# Patient Record
Sex: FEMALE | Race: WHITE | Employment: OTHER | URBAN - METROPOLITAN AREA
[De-identification: names, ages, dates, MRNs, and addresses within clinical notes are randomized per-mention and may not be internally consistent; named-entity substitution may affect disease eponyms.]

---

## 2017-02-06 ENCOUNTER — TELEPHONE (OUTPATIENT)
Dept: CARDIOLOGY CLINIC | Age: 77
End: 2017-02-06

## 2017-02-06 NOTE — TELEPHONE ENCOUNTER
Keiry Rome NP   You Less than a minute ago (3:35 PM)                 Hold 3 days prior (Routing comment)         Will fax copy of this to [address] [place] [address]  and the form they requested to be filled out by EP nurses.

## 2017-02-06 NOTE — TELEPHONE ENCOUNTER
We received faxed from [facility] [phone] stating patient will be having endoscopic procedure 05/11/2017 and wanted to know when to hold aspirin. Please advise.

## 2017-02-20 RX ORDER — ATORVASTATIN CALCIUM 20 MG/1
TABLET, FILM COATED ORAL
Qty: 90 TAB | Refills: 1 | Status: SHIPPED | OUTPATIENT
Start: 2017-02-20

## 2017-02-23 ENCOUNTER — CLINICAL SUPPORT (OUTPATIENT)
Dept: CARDIOLOGY CLINIC | Age: 77
End: 2017-02-23

## 2017-02-23 DIAGNOSIS — Z95.0 PACEMAKER: Primary | ICD-10-CM

## 2017-02-23 DIAGNOSIS — I48.92 ATRIAL FLUTTER, UNSPECIFIED TYPE (HCC): Primary | ICD-10-CM

## 2017-02-23 DIAGNOSIS — I44.2 COMPLETE HEART BLOCK (HCC): ICD-10-CM

## 2017-02-23 DIAGNOSIS — Z95.0 PACEMAKER: ICD-10-CM

## 2017-02-23 NOTE — PROGRESS NOTES
Subjective/HPI:     Kristin Pike is a 78 y.o. female is here for pacemaker f/u appt. The patient denies chest pain/ shortness of breath, orthopnea, PND, LE edema, palpitations, syncope, presyncope or fatigue. Doing well. PCP Provider  Belem Hall MD  Past Medical History:   Diagnosis Date    COPD     BY CHEST XRAY    Early satiety 1/28/2016    Hypertension     Myocardial infarction ([address] 2014    Nausea & vomiting     Occult blood in stools 1/28/2016      Past Surgical History:   Procedure Laterality Date    ABDOMEN SURGERY PROC UNLISTED      inguinal hernia repair right    HX HERNIA REPAIR  11/29/2015    Incarcerated left femoral hernia repair,.  HX PACEMAKER Left 2014     No Known Allergies   Family History   Problem Relation Age of Onset    Heart Disease Mother     Cancer Father      BLADDER    Other Sister      RA    Other Brother      RA    Alcohol abuse Brother       Current Outpatient Prescriptions   Medication Sig    apixaban (ELIQUIS) 5 mg tablet Take 1 Tab by mouth two (2) times a day.  atorvastatin (LIPITOR) 20 mg tablet TAKE 1 TABLET BY MOUTH EVERY DAY    carvedilol (COREG) 12.5 mg tablet Take 1 Tab by mouth two (2) times daily (with meals).  lisinopril (PRINIVIL, ZESTRIL) 5 mg tablet Take 1 Tab by mouth daily.  HYDROcodone-acetaminophen (NORCO) 5-325 mg per tablet Take 1 Tab by mouth as needed. NOT TAKING    Omeprazole delayed release (PRILOSEC D/R) 20 mg tablet Take 1 Tab by mouth daily. Please take 1/2 to 1 hour before a meal.    Please be aware that the use of medications in the proton pump inhibitor class  have been associated with hip fractures, osteoporosis, pneumonia and diarrhea.  aspirin delayed-release 81 mg tablet Take 81 mg by mouth daily.  multivitamin (ONE A DAY) tablet Take 1 Tab by mouth daily. No current facility-administered medications for this visit. There were no vitals filed for this visit.   Social History Social History    Marital status:      Spouse name: N/A    Number of children: N/A    Years of education: N/A     Occupational History    Not on file. Social History Main Topics    Smoking status: Former Smoker     Packs/day: 1.00     Years: 50.00     Types: Cigarettes     Quit date: 9/4/2014    Smokeless tobacco: Never Used    Alcohol use No    Drug use: No    Sexual activity: Not on file     Other Topics Concern    Not on file     Social History Narrative           Review of Symptoms:    General: Pt denies excessive weight gain or loss. Pt is able to conduct ADL's  HEENT: Denies blurred vision, headaches, epistaxis and difficulty swallowing. Respiratory: Denies shortness of breath, BURKETT, wheezing or stridor. Cardiovascular: Denies precordial pain, palpitations, edema or PND  Gastrointestinal: Denies poor appetite, indigestion, abdominal pain or blood in stool  Urinary: Denies dysuria, pyuria  Musculoskeletal: Denies pain or swelling from muscles or joints  Neurologic: Denies tremor, paresthesias, or sensory motor disturbance  Skin: Denies rash, itching or texture change.   Psych: Denies depression      Cardiographics    [employer] pacemaker- 2% A paced  Atrial flutter 9.2% time    Results for orders placed or performed during the hospital encounter of 11/27/15   EKG, 12 LEAD, INITIAL   Result Value Ref Range    Ventricular Rate 75 BPM    Atrial Rate 75 BPM    P-R Interval 172 ms    QRS Duration 76 ms    Q-T Interval 358 ms    QTC Calculation (Bezet) 399 ms    Calculated P Axis 70 degrees    Calculated R Axis 66 degrees    Calculated T Axis 71 degrees    Diagnosis       Poor data quality  Sinus rhythm with premature atrial complexes  Possible Left atrial enlargement  Nonspecific T wave abnormality    Anterolateral injury pattern has resolved since 9/2014  Confirmed by Shahab Gonsalez ([id number]) on 11/29/2015 3:31:04 PM           Cardiology Labs:  Lab Results   Component Value Date/Time Cholesterol, total 183 09/05/2014 04:30 AM    HDL Cholesterol 61 09/05/2014 04:30 AM    LDL, calculated 105.6 09/05/2014 04:30 AM    Triglyceride 82 09/05/2014 04:30 AM    CHOL/HDL Ratio 3.0 09/05/2014 04:30 AM       Lab Results   Component Value Date/Time    Sodium 135 12/13/2015 11:47 AM    Potassium 4.0 12/13/2015 11:47 AM    Chloride 98 12/13/2015 11:47 AM    CO2 32 12/13/2015 11:47 AM    Anion gap 5 12/13/2015 11:47 AM    Glucose 102 12/13/2015 11:47 AM    BUN 6 12/13/2015 11:47 AM    Creatinine 0.60 12/13/2015 11:47 AM    BUN/Creatinine ratio 10 12/13/2015 11:47 AM    GFR est AA >60 12/13/2015 11:47 AM    GFR est non-AA >60 12/13/2015 11:47 AM    Calcium 9.4 12/13/2015 11:47 AM    AST (SGOT) 28 12/13/2015 11:47 AM    Alk. phosphatase 109 12/13/2015 11:47 AM    Protein, total 7.4 12/13/2015 11:47 AM    Albumin 3.4 12/13/2015 11:47 AM    Globulin 4.0 12/13/2015 11:47 AM    A-G Ratio 0.9 12/13/2015 11:47 AM    ALT (SGPT) 38 12/13/2015 11:47 AM           Assessment:     Assessment:     Carolina Duane was seen today for pacemaker check. Diagnoses and all orders for this visit:    Atrial flutter, unspecified type ([address]  -     apixaban (ELIQUIS) 5 mg tablet; Take 1 Tab by mouth two (2) times a day. Complete heart block ([address]    Pacemaker        ICD-10-CM ICD-9-CM    1. Atrial flutter, unspecified type (HCC) I48.92 427.32 apixaban (ELIQUIS) 5 mg tablet   2. Complete heart block (HCC) I44.2 426.0    3. Pacemaker Z95.0 V45.01      Orders Placed This Encounter    apixaban (ELIQUIS) 5 mg tablet     Sig: Take 1 Tab by mouth two (2) times a day. Dispense:  60 Tab     Refill:  6        Plan:     Patient presents today for her pacemaker f/u appt, she denies cardiac complaints. She was found today to have new onset of atrial flutter. She is currently taking Coreg 12.5mg po bid and trends lower normotensive. She is a chads vasc score greater then 2, I will start Eliquis 5mg po bid and continue ASA.  I will have her return to discuss tx options with Dr Fercho Davis, information on atrial flutter ablation given to the patient to review before her appt.     Blanca Parra, NP

## 2017-03-07 ENCOUNTER — OFFICE VISIT (OUTPATIENT)
Dept: CARDIOLOGY CLINIC | Age: 77
End: 2017-03-07

## 2017-03-07 VITALS
OXYGEN SATURATION: 96 % | SYSTOLIC BLOOD PRESSURE: 140 MMHG | BODY MASS INDEX: 18.64 KG/M2 | HEIGHT: 72 IN | RESPIRATION RATE: 14 BRPM | DIASTOLIC BLOOD PRESSURE: 76 MMHG | WEIGHT: 137.6 LBS | HEART RATE: 62 BPM

## 2017-03-07 DIAGNOSIS — I48.0 PAROXYSMAL ATRIAL FIBRILLATION (HCC): Primary | ICD-10-CM

## 2017-03-07 DIAGNOSIS — I48.3 TYPICAL ATRIAL FLUTTER (HCC): ICD-10-CM

## 2017-03-07 DIAGNOSIS — I48.0 PAROXYSMAL ATRIAL FIBRILLATION (HCC): ICD-10-CM

## 2017-03-07 DIAGNOSIS — I10 ESSENTIAL HYPERTENSION: Primary | ICD-10-CM

## 2017-03-07 RX ORDER — ALBUTEROL SULFATE 90 UG/1
AEROSOL, METERED RESPIRATORY (INHALATION)
Refills: 0 | COMMUNITY
Start: 2017-02-09 | End: 2018-02-13

## 2017-03-07 RX ORDER — POLYETHYLENE GLYCOL 3350, SODIUM CHLORIDE, SODIUM BICARBONATE, POTASSIUM CHLORIDE 420; 11.2; 5.72; 1.48 G/4L; G/4L; G/4L; G/4L
POWDER, FOR SOLUTION ORAL
Refills: 0 | COMMUNITY
Start: 2017-02-19 | End: 2017-05-10

## 2017-03-07 NOTE — MR AVS SNAPSHOT
Visit Information Date & Time Provider Department Dept. Phone Encounter #  
 3/7/2017 10:45 AM Ap العلي, [address] [facility] [phone] [id number] Upcoming Health Maintenance Date Due DTaP/Tdap/Td series (1 - Tdap) 12/16/1963 FOBT Q 1 YEAR AGE 50-75 12/16/1992 ZOSTER VACCINE AGE 60> 12/16/2002 GLAUCOMA SCREENING Q2Y 12/16/2007 OSTEOPOROSIS SCREENING (DEXA) 12/16/2007 Pneumococcal 65+ Low/Medium Risk (1 of 2 - PCV13) 12/16/2007 MEDICARE YEARLY EXAM 12/16/2007 INFLUENZA AGE 9 TO ADULT 8/1/2016 Allergies as of 3/7/2017  Review Complete On: 3/7/2017 By: Marty Galvin No Known Allergies Current Immunizations  Reviewed on 11/30/2015 No immunizations on file. Not reviewed this visit You Were Diagnosed With   
  
 Codes Comments Essential hypertension    -  Primary ICD-10-CM: I10 
ICD-9-CM: 401.9 Vitals BP Pulse Resp Height(growth percentile) Weight(growth percentile) SpO2  
 140/76 (BP 1 Location: Right arm, BP Patient Position: Sitting) 62 14 6' (1.829 m) 137 lb 9.6 oz (62.4 kg) 96% BMI OB Status Smoking Status 18.66 kg/m2 Postmenopausal Former Smoker Vitals History BMI and BSA Data Body Mass Index Body Surface Area  
 18.66 kg/m 2 1.78 m 2 Preferred Pharmacy Pharmacy Name Phone [id number] [phone] Your Updated Medication List  
  
   
This list is accurate as of: 3/7/17 11:18 AM.  Always use your most recent med list.  
  
  
  
  
 apixaban 5 mg tablet Commonly known as:  Timmothy [place] Take 1 Tab by mouth two (2) times a day. aspirin delayed-release 81 mg tablet Take 81 mg by mouth daily. atorvastatin 20 mg tablet Commonly known as:  LIPITOR  
TAKE 1 TABLET BY MOUTH EVERY DAY  
  
 carvedilol 12.5 mg tablet Commonly known as:  King Liter  
 Take 1 Tab by mouth two (2) times daily (with meals). HYDROcodone-acetaminophen 5-325 mg per tablet Commonly known as:  [place]aqas Bravo Take 1 Tab by mouth as needed. NOT TAKING  
  
 lisinopril 5 mg tablet Commonly known as:   Take 1 Tab by mouth daily. multivitamin tablet Commonly known as:  ONE A DAY Take 1 Tab by mouth daily. Omeprazole delayed release 20 mg tablet Commonly known as:  PRILOSEC D/R Take 1 Tab by mouth daily. Please take 1/2 to 1 hour before a meal.   Please be aware that the use of medications in the proton pump inhibitor class  have been associated with hip fractures, osteoporosis, pneumonia and diarrhea.  
  
 peg-electrolyte soln 420 gram solution Commonly known as:  NULYTELY  
USE AS DIRECTED VENTOLIN HFA 90 mcg/actuation inhaler Generic drug:  albuterol INHALE 2 PUFFS EVERY 4 HOURS AS NEEDED We Performed the Following AMB POC EKG ROUTINE W/ 12 LEADS, INTER & REP [67148 CPT(R)] Introducing [facility]! Lorena Hardwick introduces [employer] patient portal. Now you can access parts of your medical record, email your doctor's office, and request medication refills online. 1. In your internet browser, go to [email or web address] 2. Click on the First Time User? Click Here link in the Sign In box. You will see the New Member Sign Up page. 3. Enter your [employer] Access Code exactly as it appears below. You will not need to use this code after youve completed the sign-up process. If you do not sign up before the expiration date, you must request a new code. · [employer] Access Code: [email or web address] Expires: 2017 12:46 PM 
 
4. Enter the last four digits of your Social Security Number (xxxx) and Date of Birth (mm/dd/yyyy) as indicated and click Submit. You will be taken to the next sign-up page. 5. Create a [employer] ID.  This will be your [employer] login ID and cannot be changed, so think of one that is secure and easy to remember. 6. Create a [employer] password. You can change your password at any time. 7. Enter your Password Reset Question and Answer. This can be used at a later time if you forget your password. 8. Enter your e-mail address. You will receive e-mail notification when new information is available in [address] 9. Click Sign Up. You can now view and download portions of your medical record. 10. Click the Download Summary menu link to download a portable copy of your medical information. If you have questions, please visit the Frequently Asked Questions section of the [employer] website. Remember, [employer] is NOT to be used for urgent needs. For medical emergencies, dial 911. Now available from your iPhone and Android! Please provide this summary of care documentation to your next provider. Your primary care clinician is listed as Kamille Ricks. If you have any questions after today's visit, please call [phone].

## 2017-03-07 NOTE — PROGRESS NOTES
Subjective:      Odalis Gotti is a 78 y.o. female is here for PAF/PAFL noted on device check. She has palpitations. Patient Active Problem List    Diagnosis Date Noted    Occult blood in stools 01/28/2016    Early satiety 01/28/2016    Femoral hernia with obstruction 11/29/2015    SBO (small bowel obstruction) ([address] 11/28/2015    Pacemaker 09/08/2014    Complete heart block ([address] 09/06/2014    Acute MI, inferolateral wall ([address] 09/04/2014    Hypertension 09/04/2014    Tobacco abuse 09/04/2014    Takotsubo cardiomyopathy 09/04/2014    STEMI (ST elevation myocardial infarction) ([address] 09/04/2014      Gildardo Diaz MD  Past Medical History:   Diagnosis Date    COPD     BY CHEST XRAY    Early satiety 1/28/2016    Hypertension     Myocardial infarction ([address] 2014    Nausea & vomiting     Occult blood in stools 1/28/2016      Past Surgical History:   Procedure Laterality Date    ABDOMEN SURGERY PROC UNLISTED      inguinal hernia repair right    HX HERNIA REPAIR  11/29/2015    Incarcerated left femoral hernia repair,.  HX PACEMAKER Left 2014     No Known Allergies   Family History   Problem Relation Age of Onset    Heart Disease Mother     Cancer Father      BLADDER    Other Sister      RA    Other Brother      RA    Alcohol abuse Brother     negative for cardiac disease  Social History     Social History    Marital status:      Spouse name: N/A    Number of children: N/A    Years of education: N/A     Social History Main Topics    Smoking status: Former Smoker     Packs/day: 1.00     Years: 50.00     Types: Cigarettes     Quit date: 9/4/2014    Smokeless tobacco: Never Used    Alcohol use No    Drug use: No    Sexual activity: Not Asked     Other Topics Concern    None     Social History Narrative     Current Outpatient Prescriptions   Medication Sig    apixaban (ELIQUIS) 5 mg tablet Take 1 Tab by mouth two (2) times a day.     atorvastatin (LIPITOR) 20 mg tablet TAKE 1 TABLET BY MOUTH EVERY DAY    carvedilol (COREG) 12.5 mg tablet Take 1 Tab by mouth two (2) times daily (with meals).  lisinopril (PRINIVIL, ZESTRIL) 5 mg tablet Take 1 Tab by mouth daily.  aspirin delayed-release 81 mg tablet Take 81 mg by mouth daily.  multivitamin (ONE A DAY) tablet Take 1 Tab by mouth daily.  VENTOLIN HFA 90 mcg/actuation inhaler INHALE 2 PUFFS EVERY 4 HOURS AS NEEDED    peg-electrolyte soln (NULYTELY) 420 gram solution USE AS DIRECTED    HYDROcodone-acetaminophen (NORCO) 5-325 mg per tablet Take 1 Tab by mouth as needed. NOT TAKING    Omeprazole delayed release (PRILOSEC D/R) 20 mg tablet Take 1 Tab by mouth daily. Please take 1/2 to 1 hour before a meal.    Please be aware that the use of medications in the proton pump inhibitor class  have been associated with hip fractures, osteoporosis, pneumonia and diarrhea. No current facility-administered medications for this visit. Vitals:    03/07/17 1037   BP: 140/76   Pulse: 62   Resp: 14   SpO2: 96%   Weight: 137 lb 9.6 oz (62.4 kg)   Height: 6' (1.829 m)       I have reviewed the nurses notes, vitals, problem list, allergy list, medical history, family, social history and medications. Review of Symptoms:    General: Pt denies excessive weight gain or loss. Pt is able to conduct ADL's  HEENT: Denies blurred vision, headaches, epistaxis and difficulty swallowing. Respiratory: Denies shortness of breath, BURKETT, wheezing or stridor. Cardiovascular: Denies precordial pain, palpitations, edema or PND  Gastrointestinal: Denies poor appetite, indigestion, abdominal pain or blood in stool  Urinary: Denies dysuria, pyuria  Musculoskeletal: Denies pain or swelling from muscles or joints  Neurologic: Denies tremor, paresthesias, or sensory motor disturbance  Skin: Denies rash, itching or texture change. Psych: Denies depression      Physical Exam:      General: Well developed, in no acute distress.   HEENT: Eyes - PERRL, no jvd  Heart:  Normal S1/S2 negative S3 or S4. Regular, no murmur, gallop or rub.   Respiratory: Clear bilaterally x 4, no wheezing or rales  Abdomen:   Soft, non-tender, bowel sounds are active.   Extremities:  No edema, normal cap refill, no cyanosis. Musculoskeletal: No clubbing  Neuro: A&Ox3, speech clear, gait stable. Skin: Skin color is normal. No rashes or lesions.  Non diaphoretic  Vascular: 2+ pulses symmetric in all extremities    Cardiographics    Ekg: a paced    Pacer - pafib,pafl    Results for orders placed or performed during the hospital encounter of 11/27/15   EKG, 12 LEAD, INITIAL   Result Value Ref Range    Ventricular Rate 75 BPM    Atrial Rate 75 BPM    P-R Interval 172 ms    QRS Duration 76 ms    Q-T Interval 358 ms    QTC Calculation (Bezet) 399 ms    Calculated P Axis 70 degrees    Calculated R Axis 66 degrees    Calculated T Axis 71 degrees    Diagnosis       Poor data quality  Sinus rhythm with premature atrial complexes  Possible Left atrial enlargement  Nonspecific T wave abnormality    Anterolateral injury pattern has resolved since 9/2014  Confirmed by Jerry Lopez ([id number]) on 11/29/2015 3:31:04 PM           Lab Results   Component Value Date/Time    WBC 8.0 12/13/2015 11:47 AM    Hemoglobin (POC) 15.0 09/04/2014 06:16 PM    HGB 13.4 12/13/2015 11:47 AM    Hematocrit (POC) 44 09/04/2014 06:16 PM    HCT 40.9 12/13/2015 11:47 AM    PLATELET 376 96/64/0245 11:47 AM    MCV 90.9 12/13/2015 11:47 AM      Lab Results   Component Value Date/Time    Sodium 135 12/13/2015 11:47 AM    Potassium 4.0 12/13/2015 11:47 AM    Chloride 98 12/13/2015 11:47 AM    CO2 32 12/13/2015 11:47 AM    Anion gap 5 12/13/2015 11:47 AM    Glucose 102 12/13/2015 11:47 AM    BUN 6 12/13/2015 11:47 AM    Creatinine 0.60 12/13/2015 11:47 AM    BUN/Creatinine ratio 10 12/13/2015 11:47 AM    GFR est AA >60 12/13/2015 11:47 AM    GFR est non-AA >60 12/13/2015 11:47 AM    Calcium 9.4 12/13/2015 11:47 AM    Bilirubin, total 0.4 12/13/2015 11:47 AM    AST (SGOT) 28 12/13/2015 11:47 AM    Alk. phosphatase 109 12/13/2015 11:47 AM    Protein, total 7.4 12/13/2015 11:47 AM    Albumin 3.4 12/13/2015 11:47 AM    Globulin 4.0 12/13/2015 11:47 AM    A-G Ratio 0.9 12/13/2015 11:47 AM    ALT (SGPT) 38 12/13/2015 11:47 AM         Assessment:     Assessment:        ICD-10-CM ICD-9-CM    1. Essential hypertension I10 401.9 AMB POC EKG ROUTINE W/ 12 LEADS, INTER & REP   2. Typical atrial flutter (HCC) I48.3 427.32    3. Paroxysmal atrial fibrillation (HCC) I48.0 427.31      Orders Placed This Encounter    AMB POC EKG ROUTINE W/ 12 LEADS, INTER & REP     Order Specific Question:   Reason for Exam:     Answer:   Routine    VENTOLIN HFA 90 mcg/actuation inhaler     Sig: INHALE 2 PUFFS EVERY 4 HOURS AS NEEDED     Refill:  0    peg-electrolyte soln (NULYTELY) 420 gram solution     Sig: USE AS DIRECTED     Refill:  0        Plan:   Ms Linder is a pleasant lady with symptomatic paroxysmal AF and paroxysmal AFL (9.2% of the time). She is a candidate for an afib ablation. I discussed the risks/benefits/alternatives of the procedure with the patient. Risks include (but are not limited to) bleeding, heart block, infection, cva/mi/tamponade/esophageal perforation/pv stenosis/death. The patient understands that there is a 5-9% major complication rate and agrees to proceed. Thank you for this interesting consultation. Continue medical management for htn. Thank you for allowing me to participate in Geoff Lazar 's care.     Aliyah Doherty MD, Ninfa Sarmiento

## 2017-03-13 ENCOUNTER — HOSPITAL ENCOUNTER (OUTPATIENT)
Dept: LAB | Age: 77
Discharge: HOME | End: 2017-03-13
Payer: MEDICARE

## 2017-03-13 PROCEDURE — 36415 COLL VENOUS BLD VENIPUNCTURE: CPT

## 2017-03-13 PROCEDURE — 83735 ASSAY OF MAGNESIUM: CPT

## 2017-03-13 PROCEDURE — 85025 COMPLETE CBC W/AUTO DIFF WBC: CPT

## 2017-03-13 PROCEDURE — 85610 PROTHROMBIN TIME: CPT

## 2017-03-13 PROCEDURE — 80053 COMPREHEN METABOLIC PANEL: CPT

## 2017-03-14 LAB
ALBUMIN SERPL-MCNC: 3.9 G/DL (ref 3.5–4.8)
ALBUMIN/GLOB SERPL: 1.4 {RATIO} (ref 1.2–2.2)
ALP SERPL-CCNC: 92 IU/L (ref 39–117)
ALT SERPL-CCNC: 29 IU/L (ref 0–32)
AST SERPL-CCNC: 33 IU/L (ref 0–40)
BASOPHILS # BLD AUTO: 0 X10E3/UL (ref 0–0.2)
BASOPHILS NFR BLD AUTO: 0 %
BILIRUB SERPL-MCNC: 0.4 MG/DL (ref 0–1.2)
BUN SERPL-MCNC: 11 MG/DL (ref 8–27)
BUN/CREAT SERPL: 17 (ref 11–26)
CALCIUM SERPL-MCNC: 9.7 MG/DL (ref 8.7–10.3)
CHLORIDE SERPL-SCNC: 91 MMOL/L (ref 96–106)
CO2 SERPL-SCNC: 29 MMOL/L (ref 18–29)
CREAT SERPL-MCNC: 0.63 MG/DL (ref 0.57–1)
EOSINOPHIL # BLD AUTO: 0.1 X10E3/UL (ref 0–0.4)
EOSINOPHIL NFR BLD AUTO: 2 %
ERYTHROCYTE [DISTWIDTH] IN BLOOD BY AUTOMATED COUNT: 14.1 % (ref 12.3–15.4)
GLOBULIN SER CALC-MCNC: 2.8 G/DL (ref 1.5–4.5)
GLUCOSE SERPL-MCNC: 104 MG/DL (ref 65–99)
HCT VFR BLD AUTO: 42.5 % (ref 34–46.6)
HGB BLD-MCNC: 13.9 G/DL (ref 11.1–15.9)
IMM GRANULOCYTES # BLD: 0 X10E3/UL (ref 0–0.1)
IMM GRANULOCYTES NFR BLD: 0 %
INR PPP: 1 (ref 0.8–1.2)
LYMPHOCYTES # BLD AUTO: 1.2 X10E3/UL (ref 0.7–3.1)
LYMPHOCYTES NFR BLD AUTO: 23 %
MAGNESIUM SERPL-MCNC: 1.9 MG/DL (ref 1.6–2.3)
MCH RBC QN AUTO: 30.5 PG (ref 26.6–33)
MCHC RBC AUTO-ENTMCNC: 32.7 G/DL (ref 31.5–35.7)
MCV RBC AUTO: 93 FL (ref 79–97)
MONOCYTES # BLD AUTO: 0.6 X10E3/UL (ref 0.1–0.9)
MONOCYTES NFR BLD AUTO: 12 %
NEUTROPHILS # BLD AUTO: 3.4 X10E3/UL (ref 1.4–7)
NEUTROPHILS NFR BLD AUTO: 63 %
PLATELET # BLD AUTO: 178 X10E3/UL (ref 150–379)
POTASSIUM SERPL-SCNC: 4.6 MMOL/L (ref 3.5–5.2)
PROT SERPL-MCNC: 6.7 G/DL (ref 6–8.5)
PROTHROMBIN TIME: 10 SEC (ref 9.1–12)
RBC # BLD AUTO: 4.56 X10E6/UL (ref 3.77–5.28)
SODIUM SERPL-SCNC: 133 MMOL/L (ref 134–144)
WBC # BLD AUTO: 5.3 X10E3/UL (ref 3.4–10.8)

## 2017-03-22 ENCOUNTER — ANESTHESIA (OUTPATIENT)
Dept: NON INVASIVE DIAGNOSTICS | Age: 77
End: 2017-03-22
Payer: MEDICARE

## 2017-03-22 ENCOUNTER — SURGERY (OUTPATIENT)
Age: 77
End: 2017-03-22

## 2017-03-22 ENCOUNTER — HOSPITAL ENCOUNTER (OUTPATIENT)
Dept: NON INVASIVE DIAGNOSTICS | Age: 77
Setting detail: OBSERVATION
Discharge: HOME | End: 2017-03-23
Attending: INTERNAL MEDICINE | Admitting: INTERNAL MEDICINE
Payer: MEDICARE

## 2017-03-22 ENCOUNTER — ANESTHESIA EVENT (OUTPATIENT)
Dept: NON INVASIVE DIAGNOSTICS | Age: 77
End: 2017-03-22
Payer: MEDICARE

## 2017-03-22 LAB
ACT BLD: 142 SECS (ref 79–138)
ACT BLD: 343 SECS (ref 79–138)

## 2017-03-22 PROCEDURE — 77030034850

## 2017-03-22 PROCEDURE — 77030030806 HC PTCH ENSIT NAVX STJU -G

## 2017-03-22 PROCEDURE — 74011000250 HC RX REV CODE- 250

## 2017-03-22 PROCEDURE — 77030013797 HC KT TRNSDUC PRSSR EDWD -A

## 2017-03-22 PROCEDURE — 77030010880 HC CBL EP SUPRME STJU -C

## 2017-03-22 PROCEDURE — C1733 CATH, EP, OTHR THAN COOL-TIP: HCPCS

## 2017-03-22 PROCEDURE — 76060000035 HC ANESTHESIA 2 TO 2.5 HR

## 2017-03-22 PROCEDURE — C1731 CATH, EP, 20 OR MORE ELEC: HCPCS

## 2017-03-22 PROCEDURE — 77030029065 HC DRSG HEMO QCLOT ZMED -B

## 2017-03-22 PROCEDURE — C1730 CATH, EP, 19 OR FEW ELECT: HCPCS

## 2017-03-22 PROCEDURE — 77030013079 HC BLNKT BAIR HGGR 3M -A: Performed by: ANESTHESIOLOGY

## 2017-03-22 PROCEDURE — 77030030278 HC COAX UMB DISP MEDT -C

## 2017-03-22 PROCEDURE — 77030016894 HC CBL EP DX CATH3 STJU -B

## 2017-03-22 PROCEDURE — 77030011640 HC PAD GRND REM COVD -A

## 2017-03-22 PROCEDURE — C1894 INTRO/SHEATH, NON-LASER: HCPCS

## 2017-03-22 PROCEDURE — 99218 HC RM OBSERVATION: CPT

## 2017-03-22 PROCEDURE — 77030020506 HC NDL TRNSPTL NRG BAYL -F

## 2017-03-22 PROCEDURE — 77030020061 HC IV BLD WRMR ADMIN SET 3M -B: Performed by: ANESTHESIOLOGY

## 2017-03-22 PROCEDURE — 93325 DOPPLER ECHO COLOR FLOW MAPG: CPT

## 2017-03-22 PROCEDURE — 77030027138 HC INCENT SPIROMETER -A

## 2017-03-22 PROCEDURE — 77030018729 HC ELECTRD DEFIB PAD CARD -B

## 2017-03-22 PROCEDURE — 74011250636 HC RX REV CODE- 250/636

## 2017-03-22 PROCEDURE — C1769 GUIDE WIRE: HCPCS

## 2017-03-22 PROCEDURE — 74011250636 HC RX REV CODE- 250/636: Performed by: INTERNAL MEDICINE

## 2017-03-22 PROCEDURE — 93613 INTRACARDIAC EPHYS 3D MAPG: CPT

## 2017-03-22 PROCEDURE — 77030026438 HC STYL ET INTUB CARD -A: Performed by: ANESTHESIOLOGY

## 2017-03-22 PROCEDURE — 77030029163 HC CBL EP DX ACHV DISP MEDT -C

## 2017-03-22 PROCEDURE — 77030008543 HC TBNG MON PRSS MRTM -A

## 2017-03-22 PROCEDURE — 77030019908 HC STETH ESOPH SIMS -A: Performed by: ANESTHESIOLOGY

## 2017-03-22 PROCEDURE — C1893 INTRO/SHEATH, FIXED,NON-PEEL: HCPCS

## 2017-03-22 PROCEDURE — 77030021678 HC GLIDESCP STAT DISP VERT -B: Performed by: ANESTHESIOLOGY

## 2017-03-22 PROCEDURE — C1759 CATH, INTRA ECHOCARDIOGRAPHY: HCPCS

## 2017-03-22 PROCEDURE — 77030030261 HC CBL UMB ELEC DISP MEDT -C

## 2017-03-22 PROCEDURE — 76210000006 HC OR PH I REC 0.5 TO 1 HR

## 2017-03-22 PROCEDURE — 74011636320 HC RX REV CODE- 636/320: Performed by: INTERNAL MEDICINE

## 2017-03-22 PROCEDURE — 77030008684 HC TU ET CUF COVD -B: Performed by: ANESTHESIOLOGY

## 2017-03-22 PROCEDURE — 74011250637 HC RX REV CODE- 250/637: Performed by: INTERNAL MEDICINE

## 2017-03-22 PROCEDURE — 85347 COAGULATION TIME ACTIVATED: CPT

## 2017-03-22 RX ORDER — ATORVASTATIN CALCIUM 20 MG/1
20 TABLET, FILM COATED ORAL
Status: DISCONTINUED | OUTPATIENT
Start: 2017-03-22 | End: 2017-03-23 | Stop reason: HOSPADM

## 2017-03-22 RX ORDER — FENTANYL CITRATE 50 UG/ML
12.5-5 INJECTION, SOLUTION INTRAMUSCULAR; INTRAVENOUS
Status: DISCONTINUED | OUTPATIENT
Start: 2017-03-22 | End: 2017-03-23 | Stop reason: HOSPADM

## 2017-03-22 RX ORDER — HEPARIN SODIUM 1000 [USP'U]/ML
INJECTION, SOLUTION INTRAVENOUS; SUBCUTANEOUS
Status: DISCONTINUED | OUTPATIENT
Start: 2017-03-22 | End: 2017-03-22 | Stop reason: HOSPADM

## 2017-03-22 RX ORDER — SODIUM CHLORIDE, SODIUM LACTATE, POTASSIUM CHLORIDE, CALCIUM CHLORIDE 600; 310; 30; 20 MG/100ML; MG/100ML; MG/100ML; MG/100ML
100 INJECTION, SOLUTION INTRAVENOUS CONTINUOUS
Status: CANCELLED | OUTPATIENT
Start: 2017-03-22 | End: 2017-03-23

## 2017-03-22 RX ORDER — SODIUM CHLORIDE 9 MG/ML
INJECTION, SOLUTION INTRAVENOUS
Status: DISCONTINUED | OUTPATIENT
Start: 2017-03-22 | End: 2017-03-22 | Stop reason: HOSPADM

## 2017-03-22 RX ORDER — LISINOPRIL 5 MG/1
5 TABLET ORAL DAILY
Status: DISCONTINUED | OUTPATIENT
Start: 2017-03-23 | End: 2017-03-23 | Stop reason: HOSPADM

## 2017-03-22 RX ORDER — HYDROCODONE BITARTRATE AND ACETAMINOPHEN 5; 325 MG/1; MG/1
1 TABLET ORAL AS NEEDED
Status: CANCELLED | OUTPATIENT
Start: 2017-03-22

## 2017-03-22 RX ORDER — MIDAZOLAM HYDROCHLORIDE 1 MG/ML
1-5 INJECTION, SOLUTION INTRAMUSCULAR; INTRAVENOUS
Status: DISCONTINUED | OUTPATIENT
Start: 2017-03-22 | End: 2017-03-23 | Stop reason: HOSPADM

## 2017-03-22 RX ORDER — PROPOFOL 10 MG/ML
INJECTION, EMULSION INTRAVENOUS
Status: DISCONTINUED | OUTPATIENT
Start: 2017-03-22 | End: 2017-03-22 | Stop reason: HOSPADM

## 2017-03-22 RX ORDER — FENTANYL CITRATE 50 UG/ML
INJECTION, SOLUTION INTRAMUSCULAR; INTRAVENOUS AS NEEDED
Status: DISCONTINUED | OUTPATIENT
Start: 2017-03-22 | End: 2017-03-22 | Stop reason: HOSPADM

## 2017-03-22 RX ORDER — ACETAMINOPHEN 325 MG/1
650 TABLET ORAL
Status: DISCONTINUED | OUTPATIENT
Start: 2017-03-22 | End: 2017-03-23 | Stop reason: HOSPADM

## 2017-03-22 RX ORDER — PROTAMINE SULFATE 10 MG/ML
25-100 INJECTION, SOLUTION INTRAVENOUS
Status: DISCONTINUED | OUTPATIENT
Start: 2017-03-22 | End: 2017-03-23 | Stop reason: HOSPADM

## 2017-03-22 RX ORDER — DOBUTAMINE HYDROCHLORIDE 200 MG/100ML
INJECTION INTRAVENOUS
Status: DISCONTINUED | OUTPATIENT
Start: 2017-03-22 | End: 2017-03-22 | Stop reason: HOSPADM

## 2017-03-22 RX ORDER — HEPARIN SODIUM 1000 [USP'U]/ML
INJECTION, SOLUTION INTRAVENOUS; SUBCUTANEOUS
Status: DISCONTINUED
Start: 2017-03-22 | End: 2017-03-23 | Stop reason: HOSPADM

## 2017-03-22 RX ORDER — MIDAZOLAM HYDROCHLORIDE 1 MG/ML
INJECTION, SOLUTION INTRAMUSCULAR; INTRAVENOUS AS NEEDED
Status: DISCONTINUED | OUTPATIENT
Start: 2017-03-22 | End: 2017-03-22 | Stop reason: HOSPADM

## 2017-03-22 RX ORDER — FENTANYL CITRATE 50 UG/ML
INJECTION, SOLUTION INTRAMUSCULAR; INTRAVENOUS
Status: DISPENSED
Start: 2017-03-22 | End: 2017-03-22

## 2017-03-22 RX ORDER — DIPHENHYDRAMINE HYDROCHLORIDE 50 MG/ML
12.5 INJECTION, SOLUTION INTRAMUSCULAR; INTRAVENOUS AS NEEDED
Status: CANCELLED | OUTPATIENT
Start: 2017-03-22 | End: 2017-03-22

## 2017-03-22 RX ORDER — DOBUTAMINE HYDROCHLORIDE 200 MG/100ML
INJECTION INTRAVENOUS
Status: DISCONTINUED
Start: 2017-03-22 | End: 2017-03-23 | Stop reason: HOSPADM

## 2017-03-22 RX ORDER — LIDOCAINE HYDROCHLORIDE 20 MG/ML
INJECTION, SOLUTION EPIDURAL; INFILTRATION; INTRACAUDAL; PERINEURAL AS NEEDED
Status: DISCONTINUED | OUTPATIENT
Start: 2017-03-22 | End: 2017-03-22 | Stop reason: HOSPADM

## 2017-03-22 RX ORDER — SUCCINYLCHOLINE CHLORIDE 20 MG/ML
INJECTION INTRAMUSCULAR; INTRAVENOUS AS NEEDED
Status: DISCONTINUED | OUTPATIENT
Start: 2017-03-22 | End: 2017-03-22 | Stop reason: HOSPADM

## 2017-03-22 RX ORDER — MIDAZOLAM HYDROCHLORIDE 1 MG/ML
0.5 INJECTION, SOLUTION INTRAMUSCULAR; INTRAVENOUS
Status: CANCELLED | OUTPATIENT
Start: 2017-03-22

## 2017-03-22 RX ORDER — SODIUM CHLORIDE 0.9 % (FLUSH) 0.9 %
5-10 SYRINGE (ML) INJECTION EVERY 8 HOURS
Status: DISCONTINUED | OUTPATIENT
Start: 2017-03-22 | End: 2017-03-23 | Stop reason: HOSPADM

## 2017-03-22 RX ORDER — SODIUM CHLORIDE, SODIUM LACTATE, POTASSIUM CHLORIDE, CALCIUM CHLORIDE 600; 310; 30; 20 MG/100ML; MG/100ML; MG/100ML; MG/100ML
INJECTION, SOLUTION INTRAVENOUS
Status: DISCONTINUED | OUTPATIENT
Start: 2017-03-22 | End: 2017-03-22 | Stop reason: HOSPADM

## 2017-03-22 RX ORDER — MIDAZOLAM HYDROCHLORIDE 1 MG/ML
1 INJECTION, SOLUTION INTRAMUSCULAR; INTRAVENOUS AS NEEDED
Status: CANCELLED | OUTPATIENT
Start: 2017-03-22

## 2017-03-22 RX ORDER — PROPOFOL 10 MG/ML
INJECTION, EMULSION INTRAVENOUS AS NEEDED
Status: DISCONTINUED | OUTPATIENT
Start: 2017-03-22 | End: 2017-03-22 | Stop reason: HOSPADM

## 2017-03-22 RX ORDER — FENTANYL CITRATE 50 UG/ML
25 INJECTION, SOLUTION INTRAMUSCULAR; INTRAVENOUS
Status: CANCELLED | OUTPATIENT
Start: 2017-03-22

## 2017-03-22 RX ORDER — HEPARIN SODIUM 200 [USP'U]/100ML
2000 INJECTION, SOLUTION INTRAVENOUS ONCE
Status: COMPLETED | OUTPATIENT
Start: 2017-03-22 | End: 2017-03-22

## 2017-03-22 RX ORDER — DEXAMETHASONE SODIUM PHOSPHATE 100 MG/10ML
INJECTION INTRAMUSCULAR; INTRAVENOUS AS NEEDED
Status: DISCONTINUED | OUTPATIENT
Start: 2017-03-22 | End: 2017-03-22 | Stop reason: HOSPADM

## 2017-03-22 RX ORDER — FENTANYL CITRATE 50 UG/ML
50 INJECTION, SOLUTION INTRAMUSCULAR; INTRAVENOUS AS NEEDED
Status: CANCELLED | OUTPATIENT
Start: 2017-03-22

## 2017-03-22 RX ORDER — SODIUM CHLORIDE 0.9 % (FLUSH) 0.9 %
5-10 SYRINGE (ML) INJECTION AS NEEDED
Status: DISCONTINUED | OUTPATIENT
Start: 2017-03-22 | End: 2017-03-23 | Stop reason: HOSPADM

## 2017-03-22 RX ORDER — ASPIRIN 81 MG/1
81 TABLET ORAL DAILY
Status: DISCONTINUED | OUTPATIENT
Start: 2017-03-23 | End: 2017-03-23 | Stop reason: HOSPADM

## 2017-03-22 RX ORDER — ROCURONIUM BROMIDE 10 MG/ML
INJECTION, SOLUTION INTRAVENOUS AS NEEDED
Status: DISCONTINUED | OUTPATIENT
Start: 2017-03-22 | End: 2017-03-22 | Stop reason: HOSPADM

## 2017-03-22 RX ORDER — HEPARIN SODIUM 1000 [USP'U]/ML
30000 INJECTION, SOLUTION INTRAVENOUS; SUBCUTANEOUS ONCE
Status: DISCONTINUED | OUTPATIENT
Start: 2017-03-22 | End: 2017-03-22 | Stop reason: HOSPADM

## 2017-03-22 RX ORDER — HYDROCODONE BITARTRATE AND ACETAMINOPHEN 5; 325 MG/1; MG/1
1 TABLET ORAL
Status: DISCONTINUED | OUTPATIENT
Start: 2017-03-22 | End: 2017-03-23 | Stop reason: HOSPADM

## 2017-03-22 RX ORDER — HEPARIN SODIUM 1000 [USP'U]/ML
INJECTION, SOLUTION INTRAVENOUS; SUBCUTANEOUS AS NEEDED
Status: DISCONTINUED | OUTPATIENT
Start: 2017-03-22 | End: 2017-03-22 | Stop reason: HOSPADM

## 2017-03-22 RX ORDER — HEPARIN SODIUM 10000 [USP'U]/100ML
INJECTION, SOLUTION INTRAVENOUS
Status: DISCONTINUED
Start: 2017-03-22 | End: 2017-03-23 | Stop reason: HOSPADM

## 2017-03-22 RX ORDER — MIDAZOLAM HYDROCHLORIDE 1 MG/ML
INJECTION, SOLUTION INTRAMUSCULAR; INTRAVENOUS
Status: DISPENSED
Start: 2017-03-22 | End: 2017-03-22

## 2017-03-22 RX ORDER — LIDOCAINE HYDROCHLORIDE 10 MG/ML
0.1 INJECTION, SOLUTION EPIDURAL; INFILTRATION; INTRACAUDAL; PERINEURAL AS NEEDED
Status: CANCELLED | OUTPATIENT
Start: 2017-03-22

## 2017-03-22 RX ORDER — PANTOPRAZOLE SODIUM 40 MG/1
40 TABLET, DELAYED RELEASE ORAL
Status: DISCONTINUED | OUTPATIENT
Start: 2017-03-22 | End: 2017-03-23 | Stop reason: HOSPADM

## 2017-03-22 RX ORDER — HYDROMORPHONE HYDROCHLORIDE 1 MG/ML
0.5 INJECTION, SOLUTION INTRAMUSCULAR; INTRAVENOUS; SUBCUTANEOUS
Status: CANCELLED | OUTPATIENT
Start: 2017-03-22

## 2017-03-22 RX ORDER — PROTAMINE SULFATE 10 MG/ML
INJECTION, SOLUTION INTRAVENOUS
Status: DISCONTINUED
Start: 2017-03-22 | End: 2017-03-23 | Stop reason: HOSPADM

## 2017-03-22 RX ORDER — PROTAMINE SULFATE 10 MG/ML
INJECTION, SOLUTION INTRAVENOUS AS NEEDED
Status: DISCONTINUED | OUTPATIENT
Start: 2017-03-22 | End: 2017-03-22 | Stop reason: HOSPADM

## 2017-03-22 RX ORDER — CARVEDILOL 12.5 MG/1
12.5 TABLET ORAL 2 TIMES DAILY WITH MEALS
Status: DISCONTINUED | OUTPATIENT
Start: 2017-03-22 | End: 2017-03-23 | Stop reason: HOSPADM

## 2017-03-22 RX ORDER — ONDANSETRON 2 MG/ML
4 INJECTION INTRAMUSCULAR; INTRAVENOUS AS NEEDED
Status: CANCELLED | OUTPATIENT
Start: 2017-03-22

## 2017-03-22 RX ADMIN — PROTAMINE SULFATE 50 MG: 10 INJECTION, SOLUTION INTRAVENOUS at 11:51

## 2017-03-22 RX ADMIN — DOBUTAMINE HYDROCHLORIDE 20 MCG/KG/MIN: 200 INJECTION INTRAVENOUS at 11:40

## 2017-03-22 RX ADMIN — HEPARIN SODIUM 4000 UNITS/HR: 1000 INJECTION, SOLUTION INTRAVENOUS; SUBCUTANEOUS at 10:39

## 2017-03-22 RX ADMIN — MIDAZOLAM HYDROCHLORIDE 2 MG: 1 INJECTION, SOLUTION INTRAMUSCULAR; INTRAVENOUS at 10:17

## 2017-03-22 RX ADMIN — PROPOFOL 75 MCG/KG/MIN: 10 INJECTION, EMULSION INTRAVENOUS at 10:20

## 2017-03-22 RX ADMIN — PROTAMINE SULFATE 50 MG: 10 INJECTION, SOLUTION INTRAVENOUS at 11:50

## 2017-03-22 RX ADMIN — ROCURONIUM BROMIDE 10 MG: 10 INJECTION, SOLUTION INTRAVENOUS at 10:19

## 2017-03-22 RX ADMIN — SODIUM CHLORIDE: 9 INJECTION, SOLUTION INTRAVENOUS at 12:13

## 2017-03-22 RX ADMIN — HYDROCODONE BITARTRATE AND ACETAMINOPHEN 1 TABLET: 5; 325 TABLET ORAL at 13:58

## 2017-03-22 RX ADMIN — DEXAMETHASONE SODIUM PHOSPHATE 5 MG: 100 INJECTION INTRAMUSCULAR; INTRAVENOUS at 10:24

## 2017-03-22 RX ADMIN — SUCCINYLCHOLINE CHLORIDE 140 MG: 20 INJECTION INTRAMUSCULAR; INTRAVENOUS at 10:19

## 2017-03-22 RX ADMIN — ATORVASTATIN CALCIUM 20 MG: 20 TABLET, FILM COATED ORAL at 20:50

## 2017-03-22 RX ADMIN — HEPARIN SODIUM 4000 UNITS: 200 INJECTION, SOLUTION INTRAVENOUS at 11:02

## 2017-03-22 RX ADMIN — FENTANYL CITRATE 100 MCG: 50 INJECTION, SOLUTION INTRAMUSCULAR; INTRAVENOUS at 10:19

## 2017-03-22 RX ADMIN — LIDOCAINE HYDROCHLORIDE 20 MG: 20 INJECTION, SOLUTION EPIDURAL; INFILTRATION; INTRACAUDAL; PERINEURAL at 10:19

## 2017-03-22 RX ADMIN — IOPAMIDOL 50 ML: 755 INJECTION, SOLUTION INTRAVENOUS at 11:05

## 2017-03-22 RX ADMIN — SODIUM CHLORIDE, SODIUM LACTATE, POTASSIUM CHLORIDE, CALCIUM CHLORIDE: 600; 310; 30; 20 INJECTION, SOLUTION INTRAVENOUS at 10:16

## 2017-03-22 RX ADMIN — PANTOPRAZOLE SODIUM 40 MG: 40 TABLET, DELAYED RELEASE ORAL at 18:14

## 2017-03-22 RX ADMIN — PROPOFOL 150 MG: 10 INJECTION, EMULSION INTRAVENOUS at 10:19

## 2017-03-22 RX ADMIN — Medication 10 ML: at 20:50

## 2017-03-22 RX ADMIN — CARVEDILOL 12.5 MG: 12.5 TABLET, FILM COATED ORAL at 18:14

## 2017-03-22 RX ADMIN — HEPARIN SODIUM 12000 UNITS: 1000 INJECTION, SOLUTION INTRAVENOUS; SUBCUTANEOUS at 10:31

## 2017-03-22 RX ADMIN — APIXABAN 5 MG: 5 TABLET, FILM COATED ORAL at 18:14

## 2017-03-22 NOTE — H&P (VIEW-ONLY)
Subjective:      Geoff Lazar is a 78 y.o. female is here for PAF/PAFL noted on device check. She has palpitations. Patient Active Problem List    Diagnosis Date Noted    Occult blood in stools 01/28/2016    Early satiety 01/28/2016    Femoral hernia with obstruction 11/29/2015    SBO (small bowel obstruction) ([address] 11/28/2015    Pacemaker 09/08/2014    Complete heart block ([address] 09/06/2014    Acute MI, inferolateral wall ([address] 09/04/2014    Hypertension 09/04/2014    Tobacco abuse 09/04/2014    Takotsubo cardiomyopathy 09/04/2014    STEMI (ST elevation myocardial infarction) ([address] 09/04/2014      Samantha Yanez MD  Past Medical History:   Diagnosis Date    COPD     BY CHEST XRAY    Early satiety 1/28/2016    Hypertension     Myocardial infarction ([address] 2014    Nausea & vomiting     Occult blood in stools 1/28/2016      Past Surgical History:   Procedure Laterality Date    ABDOMEN SURGERY PROC UNLISTED      inguinal hernia repair right    HX HERNIA REPAIR  11/29/2015    Incarcerated left femoral hernia repair,.  HX PACEMAKER Left 2014     No Known Allergies   Family History   Problem Relation Age of Onset    Heart Disease Mother     Cancer Father      BLADDER    Other Sister      RA    Other Brother      RA    Alcohol abuse Brother     negative for cardiac disease  Social History     Social History    Marital status:      Spouse name: N/A    Number of children: N/A    Years of education: N/A     Social History Main Topics    Smoking status: Former Smoker     Packs/day: 1.00     Years: 50.00     Types: Cigarettes     Quit date: 9/4/2014    Smokeless tobacco: Never Used    Alcohol use No    Drug use: No    Sexual activity: Not Asked     Other Topics Concern    None     Social History Narrative     Current Outpatient Prescriptions   Medication Sig    apixaban (ELIQUIS) 5 mg tablet Take 1 Tab by mouth two (2) times a day.     atorvastatin (LIPITOR) 20 mg tablet TAKE 1 TABLET BY MOUTH EVERY DAY    carvedilol (COREG) 12.5 mg tablet Take 1 Tab by mouth two (2) times daily (with meals).  lisinopril (PRINIVIL, ZESTRIL) 5 mg tablet Take 1 Tab by mouth daily.  aspirin delayed-release 81 mg tablet Take 81 mg by mouth daily.  multivitamin (ONE A DAY) tablet Take 1 Tab by mouth daily.  VENTOLIN HFA 90 mcg/actuation inhaler INHALE 2 PUFFS EVERY 4 HOURS AS NEEDED    peg-electrolyte soln (NULYTELY) 420 gram solution USE AS DIRECTED    HYDROcodone-acetaminophen (NORCO) 5-325 mg per tablet Take 1 Tab by mouth as needed. NOT TAKING    Omeprazole delayed release (PRILOSEC D/R) 20 mg tablet Take 1 Tab by mouth daily. Please take 1/2 to 1 hour before a meal.    Please be aware that the use of medications in the proton pump inhibitor class  have been associated with hip fractures, osteoporosis, pneumonia and diarrhea. No current facility-administered medications for this visit. Vitals:    03/07/17 1037   BP: 140/76   Pulse: 62   Resp: 14   SpO2: 96%   Weight: 137 lb 9.6 oz (62.4 kg)   Height: 6' (1.829 m)       I have reviewed the nurses notes, vitals, problem list, allergy list, medical history, family, social history and medications. Review of Symptoms:    General: Pt denies excessive weight gain or loss. Pt is able to conduct ADL's  HEENT: Denies blurred vision, headaches, epistaxis and difficulty swallowing. Respiratory: Denies shortness of breath, BURKETT, wheezing or stridor. Cardiovascular: Denies precordial pain, palpitations, edema or PND  Gastrointestinal: Denies poor appetite, indigestion, abdominal pain or blood in stool  Urinary: Denies dysuria, pyuria  Musculoskeletal: Denies pain or swelling from muscles or joints  Neurologic: Denies tremor, paresthesias, or sensory motor disturbance  Skin: Denies rash, itching or texture change. Psych: Denies depression      Physical Exam:      General: Well developed, in no acute distress.   HEENT: Eyes - PERRL, no jvd  Heart:  Normal S1/S2 negative S3 or S4. Regular, no murmur, gallop or rub.   Respiratory: Clear bilaterally x 4, no wheezing or rales  Abdomen:   Soft, non-tender, bowel sounds are active.   Extremities:  No edema, normal cap refill, no cyanosis. Musculoskeletal: No clubbing  Neuro: A&Ox3, speech clear, gait stable. Skin: Skin color is normal. No rashes or lesions.  Non diaphoretic  Vascular: 2+ pulses symmetric in all extremities    Cardiographics    Ekg: a paced    Pacer - pafib,pafl    Results for orders placed or performed during the hospital encounter of 11/27/15   EKG, 12 LEAD, INITIAL   Result Value Ref Range    Ventricular Rate 75 BPM    Atrial Rate 75 BPM    P-R Interval 172 ms    QRS Duration 76 ms    Q-T Interval 358 ms    QTC Calculation (Bezet) 399 ms    Calculated P Axis 70 degrees    Calculated R Axis 66 degrees    Calculated T Axis 71 degrees    Diagnosis       Poor data quality  Sinus rhythm with premature atrial complexes  Possible Left atrial enlargement  Nonspecific T wave abnormality    Anterolateral injury pattern has resolved since 9/2014  Confirmed by Beronica Moore ([id number]) on 11/29/2015 3:31:04 PM           Lab Results   Component Value Date/Time    WBC 8.0 12/13/2015 11:47 AM    Hemoglobin (POC) 15.0 09/04/2014 06:16 PM    HGB 13.4 12/13/2015 11:47 AM    Hematocrit (POC) 44 09/04/2014 06:16 PM    HCT 40.9 12/13/2015 11:47 AM    PLATELET 634 81/65/1245 11:47 AM    MCV 90.9 12/13/2015 11:47 AM      Lab Results   Component Value Date/Time    Sodium 135 12/13/2015 11:47 AM    Potassium 4.0 12/13/2015 11:47 AM    Chloride 98 12/13/2015 11:47 AM    CO2 32 12/13/2015 11:47 AM    Anion gap 5 12/13/2015 11:47 AM    Glucose 102 12/13/2015 11:47 AM    BUN 6 12/13/2015 11:47 AM    Creatinine 0.60 12/13/2015 11:47 AM    BUN/Creatinine ratio 10 12/13/2015 11:47 AM    GFR est AA >60 12/13/2015 11:47 AM    GFR est non-AA >60 12/13/2015 11:47 AM    Calcium 9.4 12/13/2015 11:47 AM    Bilirubin, total 0.4 12/13/2015 11:47 AM    AST (SGOT) 28 12/13/2015 11:47 AM    Alk. phosphatase 109 12/13/2015 11:47 AM    Protein, total 7.4 12/13/2015 11:47 AM    Albumin 3.4 12/13/2015 11:47 AM    Globulin 4.0 12/13/2015 11:47 AM    A-G Ratio 0.9 12/13/2015 11:47 AM    ALT (SGPT) 38 12/13/2015 11:47 AM         Assessment:     Assessment:        ICD-10-CM ICD-9-CM    1. Essential hypertension I10 401.9 AMB POC EKG ROUTINE W/ 12 LEADS, INTER & REP   2. Typical atrial flutter (HCC) I48.3 427.32    3. Paroxysmal atrial fibrillation (HCC) I48.0 427.31      Orders Placed This Encounter    AMB POC EKG ROUTINE W/ 12 LEADS, INTER & REP     Order Specific Question:   Reason for Exam:     Answer:   Routine    VENTOLIN HFA 90 mcg/actuation inhaler     Sig: INHALE 2 PUFFS EVERY 4 HOURS AS NEEDED     Refill:  0    peg-electrolyte soln (NULYTELY) 420 gram solution     Sig: USE AS DIRECTED     Refill:  0        Plan:   Ms Norma Cuello is a pleasant lady with symptomatic paroxysmal AF and paroxysmal AFL (9.2% of the time). She is a candidate for an afib ablation. I discussed the risks/benefits/alternatives of the procedure with the patient. Risks include (but are not limited to) bleeding, heart block, infection, cva/mi/tamponade/esophageal perforation/pv stenosis/death. The patient understands that there is a 7-6% major complication rate and agrees to proceed. Thank you for this interesting consultation. Continue medical management for htn. Thank you for allowing me to participate in Leslie Woods 's care.     Gabbie Summers MD, [facility]

## 2017-03-22 NOTE — PROGRESS NOTES
Cardiac Cath Lab Recovery Arrival Note:      Rosendo Keys arrived to Cardiac Cath Lab, Recovery Area. Staff introduced to patient. Patient identifiers verified with NAME and DATE OF BIRTH. Procedure verified with patient. Consent forms reviewed and signed by patient or authorized representative and verified. Allergies verified. Patient and family oriented to department. Patient and family informed of procedure and plan of care. Questions answered with review. Patient prepped for procedure, per orders from physician, prior to arrival.    Patient on cardiac monitor, non-invasive blood pressure, SPO2 monitor. On room air. Patient is A&Ox 3. Patient reports no complaints. Patient in stretcher, in low position, with side rails up, call bell within reach, patient instructed to call if assistance as needed. Patient prep in: [address], [place]. Family in: Waiting room.    Prep by: Dirk Miranda RN

## 2017-03-22 NOTE — IP AVS SNAPSHOT
Current Discharge Medication List  
  
CONTINUE these medications which have NOT CHANGED Dose & Instructions Dispensing Information Comments Morning Noon Evening Bedtime  
 apixaban 5 mg tablet Commonly known as:  James Aguero Your last dose was: Your next dose is:    
   
   
 Dose:  5 mg Take 1 Tab by mouth two (2) times a day. Quantity:  60 Tab Refills:  6  
     
   
   
   
  
 aspirin delayed-release 81 mg tablet Your last dose was: Your next dose is:    
   
   
 Dose:  81 mg Take 81 mg by mouth daily. Refills:  0  
     
   
   
   
  
 atorvastatin 20 mg tablet Commonly known as:  LIPITOR Your last dose was: Your next dose is: TAKE 1 TABLET BY MOUTH EVERY DAY Quantity:  90 Tab Refills:  1  
     
   
   
   
  
 carvedilol 12.5 mg tablet Commonly known as:  Doris Lopez Your last dose was: Your next dose is:    
   
   
 Dose:  12.5 mg Take 1 Tab by mouth two (2) times daily (with meals). Quantity:  180 Tab Refills:  3  
     
   
   
   
  
 lisinopril 5 mg tablet Commonly known as:  Laney Jiang Your last dose was: Your next dose is:    
   
   
 Dose:  5 mg Take 1 Tab by mouth daily. Quantity:  90 Tab Refills:  3  
     
   
   
   
  
 multivitamin tablet Commonly known as:  ONE A DAY Your last dose was: Your next dose is:    
   
   
 Dose:  1 Tab Take 1 Tab by mouth daily. Refills:  0 Omeprazole delayed release 20 mg tablet Commonly known as:  PRILOSEC D/R Your last dose was: Your next dose is:    
   
   
 Dose:  20 mg Take 1 Tab by mouth daily. Please take 1/2 to 1 hour before a meal.   Please be aware that the use of medications in the proton pump inhibitor class  have been associated with hip fractures, osteoporosis, pneumonia and diarrhea. Quantity:  30 Tab Refills:  3 peg-electrolyte soln 420 gram solution Commonly known as:  Stephanie Reining Your last dose was: Your next dose is: USE AS DIRECTED Refills:  0 VENTOLIN HFA 90 mcg/actuation inhaler Generic drug:  albuterol Your last dose was: Your next dose is:    
   
   
 INHALE 2 PUFFS EVERY 4 HOURS AS NEEDED Refills:  0

## 2017-03-22 NOTE — ANESTHESIA POSTPROCEDURE EVALUATION
Post-Anesthesia Evaluation and Assessment    Patient: Reanna Wolfe MRN: [id number]  SSN: [id number]    YOB: 1940  Age: 78 y.o. Sex: female       Cardiovascular Function/Vital Signs  Visit Vitals    /77 (BP 1 Location: Right arm, BP Patient Position: At rest)    Pulse 78    Temp 36.4 °C (97.6 °F)    Resp 24    Ht 5' 11\" (1.803 m)    Wt 62.1 kg (137 lb)    SpO2 94%    BMI 19.11 kg/m2       Patient is status post general, total IV anesthesia anesthesia for * No procedures listed *. Nausea/Vomiting: None    Postoperative hydration reviewed and adequate. Pain:  Pain Scale 1: Numeric (0 - 10) (03/22/17 1250)  Pain Intensity 1: 0 (03/22/17 1250)   Managed    Neurological Status:   Neuro (WDL): Exceptions to WDL (03/22/17 1241)  Neuro  Neurologic State: Drowsy (03/22/17 1241)  Orientation Level: Oriented to person;Oriented to situation (03/22/17 1241)  Cognition: Follows commands (03/22/17 1241)  Speech: Clear (03/22/17 1241)  LUE Motor Response: Purposeful (03/22/17 1241)  LLE Motor Response: Purposeful (03/22/17 1241)  RUE Motor Response: Purposeful (03/22/17 1241)  RLE Motor Response: Purposeful (03/22/17 1241)   At baseline    Mental Status and Level of Consciousness: Arousable    Pulmonary Status:   O2 Device: Nasal cannula (03/22/17 1250)   Adequate oxygenation and airway patent    Complications related to anesthesia: None    Post-anesthesia assessment completed.  No concerns    Signed By: Daniel Sales MD     March 22, 2017

## 2017-03-22 NOTE — ANESTHESIA PREPROCEDURE EVALUATION
Anesthetic History     PONV          Review of Systems / Medical History  Patient summary reviewed, nursing notes reviewed and pertinent labs reviewed    Pulmonary    COPD      Smoker         Neuro/Psych   Within defined limits           Cardiovascular    Hypertension        Dysrhythmias   Pacemaker (PM), past MI and CAD    Exercise tolerance: >4 METS     GI/Hepatic/Renal  Within defined limits              Endo/Other  Within defined limits           Other Findings              Physical Exam    Airway  Mallampati: III  TM Distance: 4 - 6 cm  Neck ROM: normal range of motion   Mouth opening: Normal     Cardiovascular  Regular rate and rhythm,  S1 and S2 normal,  no murmur, click, rub, or gallop             Dental  No notable dental hx       Pulmonary  Breath sounds clear to auscultation               Abdominal  GI exam deferred       Other Findings            Anesthetic Plan    ASA: 3, emergent  Anesthesia type: general    Monitoring Plan: BIS      Induction: Intravenous  Anesthetic plan and risks discussed with: Patient      TIVA

## 2017-03-22 NOTE — IP AVS SNAPSHOT
[address] [facility] 
[phone] Patient: Alysa Tao MRN: [id number] PYI:[id number] You are allergic to the following No active allergies Recent Documentation Height Weight BMI OB Status Smoking Status 1.803 m 62.1 kg 19.11 kg/m2 Postmenopausal Former Smoker Emergency Contacts Name Discharge Info Relation Home Work Mobile Talia Pierre  Child [2]   [phone] AnikatonMary  Child [2] [phone] Lesia Kimble  Child [2] [phone] About your hospitalization You were admitted on:  March 22, 2017 You last received care in the:  \[facility] [place] [facility]\"" 2 INTRVNTNL CARDIO You were discharged on:  March 23, 2017 Unit phone number:  [phone] Why you were hospitalized Your primary diagnosis was:  Not on File Your diagnoses also included:  Paf (Paroxysmal Atrial Fibrillation) ([facility]), Atrial Flutter (Hcc), S/P Ablation Of Atrial Fibrillation, S/P Ablation Of Atrial Flutter Providers Seen During Your Hospitalizations Provider Role Specialty Primary office phone Moreno Rider MD Attending Provider Cardiology [phone] Your Primary Care Physician (PCP) Primary Care Physician Office Phone Office Fax Bernardinokarimefaith Bobby [phone] Follow-up Information Follow up With Details Comments Contact Info Kj Mehta MD   [address] [facility] 
[phone] Moreno Rider MD On 3/30/2017 as scheduled [address] [facility] 
[phone] Your Appointments Thursday March 30, 2017 10:15 AM EDT HOSPITAL FOLLOW-UP with Moreno Rider MD  
[facility]) [address] [facility]  
[phone]  Thursday March 30, 2017 10:15 AM EDT  
 PACEMAKER with PACEMAKER, [facility] [address]) [address] [facility] [place]  
[phone] Current Discharge Medication List  
  
CONTINUE these medications which have NOT CHANGED Dose & Instructions Dispensing Information Comments Morning Noon Evening Bedtime  
 apixaban 5 mg tablet Commonly known as:  Timmothy Pellston Your last dose was: Your next dose is:    
   
   
 Dose:  5 mg Take 1 Tab by mouth two (2) times a day. Quantity:  60 Tab Refills:  6  
     
   
   
   
  
 aspirin delayed-release 81 mg tablet Your last dose was: Your next dose is:    
   
   
 Dose:  81 mg Take 81 mg by mouth daily. Refills:  0  
     
   
   
   
  
 atorvastatin 20 mg tablet Commonly known as:  LIPITOR Your last dose was: Your next dose is: TAKE 1 TABLET BY MOUTH EVERY DAY Quantity:  90 Tab Refills:  1  
     
   
   
   
  
 carvedilol 12.5 mg tablet Commonly known as:  King Liter Your last dose was: Your next dose is:    
   
   
 Dose:  12.5 mg Take 1 Tab by mouth two (2) times daily (with meals). Quantity:  180 Tab Refills:  3  
     
   
   
   
  
 lisinopril 5 mg tablet Commonly known as:  Merilynn Putney Your last dose was: Your next dose is:    
   
   
 Dose:  5 mg Take 1 Tab by mouth daily. Quantity:  90 Tab Refills:  3  
     
   
   
   
  
 multivitamin tablet Commonly known as:  ONE A DAY Your last dose was: Your next dose is:    
   
   
 Dose:  1 Tab Take 1 Tab by mouth daily. Refills:  0 Omeprazole delayed release 20 mg tablet Commonly known as:  PRILOSEC D/R Your last dose was: Your next dose is:    
   
   
 Dose:  20 mg Take 1 Tab by mouth daily.  Please take 1/2 to 1 hour before a meal. Please be aware that the use of medications in the proton pump inhibitor class  have been associated with hip fractures, osteoporosis, pneumonia and diarrhea. Quantity:  30 Tab Refills:  3 peg-electrolyte soln 420 gram solution Commonly known as:  Tatiana Alberta Your last dose was: Your next dose is: USE AS DIRECTED Refills:  0 VENTOLIN HFA 90 mcg/actuation inhaler Generic drug:  albuterol Your last dose was: Your next dose is:    
   
   
 INHALE 2 PUFFS EVERY 4 HOURS AS NEEDED Refills:  0 Discharge Instructions [facility] [place], [place], [address] [facility]  [phone] ABLATION DISCHARGE INSTRUCTIONS Patient ID: 
Myla Colon [id number] 
11 y.o. 
1940 Admit Date: 3/22/2017 Discharge Date: 3/23/2017 Admitting Physician: Ana Louise MD  
 
Discharge Physician: Ender Cade NP Admission Diagnoses:  
a fib Recovery Needed in PACU 
a fib Discharge Diagnoses: Active Problems: 
  PAF (paroxysmal atrial fibrillation) ([facility] (3/23/2017) Atrial flutter ([facility] (3/23/2017) S/P ablation of atrial fibrillation (3/23/2017) S/P ablation of atrial flutter (3/23/2017) Discharge Condition: Good Cardiology Procedures this Admission:  PVI atrial fib/flutter ablation Disposition: home Reference discharge instructions provided by nursing for diet and activity. Follow-up with Dr Howard Burns in one week. Call [phone] to make an appointment. Signed: 
Ender Cade NP 
3/23/2017 
8:20 AM 
 
S/P ABLATION DISCHARGE INSTRUCTIONS It is normal to feel tired the first couple days. Take it easy and follow the physicians instructions. CHECK THE CATHETER INSERTION SITE DAILY: 
You may shower 24 hours after the procedure, remove the bandage during showering. Wash with soap and water and pat dry. Gentle cleaning of the site with soap and water is sufficient, cover with a dry clean dressing or bandage. Do not apply creams or powders to the area. Do not sit in a bathtub or pool of water for 7 days or until wound has completely healed. Temporary bruising and discomfort is normal and may last a few weeks. You may have a  formation of a small lump at the site which may last up to 6 weeks. CALL THE PHYSICIAN: If the site becomes red, swollen or feels warm to the touch If there is bleeding or drainage or if there is unusual pain at the groin or down the leg. If there is any bleeding, lie down, apply pressure or have someone apply pressure with a clean cloth until the bleeding stops. If the bleeding continues, call 911 to be transported to the hospital. 
DO  [facility] [address]. Activity: For the first 24-48 hours or as instructed by the physician: No lifting, pushing or pulling over 5 pounds and no straining the insertion site. Do not life grocery bags or the garbage can, do not run the vacuum  or  for 7 days. Start with short walks as in the hospital and gradually increase as tolerated each day. It is recommended to walk 30 minutes 5-7 days per week. Follow your physicians instructions on activity. Avoid walking outside in extremes of heat or cold. Walk inside when it is cold and windy or hot and humid. Things to keep in mind: 
No driving for at least 5 days, or as designated by your physician. Limit the number of times you go up and down the stairs Take rests and pace yourself with activity. Be careful and do not strain with bowel movements. Medications: Take all medications as prescribed Call your physician if you have any questions Keep an updated list of your medications with you at all times and give a list to your physician and pharmacist 
 
Signs and Symptoms: Be cautious of symptoms of angina or recurrent symptoms such as chest discomfort, unusual shortness of breath or fatigue, palpitations. After Care: Follow up with your physician as instructed. Follow a heart healthy diet with proper portion control, daily stress management, daily exercise, blood pressure and cholesterol control , and smoking cessation. AFTER YOU TRANSESOPHAGEAL ECHOCARDIOGRAM 
 
Do not eat or drink for at least two hours after your procedure. Your throat will be numb and there is a risk you might have difficulty swallowing for a while. Be careful when you do eat or drink for the first time especially with hot fluids since you could easily burn your throat. Call your doctor if: 
 
· You are bleeding from your throat or mouth. · You have trouble breathing all of a sudden. · You have chest pain or any pain that spreads to your neck, jaw, or arms. · You have questions or concerns. · You have a fever greater than 101°F. Discharge Orders None Introducing [facility]! Franklin Andrews introduces [employer] patient portal. Now you can access parts of your medical record, email your doctor's office, and request medication refills online. 1. In your internet browser, go to [email or web address] 2. Click on the First Time User? Click Here link in the Sign In box. You will see the New Member Sign Up page. 3. Enter your [employer] Access Code exactly as it appears below. You will not need to use this code after youve completed the sign-up process. If you do not sign up before the expiration date, you must request a new code. · [employer] Access Code: [email or web address] Expires: 2017  1:46 PM 
 
4. Enter the last four digits of your Social Security Number (xxxx) and Date of Birth (mm/dd/yyyy) as indicated and click Submit. You will be taken to the next sign-up page. 5. Create a Y[employer]YangMap ID.  This will be your [employer] login ID and cannot be changed, so think of one that is secure and easy to remember. 6. Create a [employer] password. You can change your password at any time. 7. Enter your Password Reset Question and Answer. This can be used at a later time if you forget your password. 8. Enter your e-mail address. You will receive e-mail notification when new information is available in [address] 9. Click Sign Up. You can now view and download portions of your medical record. 10. Click the Download Summary menu link to download a portable copy of your medical information. If you have questions, please visit the Frequently Asked Questions section of the [employer] website. Remember, [employer] is NOT to be used for urgent needs. For medical emergencies, dial 911. Now available from your iPhone and Android! General Information Please provide this summary of care documentation to your next provider. Patient Signature:  ____________________________________________________________ Date:  ____________________________________________________________  
  
[employer] Provider Signature:  ____________________________________________________________ Date:  ____________________________________________________________

## 2017-03-22 NOTE — PERIOP NOTES
TRANSFER - OUT REPORT:    Verbal report given to [facility] RN(name) on Day Laguna  being transferred to IVCU/2164(unit) for routine post - op       Report consisted of patients Situation, Background, Assessment and   Recommendations(SBAR). Information from the following report(s) SBAR, Procedure Summary, Intake/Output, MAR and Recent Results was reviewed with the receiving nurse. Opportunity for questions and clarification was provided. Patient transported with:   Monitor  O2 @ 2 liters  Registered Nurse  Tech     No family in [place] to notify of transfer.

## 2017-03-22 NOTE — PROCEDURES
[address] [place] [address], [address] [facility]  [phone]    Indications and Pre-Procedure Diagnosis:  Macey Damian is a 78 y.o. female with atrial fibrillation and atrial flutter is referred for electr-physiologic evaluation and intervention. Post Procedure Diagnosis    Atrial fibrillation  Atrial flutter    Atrial Fibrillation Ablation Summary  Informed consent was obtained. The patient was brought to the EP lab where VON demonstrated no thrombus in the left atrial appendage. All vascular access sites were prepped and draped in the usual sterile fashion and the Seldinger technique was used to catherize the RFV and LFV with multi-polar electrode catheters, which were placed in the appropriate intra-cardiac sites under fluoroscopic guidance (see catheter list). Right and left atrial pacing and recording, His bundle recording, and right ventricular pacing and recording were performed. Continuous pulse oximetry and cuff BP monitoring were performed. During the procedure, the patient received Versed, Fentanyl and Propofol for sedation per anesthesia personnel. Transeptal Puncture  A transeptal atrial puncture was performed using fluoroscopic and intracardiac ultrasound guidance. An SL1 sheath was dragged from the SVC along the septum until a sudden leftward displacement was observed. Engagement of the Fossa Ovalis was confirmed by the interatrial tenting seen under intracardiac ultrasound guidance. The Julieta Lat NRG needle was advanced into the left atrium and its positioned confirmed with contrast injection. The dilator and sheath were advanced carefully over the needle into the body of the left atrium and the dilator/needle removed. A Flexcath sheath was exchanged over the wire for the SL1 sheath.  No pericardial effusion was appreciated on intracardiac ultrasound so a bolus injection of heparin 100 units/kg was administered, with a continuous heparin gtt of 4000 units/hr so that the activated clotting time maintained was between 300 and 400 seconds with additional boluses q 30 minutes as necessary. A 3D shell representing the left atrium and pulmonary veins was constructed with the electroanatomic mapping system. An Achieve circular mapping catheter was advanced into the left atrium through the Flexcath sheath and a map of the body of the LA and the pulmonary veins was created. Pulmonary vein venography was also performed at that time. A 28 mm Medtronic Cryo balloon was advanced into the left atrium. Cryo ablation of the left atrium was performed at the PV ostia to encircle the right and left PVs. Cryo energy was applied for a total of 16 minutes with confirmed entrance/exit block of four pulmonary veins. Autonomic manipulation with Dobutamine @ 20 mcg/min was performed during this procedure. Post ablation, on dobutamine at 20 mg/min, there was spontaneous initiation of counter clockwise atrial flutter at a cycle length of 217 msec. Pacing from the posterior septum showed entrainment with concealed fusion and post pacing intervals within 30 msec of the flutter cycle length. Atrial Flutter ablation  The flexcath sheath was pulled back into the right atrium and the cryoballoon was removed. A large curve 10 mm tip Blazer catheter was used to deliver 4 RF lesions for a total of 5 minutes in the cavo-tricuspid isthmus with creation of bi-directional isthmus block. The patient converted to an atypical atrial flutter with a cycle length of 227 msec that was entrained from the left atrium. The patient was cardioverted to sinus rhythm with one 360J biphasic synchronized shock. At the end of the procedure, all catheters were removed, heparin reversed, groin sheaths pulled and hemostasis achieved. The patient left the laboratory in a stable condition. Fluoroscopy and procedure times were 16 and 70 minutes respectively. Estimated blood loss: <10 ml. Sharp counts: correct. Specimen (s) collected: none. The procedure related complication occurred: none. The following problems were encountered: none. Conduction intervals (ms)    A-A A-H H-V P-R QRS Q-T R-R V-V  [phone] 80 [phone]    AV geovany conduction    VA Block when pacing at 280 ms    Findings and Summary    This study demonstrates:  1. Successful PVI of all 4 PVs  2. Atrial flutter ablation with RFA of the CTI and confirmed bi-directional isthmus block  3. Atypical atrial flutter on dobutamine with rapid atrial pacing    Recommendation:  1. [address]  2. DDDR 75 bpm    Thank you for allowing me to participate in this patients care.     Rosendo Tirado MD, Mikaela Travis

## 2017-03-22 NOTE — PROGRESS NOTES
Pt assisted up to chair with one assist. Bilateral groin sites stable. Pt's Sats 90 on room air. 2 L NC pllaced on pt. Sats 93. Will give incentive spirometer.

## 2017-03-22 NOTE — INTERVAL H&P NOTE
H&P Update:  Claudette Lucky was seen and examined. History and physical has been reviewed. The patient has been examined.  There have been no significant clinical changes since the completion of the originally dated History and Physical.    Signed By: Telly Jones MD     March 22, 2017 8:46 AM

## 2017-03-22 NOTE — PERIOP NOTES
Handoff Report from Operating Room to PACU    Report received from HERNANDO Aguero and BRIAN Garcia regarding Tilmon Corporal. Surgeon(s):  Case Anesthesia  And Procedure(s) (LRB):  PACU/RECOVERY FROM VON PVI (N/A)  confirmed   with allergies, drains and dressings discussed. Anesthesia type, drugs, patient history, complications, estimated blood loss, vital signs, intake and output, and last pain medication, lines and temperature were reviewed.

## 2017-03-23 VITALS
WEIGHT: 137 LBS | TEMPERATURE: 97.7 F | SYSTOLIC BLOOD PRESSURE: 118 MMHG | BODY MASS INDEX: 19.18 KG/M2 | RESPIRATION RATE: 18 BRPM | DIASTOLIC BLOOD PRESSURE: 57 MMHG | HEART RATE: 86 BPM | HEIGHT: 71 IN | OXYGEN SATURATION: 94 %

## 2017-03-23 PROBLEM — Z86.79 S/P ABLATION OF ATRIAL FLUTTER: Status: ACTIVE | Noted: 2017-03-23

## 2017-03-23 PROBLEM — Z86.79 S/P ABLATION OF ATRIAL FIBRILLATION: Status: ACTIVE | Noted: 2017-03-23

## 2017-03-23 PROBLEM — Z98.890 S/P ABLATION OF ATRIAL FIBRILLATION: Status: ACTIVE | Noted: 2017-03-23

## 2017-03-23 PROBLEM — I48.0 PAF (PAROXYSMAL ATRIAL FIBRILLATION) (HCC): Status: ACTIVE | Noted: 2017-03-23

## 2017-03-23 PROBLEM — I48.92 ATRIAL FLUTTER (HCC): Status: ACTIVE | Noted: 2017-03-23

## 2017-03-23 PROBLEM — Z98.890 S/P ABLATION OF ATRIAL FLUTTER: Status: ACTIVE | Noted: 2017-03-23

## 2017-03-23 LAB
ANION GAP BLD CALC-SCNC: 8 MMOL/L (ref 5–15)
ATRIAL RATE: 75 BPM
BUN SERPL-MCNC: 14 MG/DL (ref 6–20)
BUN/CREAT SERPL: 20 (ref 12–20)
CALCIUM SERPL-MCNC: 8.9 MG/DL (ref 8.5–10.1)
CALCULATED P AXIS, ECG09: 81 DEGREES
CALCULATED R AXIS, ECG10: 78 DEGREES
CALCULATED T AXIS, ECG11: 82 DEGREES
CHLORIDE SERPL-SCNC: 98 MMOL/L (ref 97–108)
CO2 SERPL-SCNC: 28 MMOL/L (ref 21–32)
CREAT SERPL-MCNC: 0.71 MG/DL (ref 0.55–1.02)
DIAGNOSIS, 93000: NORMAL
ERYTHROCYTE [DISTWIDTH] IN BLOOD BY AUTOMATED COUNT: 14.6 % (ref 11.5–14.5)
GLUCOSE SERPL-MCNC: 118 MG/DL (ref 65–100)
HCT VFR BLD AUTO: 38.6 % (ref 35–47)
HGB BLD-MCNC: 12.8 G/DL (ref 11.5–16)
MCH RBC QN AUTO: 30.1 PG (ref 26–34)
MCHC RBC AUTO-ENTMCNC: 33.2 G/DL (ref 30–36.5)
MCV RBC AUTO: 90.8 FL (ref 80–99)
P-R INTERVAL, ECG05: 142 MS
PLATELET # BLD AUTO: 171 K/UL (ref 150–400)
POTASSIUM SERPL-SCNC: 4.5 MMOL/L (ref 3.5–5.1)
Q-T INTERVAL, ECG07: 374 MS
QRS DURATION, ECG06: 80 MS
QTC CALCULATION (BEZET), ECG08: 417 MS
RBC # BLD AUTO: 4.25 M/UL (ref 3.8–5.2)
SODIUM SERPL-SCNC: 134 MMOL/L (ref 136–145)
VENTRICULAR RATE, ECG03: 75 BPM
WBC # BLD AUTO: 10.8 K/UL (ref 3.6–11)

## 2017-03-23 PROCEDURE — 36415 COLL VENOUS BLD VENIPUNCTURE: CPT | Performed by: INTERNAL MEDICINE

## 2017-03-23 PROCEDURE — 74011250637 HC RX REV CODE- 250/637: Performed by: INTERNAL MEDICINE

## 2017-03-23 PROCEDURE — 77010033678 HC OXYGEN DAILY

## 2017-03-23 PROCEDURE — 80048 BASIC METABOLIC PNL TOTAL CA: CPT | Performed by: INTERNAL MEDICINE

## 2017-03-23 PROCEDURE — 85027 COMPLETE CBC AUTOMATED: CPT | Performed by: INTERNAL MEDICINE

## 2017-03-23 PROCEDURE — 93005 ELECTROCARDIOGRAM TRACING: CPT

## 2017-03-23 PROCEDURE — 99218 HC RM OBSERVATION: CPT

## 2017-03-23 RX ADMIN — Medication 10 ML: at 03:52

## 2017-03-23 RX ADMIN — CARVEDILOL 12.5 MG: 12.5 TABLET, FILM COATED ORAL at 08:28

## 2017-03-23 RX ADMIN — APIXABAN 5 MG: 5 TABLET, FILM COATED ORAL at 08:29

## 2017-03-23 RX ADMIN — ASPIRIN 81 MG: 81 TABLET, COATED ORAL at 08:28

## 2017-03-23 RX ADMIN — LISINOPRIL 5 MG: 5 TABLET ORAL at 08:28

## 2017-03-23 RX ADMIN — PANTOPRAZOLE SODIUM 40 MG: 40 TABLET, DELAYED RELEASE ORAL at 08:28

## 2017-03-23 NOTE — PROGRESS NOTES
1900 - Bedside report from [facility]. NSR. Scant spotty nose bleeding. Pt holding pressure. Bilat groins benign. Up in chair. Voided post oneal removal.      Sat in chair for 2+ hours then ambulation in hallway 300 feet without difficulty or complaints. Bilat groins benign. 0700 - Bedside report to RN. NSR, occaisional paced. No complaints. Bilat groins benign.

## 2017-03-23 NOTE — DISCHARGE INSTRUCTIONS
[address] [id number]        [facility] INSTRUCTIONS    Patient ID:  Ulysses Durie  [id number]  19 y.o.  1940    Admit Date: 3/22/2017    Discharge Date: 3/23/2017     Admitting Physician: Franko Godinez MD     Discharge Physician: Bobby Chambers NP    Admission Diagnoses:   a fib  Recovery Needed in PACU  a fib    Discharge Diagnoses: Active Problems:    PAF (paroxysmal atrial fibrillation) ([facility]) (3/23/2017)      Atrial flutter ([address] (3/23/2017)      S/P ablation of atrial fibrillation (3/23/2017)      S/P ablation of atrial flutter (3/23/2017)        Discharge Condition: Good    Cardiology Procedures this Admission:  PVI atrial fib/flutter ablation    Disposition: home    Reference discharge instructions provided by nursing for diet and activity. Follow-up with Dr Merced Lawrence in one week. Call [phone] to make an appointment. Signed:  Bobby Chambers NP  3/23/2017  8:20 AM    S/P ABLATION DISCHARGE INSTRUCTIONS    It is normal to feel tired the first couple days. Take it easy and follow the physicians instructions. CHECK THE CATHETER INSERTION SITE DAILY:  You may shower 24 hours after the procedure, remove the bandage during showering. Wash with soap and water and pat dry. Gentle cleaning of the site with soap and water is sufficient, cover with a dry clean dressing or bandage. Do not apply creams or powders to the area. Do not sit in a bathtub or pool of water for 7 days or until wound has completely healed. Temporary bruising and discomfort is normal and may last a few weeks. You may have a  formation of a small lump at the site which may last up to 6 weeks. CALL THE PHYSICIAN:  If the site becomes red, swollen or feels warm to the touch  If there is bleeding or drainage or if there is unusual pain at the groin or down the leg.   If there is any bleeding, lie down, apply pressure or have someone apply pressure with a clean cloth until the bleeding stops. If the bleeding continues, call 911 to be transported to the hospital.  DO NOT DRIVE YOURSELF, [address]. Activity:      For the first 24-48 hours or as instructed by the physician:  No lifting, pushing or pulling over 5 pounds and no straining the insertion site. Do not life grocery bags or the garbage can, do not run the vacuum  or  for 7 days. Start with short walks as in the hospital and gradually increase as tolerated each day. It is recommended to walk 30 minutes 5-7 days per week. Follow your physicians instructions on activity. Avoid walking outside in extremes of heat or cold. Walk inside when it is cold and windy or hot and humid. Things to keep in mind:  No driving for at least 5 days, or as designated by your physician. Limit the number of times you go up and down the stairs  Take rests and pace yourself with activity. Be careful and do not strain with bowel movements. Medications: Take all medications as prescribed  Call your physician if you have any questions  Keep an updated list of your medications with you at all times and give a list to your physician and pharmacist    Signs and Symptoms:  Be cautious of symptoms of angina or recurrent symptoms such as chest discomfort, unusual shortness of breath or fatigue, palpitations. After Care: Follow up with your physician as instructed. Follow a heart healthy diet with proper portion control, daily stress management, daily exercise, blood pressure and cholesterol control , and smoking cessation. AFTER YOU TRANSESOPHAGEAL ECHOCARDIOGRAM    Do not eat or drink for at least two hours after your procedure. Your throat will be numb and there is a risk you might have difficulty swallowing for a while. Be careful when you do eat or drink for the first time especially with hot fluids since you could easily burn your throat.     Call your doctor if:    · You are bleeding from your throat or mouth. · You have trouble breathing all of a sudden. · You have chest pain or any pain that spreads to your neck, jaw, or arms. · You have questions or concerns.   · You have a fever greater than 101°F.

## 2017-03-23 NOTE — PROGRESS NOTES
Pt received discharge instructions and prescriptions. Pt states understanding of follow-up care and instructions. Pt has all belongings.  Flaco Ventura RN

## 2017-03-23 NOTE — CARDIO/PULMONARY
C/P Rehab Note:    Chart Reviewed. Pt with atrial fibrillation and atrial flutter S/pSuccessful PVI of all 4 PVs, Atrial flutter ablation. Echo from 11/16 LVEF 55-60%. VON results pending. PMH significant for:  -PPM s/p CHB  -CAD  -HTN  -Takotsubo cardiomyopathy  -COPD  Pt is a former smoker. Met with pt who was sitting up in bed. Provided pt a handout on \" EP STUDY, AFTER YOUR PROCEDURE\". Reviewed activity restrictions: No tub baths or swimming for the first seven days. No driving for the first 24 hours or strenuous activities. Discussed signs and symptoms of infection and when to call the MD. The importance of taking medications as prescribed and keeping follow up appointments. Pt without questions and demonstrated understanding.

## 2017-03-23 NOTE — PROGRESS NOTES
[address]  [phone]      Cardiology Progress Note      3/23/2017 9:03 AM    Admit Date: 3/22/2017    Admit Diagnosis:   a fib  Recovery Needed in PACU  a fib    Subjective:     Odalis Gotti has no c/o C/P, SOB, but is c/o slight HA. Overnight did experience left sided nose bleed that has since subsided.      Visit Vitals    /57 (BP 1 Location: Right arm, BP Patient Position: At rest)    Pulse 86    Temp 97.7 °F (36.5 °C)    Resp 18    Ht 5' 11\" (1.803 m)    Wt 62.1 kg (137 lb)    SpO2 94%    BMI 19.11 kg/m2       Current Facility-Administered Medications   Medication Dose Route Frequency    fentaNYL citrate (PF) injection 12.5-50 mcg  12.5-50 mcg IntraVENous Multiple    midazolam (VERSED) injection 1-5 mg  1-5 mg IntraVENous Multiple    protamine injection  mg   mg IntraVENous Multiple    protamine 10 mg/mL injection        heparin (porcine) 1,000 unit/mL injection        DOBUTamine (DOBUTREX) 500 mg/250 mL (2,000 mcg/mL) infusion        heparin 25,000 units in D5W 250 ml 25,000 unit/250 mL(100 unit/mL) infusion        sodium chloride (NS) flush 5-10 mL  5-10 mL IntraVENous Q8H    sodium chloride (NS) flush 5-10 mL  5-10 mL IntraVENous PRN    acetaminophen (TYLENOL) tablet 650 mg  650 mg Oral Q4H PRN    HYDROcodone-acetaminophen (NORCO) 5-325 mg per tablet 1 Tab  1 Tab Oral Q4H PRN    aspirin delayed-release tablet 81 mg  81 mg Oral DAILY    pantoprazole (PROTONIX) tablet 40 mg  40 mg Oral ACB&D    carvedilol (COREG) tablet 12.5 mg  12.5 mg Oral BID WITH MEALS    lisinopril (PRINIVIL, ZESTRIL) tablet 5 mg  5 mg Oral DAILY    atorvastatin (LIPITOR) tablet 20 mg  20 mg Oral QHS    apixaban (ELIQUIS) tablet 5 mg  5 mg Oral BID       Objective:      Physical Exam:  General Appearance:  WNWD thin  female in no acute distress  Chest:   Clear  Cardiovascular:  Regular rate and rhythm, no murmur.   Abdomen:   Soft, non-tender, bowel sounds are active.   Extremities: magnus groin sites D/I, no hematoma, soft; magnus +DP/PT  Skin:  Warm and dry.     Data Review:   Recent Labs      03/23/17 0347   WBC  10.8   HGB  12.8   HCT  38.6   PLT  171     Recent Labs      03/23/17 0347   NA  134*   K  4.5   CL  98   CO2  28   GLU  118*   BUN  14   CREA  0.71   CA  8.9       No results for input(s): TROIQ, CPK, CKMB in the last 72 hours. Intake/Output Summary (Last 24 hours) at 03/23/17 0903  Last data filed at 03/22/17 2004   Gross per 24 hour   Intake             1490 ml   Output              350 ml   Net             1140 ml        Telemetry: SR    Assessment:     Active Problems:    PAF (paroxysmal atrial fibrillation) ([facility]) (3/23/2017)      Atrial flutter ([facility] (3/23/2017)      S/P ablation of atrial fibrillation (3/23/2017)      S/P ablation of atrial flutter (3/23/2017)        Plan:     PAF/flutter:  S/p afib/flutter ablations. Continue on BB, Eliquis. Discharge to home with f/u to Dr. Yue Moody on 3/30/17. Pt seen and examined in details. Agree with NP A&P. D/w pt.      Charanjit Horn MD

## 2017-03-23 NOTE — PROGRESS NOTES
Care Management:    Admitted with Afib and is post Ablation. OBS letter given to patient and copy placed on chart.      Patient has follow up appointment scheduled with Dr Vy Zamudio 1974

## 2017-03-28 ENCOUNTER — PATIENT OUTREACH (OUTPATIENT)
Dept: CARDIOLOGY CLINIC | Age: 77
End: 2017-03-28

## 2017-03-28 NOTE — PROGRESS NOTES
Called pt to follow up on hospital visit to [facility], discharged on 3/23/17. LVM stating my name, NN at [facility], date and time of call, and return telephone number. This note will not be viewable in [address]

## 2017-03-30 ENCOUNTER — OFFICE VISIT (OUTPATIENT)
Dept: CARDIOLOGY CLINIC | Age: 77
End: 2017-03-30

## 2017-03-30 ENCOUNTER — CLINICAL SUPPORT (OUTPATIENT)
Dept: CARDIOLOGY CLINIC | Age: 77
End: 2017-03-30

## 2017-03-30 VITALS
HEIGHT: 71 IN | SYSTOLIC BLOOD PRESSURE: 140 MMHG | RESPIRATION RATE: 18 BRPM | DIASTOLIC BLOOD PRESSURE: 80 MMHG | OXYGEN SATURATION: 96 % | HEART RATE: 76 BPM | WEIGHT: 138.8 LBS | BODY MASS INDEX: 19.43 KG/M2

## 2017-03-30 DIAGNOSIS — I10 ESSENTIAL HYPERTENSION: Primary | ICD-10-CM

## 2017-03-30 DIAGNOSIS — I48.0 PAROXYSMAL ATRIAL FIBRILLATION (HCC): ICD-10-CM

## 2017-03-30 DIAGNOSIS — Z95.0 PACEMAKER: Primary | ICD-10-CM

## 2017-03-30 DIAGNOSIS — I48.0 PAF (PAROXYSMAL ATRIAL FIBRILLATION) (HCC): ICD-10-CM

## 2017-03-30 NOTE — PROGRESS NOTES
Subjective:      Lake Villatoro is a 78 y.o. female is here for follow up s/p AF/AFL ablation 3/2017. She is doing well, has some ongoing fatigue. The patient denies chest pain/ shortness of breath, orthopnea, PND, LE edema, palpitations, syncope, presyncope. Patient Active Problem List    Diagnosis Date Noted    PAF (paroxysmal atrial fibrillation) ([address] 03/23/2017    Atrial flutter ([address] 03/23/2017    S/P ablation of atrial fibrillation 03/23/2017    S/P ablation of atrial flutter 03/23/2017    Occult blood in stools 01/28/2016    Early satiety 01/28/2016    Femoral hernia with obstruction 11/29/2015    SBO (small bowel obstruction) ([address] 11/28/2015    Pacemaker 09/08/2014    Complete heart block ([address] 09/06/2014    Acute MI, inferolateral wall ([address] 09/04/2014    Hypertension 09/04/2014    Tobacco abuse 09/04/2014    Takotsubo cardiomyopathy 09/04/2014    STEMI (ST elevation myocardial infarction) ([address] 09/04/2014      Sesar Tello MD  Past Medical History:   Diagnosis Date    Atrial flutter ([address] 3/23/2017    COPD     BY CHEST XRAY    Early satiety 1/28/2016    Hypertension     Myocardial infarction ([address] 2014    Nausea & vomiting     Occult blood in stools 1/28/2016    PAF (paroxysmal atrial fibrillation) ([address] 3/23/2017    S/P ablation of atrial fibrillation 3/23/2017    S/P ablation of atrial flutter 3/23/2017      Past Surgical History:   Procedure Laterality Date    ABDOMEN SURGERY PROC UNLISTED      inguinal hernia repair right    HX HERNIA REPAIR  11/29/2015    Incarcerated left femoral hernia repair,.     HX PACEMAKER Left 2014     No Known Allergies   Family History   Problem Relation Age of Onset    Heart Disease Mother     Cancer Father      BLADDER    Other Sister      RA    Other Brother      RA    Alcohol abuse Brother     negative for cardiac disease  Social History     Social History    Marital status:      Spouse name: N/A    Number of children: N/A    Years of education: N/A     Social History Main Topics    Smoking status: Former Smoker     Packs/day: 1.00     Years: 50.00     Types: Cigarettes     Quit date: 9/4/2014    Smokeless tobacco: Never Used    Alcohol use No    Drug use: No    Sexual activity: Not Asked     Other Topics Concern    None     Social History Narrative     Current Outpatient Prescriptions   Medication Sig    VENTOLIN HFA 90 mcg/actuation inhaler INHALE 2 PUFFS EVERY 4 HOURS AS NEEDED    apixaban (ELIQUIS) 5 mg tablet Take 1 Tab by mouth two (2) times a day.  atorvastatin (LIPITOR) 20 mg tablet TAKE 1 TABLET BY MOUTH EVERY DAY    carvedilol (COREG) 12.5 mg tablet Take 1 Tab by mouth two (2) times daily (with meals).  lisinopril (PRINIVIL, ZESTRIL) 5 mg tablet Take 1 Tab by mouth daily.  aspirin delayed-release 81 mg tablet Take 81 mg by mouth daily.  multivitamin (ONE A DAY) tablet Take 1 Tab by mouth daily.  peg-electrolyte soln (NULYTELY) 420 gram solution USE AS DIRECTED    Omeprazole delayed release (PRILOSEC D/R) 20 mg tablet Take 1 Tab by mouth daily. Please take 1/2 to 1 hour before a meal.    Please be aware that the use of medications in the proton pump inhibitor class  have been associated with hip fractures, osteoporosis, pneumonia and diarrhea. No current facility-administered medications for this visit. Vitals:    03/30/17 1035   BP: 140/80   Pulse: 76   Resp: 18   SpO2: 96%   Weight: 138 lb 12.8 oz (63 kg)   Height: 5' 11\" (1.803 m)       I have reviewed the nurses notes, vitals, problem list, allergy list, medical history, family, social history and medications. Review of Symptoms:    General: +fatigue, Pt denies excessive weight gain or loss. Pt is able to conduct ADL's  HEENT: Denies blurred vision, headaches, epistaxis and difficulty swallowing. Respiratory: Denies shortness of breath, BURKETT, wheezing or stridor.   Cardiovascular: Denies precordial pain, palpitations, edema or PND  Gastrointestinal: Denies poor appetite, indigestion, abdominal pain or blood in stool  Urinary: Denies dysuria, pyuria  Musculoskeletal: Denies pain or swelling from muscles or joints  Neurologic: Denies tremor, paresthesias, or sensory motor disturbance  Skin: Denies rash, itching or texture change. Psych: Denies depression      Physical Exam:      General: Well developed, in no acute distress. HEENT: Eyes - PERRL, no jvd  Heart:  Normal S1/S2 negative S3 or S4. Regular, no murmur, gallop or rub.   Respiratory: Clear bilaterally x 4, no wheezing or rales  Abdomen:   Soft, non-tender, bowel sounds are active.   Extremities:  No edema, normal cap refill, no cyanosis. Musculoskeletal: No clubbing  Neuro: A&Ox3, speech clear, gait stable. Skin: Skin color is normal. No rashes or lesions.  Non diaphoretic  Vascular: 2+ pulses symmetric in all extremities    Cardiographics    Ekg: NSR    Results for orders placed or performed during the hospital encounter of 03/22/17   EKG, 12 LEAD, INITIAL   Result Value Ref Range    Ventricular Rate 75 BPM    Atrial Rate 75 BPM    P-R Interval 142 ms    QRS Duration 80 ms    Q-T Interval 374 ms    QTC Calculation (Bezet) 417 ms    Calculated P Axis 81 degrees    Calculated R Axis 78 degrees    Calculated T Axis 82 degrees    Diagnosis       Electronic atrial pacemaker  Nonspecific ST and T wave abnormality  When compared with ECG of 28-NOV-2015 13:10,  Electronic atrial pacemaker has replaced Sinus rhythm    Confirmed by Bennett Jean ([id number]) on 3/23/2017 7:50:30 AM           Lab Results   Component Value Date/Time    WBC 10.8 03/23/2017 03:47 AM    Hemoglobin (POC) 15.0 09/04/2014 06:16 PM    HGB 12.8 03/23/2017 03:47 AM    Hematocrit (POC) 44 09/04/2014 06:16 PM    HCT 38.6 03/23/2017 03:47 AM    PLATELET 117 91/56/1246 03:47 AM    MCV 90.8 03/23/2017 03:47 AM      Lab Results   Component Value Date/Time    Sodium 134 03/23/2017 03:47 AM    Potassium 4.5 03/23/2017 03:47 AM    Chloride 98 03/23/2017 03:47 AM    CO2 28 03/23/2017 03:47 AM    Anion gap 8 03/23/2017 03:47 AM    Glucose 118 03/23/2017 03:47 AM    BUN 14 03/23/2017 03:47 AM    Creatinine 0.71 03/23/2017 03:47 AM    BUN/Creatinine ratio 20 03/23/2017 03:47 AM    GFR est AA >60 03/23/2017 03:47 AM    GFR est non-AA >60 03/23/2017 03:47 AM    Calcium 8.9 03/23/2017 03:47 AM    Bilirubin, total 0.4 03/13/2017 09:34 AM    AST (SGOT) 33 03/13/2017 09:34 AM    Alk. phosphatase 92 03/13/2017 09:34 AM    Protein, total 6.7 03/13/2017 09:34 AM    Albumin 3.9 03/13/2017 09:34 AM    Globulin 4.0 12/13/2015 11:47 AM    A-G Ratio 1.4 03/13/2017 09:34 AM    ALT (SGPT) 29 03/13/2017 09:34 AM         Assessment:     Assessment:        ICD-10-CM ICD-9-CM    1. Essential hypertension I10 401.9 AMB POC EKG ROUTINE W/ 12 LEADS, INTER & REP     Orders Placed This Encounter    AMB POC EKG ROUTINE W/ 12 LEADS, INTER & REP     Order Specific Question:   Reason for Exam:     Answer:   routine        Plan:   Ms. Norma Cuello is here for f/u s/p AF/AFL ablation. She reports some fatigue. Device interrogation failed to reveal recurrence of AF/AFL. EKG shows NSR. Continue current medical therapy and follow up in 3 months. Continue medical management for HTN. Thank you for allowing me to participate in Leslie Woods 's care.     Gabbie Summers MD, Angella Carrasquillo

## 2017-04-10 ENCOUNTER — HOSPITAL ENCOUNTER (OUTPATIENT)
Dept: GENERAL RADIOLOGY | Age: 77
Discharge: HOME | End: 2017-04-10
Payer: MEDICARE

## 2017-04-10 DIAGNOSIS — R06.02 SOB (SHORTNESS OF BREATH): ICD-10-CM

## 2017-04-10 PROCEDURE — 71020 XR CHEST PA LAT: CPT

## 2017-04-14 RX ORDER — CARVEDILOL 12.5 MG/1
TABLET ORAL
Qty: 180 TAB | Refills: 3 | Status: SHIPPED | OUTPATIENT
Start: 2017-04-14 | End: 2018-02-13 | Stop reason: SDUPTHER

## 2017-05-10 ENCOUNTER — ANESTHESIA EVENT (OUTPATIENT)
Dept: ENDOSCOPY | Age: 77
End: 2017-05-10
Payer: MEDICARE

## 2017-05-10 NOTE — ANESTHESIA PREPROCEDURE EVALUATION
Anesthetic History     PONV (no probs with previous colonoscopy using propofol)          Review of Systems / Medical History  Patient summary reviewed, nursing notes reviewed and pertinent labs reviewed    Pulmonary    COPD (emphysema on CXR)      Smoker (EX, 50 pk yrs, quit 9-4-14)      Comments: Chronic congested sounding cough, denies fever, chills colored secretions or recent URI   Neuro/Psych   Within defined limits           Cardiovascular    Hypertension        Dysrhythmias (s/p ablation) : atrial fibrillation and atrial flutter  Pacemaker (PM for complete hrt blk), past MI, CAD and hyperlipidemia    Exercise tolerance: >4 METS  Comments: Hx of Takotsubo cardiomyopathy    3-30-17 EKG: SR, short TN    3-22-17 ECHO: normal function, mod to severe rt and left atrium dilation, mod to severe TR, no AS noted   GI/Hepatic/Renal  Within defined limits             Comments: Screening    Hx of SBO    Hx of occult blood in stools  Hx of early satiety Endo/Other  Within defined limits           Other Findings            Physical Exam    Airway  Mallampati: III  TM Distance: 4 - 6 cm  Neck ROM: normal range of motion   Mouth opening: Normal     Cardiovascular  Regular rate and rhythm,  S1 and S2 normal,  no murmur, click, rub, or gallop  Rhythm: regular  Rate: normal         Dental  No notable dental hx       Pulmonary  Breath sounds clear to auscultation               Abdominal  GI exam deferred       Other Findings            Anesthetic Plan    ASA: 3  Anesthesia type: total IV anesthesia          Induction: Intravenous  Anesthetic plan and risks discussed with: Patient

## 2017-05-11 ENCOUNTER — HOSPITAL ENCOUNTER (OUTPATIENT)
Age: 77
Setting detail: OUTPATIENT SURGERY
Discharge: HOME | End: 2017-05-11
Attending: SPECIALIST | Admitting: SPECIALIST
Payer: MEDICARE

## 2017-05-11 ENCOUNTER — ANESTHESIA (OUTPATIENT)
Dept: ENDOSCOPY | Age: 77
End: 2017-05-11
Payer: MEDICARE

## 2017-05-11 VITALS
TEMPERATURE: 98 F | HEART RATE: 75 BPM | SYSTOLIC BLOOD PRESSURE: 145 MMHG | WEIGHT: 135.31 LBS | RESPIRATION RATE: 14 BRPM | DIASTOLIC BLOOD PRESSURE: 70 MMHG | BODY MASS INDEX: 18.33 KG/M2 | OXYGEN SATURATION: 98 % | HEIGHT: 72 IN

## 2017-05-11 DIAGNOSIS — I48.92 ATRIAL FLUTTER, UNSPECIFIED TYPE (HCC): ICD-10-CM

## 2017-05-11 PROBLEM — D37.4 NEOPLASM OF UNCERTAIN BEHAVIOR OF LARGE INTESTINE: Status: ACTIVE | Noted: 2017-05-11

## 2017-05-11 PROCEDURE — 77030013992 HC SNR POLYP ENDOSC BSC -B: Performed by: SPECIALIST

## 2017-05-11 PROCEDURE — 76040000007: Performed by: SPECIALIST

## 2017-05-11 PROCEDURE — 74011250636 HC RX REV CODE- 250/636

## 2017-05-11 PROCEDURE — 77030010936 HC CLP LIG BSC -C: Performed by: SPECIALIST

## 2017-05-11 PROCEDURE — 74011000250 HC RX REV CODE- 250

## 2017-05-11 PROCEDURE — 76060000032 HC ANESTHESIA 0.5 TO 1 HR: Performed by: SPECIALIST

## 2017-05-11 PROCEDURE — 88305 TISSUE EXAM BY PATHOLOGIST: CPT | Performed by: SPECIALIST

## 2017-05-11 RX ORDER — MIDAZOLAM HYDROCHLORIDE 1 MG/ML
1-2 INJECTION, SOLUTION INTRAMUSCULAR; INTRAVENOUS
Status: DISCONTINUED | OUTPATIENT
Start: 2017-05-11 | End: 2017-05-11 | Stop reason: HOSPADM

## 2017-05-11 RX ORDER — SODIUM CHLORIDE 0.9 % (FLUSH) 0.9 %
5-10 SYRINGE (ML) INJECTION AS NEEDED
Status: DISCONTINUED | OUTPATIENT
Start: 2017-05-11 | End: 2017-05-11 | Stop reason: HOSPADM

## 2017-05-11 RX ORDER — PHENYLEPHRINE HCL IN 0.9% NACL 0.4MG/10ML
SYRINGE (ML) INTRAVENOUS AS NEEDED
Status: DISCONTINUED | OUTPATIENT
Start: 2017-05-11 | End: 2017-05-11

## 2017-05-11 RX ORDER — SODIUM CHLORIDE 0.9 % (FLUSH) 0.9 %
5-10 SYRINGE (ML) INJECTION EVERY 8 HOURS
Status: DISCONTINUED | OUTPATIENT
Start: 2017-05-11 | End: 2017-05-11 | Stop reason: HOSPADM

## 2017-05-11 RX ORDER — PROPOFOL 10 MG/ML
INJECTION, EMULSION INTRAVENOUS AS NEEDED
Status: DISCONTINUED | OUTPATIENT
Start: 2017-05-11 | End: 2017-05-11 | Stop reason: HOSPADM

## 2017-05-11 RX ORDER — NALOXONE HYDROCHLORIDE 0.4 MG/ML
0.4 INJECTION, SOLUTION INTRAMUSCULAR; INTRAVENOUS; SUBCUTANEOUS
Status: DISCONTINUED | OUTPATIENT
Start: 2017-05-11 | End: 2017-05-11 | Stop reason: HOSPADM

## 2017-05-11 RX ORDER — FLUMAZENIL 0.1 MG/ML
0.2 INJECTION INTRAVENOUS
Status: DISCONTINUED | OUTPATIENT
Start: 2017-05-11 | End: 2017-05-11 | Stop reason: HOSPADM

## 2017-05-11 RX ORDER — DEXTROMETHORPHAN/PSEUDOEPHED 2.5-7.5/.8
1.2 DROPS ORAL
Status: DISCONTINUED | OUTPATIENT
Start: 2017-05-11 | End: 2017-05-11 | Stop reason: HOSPADM

## 2017-05-11 RX ORDER — LIDOCAINE HYDROCHLORIDE 20 MG/ML
INJECTION, SOLUTION EPIDURAL; INFILTRATION; INTRACAUDAL; PERINEURAL AS NEEDED
Status: DISCONTINUED | OUTPATIENT
Start: 2017-05-11 | End: 2017-05-11 | Stop reason: HOSPADM

## 2017-05-11 RX ORDER — SODIUM CHLORIDE 9 MG/ML
100 INJECTION, SOLUTION INTRAVENOUS CONTINUOUS
Status: DISCONTINUED | OUTPATIENT
Start: 2017-05-11 | End: 2017-05-11 | Stop reason: HOSPADM

## 2017-05-11 RX ORDER — ATROPINE SULFATE 0.1 MG/ML
0.5 INJECTION INTRAVENOUS
Status: DISCONTINUED | OUTPATIENT
Start: 2017-05-11 | End: 2017-05-11 | Stop reason: HOSPADM

## 2017-05-11 RX ORDER — SODIUM CHLORIDE 9 MG/ML
INJECTION, SOLUTION INTRAVENOUS
Status: DISCONTINUED | OUTPATIENT
Start: 2017-05-11 | End: 2017-05-11 | Stop reason: HOSPADM

## 2017-05-11 RX ORDER — MIDAZOLAM HYDROCHLORIDE 1 MG/ML
.25-5 INJECTION, SOLUTION INTRAMUSCULAR; INTRAVENOUS
Status: DISCONTINUED | OUTPATIENT
Start: 2017-05-11 | End: 2017-05-11 | Stop reason: HOSPADM

## 2017-05-11 RX ADMIN — PROPOFOL 180 MG: 10 INJECTION, EMULSION INTRAVENOUS at 08:32

## 2017-05-11 RX ADMIN — LIDOCAINE HYDROCHLORIDE 40 MG: 20 INJECTION, SOLUTION EPIDURAL; INFILTRATION; INTRACAUDAL; PERINEURAL at 07:58

## 2017-05-11 RX ADMIN — SODIUM CHLORIDE: 9 INJECTION, SOLUTION INTRAVENOUS at 07:50

## 2017-05-11 NOTE — PROGRESS NOTES
[facility]  1940  [id number]    Situation:  Verbal report received from: Vera Agent  Procedure: Procedure(s):  COLONOSCOPY  ENDOSCOPIC POLYPECTOMY  RESOLUTION CLIP    Background:    Preoperative diagnosis: SCREENING  Postoperative diagnosis: colon polpys, diverticulosis    :  Dr. Jo-Ann Kemp  Assistant(s): Endoscopy Technician-1: Naeem Gifford  Endoscopy RN-1: Katie Frye RN    Specimens:   ID Type Source Tests Collected by Time Destination   1 : TRANSVERSE COLON POLYP Preservative Colon, Bryan Coleman MD 5/11/2017 0802 Pathology   2 : RECTUM POLYPS Preservative Sigmoid  Governor Forrest MD 5/11/2017 6121 Pathology     H. Pylori  no    Assessment:  Intra-procedure medications   Anesthesia gave intra-procedure sedation and medications, see anesthesia flow sheet yes    Intravenous fluids: NS@ KVO     Vital signs stable     Abdominal assessment: round and soft     Recommendation:  Discharge patient per MD order.     Family or Friend   Permission to share finding with family or friend yes

## 2017-05-11 NOTE — H&P
Pre-endoscopy H and P    The patient was seen and examined in the endoscopy suite. The airway was assessed and docuemented. The problem list, past medical history, and medications were reviewed. 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 Taking? VENTOLIN HFA 90 mcg/actuation inhaler 5/11/2017 at Unknown time Self Yes Yes   Sig: INHALE 2 PUFFS EVERY 4 HOURS AS NEEDED   apixaban (ELIQUIS) 5 mg tablet 5/7/2017 Self No Yes   Sig: Take 1 Tab by mouth two (2) times a day. aspirin delayed-release 81 mg tablet 5/10/2017 at Unknown time Self Yes Yes   Sig: Take 81 mg by mouth daily. atorvastatin (LIPITOR) 20 mg tablet 5/11/2017 at Unknown time Self No Yes   Sig: TAKE 1 TABLET BY MOUTH EVERY DAY   carvedilol (COREG) 12.5 mg tablet 5/11/2017 at Unknown time  No Yes   Sig: TAKE 1 TABLET BY MOUTH TWICE A DAY WITH MEALS   lisinopril (PRINIVIL, ZESTRIL) 5 mg tablet 5/10/2017 at Unknown time Self No Yes   Sig: Take 1 Tab by mouth daily. multivitamin (ONE A DAY) tablet 5/10/2017 at Unknown time Self Yes Yes   Sig: Take 1 Tab by mouth daily. Facility-Administered Medications: None           The review of systems is:  + for shortness of breath and negative for chest pain      The heart, lungs, and mental status were satisfactory for the administration of anesthesia sedation and for the procedure. I discussed with the patient the objectives, risks, consequences and alternatives to the procedure.       Luisa Negron MD  5/11/2017  7:55 AM

## 2017-05-11 NOTE — DISCHARGE INSTRUCTIONS
Deny Avendano  [id number]  1940              Procedure  Discharge Instructions:      Discomfort:  Redness at IV site- apply warm compress to area; if redness or soreness persist- contact your physician  There may be a slight amount of blood passed from the rectum  Gaseous discomfort- walking, belching will help relieve any discomfort  You may not operate a vehicle for 12 hours  You may not engage in an occupation involving machinery or appliances for rest of today  You may not drink alcoholic beverages for at least 12 hours  Avoid making any critical decisions for at least 24 hour  DIET:   You may resume your normal diet today. You should not overeat or \"feast\" today as your abdomen may become distended or uncomfortable. MEDICATIONS:   I reconciled this list from the list you gave us when you came today for the procedure. Please clarify with me, your primary care physician and the nurse who is discharging you if we have any discrepancies. Aspirin and or non-steroidal medication (Ibuprofen, Motrin, naproxen, etc.) is ok in limited quantities. ACTIVITY:  You may resume your normal daily activities it is recommended that you spend the remainder of the day resting -  avoid any strenuous activity. CALL M.D. ANY SIGN OF:  Increasing pain, nausea, vomiting  Abdominal distension (swelling)  New increased bleeding (oral or rectal)  Fever (chills)          Follow-up Instructions:   Call Dr. Dorcas Oglesby for the results of  biopsy in approximately one week  Telephone #  [phone]  Follow up visit as needed. You have recurrent polyps and will need another colonoscopy in six months. Az Maza MD  8:35 AM  5/11/2017   [facility] Activation    Thank you for requesting access to [facility]. Please follow the instructions below to securely access and download your online medical record.  [facility] allows you to send messages to your doctor, view your test results, renew your prescriptions, schedule appointments, and more. How Do I Sign Up? 1. In your internet browser, go to [email or web address]  2. Click on the First Time User? Click Here link in the Sign In box. You will be redirect to the New Member Sign Up page. 3. Enter your [employer] Access Code exactly as it appears below. You will not need to use this code after youve completed the sign-up process. If you do not sign up before the expiration date, you must request a new code. [employer] Access Code: [email or web address]  Expires: 2017  1:46 PM (This is the date your [employer] access code will )    4. Enter the last four digits of your Social Security Number (xxxx) and Date of Birth (mm/dd/yyyy) as indicated and click Submit. You will be taken to the next sign-up page. 5. Create a [employer] ID. This will be your [employer] login ID and cannot be changed, so think of one that is secure and easy to remember. 6. Create a [employer] password. You can change your password at any time. 7. Enter your Password Reset Question and Answer. This can be used at a later time if you forget your password. 8. Enter your e-mail address. You will receive e-mail notification when new information is available in [address] 9. Click Sign Up. You can now view and download portions of your medical record. 10. Click the Download Summary menu link to download a portable copy of your medical information. Additional Information    If you have questions, please visit the Frequently Asked Questions section of the [employer] website at [email or web address] Remember, [employer] is NOT to be used for urgent needs. For medical emergencies, dial 911.

## 2017-05-11 NOTE — PROCEDURES
Colonoscopy    Indications: prior large [id number] adenoma, rectum     Pre-operative Diagnosis: see above    Medications:  See anesthesia form    Post-operative Diagnosis:  colon polpys, diverticulosis      Procedure Details   Prior to the procedure its objectives, risks, consequences and alternatives were discussed with the patient who then elected to proceed. All questions were answered. Digital Rectal Exam:  was normal     The Olympus videocolonoscope was inserted in the rectum and advanced to the cecum. The cecum was identified by typical landmarks. The colonoscope was slowly and carefully withdrawn as the mucosa was inspected. In the distal transverse colon a 12 mm sessile polyp was snared and recovered with good hemostasis. I clipped the site. There was extensive left sided diverticulosis. At 20 cm in the sigmoid colon an 8mm sessile polyp was snared. Tissue was not recovered. I clipped this site x 2. In the distal rectum at 2 cm there was a 15mm sessile polyp. I snared this, recovered tissue (that fragmented) and clipped the site x 1. There were a few other small distal rectal polyps that I did not remove pending histology of the larger polyp. No other abnormalities were noted. Retroflexion in the rectum was confirmatory. Photos to document the ileocecal valve, appendiceal orifice were obtained. The preparation was adequate      Estimated Blood Loss:  none    Specimens:  Transverse colon polyp  Rectal polyp    Findings: Three polyps removed, one at prior site in rectum  A few rectal polyps remain  diverticula    Complications:  none    Repeat colonoscopy is recommended in:  Six months.                Jairo Mistry MD  8:31 AM  5/11/2017

## 2017-05-11 NOTE — ANESTHESIA POSTPROCEDURE EVALUATION
Post-Anesthesia Evaluation and Assessment    Patient: Randy Dyer MRN: [id number]  SSN: [id number]    YOB: 1940  Age: 78 y.o. Sex: female       Cardiovascular Function/Vital Signs  Visit Vitals    /70    Pulse 75    Temp 36.7 °C (98 °F)    Resp 14    Ht 6' (1.829 m)    Wt 61.4 kg (135 lb 5 oz)    SpO2 98%    Breastfeeding No    BMI 18.35 kg/m2       Patient is status post total IV anesthesia anesthesia for Procedure(s):  COLONOSCOPY  ENDOSCOPIC POLYPECTOMY  RESOLUTION CLIP. Nausea/Vomiting: None    Postoperative hydration reviewed and adequate. Pain:  Pain Scale 1: Numeric (0 - 10) (05/11/17 0905)  Pain Intensity 1: 0 (05/11/17 0905)   Managed    Neurological Status: At baseline    Mental Status and Level of Consciousness: Arousable    Pulmonary Status:   O2 Device: Room air (05/11/17 0905)   Adequate oxygenation and airway patent    Complications related to anesthesia: None    Post-anesthesia assessment completed.  No concerns    Signed By: Dena Villegas DO     May 11, 2017

## 2017-05-11 NOTE — IP AVS SNAPSHOT
[address] [place] [address] 
[phone] Patient: Eriberto Boyce MRN: [id number] QSK:[id number]/[id number] You are allergic to the following No active allergies Recent Documentation Height Weight Breastfeeding? BMI OB Status Smoking Status 1.829 m 61.4 kg No 18.35 kg/m2 Postmenopausal Former Smoker Emergency Contacts Name Discharge Info Relation Home Work Mobile Agapito Madsen  Child [2]   [phone] AnikatonMary  Child [2] [phone] Lesia Kimble  Child [2] [phone] About your hospitalization You were admitted on:  May 11, 2017 You last received care in the:  [facility] You were discharged on:  May 11, 2017 Unit phone number:  [phone] Why you were hospitalized Your primary diagnosis was:  Not on File Your diagnoses also included:  Neoplasm Of Uncertain Behavior Of Large Intestine Providers Seen During Your Hospitalizations Provider Role Specialty Primary office phone Deb Fu MD Attending Provider Gastroenterology [phone] Your Primary Care Physician (PCP) Primary Care Physician Office Phone Office Fax Tee Margaret [phone] Follow-up Information Follow up With Details Comments [place]   [address] [address] 
[phone] Current Discharge Medication List  
  
CONTINUE these medications which have NOT CHANGED Dose & Instructions Dispensing Information Comments Morning Noon Evening Bedtime  
 apixaban 5 mg tablet Commonly known as:  Wilburt Laity Start taking on:  5/13/2017 Your last dose was: Your next dose is:    
   
   
 Dose:  5 mg Take 1 Tab by mouth two (2) times a day. Quantity:  60 Tab Refills:  6  
     
   
   
   
  
 aspirin delayed-release 81 mg tablet Your last dose was: Your next dose is:    
   
   
 Dose:  81 mg Take 81 mg by mouth daily. Refills:  0  
     
   
   
   
  
 atorvastatin 20 mg tablet Commonly known as:  LIPITOR Your last dose was: Your next dose is: TAKE 1 TABLET BY MOUTH EVERY DAY Quantity:  90 Tab Refills:  1  
     
   
   
   
  
 carvedilol 12.5 mg tablet Commonly known as:  [place] Your last dose was: Your next dose is: TAKE 1 TABLET BY MOUTH TWICE A DAY WITH MEALS Quantity:  180 Tab Refills:  3  
     
   
   
   
  
 lisinopril 5 mg tablet Commonly known as:  Guadalupe Nicholson Your last dose was: Your next dose is:    
   
   
 Dose:  5 mg Take 1 Tab by mouth daily. Quantity:  90 Tab Refills:  3  
     
   
   
   
  
 multivitamin tablet Commonly known as:  ONE A DAY Your last dose was: Your next dose is:    
   
   
 Dose:  1 Tab Take 1 Tab by mouth daily. Refills:  0 VENTOLIN HFA 90 mcg/actuation inhaler Generic drug:  albuterol Your last dose was: Your next dose is:    
   
   
 INHALE 2 PUFFS EVERY 4 HOURS AS NEEDED Refills:  0 Where to Get Your Medications Information on where to get these meds will be given to you by the nurse or doctor. ! Ask your nurse or doctor about these medications  
  apixaban 5 mg tablet Discharge Instructions Georgette Collins [id number] 
1940 Procedure  Discharge Instructions:   
 
Discomfort: 
Redness at IV site- apply warm compress to area; if redness or soreness persist- contact your physician There may be a slight amount of blood passed from the rectum Gaseous discomfort- walking, belching will help relieve any discomfort You may not operate a vehicle for 12 hours You may not engage in an occupation involving machinery or appliances for rest of today You may not drink alcoholic beverages for at least 12 hours Avoid making any critical decisions for at least 24 hour DIET: 
 You may resume your normal diet today. You should not overeat or \"feast\" today as your abdomen may become distended or uncomfortable. MEDICATIONS: 
 I reconciled this list from the list you gave us when you came today for the procedure. Please clarify with me, your primary care physician and the nurse who is discharging you if we have any discrepancies. Aspirin and or non-steroidal medication (Ibuprofen, Motrin, naproxen, etc.) is ok in limited quantities. ACTIVITY: 
You may resume your normal daily activities it is recommended that you spend the remainder of the day resting -  avoid any strenuous activity. CALL M.D. ANY SIGN OF: Increasing pain, nausea, vomiting Abdominal distension (swelling) New increased bleeding (oral or rectal) Fever (chills) Follow-up Instructions: 
 Call Dr. Niranjan Brown for the results of  biopsy in approximately one week Telephone #  [phone] Follow up visit as needed. You have recurrent polyps and will need another colonoscopy in six months. Az Raza MD 
8:35 AM 
5/11/2017 [employer] Activation Thank you for requesting access to [employer] [facility]. Please follow the instructions below to securely access and download your online medical record. [employer] allows you to send messages to your doctor, view your test results, renew your prescriptions, schedule appointments, and more. How Do I Sign Up? 1. In your internet browser, go to [email or web address]. 
2. Click on the First Time User? Click Here link in the Sign In box. You will be redirect to the New Member Sign Up page. 3. Enter your [employer] Access Code exactly as it appears below. You will not need to use this code after youve completed the sign-up process. If you do not sign up before the expiration date, you must request a new code. [employer] Access Code: [email or web address] Expires: 2017  1:46 PM (This is the date your [employer] access code will ) 4. Enter the last four digits of your Social Security Number (xxxx) and Date of Birth (mm/dd/yyyy) as indicated and click Submit. You will be taken to the next sign-up page. 5. Create a [employer] ID. This will be your [employer] login ID and cannot be changed, so think of one that is secure and easy to remember. 6. Create a [employer] password. You can change your password at any time. 7. Enter your Password Reset Question and Answer. This can be used at a later time if you forget your password. 8. Enter your e-mail address. You will receive e-mail notification when new information is available in [address] 9. Click Sign Up. You can now view and download portions of your medical record. 10. Click the Download Summary menu link to download a portable copy of your medical information. Additional Information If you have questions, please visit the Frequently Asked Questions section of the [employer] website at [email or web address] Remember, [employer] is NOT to be used for urgent needs. For medical emergencies, dial 911. Discharge Orders None Introducing [facility]! Grover Hicks introduces [employer] patient portal. Now you can access parts of your medical record, email your doctor's office, and request medication refills online. 1. In your internet browser, go to [email or web address] 2. Click on the First Time User? Click Here link in the Sign In box. You will see the New Member Sign Up page. 3. Enter your [employer] Access Code exactly as it appears below. You will not need to use this code after youve completed the sign-up process. If you do not sign up before the expiration date, you must request a new code. · [employer] Access Code: [email or web address] Expires: 2017  1:46 PM 
 
 4. Enter the last four digits of your Social Security Number (xxxx) and Date of Birth (mm/dd/yyyy) as indicated and click Submit. You will be taken to the next sign-up page. 5. Create a [employer] ID. This will be your [employer] login ID and cannot be changed, so think of one that is secure and easy to remember. 6. Create a [employer] password. You can change your password at any time. 7. Enter your Password Reset Question and Answer. This can be used at a later time if you forget your password. 8. Enter your e-mail address. You will receive e-mail notification when new information is available in [address] 9. Click Sign Up. You can now view and download portions of your medical record. 10. Click the Download Summary menu link to download a portable copy of your medical information. If you have questions, please visit the Frequently Asked Questions section of the [employer] website. Remember, [employer] is NOT to be used for urgent needs. For medical emergencies, dial 911. Now available from your iPhone and Android! General Information Please provide this summary of care documentation to your next provider. Patient Signature:  ____________________________________________________________ Date:  ____________________________________________________________  
  
Esau Kohlia Provider Signature:  ____________________________________________________________ Date:  ____________________________________________________________

## 2017-06-29 ENCOUNTER — CLINICAL SUPPORT (OUTPATIENT)
Dept: CARDIOLOGY CLINIC | Age: 77
End: 2017-06-29

## 2017-06-29 ENCOUNTER — OFFICE VISIT (OUTPATIENT)
Dept: CARDIOLOGY CLINIC | Age: 77
End: 2017-06-29

## 2017-06-29 VITALS
WEIGHT: 137.8 LBS | DIASTOLIC BLOOD PRESSURE: 68 MMHG | HEIGHT: 72 IN | OXYGEN SATURATION: 92 % | SYSTOLIC BLOOD PRESSURE: 110 MMHG | RESPIRATION RATE: 16 BRPM | BODY MASS INDEX: 18.66 KG/M2 | HEART RATE: 75 BPM

## 2017-06-29 DIAGNOSIS — I44.2 COMPLETE HEART BLOCK (HCC): ICD-10-CM

## 2017-06-29 DIAGNOSIS — I48.92 ATRIAL FLUTTER, UNSPECIFIED TYPE (HCC): ICD-10-CM

## 2017-06-29 DIAGNOSIS — Z72.0 TOBACCO ABUSE: ICD-10-CM

## 2017-06-29 DIAGNOSIS — Z95.0 PACEMAKER: ICD-10-CM

## 2017-06-29 DIAGNOSIS — I48.0 PAF (PAROXYSMAL ATRIAL FIBRILLATION) (HCC): ICD-10-CM

## 2017-06-29 DIAGNOSIS — Z86.79 S/P ABLATION OF ATRIAL FLUTTER: ICD-10-CM

## 2017-06-29 DIAGNOSIS — I51.81 TAKOTSUBO CARDIOMYOPATHY: ICD-10-CM

## 2017-06-29 DIAGNOSIS — Z98.890 S/P ABLATION OF ATRIAL FLUTTER: ICD-10-CM

## 2017-06-29 DIAGNOSIS — I48.0 PAF (PAROXYSMAL ATRIAL FIBRILLATION) (HCC): Primary | ICD-10-CM

## 2017-06-29 DIAGNOSIS — Z86.79 S/P ABLATION OF ATRIAL FIBRILLATION: ICD-10-CM

## 2017-06-29 DIAGNOSIS — Z98.890 S/P ABLATION OF ATRIAL FIBRILLATION: ICD-10-CM

## 2017-06-29 DIAGNOSIS — Z95.0 PACEMAKER: Primary | ICD-10-CM

## 2017-06-29 DIAGNOSIS — I10 ESSENTIAL HYPERTENSION: ICD-10-CM

## 2017-06-29 RX ORDER — TIOTROPIUM BROMIDE INHALATION SPRAY 3.12 UG/1
SPRAY, METERED RESPIRATORY (INHALATION)
Refills: 6 | COMMUNITY
Start: 2017-05-31 | End: 2019-10-22

## 2017-06-29 RX ORDER — TIOTROPIUM BROMIDE 18 UG/1
CAPSULE ORAL; RESPIRATORY (INHALATION)
Refills: 4 | COMMUNITY
Start: 2017-06-11 | End: 2018-02-13

## 2017-06-29 NOTE — PROGRESS NOTES
Subjective:      Chio Hernandez is a 78 y.o. female is here for f/u regarding SSS with pacemaker and hx of afib/flutter with ablation in March. The patient denies chest pain/worsening shortness of breath, orthopnea, PND, LE edema, palpitations, syncope, presyncope or fatigue. 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    PAF (paroxysmal atrial fibrillation) ([address] 03/23/2017    Atrial flutter ([address] 03/23/2017    S/P ablation of atrial fibrillation 03/23/2017    S/P ablation of atrial flutter 03/23/2017    Occult blood in stools 01/28/2016    Early satiety 01/28/2016    Femoral hernia with obstruction 11/29/2015    SBO (small bowel obstruction) ([address] 11/28/2015    Pacemaker 09/08/2014    Complete heart block ([address] 09/06/2014    Acute MI, inferolateral wall ([address] 09/04/2014    Hypertension 09/04/2014    Tobacco abuse 09/04/2014    Takotsubo cardiomyopathy 09/04/2014    STEMI (ST elevation myocardial infarction) ([address] 09/04/2014      Marco Antonio Tate MD  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     HX PACEMAKER Left 2014     No Known Allergies   Family History   Problem Relation Age of Onset    Heart Disease Mother     Cancer Father      BLADDER    Other Sister      RA    Other Brother      RA    Alcohol abuse Brother     negative for cardiac disease  Social History     Social History    Marital status:      Spouse name: N/A    Number of children: N/A    Years of education: N/A     Social History Main Topics    Smoking status: Former Smoker     Packs/day: 1.00     Years: 50.00     Types: Cigarettes     Quit date: 9/4/2014    Smokeless tobacco: Never Used    Alcohol use No    Drug use: No    Sexual activity: Not Asked     Other Topics Concern    None     Social History Narrative     Current Outpatient Prescriptions   Medication Sig    SPIRIVA WITH HANDIHALER 18 mcg inhalation capsule INHALE 1 CAPSULE EVERY DAY    SPIRIVA RESPIMAT 2.5 mcg/actuation inhaler INHALE 2 PUFFS BY MOUTH ONCE DAILY    apixaban (ELIQUIS) 5 mg tablet Take 1 Tab by mouth two (2) times a day.  carvedilol (COREG) 12.5 mg tablet TAKE 1 TABLET BY MOUTH TWICE A DAY WITH MEALS    atorvastatin (LIPITOR) 20 mg tablet TAKE 1 TABLET BY MOUTH EVERY DAY    lisinopril (PRINIVIL, ZESTRIL) 5 mg tablet Take 1 Tab by mouth daily.  aspirin delayed-release 81 mg tablet Take 81 mg by mouth daily.  multivitamin (ONE A DAY) tablet Take 1 Tab by mouth daily.  VENTOLIN HFA 90 mcg/actuation inhaler INHALE 2 PUFFS EVERY 4 HOURS AS NEEDED     No current facility-administered medications for this visit. Vitals:    06/29/17 0841   BP: 110/68   Pulse: 75   Resp: 16   SpO2: 92%   Weight: 137 lb 12.8 oz (62.5 kg)   Height: 6' (1.829 m)       I have reviewed the nurses notes, vitals, problem list, allergy list, medical history, family, social history and medications. Review of Symptoms:    General: Pt denies excessive weight gain or loss. Pt is able to conduct ADL's  HEENT: Denies blurred vision, headaches, epistaxis and difficulty swallowing.   Respiratory: Denies worsening shortness of breath, BURKETT, wheezing or stridor. Cardiovascular: Denies precordial pain, palpitations, edema or PND  Gastrointestinal: Denies poor appetite, indigestion, abdominal pain or blood in stool  Urinary: Denies dysuria, pyuria  Musculoskeletal: Denies pain or swelling from muscles or joints  Neurologic: Denies tremor, paresthesias, or sensory motor disturbance  Skin: Denies rash, itching or texture change. Psych: Denies depression      Physical Exam:      General: Well developed, in no acute distress. HEENT: Eyes - PERRL, no jvd  Heart:  Normal S1/S2 negative S3 or S4. Regular, no murmur, gallop or rub.   Respiratory: Clear bilaterally x 4, no wheezing or rales  Abdomen:   Soft, non-tender, bowel sounds are active.   Extremities:  No edema, normal cap refill, no cyanosis. Musculoskeletal: No clubbing  Neuro: A&Ox3, speech clear, gait stable. Skin: Skin color is normal. No rashes or lesions.  Non diaphoretic  Vascular: 2+ pulses symmetric in all extremities    Cardiographics    Ekg: A paced, HR 76    [employer] biv-pacemaker- 66% A paced  Aflutter less than 1%, longest 22 sec  6.5 year battery life      Results for orders placed or performed during the hospital encounter of 03/22/17   EKG, 12 LEAD, INITIAL   Result Value Ref Range    Ventricular Rate 75 BPM    Atrial Rate 75 BPM    P-R Interval 142 ms    QRS Duration 80 ms    Q-T Interval 374 ms    QTC Calculation (Bezet) 417 ms    Calculated P Axis 81 degrees    Calculated R Axis 78 degrees    Calculated T Axis 82 degrees    Diagnosis       Electronic atrial pacemaker  Nonspecific ST and T wave abnormality  When compared with ECG of 28-NOV-2015 13:10,  Electronic atrial pacemaker has replaced Sinus rhythm    Confirmed by Fay Thompson ([id number]) on 3/23/2017 7:50:30 AM           Lab Results   Component Value Date/Time    WBC 10.8 03/23/2017 03:47 AM    Hemoglobin (POC) 15.0 09/04/2014 06:16 PM    HGB 12.8 03/23/2017 03:47 AM Hematocrit (POC) 44 09/04/2014 06:16 PM    HCT 38.6 03/23/2017 03:47 AM    PLATELET 947 60/73/8616 03:47 AM    MCV 90.8 03/23/2017 03:47 AM      Lab Results   Component Value Date/Time    Sodium 134 03/23/2017 03:47 AM    Potassium 4.5 03/23/2017 03:47 AM    Chloride 98 03/23/2017 03:47 AM    CO2 28 03/23/2017 03:47 AM    Anion gap 8 03/23/2017 03:47 AM    Glucose 118 03/23/2017 03:47 AM    BUN 14 03/23/2017 03:47 AM    Creatinine 0.71 03/23/2017 03:47 AM    BUN/Creatinine ratio 20 03/23/2017 03:47 AM    GFR est AA >60 03/23/2017 03:47 AM    GFR est non-AA >60 03/23/2017 03:47 AM    Calcium 8.9 03/23/2017 03:47 AM    Bilirubin, total 0.4 03/13/2017 09:34 AM    AST (SGOT) 33 03/13/2017 09:34 AM    Alk. phosphatase 92 03/13/2017 09:34 AM    Protein, total 6.7 03/13/2017 09:34 AM    Albumin 3.9 03/13/2017 09:34 AM    Globulin 4.0 12/13/2015 11:47 AM    A-G Ratio 1.4 03/13/2017 09:34 AM    ALT (SGPT) 29 03/13/2017 09:34 AM         Assessment:     Assessment:        ICD-10-CM ICD-9-CM    1. PAF (paroxysmal atrial fibrillation) (HCC) I48.0 427.31 AMB POC EKG ROUTINE W/ 12 LEADS, INTER & REP   2. Atrial flutter, unspecified type ([facility] I48.92 427.32    3. S/P ablation of atrial fibrillation Z98.890 V45.89     Z86.79     4. S/P ablation of atrial flutter Z98.890 V45.89     Z86.79     5. Complete heart block (HCC) I44.2 426.0    6. Pacemaker Z95.0 V45.01    7. Takotsubo cardiomyopathy I51.81 429.83    8. Essential hypertension I10 401.9    9.  Tobacco abuse Z72.0 305.1      Orders Placed This Encounter    AMB POC EKG ROUTINE W/ 12 LEADS, INTER & REP     Order Specific Question:   Reason for Exam:     Answer:   Routine    SPIRIVA WITH HANDIHALER 18 mcg inhalation capsule     Sig: INHALE 1 CAPSULE EVERY DAY     Refill:  4    SPIRIVA RESPIMAT 2.5 mcg/actuation inhaler     Sig: INHALE 2 PUFFS BY MOUTH ONCE DAILY     Refill:  6        Plan:   Ms Fernandez Arorad is here today for f/u regarding SSS with pacemaker and hx of afib/flutter with ablation in March. She denies new/worsening cardiac complaints. She is A paced 66%. She is having short bursts of atrial flutter longest 22 sec. Continue medical management for afib/flutter and htn. She is normotensive on med rx. F/U in a year    Thank you for allowing me to participate in Carley Owen 's care.     Stephanie Zeng MD, Hipolito Arellano

## 2017-07-21 ENCOUNTER — APPOINTMENT (OUTPATIENT)
Dept: GENERAL RADIOLOGY | Age: 77
End: 2017-07-21
Attending: EMERGENCY MEDICINE
Payer: MEDICARE

## 2017-07-21 ENCOUNTER — HOSPITAL ENCOUNTER (EMERGENCY)
Age: 77
Discharge: HOME | End: 2017-07-21
Attending: EMERGENCY MEDICINE
Payer: MEDICARE

## 2017-07-21 VITALS
RESPIRATION RATE: 18 BRPM | HEIGHT: 71 IN | DIASTOLIC BLOOD PRESSURE: 65 MMHG | HEART RATE: 74 BPM | OXYGEN SATURATION: 95 % | BODY MASS INDEX: 19.23 KG/M2 | SYSTOLIC BLOOD PRESSURE: 150 MMHG | WEIGHT: 137.35 LBS | TEMPERATURE: 98.8 F

## 2017-07-21 DIAGNOSIS — K59.00 CONSTIPATION, UNSPECIFIED CONSTIPATION TYPE: Primary | ICD-10-CM

## 2017-07-21 PROCEDURE — 99282 EMERGENCY DEPT VISIT SF MDM: CPT

## 2017-07-21 PROCEDURE — 74022 RADEX COMPL AQT ABD SERIES: CPT

## 2017-07-22 NOTE — ED NOTES
Discharge instructions reviewed with patient by Dr Alice Hong. Pt denies questions or concerns at this time. Pt appears in no acute distress at this time. Pt ambulatory out of department with steady gait.

## 2017-07-22 NOTE — ED TRIAGE NOTES
Pt presents to ED c/o constipation x1 month. Hx of SBO. Pt states she has had some loose stools after taking some laxatives. Denies nausea/vomiting/abdominal pain.

## 2017-07-22 NOTE — DISCHARGE INSTRUCTIONS
Constipation: Care Instructions  Your Care Instructions  Constipation means that you have a hard time passing stools (bowel movements). People pass stools from 3 times a day to once every 3 days. What is normal for you may be different. Constipation may occur with pain in the rectum and cramping. The pain may get worse when you try to pass stools. Sometimes there are small amounts of bright red blood on toilet paper or the surface of stools. This is because of enlarged veins near the rectum (hemorrhoids). A few changes in your diet and lifestyle may help you avoid ongoing constipation. Your doctor may also prescribe medicine to help loosen your stool. Some medicines can cause constipation. These include pain medicines and antidepressants. Tell your doctor about all the medicines you take. Your doctor may want to make a medicine change to ease your symptoms. Follow-up care is a key part of your treatment and safety. Be sure to make and go to all appointments, and call your doctor if you are having problems. It's also a good idea to know your test results and keep a list of the medicines you take. How can you care for yourself at home? · Drink plenty of fluids, enough so that your urine is light yellow or clear like water. If you have kidney, heart, or liver disease and have to limit fluids, talk with your doctor before you increase the amount of fluids you drink. · Include high-fiber foods in your diet each day. These include fruits, vegetables, beans, and whole grains. · Get at least 30 minutes of exercise on most days of the week. Walking is a good choice. You also may want to do other activities, such as running, swimming, cycling, or playing tennis or team sports. · Take a fiber supplement, such as Citrucel or Metamucil, every day. Read and follow all instructions on the label. · Schedule time each day for a bowel movement. A daily routine may help.  Take your time having your bowel movement. · Support your feet with a small step stool when you sit on the toilet. This helps flex your hips and places your pelvis in a squatting position. · Your doctor may recommend an over-the-counter laxative to relieve your constipation. Examples are Milk of Magnesia and MiraLax. Read and follow all instructions on the label. Do not use laxatives on a long-term basis. When should you call for help? Call your doctor now or seek immediate medical care if:  · You have new or worse belly pain. · You have new or worse nausea or vomiting. · You have blood in your stools. Watch closely for changes in your health, and be sure to contact your doctor if:  · Your constipation is getting worse. · You do not get better as expected. Where can you learn more? Go to http://gisell-shelly.info/. Enter 21 [phone] in the search box to learn more about \"Constipation: Care Instructions. \"  Current as of: March 20, 2017  Content Version: 11.3  © 5297-8485 [employer]. Care instructions adapted under license by [employer] (which disclaims liability or warranty for this information). If you have questions about a medical condition or this instruction, always ask your healthcare professional. [place] [address] any warranty or liability for your use of this information.

## 2017-07-22 NOTE — ED PROVIDER NOTES
HPI Comments: Jamar León, 78 y.o. Female with PMHx of HTN, MI, COPD, and PAF presents ambulatory to the ED with cc of intermittent constipation x 1 month. Pt notes that she has had increased difficulty with producing a BM and has to strain or use a laxative to produce a BM. Pt notes that she had a bowel obstruction in the past and is concerned because her current sxs are similar. Pt has been eating 3 meals per day, even though she has not been hungry recently. Of note, the pt had a colonoscopy performed in 5/17 by Dr. Romario Palacios which showed polyps, she has a f/u colonoscopy in October. She denies any changes fevers, chills, N/V/D, abd pain, CP, SOB or urinary sxs. PCP: Leighton Hutchinson MD    Social history significant for: - Tobacco, - EtOH, - Illicit Drug Use    There are no other complaints, changes, or physical findings at this time. Written by Deana Umanzor ED Scribe, as dictated by Rohini Pa MD.      The history is provided by the patient. No  was used. 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     HX PACEMAKER Left 2014         Family History:   Problem Relation Age of Onset    Heart Disease Mother     Cancer Father      BLADDER  Other Sister      RA    Other Brother      RA    Alcohol abuse Brother        Social History     Social History    Marital status:      Spouse name: N/A    Number of children: N/A    Years of education: N/A     Occupational History    Not on file. Social History Main Topics    Smoking status: Former Smoker     Packs/day: 1.00     Years: 50.00     Types: Cigarettes     Quit date: 9/4/2014    Smokeless tobacco: Never Used    Alcohol use No    Drug use: No    Sexual activity: Not on file     Other Topics Concern    Not on file     Social History Narrative         ALLERGIES: Review of patient's allergies indicates no known allergies. Review of Systems   Constitutional: Negative for activity change, appetite change, chills, fever and unexpected weight change. HENT: Negative for congestion. Eyes: Negative for pain and visual disturbance. Respiratory: Negative for cough and shortness of breath. Cardiovascular: Negative for chest pain. Gastrointestinal: Positive for constipation. Negative for abdominal pain, diarrhea, nausea and vomiting. Genitourinary: Negative for dysuria. Musculoskeletal: Negative for back pain. Skin: Negative for rash. Neurological: Negative for headaches. Vitals:    07/21/17 2146   BP: 150/65   Pulse: 74   Resp: 18   Temp: 98.8 °F (37.1 °C)   SpO2: 95%   Weight: 62.3 kg (137 lb 5.6 oz)   Height: 5' 11\" (1.803 m)            Physical Exam   Constitutional: She is oriented to person, place, and time. She appears well-developed and well-nourished. Well appearing elderly female in NAD   HENT:   Head: Normocephalic and atraumatic. Mouth/Throat: Oropharynx is clear and moist.   Eyes: Conjunctivae and EOM are normal. Pupils are equal, round, and reactive to light. Right eye exhibits no discharge. Left eye exhibits no discharge. Neck: Normal range of motion. Neck supple. Cardiovascular: Normal rate and normal heart sounds.     No murmur heard.  Pulmonary/Chest: Effort normal and breath sounds normal. No respiratory distress. She has no wheezes. She has no rales. Abdominal: Soft. Bowel sounds are normal. She exhibits no distension. There is no tenderness. Musculoskeletal: Normal range of motion. Trace pedal edema   Neurological: She is alert and oriented to person, place, and time. No cranial nerve deficit. She exhibits normal muscle tone. Skin: Skin is warm and dry. No rash noted. She is not diaphoretic. Nursing note and vitals reviewed. MDM  Number of Diagnoses or Management Options  Constipation, unspecified constipation type:   Diagnosis management comments: Change in stool frequency with recent colonoscopy 2 months ago, other than polyps there were no colon masses       Amount and/or Complexity of Data Reviewed  Tests in the radiology section of CPT®: ordered and reviewed  Review and summarize past medical records: yes    Patient Progress  Patient progress: stable    ED Course       Procedures  23:10 Discussed xray results as well as home care recommendations. No further symptoms. IMAGING RESULTS:  XR ABD ACUTE W 1 V CHEST   Final Result   INDICATION:  r/o ileus      EXAM: Frontal view of the chest and 2 views of the abdomen. Comparison April 10,  2017      FINDINGS: The cardiomediastinal silhouette is normal. Pulmonary vasculature is  not engorged. Lungs are hyperinflated. Pacer unchanged. No pneumothorax, focal  parenchymal opacity or effusion. There is no free air. Bowel gas pattern is  normal. No abnormal calcifications. Surgical clips right lower quadrant     IMPRESSION  IMPRESSION:  1. Hyperinflation no acute cardiopulmonary disease  2. Normal bowel gas pattern       IMPRESSION:  1. Constipation, unspecified constipation type        PLAN:  1. Current Discharge Medication List        2.    Follow-up Information     Follow up With Details Comments Contact Info    [facility] EMERGENCY DEPT  If symptoms worsen [address] [facility]  [phone]        Return to ED if worse     Discharge Note:  11:00 PM  The pt is ready for discharge. The pt's signs, symptoms, diagnosis, and discharge instructions have been discussed and pt has conveyed their understanding. The pt is to follow up as recommended or return to ER should their symptoms worsen. Plan has been discussed and pt is in agreement. This note is prepared by Dora Reddy, acting as a Scribe for Jean Carlos Laguerre MD.    Jean Carlos Laguerre MD: The scribe's documentation has been prepared under my direction and personally reviewed by me in its entirety. I confirm that the notes above accurately reflects all work, treatment, procedures, and medical decision making performed by me.

## 2017-08-23 ENCOUNTER — TELEPHONE (OUTPATIENT)
Dept: CARDIOLOGY CLINIC | Age: 77
End: 2017-08-23

## 2017-08-23 NOTE — TELEPHONE ENCOUNTER
Left message for patient to return my call r/t needing to make an appt before clearance for procedure with [facility].

## 2017-08-24 NOTE — TELEPHONE ENCOUNTER
Verified patient with two identifiers. Spoke with pt letting her know we will need to see her before she can be cleared for procedure with [facility]. Pt has an appt on 11/22/17 with Dr. Sravan Ward.

## 2017-08-25 ENCOUNTER — TELEPHONE (OUTPATIENT)
Dept: CARDIOLOGY CLINIC | Age: 77
End: 2017-08-25

## 2017-08-28 ENCOUNTER — TELEPHONE (OUTPATIENT)
Dept: CARDIOLOGY CLINIC | Age: 77
End: 2017-08-28

## 2017-08-28 NOTE — TELEPHONE ENCOUNTER
Verified patient with two identifiers. Spoke with pt c/o she would need an appt with Dr. Norman Alexis for clearance as he was the one who put her on Eliquis. Assured pt that Dr. Flavia Gonzalez can address that. She verbalized understanding.

## 2017-09-07 ENCOUNTER — TELEPHONE (OUTPATIENT)
Dept: CARDIOLOGY CLINIC | Age: 77
End: 2017-09-07

## 2017-09-07 NOTE — TELEPHONE ENCOUNTER
Colonoscopy on 11/30/17, please call to advise if it is okay if patient can be off blood thinner 5 days prier. Thanks.

## 2017-09-28 DIAGNOSIS — I48.92 ATRIAL FLUTTER, UNSPECIFIED TYPE (HCC): ICD-10-CM

## 2017-09-28 RX ORDER — APIXABAN 5 MG/1
TABLET, FILM COATED ORAL
Qty: 60 TAB | Refills: 6 | Status: SHIPPED | OUTPATIENT
Start: 2017-09-28 | End: 2017-11-09 | Stop reason: SDUPTHER

## 2017-09-29 DIAGNOSIS — I48.92 ATRIAL FLUTTER, UNSPECIFIED TYPE (HCC): ICD-10-CM

## 2017-09-29 RX ORDER — APIXABAN 5 MG/1
TABLET, FILM COATED ORAL
Qty: 60 TAB | Refills: 6 | Status: SHIPPED | OUTPATIENT
Start: 2017-09-29 | End: 2017-11-09 | Stop reason: SDUPTHER

## 2017-11-09 ENCOUNTER — OFFICE VISIT (OUTPATIENT)
Dept: CARDIOLOGY CLINIC | Age: 77
End: 2017-11-09

## 2017-11-09 VITALS
OXYGEN SATURATION: 97 % | HEART RATE: 70 BPM | SYSTOLIC BLOOD PRESSURE: 112 MMHG | WEIGHT: 143.1 LBS | HEIGHT: 71 IN | DIASTOLIC BLOOD PRESSURE: 70 MMHG | BODY MASS INDEX: 20.03 KG/M2 | RESPIRATION RATE: 18 BRPM

## 2017-11-09 DIAGNOSIS — I10 ESSENTIAL HYPERTENSION: ICD-10-CM

## 2017-11-09 DIAGNOSIS — I51.81 TAKOTSUBO CARDIOMYOPATHY: ICD-10-CM

## 2017-11-09 DIAGNOSIS — I48.0 PAF (PAROXYSMAL ATRIAL FIBRILLATION) (HCC): Primary | ICD-10-CM

## 2017-11-09 NOTE — PROGRESS NOTES
NAME:  Yazmin Saha   :   1940   MRN:   [id number]   PCP:  Frances Farias MD           Subjective: The patient is a 78y.o. year old female  who returns for a routine follow-up. Since the last visit, patient reports no change in exercise tolerance, chest pain, edema, medication intolerance, palpitations, shortness of breath, PND/orthopnea wheezing, sputum, syncope, dizziness or light headedness. Doing well. 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 PAF (paroxysmal atrial fibrillation) ([facility]) I48.0 427.31 AMB POC EKG ROUTINE W/ 12 LEADS, INTER & REP   2. Takotsubo cardiomyopathy I51.81 429.83    3. Essential hypertension I10 401.9       Social History   Substance Use Topics    Smoking status: Former Smoker     Packs/day: 1.00     Years: 50.00     Types: Cigarettes     Quit date: 2014    Smokeless tobacco: Never Used    Alcohol use No      Family History   Problem Relation Age of Onset    Heart Disease Mother     Cancer Father      BLADDER    Other Sister      RA    Other Brother      RA    Alcohol abuse Brother         Review of Systems  General: Pt denies excessive weight gain or loss. Pt is able to conduct ADL's  HEENT: Denies blurred vision, headaches, epistaxis and difficulty swallowing. Respiratory: Denies shortness of breath, BURKETT, wheezing or stridor.   Cardiovascular: Denies precordial pain, palpitations, edema or PND  Gastrointestinal: Denies poor appetite, indigestion, abdominal pain or blood in stool  Musculoskeletal: Denies pain or swelling from muscles or joints  Neurologic: Denies tremor, paresthesias, or sensory motor disturbance  Skin: Denies rash, itching or texture change. Objective:       Vitals:    11/09/17 1403 11/09/17 1411   BP: 110/70 112/70   Pulse: 70    Resp: 18    SpO2: 97%    Weight: 143 lb 1.6 oz (64.9 kg)    Height: 5' 11\" (1.803 m)     Body mass index is 19.96 kg/(m^2). General PE  Mental Status - Alert. General Appearance - Not in acute distress. Chest and Lung Exam   Inspection: Accessory muscles - No use of accessory muscles in breathing. Auscultation:   Breath sounds: - Normal.    Cardiovascular   Inspection: Jugular vein - Bilateral - Inspection Normal.  Palpation/Percussion:   Apical Impulse: - Normal.  Auscultation: Rhythm - Regular. Heart Sounds - S1 WNL and S2 WNL. No S3 or S4. Murmurs & Other Heart Sounds: Auscultation of the heart reveals - No Murmurs. Peripheral Vascular   Upper Extremity: Inspection - Bilateral - No Cyanotic nailbeds or Digital clubbing. Lower Extremity:   Palpation: Edema - Bilateral - No edema. Data Review:     EKG -EKG: paced rythm    Medications reviewed  Current Outpatient Prescriptions   Medication Sig    SPIRIVA RESPIMAT 2.5 mcg/actuation inhaler INHALE 2 PUFFS BY MOUTH ONCE DAILY    apixaban (ELIQUIS) 5 mg tablet Take 1 Tab by mouth two (2) times a day.  carvedilol (COREG) 12.5 mg tablet TAKE 1 TABLET BY MOUTH TWICE A DAY WITH MEALS    atorvastatin (LIPITOR) 20 mg tablet TAKE 1 TABLET BY MOUTH EVERY DAY    lisinopril (PRINIVIL, ZESTRIL) 5 mg tablet Take 1 Tab by mouth daily.  aspirin delayed-release 81 mg tablet Take 81 mg by mouth daily.  multivitamin (ONE A DAY) tablet Take 1 Tab by mouth daily.     SPIRIVA WITH HANDIHALER 18 mcg inhalation capsule INHALE 1 CAPSULE EVERY DAY    VENTOLIN HFA 90 mcg/actuation inhaler INHALE 2 PUFFS EVERY 4 HOURS AS NEEDED     No current facility-administered medications for this visit. Assessment:       ICD-10-CM ICD-9-CM    1. PAF (paroxysmal atrial fibrillation) (HCC) I48.0 427.31 AMB POC EKG ROUTINE W/ 12 LEADS, INTER & REP   2. Takotsubo cardiomyopathy I51.81 429.83    3. Essential hypertension I10 401.9         Plan:     Patient presents doing well and stable from cardiac standpoint. Cleared  for colonoscopy. Can hold eliquis for 24 hrs before ( 1 day before ) the procedure and the day of the procedure . Can resume next day if no polypectomy. Dr. Yue Bush notified. Continue current care and follow up in 6 months.     Eliezer Rodrigez MD

## 2017-11-09 NOTE — MR AVS SNAPSHOT
Visit Information Date & Time Provider Department Dept. Phone Encounter #  
 11/9/2017  2:15 PM [address] [facility], [place] [address] [facility] [phone] [id number] Your Appointments 1/4/2018  9:00 AM  
6 MONTH with Lisset Melara MD  
[facility]) Appt Note: Per Dr. Rosa Isela Jerez Abdul Dexter  
[phone] [address] [facility]  
  
    
 1/4/2018  9:00 AM  
6 MONTH with PACEMAKER, [facility]) Appt Note: Per Dr. Rosa Isela Jerez Bradshaw Dexter  
[phone] [address] [facility] Upcoming Health Maintenance Date Due FOBT Q 1 YEAR AGE 50-75 12/16/1992 ZOSTER VACCINE AGE 60> 10/16/2002 DTaP/Tdap/Td series (1 - Tdap) 1/11/2005 GLAUCOMA SCREENING Q2Y 12/16/2007 OSTEOPOROSIS SCREENING (DEXA) 12/16/2007 MEDICARE YEARLY EXAM 12/16/2007 Pneumococcal 65+ Low/Medium Risk (2 of 2 - PCV13) 9/19/2009 Influenza Age 5 to Adult 8/1/2017 Allergies as of 11/9/2017  Review Complete On: 11/9/2017 By: Cherrie Quezada LPN No Known Allergies Current Immunizations  Reviewed on 11/30/2015 Name Date Influenza Vaccine 10/1/2016 Pneumococcal Polysaccharide (PPSV-23) 9/19/2008 Td 1/10/2005 Not reviewed this visit You Were Diagnosed With   
  
 Codes Comments PAF (paroxysmal atrial fibrillation) ([facility]    -  Primary ICD-10-CM: I48.0 ICD-9-CM: 427.31 Takotsubo cardiomyopathy     ICD-10-CM: I51.81 
ICD-9-CM: 429.83 Vitals BP Pulse Resp Height(growth percentile) Weight(growth percentile) SpO2  
 112/70 (BP 1 Location: Right arm, BP Patient Position: Sitting) 70 18 5' 11\" (1.803 m) 143 lb 1.6 oz (64.9 kg) 97% BMI OB Status Smoking Status 19.96 kg/m2 Postmenopausal Former Smoker Vitals History BMI and BSA Data Body Mass Index Body Surface Area  
 19.96 kg/m 2 1.8 m 2 Preferred Pharmacy Pharmacy Name Phone [facility]/PHARMACY #[id number] [facility] [phone] Your Updated Medication List  
  
   
This list is accurate as of: 11/9/17  3:05 PM.  Always use your most recent med list.  
  
  
  
  
 apixaban 5 mg tablet Commonly known as:  Princella Hernandez Take 1 Tab by mouth two (2) times a day. aspirin delayed-release 81 mg tablet Take 81 mg by mouth daily. atorvastatin 20 mg tablet Commonly known as:  LIPITOR  
TAKE 1 TABLET BY MOUTH EVERY DAY  
  
 carvedilol 12.5 mg tablet Commonly known as:  COREG  
TAKE 1 TABLET BY MOUTH TWICE A DAY WITH MEALS  
  
 lisinopril 5 mg tablet Commonly known as:  Samantha [place] Take 1 Tab by mouth daily. multivitamin tablet Commonly known as:  ONE A DAY Take 1 Tab by mouth daily. * SPIRIVA RESPIMAT 2.5 mcg/actuation inhaler Generic drug:  tiotropium bromide INHALE 2 PUFFS BY MOUTH ONCE DAILY * SPIRIVA WITH HANDIHALER 18 mcg inhalation capsule Generic drug:  tiotropium INHALE 1 CAPSULE EVERY DAY  
  
 VENTOLIN HFA 90 mcg/actuation inhaler Generic drug:  albuterol INHALE 2 PUFFS EVERY 4 HOURS AS NEEDED * Notice: This list has 2 medication(s) that are the same as other medications prescribed for you. Read the directions carefully, and ask your doctor or other care provider to review them with you. We Performed the Following AMB POC EKG ROUTINE W/ 12 LEADS, INTER & REP [48681 CPT(R)] Introducing [facility] & HEALTH SERVICES! Yanira Antony introduces Vertive (Offers.com) patient portal. Now you can access parts of your medical record, email your doctor's office, and request medication refills online. 1. In your internet browser, go to [email or web address] 2. Click on the First Time User? Click Here link in the Sign In box. You will see the New Member Sign Up page. 3. Enter your RhinoCyte Access Code exactly as it appears below. You will not need to use this code after youve completed the sign-up process. If you do not sign up before the expiration date, you must request a new code. · RhinoCyte Access Code: [email or web address] Expires: 2/7/2018  3:05 PM 
 
4. Enter the last four digits of your Social Security Number (xxxx) and Date of Birth (mm/dd/yyyy) as indicated and click Submit. You will be taken to the next sign-up page. 5. Create a RhinoCyte ID. This will be your RhinoCyte login ID and cannot be changed, so think of one that is secure and easy to remember. 6. Create a RhinoCyte password. You can change your password at any time. 7. Enter your Password Reset Question and Answer. This can be used at a later time if you forget your password. 8. Enter your e-mail address. You will receive e-mail notification when new information is available in [address] 9. Click Sign Up. You can now view and download portions of your medical record. 10. Click the Download Summary menu link to download a portable copy of your medical information. If you have questions, please visit the Frequently Asked Questions section of the RhinoCyte website. Remember, RhinoCyte is NOT to be used for urgent needs. For medical emergencies, dial 911. Now available from your iPhone and Android! Please provide this summary of care documentation to your next provider. Your primary care clinician is listed as Kamille Ricks. If you have any questions after today's visit, please call [phone].

## 2017-11-10 ENCOUNTER — TELEPHONE (OUTPATIENT)
Dept: CARDIOLOGY CLINIC | Age: 77
End: 2017-11-10

## 2017-11-10 NOTE — TELEPHONE ENCOUNTER
Faxed clearance note to Krista @ [address] [facility] @ [phone]. Pt is cleared for procedure, hold Eliquis 24 hours before procedure.

## 2017-11-10 NOTE — TELEPHONE ENCOUNTER
Please call Haylee Dover, she faxed a request back in 8/17 to see if the patient can come off eliquis 5 days prior to her colonoscopy on 11/30/17     thanks

## 2017-11-29 ENCOUNTER — ANESTHESIA EVENT (OUTPATIENT)
Dept: ENDOSCOPY | Age: 77
End: 2017-11-29
Payer: MEDICARE

## 2017-11-29 DIAGNOSIS — I48.92 ATRIAL FLUTTER, UNSPECIFIED TYPE (HCC): ICD-10-CM

## 2017-11-29 NOTE — ANESTHESIA PREPROCEDURE EVALUATION
Anesthetic History     PONV (no probs with previous colonoscopy using propofol)          Review of Systems / Medical History  Patient summary reviewed, nursing notes reviewed and pertinent labs reviewed    Pulmonary    COPD (emphysema on CXR): moderate      Smoker (EX, 50 pk yrs, quit 9-4-14)      Comments: Chronic congested sounding cough, denies fever, chills colored secretions or recent URI   Neuro/Psych   Within defined limits           Cardiovascular    Hypertension: well controlled        Dysrhythmias (s/p ablation) : atrial fibrillation and atrial flutter  Pacemaker (PM for complete hrt blk), past MI (STEMI), CAD and hyperlipidemia    Exercise tolerance: <4 METS  Comments: Hx of Takotsubo cardiomyopathy    11-9-17 EKG: Pacemaker    3-22-17 ECHO: normal function, mod to severe rt and left atrium dilation, mod to severe TR, no AS noted   GI/Hepatic/Renal  Within defined limits             Comments:  Follow up for colon polyps, tubulovillous adenoma of rectum 2016    Hx of SBO    Hx of occult blood in stools  Hx of early satiety Endo/Other  Within defined limits           Other Findings            Physical Exam    Airway  Mallampati: III  TM Distance: 4 - 6 cm  Neck ROM: normal range of motion   Mouth opening: Normal     Cardiovascular  Regular rate and rhythm,  S1 and S2 normal,  no murmur, click, rub, or gallop  Rhythm: regular  Rate: normal         Dental    Dentition: Caps/crowns     Pulmonary      Decreased breath sounds: bilateral           Abdominal  GI exam deferred       Other Findings            Anesthetic Plan    ASA: 3  Anesthesia type: total IV anesthesia          Induction: Intravenous  Anesthetic plan and risks discussed with: Patient

## 2017-11-30 ENCOUNTER — ANESTHESIA (OUTPATIENT)
Dept: ENDOSCOPY | Age: 77
End: 2017-11-30
Payer: MEDICARE

## 2017-11-30 ENCOUNTER — HOSPITAL ENCOUNTER (OUTPATIENT)
Age: 77
Setting detail: OUTPATIENT SURGERY
Discharge: HOME | End: 2017-11-30
Attending: SPECIALIST | Admitting: SPECIALIST
Payer: MEDICARE

## 2017-11-30 VITALS
WEIGHT: 144.5 LBS | HEIGHT: 71 IN | BODY MASS INDEX: 20.23 KG/M2 | HEART RATE: 75 BPM | DIASTOLIC BLOOD PRESSURE: 89 MMHG | OXYGEN SATURATION: 99 % | TEMPERATURE: 97.5 F | RESPIRATION RATE: 20 BRPM | SYSTOLIC BLOOD PRESSURE: 132 MMHG

## 2017-11-30 DIAGNOSIS — I48.92 ATRIAL FLUTTER, UNSPECIFIED TYPE (HCC): ICD-10-CM

## 2017-11-30 PROCEDURE — 88305 TISSUE EXAM BY PATHOLOGIST: CPT | Performed by: SPECIALIST

## 2017-11-30 PROCEDURE — 76060000032 HC ANESTHESIA 0.5 TO 1 HR: Performed by: SPECIALIST

## 2017-11-30 PROCEDURE — 74011250636 HC RX REV CODE- 250/636: Performed by: SPECIALIST

## 2017-11-30 PROCEDURE — 76040000007: Performed by: SPECIALIST

## 2017-11-30 PROCEDURE — 74011250636 HC RX REV CODE- 250/636

## 2017-11-30 PROCEDURE — 77030013992 HC SNR POLYP ENDOSC BSC -B: Performed by: SPECIALIST

## 2017-11-30 RX ORDER — SODIUM CHLORIDE 0.9 % (FLUSH) 0.9 %
5-10 SYRINGE (ML) INJECTION AS NEEDED
Status: DISCONTINUED | OUTPATIENT
Start: 2017-11-30 | End: 2017-11-30 | Stop reason: HOSPADM

## 2017-11-30 RX ORDER — SODIUM CHLORIDE 0.9 % (FLUSH) 0.9 %
5-10 SYRINGE (ML) INJECTION EVERY 8 HOURS
Status: DISCONTINUED | OUTPATIENT
Start: 2017-11-30 | End: 2017-11-30 | Stop reason: HOSPADM

## 2017-11-30 RX ORDER — PROPOFOL 10 MG/ML
INJECTION, EMULSION INTRAVENOUS AS NEEDED
Status: DISCONTINUED | OUTPATIENT
Start: 2017-11-30 | End: 2017-11-30 | Stop reason: HOSPADM

## 2017-11-30 RX ORDER — FLUMAZENIL 0.1 MG/ML
0.2 INJECTION INTRAVENOUS
Status: DISCONTINUED | OUTPATIENT
Start: 2017-11-30 | End: 2017-11-30 | Stop reason: HOSPADM

## 2017-11-30 RX ORDER — PHENYLEPHRINE HCL IN 0.9% NACL 0.4MG/10ML
SYRINGE (ML) INTRAVENOUS AS NEEDED
Status: DISCONTINUED | OUTPATIENT
Start: 2017-11-30 | End: 2017-11-30 | Stop reason: HOSPADM

## 2017-11-30 RX ORDER — MIDAZOLAM HYDROCHLORIDE 1 MG/ML
1-2 INJECTION, SOLUTION INTRAMUSCULAR; INTRAVENOUS
Status: DISCONTINUED | OUTPATIENT
Start: 2017-11-30 | End: 2017-11-30 | Stop reason: HOSPADM

## 2017-11-30 RX ORDER — SODIUM CHLORIDE 9 MG/ML
100 INJECTION, SOLUTION INTRAVENOUS CONTINUOUS
Status: DISCONTINUED | OUTPATIENT
Start: 2017-11-30 | End: 2017-11-30 | Stop reason: HOSPADM

## 2017-11-30 RX ORDER — DEXTROMETHORPHAN/PSEUDOEPHED 2.5-7.5/.8
1.2 DROPS ORAL
Status: DISCONTINUED | OUTPATIENT
Start: 2017-11-30 | End: 2017-11-30 | Stop reason: HOSPADM

## 2017-11-30 RX ORDER — ATROPINE SULFATE 0.1 MG/ML
0.5 INJECTION INTRAVENOUS
Status: DISCONTINUED | OUTPATIENT
Start: 2017-11-30 | End: 2017-11-30 | Stop reason: HOSPADM

## 2017-11-30 RX ADMIN — Medication 80 MCG: at 08:09

## 2017-11-30 RX ADMIN — SODIUM CHLORIDE 100 ML/HR: 900 INJECTION, SOLUTION INTRAVENOUS at 07:27

## 2017-11-30 RX ADMIN — PROPOFOL 270 MG: 10 INJECTION, EMULSION INTRAVENOUS at 08:27

## 2017-11-30 NOTE — IP AVS SNAPSHOT
[address] [facility] 
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  
  
Phone:  [phone] Where:  [address] [address] Thursday Jan 04, 2018  
6 MONTH with PACEMAKER, [facility] at  9:00 AM  
Where:  [address] [facility]) 6 MONTH with Rex Gamez MD at  9:00 AM  
Where:  [address] [facility]) Discharge Orders None A check edmundo indicates which time of day the medication should be taken. My Medications TAKE these medications as instructed Instructions Each Dose to Equal  
 Morning Noon Evening Bedtime  
 apixaban 5 mg tablet Commonly known as:  Erin Clayton Start taking on:  12/3/2017 Your last dose was: Your next dose is: Take 1 Tab by mouth two (2) times a day. 5 mg  
    
   
   
   
  
 aspirin delayed-release 81 mg tablet Your last dose was: Your next dose is: Take 81 mg by mouth daily. 81 mg  
    
   
   
   
  
 atorvastatin 20 mg tablet Commonly known as:  LIPITOR Your last dose was: Your next dose is: TAKE 1 TABLET BY MOUTH EVERY DAY  
     
   
   
   
  
 carvedilol 12.5 mg tablet Commonly known as:  Shahid Morillo Your last dose was: Your next dose is: TAKE 1 TABLET BY MOUTH TWICE A DAY WITH MEALS  
     
   
   
   
  
 lisinopril 5 mg tablet Commonly known as:  Yudith Pierce Your last dose was: Your next dose is: Take 1 Tab by mouth daily. 5 mg  
    
   
   
   
  
 multivitamin tablet Commonly known as:  ONE A DAY Your last dose was: Your next dose is: Take 1 Tab by mouth daily. 1 Tab * SPIRIVA RESPIMAT 2.5 mcg/actuation inhaler Generic drug:  tiotropium bromide Your last dose was: Your next dose is:    
   
   
 INHALE 2 PUFFS BY MOUTH ONCE DAILY * SPIRIVA WITH HANDIHALER 18 mcg inhalation capsule Generic drug:  tiotropium Your last dose was: Your next dose is:    
   
   
 INHALE 1 CAPSULE EVERY DAY  
     
   
   
   
  
 VENTOLIN HFA 90 mcg/actuation inhaler Generic drug:  albuterol Your last dose was: Your next dose is:    
   
   
 INHALE 2 PUFFS EVERY 4 HOURS AS NEEDED * Notice: This list has 2 medication(s) that are the same as other medications prescribed for you. Read the directions carefully, and ask your doctor or other care provider to review them with you. Where to Get Your Medications These medications were sent to [facility]/pharmacy #[id number], [facility]  [address] [place] [address] Hours:  24-hours Phone:  [phone]  
  apixaban 5 mg tablet Discharge Instructions Kristin Distance [id number] 
1940 Procedure  Discharge Instructions:   
 
Discomfort: 
Redness at IV site- apply warm compress to area; if redness or soreness persist- contact your physician There may be a slight amount of blood passed from the rectum Gaseous discomfort- walking, belching will help relieve any discomfort You may not operate a vehicle for 12 hours You may not engage in an occupation involving machinery or appliances for rest of today You may not drink alcoholic beverages for at least 12 hours Avoid making any critical decisions for at least 24 hour DIET: 
 You may resume your normal diet today. You should not overeat or \"feast\" today as your abdomen may become distended or uncomfortable. MEDICATIONS: 
 I reconciled this list from the list you gave us when you came today for the procedure. Please clarify with me, your primary care physician and the nurse who is discharging you if we have any discrepancies. Aspirin and or non-steroidal medication (Ibuprofen, Motrin, naproxen, etc.) is ok in limited quantities. ACTIVITY: 
You may resume your normal daily activities it is recommended that you spend the remainder of the day resting -  avoid any strenuous activity. CALL M.D. ANY SIGN OF: Increasing pain, nausea, vomiting Abdominal distension (swelling) New increased bleeding (oral or rectal) Fever (chills) Follow-up Instructions: 
 Call Dr. Leilani Dawson for the results of  biopsy in approximately one week Telephone #  [phone] Follow up visit colonoscopy one year The polyp in the rectum is gone. The sigmoid polyp removed today was not clipped. Bleeding risk is for 14 days total.   
 
Karon Francis MD 
8:31 AM 
11/30/2017 [employer] Activation Thank you for requesting access to [employer]. Please follow the instructions below to securely access and download your online medical record. [employer] allows you to send messages to your doctor, view your test results, renew your prescriptions, schedule appointments, and more. How Do I Sign Up? 1. In your internet browser, go to [email or web address] 
2. Click on the First Time User? Click Here link in the Sign In box. You will be redirect to the New Member Sign Up page. 3. Enter your [employer] Access Code exactly as it appears below. You will not need to use this code after youve completed the sign-up process.  If you do not sign up before the expiration date, you must request a new code. WalkMe Access Code: [email or web address] Expires: 2018  3:05 PM (This is the date your WalkMe access code will ) 4. Enter the last four digits of your Social Security Number (xxxx) and Date of Birth (mm/dd/yyyy) as indicated and click Submit. You will be taken to the next sign-up page. 5. Create a WalkMe ID. This will be your WalkMe login ID and cannot be changed, so think of one that is secure and easy to remember. 6. Create a WalkMe password. You can change your password at any time. 7. Enter your Password Reset Question and Answer. This can be used at a later time if you forget your password. 8. Enter your e-mail address. You will receive e-mail notification when new information is available in [address] 9. Click Sign Up. You can now view and download portions of your medical record. 10. Click the Download Summary menu link to download a portable copy of your medical information. Additional Information If you have questions, please visit the Frequently Asked Questions section of the WalkMe website at [email or web address] Remember, WalkMe is NOT to be used for urgent needs. For medical emergencies, dial 911. Introducing [facility] & HEALTH SERVICES! [employer] introduces WalkMe patient portal. Now you can access parts of your medical record, email your doctor's office, and request medication refills online. 1. In your internet browser, go to [email or web address] 2. Click on the First Time User? Click Here link in the Sign In box. You will see the New Member Sign Up page. 3. Enter your WalkMe Access Code exactly as it appears below. You will not need to use this code after youve completed the sign-up process. If you do not sign up before the expiration date, you must request a new code. · WalkMe Access Code: [email or web address] Expires: 2/7/2018  3:05 PM 
 
4. Enter the last four digits of your Social Security Number (xxxx) and Date of Birth (mm/dd/yyyy) as indicated and click Submit. You will be taken to the next sign-up page. 5. Create a GradeStackt ID. This will be your [employer] Lanes login ID and cannot be changed, so think of one that is secure and easy to remember. 6. Create a Buzz Lanes password. You can change your password at any time. 7. Enter your Password Reset Question and Answer. This can be used at a later time if you forget your password. 8. Enter your e-mail address. You will receive e-mail notification when new information is available in [address] 9. Click Sign Up. You can now view and download portions of your medical record. 10. Click the Download Summary menu link to download a portable copy of your medical information. If you have questions, please visit the Frequently Asked Questions section of the [employer] website. Remember, [employer] is NOT to be used for urgent needs. For medical emergencies, dial 911. Now available from your iPhone and Android! Providers Seen During Your Hospitalization Provider Specialty Primary office phone Roderick Ricks MD Gastroenterology [phone] Your Primary Care Physician (PCP) Primary Care Physician Office Phone Office Fax Irma Saenz [phone] You are allergic to the following No active allergies Recent Documentation Height Weight BMI OB Status Smoking Status 1.803 m 65.5 kg 20.15 kg/m2 Postmenopausal Former Smoker Emergency Contacts Name Discharge Info Relation Home Work Mobile Lesia Kimble DISCHARGE CAREGIVER [3] Child [2] [phone] Mary Couch  Child [2] [phone] Patient Belongings The following personal items are in your possession at time of discharge: 
  Dental Appliances: None  Visual Aid: None Please provide this summary of care documentation to your next provider. Signatures-by signing, you are acknowledging that this After Visit Summary has been reviewed with you and you have received a copy. Patient Signature:  ____________________________________________________________ Date:  ____________________________________________________________  
  
[facility] Perfect Provider Signature:  ____________________________________________________________ Date:  ____________________________________________________________

## 2017-11-30 NOTE — H&P
Pre-endoscopy H and P    The patient was seen and examined in the endoscopy suite. The airway was assessed and docuemented. The problem list, past medical history, and medications were reviewed. 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 Taking? SPIRIVA RESPIMAT 2.5 mcg/actuation inhaler 11/29/2017 at Unknown time  Yes Yes   Sig: INHALE 2 PUFFS BY MOUTH ONCE DAILY   SPIRIVA WITH HANDIHALER 18 mcg inhalation capsule   Yes No   Sig: INHALE 1 CAPSULE EVERY DAY   VENTOLIN HFA 90 mcg/actuation inhaler Not Taking at Unknown time Self Yes No   Sig: INHALE 2 PUFFS EVERY 4 HOURS AS NEEDED   apixaban (ELIQUIS) 5 mg tablet 11/27/2017  No No   Sig: Take 1 Tab by mouth two (2) times a day. aspirin delayed-release 81 mg tablet 11/27/2017 Self Yes No   Sig: Take 81 mg by mouth daily. atorvastatin (LIPITOR) 20 mg tablet 11/29/2017 at Unknown time Self No Yes   Sig: TAKE 1 TABLET BY MOUTH EVERY DAY   carvedilol (COREG) 12.5 mg tablet 11/29/2017 at Unknown time  No Yes   Sig: TAKE 1 TABLET BY MOUTH TWICE A DAY WITH MEALS   lisinopril (PRINIVIL, ZESTRIL) 5 mg tablet 11/29/2017 at Unknown time Self No Yes   Sig: Take 1 Tab by mouth daily. multivitamin (ONE A DAY) tablet 11/29/2017 at Unknown time Self Yes Yes   Sig: Take 1 Tab by mouth daily. Facility-Administered Medications: None           The review of systems is:  negative for shortness of breath or chest pain      The heart, lungs, and mental status were satisfactory for the administration of anesthesia sedation and for the procedure. I discussed with the patient the objectives, risks, consequences and alternatives to the procedure.       Adolfo Lynn MD  11/30/2017  7:51 AM

## 2017-11-30 NOTE — DISCHARGE INSTRUCTIONS
Monse Weston  [id number]  1940              Procedure  Discharge Instructions:      Discomfort:  Redness at IV site- apply warm compress to area; if redness or soreness persist- contact your physician  There may be a slight amount of blood passed from the rectum  Gaseous discomfort- walking, belching will help relieve any discomfort  You may not operate a vehicle for 12 hours  You may not engage in an occupation involving machinery or appliances for rest of today  You may not drink alcoholic beverages for at least 12 hours  Avoid making any critical decisions for at least 24 hour  DIET:   You may resume your normal diet today. You should not overeat or \"feast\" today as your abdomen may become distended or uncomfortable. MEDICATIONS:   I reconciled this list from the list you gave us when you came today for the procedure. Please clarify with me, your primary care physician and the nurse who is discharging you if we have any discrepancies. Aspirin and or non-steroidal medication (Ibuprofen, Motrin, naproxen, etc.) is ok in limited quantities. ACTIVITY:  You may resume your normal daily activities it is recommended that you spend the remainder of the day resting -  avoid any strenuous activity. CALL M.D. ANY SIGN OF:  Increasing pain, nausea, vomiting  Abdominal distension (swelling)  New increased bleeding (oral or rectal)  Fever (chills)        Follow-up Instructions:   Call Dr. Waylon Samuels for the results of  biopsy in approximately one week  Telephone #  [phone]  Follow up visit colonoscopy one year    The polyp in the rectum is gone. The sigmoid polyp removed today was not clipped. Bleeding risk is for 14 days total.      Yvrose Jansen MD  8:31 AM  11/30/2017       [employer] Activation    Thank you for requesting access to [employer]. Please follow the instructions below to securely access and download your online medical record.  [employer] allows you to send messages to your doctor, view your test results, renew your prescriptions, schedule appointments, and more. How Do I Sign Up? 1. In your internet browser, go to [email or web address]  2. Click on the First Time User? Click Here link in the Sign In box. You will be redirect to the New Member Sign Up page. 3. Enter your [employer] Access Code exactly as it appears below. You will not need to use this code after youve completed the sign-up process. If you do not sign up before the expiration date, you must request a new code. [employer] Access Code: [email or web address]  Expires: 2018  3:05 PM (This is the date your [employer] access code will )    4. Enter the last four digits of your Social Security Number (xxxx) and Date of Birth (mm/dd/yyyy) as indicated and click Submit. You will be taken to the next sign-up page. 5. Create a [employer] ID. This will be your [employer] login ID and cannot be changed, so think of one that is secure and easy to remember. 6. Create a [employer] password. You can change your password at any time. 7. Enter your Password Reset Question and Answer. This can be used at a later time if you forget your password. 8. Enter your e-mail address. You will receive e-mail notification when new information is available in [address] 9. Click Sign Up. You can now view and download portions of your medical record. 10. Click the Download Summary menu link to download a portable copy of your medical information. Additional Information    If you have questions, please visit the Frequently Asked Questions section of the [employer] website at [email or web address] Remember, [employer] is NOT to be used for urgent needs. For medical emergencies, dial 911.

## 2017-11-30 NOTE — PERIOP NOTES
Anesthesia reports 270mg Propofol 80 mcg Neosynephrine and 750mL NS given during procedure. Received report from anesthesia staff on vital signs and status of patient.

## 2017-11-30 NOTE — ANESTHESIA POSTPROCEDURE EVALUATION
Post-Anesthesia Evaluation and Assessment    Patient: Tee Martinez MRN: [id number]  SSN: [id number]    YOB: 1940  Age: 78 y.o. Sex: female       Cardiovascular Function/Vital Signs  Visit Vitals    /89    Pulse 75    Temp 36.4 °C (97.5 °F)    Resp 20    Ht 5' 11\" (1.803 m)    Wt 65.5 kg (144 lb 8 oz)    SpO2 99%    BMI 20.15 kg/m2       Patient is status post total IV anesthesia anesthesia for Procedure(s):  COLONOSCOPY  ENDOSCOPIC POLYPECTOMY. Nausea/Vomiting: None    Postoperative hydration reviewed and adequate. Pain:  Pain Scale 1: Numeric (0 - 10) (11/30/17 0854)  Pain Intensity 1: 0 (11/30/17 0854)   Managed    Neurological Status: At baseline    Mental Status and Level of Consciousness: Arousable    Pulmonary Status:   O2 Device: Room air (11/30/17 0854)   Adequate oxygenation and airway patent    Complications related to anesthesia: None    Post-anesthesia assessment completed.  No concerns    Signed By: Misty Rodriguez MD     November 30, 2017

## 2017-11-30 NOTE — PERIOP NOTES
Nicky Jorge  1940  [id number]    Situation:  Verbal report received from: Modesto Sams RN  Procedure: Procedure(s):  COLONOSCOPY  ENDOSCOPIC POLYPECTOMY    Background:    Preoperative diagnosis: follow up for polyps  Postoperative diagnosis: Diverticulosis, colon polyp    :  Dr. Amador Mello  Assistant(s): Endoscopy Technician-1: Lillian Spencer  Endoscopy RN-1: Gely Botello RN    Specimens:   ID Type Source Tests Collected by Time Destination   1 : Descending colon polyp Preservative Colon, Descending  Silvano Irwin MD 11/30/2017 9613 Pathology   2 : Sigmoid colon 20cm polyp Preservative   Silvano Irwin MD 11/30/2017 1318 Pathology     H. Pylori  no    Assessment:  Intra-procedure medications     Anesthesia gave intra-procedure sedation and medications, see anesthesia flow sheet yes    Intravenous fluids: NS@ KVO     Vital signs stable     Abdominal assessment: round and soft     Recommendation:  Discharge patient per MD order  Family or Friend   Permission to share finding with family or friend yes

## 2017-11-30 NOTE — PROCEDURES
Colonoscopy    Indications: personal history of colon polyps    Pre-operative Diagnosis: see above    Medications:  See anesthesia form    Post-operative Diagnosis:  Diverticulosis, colon polyps      Procedure Details   Prior to the procedure its objectives, risks, consequences and alternatives were discussed with the patient who then elected to proceed. All questions were answered. Digital Rectal Exam:  was normal     The Olympus videocolonoscope was inserted in the rectum and advanced to the cecum. The cecum was identified by typical landmarks. The colonoscope was slowly and carefully withdrawn as the mucosa was inspected. At 60 cm in the proximal descending colon a six mm sessile polyp was snared and recovered with good hemostasis. Extensive left sided diverticula were seen. At 20 cm in the distal sigmoid colon an 8mm pedunculated polyp was snared completely with good hemostasis. I could not relocated the site to clip. No other abnormalities were noted. Retroflexion in the rectum was negative. Photos to document the ileocecal valve, appendiceal orifice and retroflexion exam were obtained. The preparation was adequate      Estimated Blood Loss:  none    Specimens:    Descending polyp  Sigmoid polyp    Findings:  two polyps  diverticula    Complications:  none    Repeat colonoscopy is recommended in:  One year.                Crystal Gant MD  8:32 AM  11/30/2017

## 2017-12-24 ENCOUNTER — HOSPITAL ENCOUNTER (EMERGENCY)
Age: 77
Discharge: HOME | End: 2017-12-24
Attending: EMERGENCY MEDICINE
Payer: MEDICARE

## 2017-12-24 ENCOUNTER — APPOINTMENT (OUTPATIENT)
Dept: GENERAL RADIOLOGY | Age: 77
End: 2017-12-24
Attending: EMERGENCY MEDICINE
Payer: MEDICARE

## 2017-12-24 VITALS
HEART RATE: 79 BPM | TEMPERATURE: 97.4 F | SYSTOLIC BLOOD PRESSURE: 138 MMHG | HEIGHT: 72 IN | WEIGHT: 140.87 LBS | DIASTOLIC BLOOD PRESSURE: 57 MMHG | BODY MASS INDEX: 19.08 KG/M2 | OXYGEN SATURATION: 93 % | RESPIRATION RATE: 23 BRPM

## 2017-12-24 DIAGNOSIS — J20.9 ACUTE BRONCHITIS, UNSPECIFIED ORGANISM: ICD-10-CM

## 2017-12-24 DIAGNOSIS — J44.1 ACUTE EXACERBATION OF CHRONIC OBSTRUCTIVE PULMONARY DISEASE (COPD) (HCC): Primary | ICD-10-CM

## 2017-12-24 LAB
ALBUMIN SERPL-MCNC: 3.3 G/DL (ref 3.5–5)
ALBUMIN/GLOB SERPL: 0.8 {RATIO} (ref 1.1–2.2)
ALP SERPL-CCNC: 98 U/L (ref 45–117)
ALT SERPL-CCNC: 32 U/L (ref 12–78)
ANION GAP SERPL CALC-SCNC: 3 MMOL/L (ref 5–15)
AST SERPL-CCNC: 28 U/L (ref 15–37)
BASOPHILS # BLD: 0 K/UL (ref 0–0.1)
BASOPHILS NFR BLD: 0 % (ref 0–1)
BILIRUB SERPL-MCNC: 0.3 MG/DL (ref 0.2–1)
BNP SERPL-MCNC: 904 PG/ML (ref 0–450)
BUN SERPL-MCNC: 12 MG/DL (ref 6–20)
BUN/CREAT SERPL: 17 (ref 12–20)
CALCIUM SERPL-MCNC: 9.4 MG/DL (ref 8.5–10.1)
CHLORIDE SERPL-SCNC: 95 MMOL/L (ref 97–108)
CK SERPL-CCNC: 62 U/L (ref 26–192)
CO2 SERPL-SCNC: 35 MMOL/L (ref 21–32)
CREAT SERPL-MCNC: 0.69 MG/DL (ref 0.55–1.02)
EOSINOPHIL # BLD: 0 K/UL (ref 0–0.4)
EOSINOPHIL NFR BLD: 1 % (ref 0–7)
ERYTHROCYTE [DISTWIDTH] IN BLOOD BY AUTOMATED COUNT: 14.1 % (ref 11.5–14.5)
FLUAV AG NPH QL IA: NEGATIVE
FLUBV AG NOSE QL IA: NEGATIVE
GLOBULIN SER CALC-MCNC: 4.1 G/DL (ref 2–4)
GLUCOSE SERPL-MCNC: 69 MG/DL (ref 65–100)
HCT VFR BLD AUTO: 37.5 % (ref 35–47)
HGB BLD-MCNC: 12.7 G/DL (ref 11.5–16)
LYMPHOCYTES # BLD: 2.2 K/UL (ref 0.8–3.5)
LYMPHOCYTES NFR BLD: 32 % (ref 12–49)
MCH RBC QN AUTO: 30.5 PG (ref 26–34)
MCHC RBC AUTO-ENTMCNC: 33.9 G/DL (ref 30–36.5)
MCV RBC AUTO: 90.1 FL (ref 80–99)
MONOCYTES # BLD: 0.9 K/UL (ref 0–1)
MONOCYTES NFR BLD: 14 % (ref 5–13)
NEUTS SEG # BLD: 3.6 K/UL (ref 1.8–8)
NEUTS SEG NFR BLD: 53 % (ref 32–75)
PLATELET # BLD AUTO: 180 K/UL (ref 150–400)
POTASSIUM SERPL-SCNC: 4 MMOL/L (ref 3.5–5.1)
PROT SERPL-MCNC: 7.4 G/DL (ref 6.4–8.2)
RBC # BLD AUTO: 4.16 M/UL (ref 3.8–5.2)
SODIUM SERPL-SCNC: 133 MMOL/L (ref 136–145)
TROPONIN I SERPL-MCNC: <0.04 NG/ML
WBC # BLD AUTO: 6.7 K/UL (ref 3.6–11)

## 2017-12-24 PROCEDURE — 77030029684 HC NEB SM VOL KT MONA -A

## 2017-12-24 PROCEDURE — 74011000250 HC RX REV CODE- 250: Performed by: EMERGENCY MEDICINE

## 2017-12-24 PROCEDURE — 83880 ASSAY OF NATRIURETIC PEPTIDE: CPT | Performed by: EMERGENCY MEDICINE

## 2017-12-24 PROCEDURE — 94640 AIRWAY INHALATION TREATMENT: CPT

## 2017-12-24 PROCEDURE — 93005 ELECTROCARDIOGRAM TRACING: CPT

## 2017-12-24 PROCEDURE — 99284 EMERGENCY DEPT VISIT MOD MDM: CPT

## 2017-12-24 PROCEDURE — 80053 COMPREHEN METABOLIC PANEL: CPT | Performed by: EMERGENCY MEDICINE

## 2017-12-24 PROCEDURE — 85025 COMPLETE CBC W/AUTO DIFF WBC: CPT | Performed by: EMERGENCY MEDICINE

## 2017-12-24 PROCEDURE — 71020 XR CHEST PA LAT: CPT

## 2017-12-24 PROCEDURE — 82550 ASSAY OF CK (CPK): CPT | Performed by: EMERGENCY MEDICINE

## 2017-12-24 PROCEDURE — 96375 TX/PRO/DX INJ NEW DRUG ADDON: CPT

## 2017-12-24 PROCEDURE — 74011250637 HC RX REV CODE- 250/637: Performed by: EMERGENCY MEDICINE

## 2017-12-24 PROCEDURE — 96365 THER/PROPH/DIAG IV INF INIT: CPT

## 2017-12-24 PROCEDURE — 84484 ASSAY OF TROPONIN QUANT: CPT | Performed by: EMERGENCY MEDICINE

## 2017-12-24 PROCEDURE — 74011250636 HC RX REV CODE- 250/636: Performed by: EMERGENCY MEDICINE

## 2017-12-24 PROCEDURE — 87804 INFLUENZA ASSAY W/OPTIC: CPT | Performed by: EMERGENCY MEDICINE

## 2017-12-24 PROCEDURE — 36415 COLL VENOUS BLD VENIPUNCTURE: CPT | Performed by: EMERGENCY MEDICINE

## 2017-12-24 RX ORDER — IPRATROPIUM BROMIDE AND ALBUTEROL SULFATE 2.5; .5 MG/3ML; MG/3ML
3 SOLUTION RESPIRATORY (INHALATION)
Status: COMPLETED | OUTPATIENT
Start: 2017-12-24 | End: 2017-12-24

## 2017-12-24 RX ORDER — MAGNESIUM SULFATE HEPTAHYDRATE 40 MG/ML
2 INJECTION, SOLUTION INTRAVENOUS ONCE
Status: COMPLETED | OUTPATIENT
Start: 2017-12-24 | End: 2017-12-24

## 2017-12-24 RX ORDER — FLUTICASONE PROPIONATE 50 MCG
2 SPRAY, SUSPENSION (ML) NASAL DAILY
COMMUNITY
End: 2018-02-13

## 2017-12-24 RX ORDER — ALBUTEROL SULFATE 0.83 MG/ML
2.5 SOLUTION RESPIRATORY (INHALATION)
Qty: 24 EACH | Refills: 0 | Status: SHIPPED | OUTPATIENT
Start: 2017-12-24 | End: 2019-10-22

## 2017-12-24 RX ORDER — AZITHROMYCIN 250 MG/1
TABLET, FILM COATED ORAL
Qty: 6 TAB | Refills: 0 | Status: SHIPPED | OUTPATIENT
Start: 2017-12-24 | End: 2017-12-29

## 2017-12-24 RX ORDER — AZITHROMYCIN 250 MG/1
500 TABLET, FILM COATED ORAL
Status: COMPLETED | OUTPATIENT
Start: 2017-12-24 | End: 2017-12-24

## 2017-12-24 RX ORDER — GUAIFENESIN 600 MG/1
600 TABLET, EXTENDED RELEASE ORAL 2 TIMES DAILY
Qty: 20 TAB | Refills: 0 | Status: SHIPPED | OUTPATIENT
Start: 2017-12-24 | End: 2018-02-13

## 2017-12-24 RX ORDER — GUAIFENESIN 600 MG/1
600 TABLET, EXTENDED RELEASE ORAL ONCE
Status: COMPLETED | OUTPATIENT
Start: 2017-12-24 | End: 2017-12-24

## 2017-12-24 RX ORDER — NEBULIZER AND COMPRESSOR
1 EACH MISCELLANEOUS ONCE
Qty: 1 EACH | Refills: 0 | Status: SHIPPED | OUTPATIENT
Start: 2017-12-24 | End: 2017-12-24

## 2017-12-24 RX ORDER — BENZONATATE 100 MG/1
100 CAPSULE ORAL
Qty: 30 CAP | Refills: 0 | Status: SHIPPED | OUTPATIENT
Start: 2017-12-24 | End: 2017-12-31

## 2017-12-24 RX ORDER — PREDNISONE 50 MG/1
50 TABLET ORAL DAILY
Qty: 5 TAB | Refills: 0 | Status: SHIPPED | OUTPATIENT
Start: 2017-12-24 | End: 2018-02-13

## 2017-12-24 RX ORDER — PREDNISONE 5 MG/1
TABLET ORAL
COMMUNITY
End: 2018-02-13

## 2017-12-24 RX ADMIN — MAGNESIUM SULFATE HEPTAHYDRATE 2 G: 40 INJECTION, SOLUTION INTRAVENOUS at 11:38

## 2017-12-24 RX ADMIN — METHYLPREDNISOLONE SODIUM SUCCINATE 125 MG: 125 INJECTION, POWDER, FOR SOLUTION INTRAMUSCULAR; INTRAVENOUS at 11:37

## 2017-12-24 RX ADMIN — IPRATROPIUM BROMIDE AND ALBUTEROL SULFATE 3 ML: .5; 3 SOLUTION RESPIRATORY (INHALATION) at 12:42

## 2017-12-24 RX ADMIN — IPRATROPIUM BROMIDE AND ALBUTEROL SULFATE 3 ML: .5; 3 SOLUTION RESPIRATORY (INHALATION) at 11:29

## 2017-12-24 RX ADMIN — GUAIFENESIN 600 MG: 600 TABLET, EXTENDED RELEASE ORAL at 11:37

## 2017-12-24 RX ADMIN — AZITHROMYCIN 500 MG: 250 TABLET, FILM COATED ORAL at 11:37

## 2017-12-24 NOTE — ED PROVIDER NOTES
EMERGENCY DEPARTMENT HISTORY AND PHYSICAL EXAM      Date: 12/24/2017  Patient Name: Katie Marroquin    History of Presenting Illness     Chief Complaint   Patient presents with    Shortness of Breath     Ambualtory w/ c/o SOB & wheezing for 1 week, seen @ [address] ED & given steroids, albuterol & flonase with no relief    Wheezing       History Provided By: Patient    HPI: Katie Marroquin, 78 y.o. female with PMHx significant for Afib w/ablation, COPD, HTN, MI, presents ambulatory to the ED with cc of one week of a constant and unchanged, mild to moderate SOB, described as a COPD exacerbation. Pt states she was seen at [facility] ER on 12/22/2017 with CXR, and tx with Albuterol, Flonase, and steroids with no significant improvement. She was not started on abx. Pt specifically denies any CP, leg swelling, N/V, abd pain, sputum production. She has not been admitted to the ICU, nor has required intubation for her exacerbations. Patient sleeps with O2. PCP: Wyatt Thornton MD    There are no other complaints, changes, or physical findings at this time. Current Facility-Administered Medications   Medication Dose Route Frequency Provider Last Rate Last Dose    albuterol-ipratropium (DUO-NEB) 2.5 MG-0.5 MG/3 ML  3 mL Nebulization NOW Jenna Sandra MD         Current Outpatient Prescriptions   Medication Sig Dispense Refill    Nebulizer & Compressor machine 1 Each by Does Not Apply route once for 1 dose. 1 Each 0    Nebulizer Accessories kit See prescribed 1 Kit 0    fluticasone (FLONASE) 50 mcg/actuation nasal spray 2 Sprays by Both Nostrils route daily.  predniSONE (DELTASONE) 5 mg tablet Take  by mouth.  azithromycin (ZITHROMAX Z-JACKIE) 250 mg tablet Take 500mg first day, then 250mg daily x 4 days 6 Tab 0    guaiFENesin ER (MUCINEX) 600 mg ER tablet Take 1 Tab by mouth two (2) times a day.  20 Tab 0    albuterol (PROVENTIL VENTOLIN) 2.5 mg /3 mL (0.083 %) nebulizer solution 3 mL by Nebulization route every four (4) hours as needed for Wheezing. 24 Each 0    Nebulizer & Compressor machine 1 Each by Does Not Apply route once for 1 dose. 1 Each 0    Nebulizer Accessories kit For nebulizer machine 1 Kit 0    benzonatate (TESSALON PERLES) 100 mg capsule Take 1 Cap by mouth three (3) times daily as needed for Cough for up to 7 days. 30 Cap 0    apixaban (ELIQUIS) 5 mg tablet Take 1 Tab by mouth two (2) times a day. 180 Tab 2    SPIRIVA WITH HANDIHALER 18 mcg inhalation capsule INHALE 1 CAPSULE EVERY DAY  4    SPIRIVA RESPIMAT 2.5 mcg/actuation inhaler INHALE 2 PUFFS BY MOUTH ONCE DAILY  6    carvedilol (COREG) 12.5 mg tablet TAKE 1 TABLET BY MOUTH TWICE A DAY WITH MEALS 180 Tab 3    VENTOLIN HFA 90 mcg/actuation inhaler INHALE 2 PUFFS EVERY 4 HOURS AS NEEDED  0    atorvastatin (LIPITOR) 20 mg tablet TAKE 1 TABLET BY MOUTH EVERY DAY 90 Tab 1    lisinopril (PRINIVIL, ZESTRIL) 5 mg tablet Take 1 Tab by mouth daily. 90 Tab 3    aspirin delayed-release 81 mg tablet Take 81 mg by mouth daily.  multivitamin (ONE A DAY) tablet Take 1 Tab by mouth daily.          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    Occult blood in stools 1/28/2016    PAF (paroxysmal atrial fibrillation) ([facility] [address] 3/23/2017    S/P ablation of atrial fibrillation 3/23/2017    S/P ablation of atrial flutter 3/23/2017       Past Surgical History:  Past Surgical History:   Procedure Laterality Date    ABDOMEN SURGERY PROC UNLISTED      inguinal hernia repair right    COLONOSCOPY N/A 5/11/2017    COLONOSCOPY performed by Juanito Pagan MD at [facility] ENDOSCOPY    COLONOSCOPY N/A 11/30/2017    COLONOSCOPY performed by Juanito Pagan MD at [facility] ENDOSCOPY  COLONOSCOPY,REMV LESN,SNARE  11/30/2017         HX HERNIA REPAIR  11/29/2015    Incarcerated left femoral hernia repair,.  HX PACEMAKER Left 2014       Family History:  Family History   Problem Relation Age of Onset    Heart Disease Mother     Cancer Father      BLADDER    Other Sister      RA    Other Brother      RA    Alcohol abuse Brother        Social History:  Social History   Substance Use Topics    Smoking status: Former Smoker     Packs/day: 1.00     Years: 50.00     Types: Cigarettes     Quit date: 9/4/2014    Smokeless tobacco: Never Used      Comment: lives with a smoker    Alcohol use No       Allergies:  No Known Allergies      Review of Systems   Review of Systems   Constitutional: Negative. Negative for chills and fever. HENT: Negative. Negative for congestion, facial swelling, rhinorrhea, sore throat, trouble swallowing and voice change. Eyes: Negative. Respiratory: Positive for cough, shortness of breath and wheezing. Negative for apnea and chest tightness. Cardiovascular: Negative. Negative for chest pain, palpitations and leg swelling. Gastrointestinal: Negative. Negative for abdominal distention, abdominal pain, blood in stool, constipation, diarrhea, nausea and vomiting. Endocrine: Negative. Negative for cold intolerance, heat intolerance and polyuria. Genitourinary: Negative. Negative for difficulty urinating, dysuria, flank pain, frequency, hematuria and urgency. Musculoskeletal: Negative. Negative for arthralgias, back pain, myalgias, neck pain and neck stiffness. Skin: Negative. Negative for color change and rash. Neurological: Negative. Negative for dizziness, syncope, facial asymmetry, speech difficulty, weakness, light-headedness, numbness and headaches. Hematological: Negative. Does not bruise/bleed easily. Psychiatric/Behavioral: Negative. Negative for confusion and self-injury. The patient is not nervous/anxious.         Physical Exam   Physical Exam   Constitutional: She is oriented to person, place, and time. She appears well-developed and well-nourished. No distress. HENT:   Head: Normocephalic and atraumatic. Mouth/Throat: Oropharynx is clear and moist. No oropharyngeal exudate. Eyes: Conjunctivae and EOM are normal. Pupils are equal, round, and reactive to light. Neck: Normal range of motion. Cardiovascular: Normal rate, regular rhythm and normal heart sounds. Exam reveals no gallop and no friction rub. No murmur heard. No peripheral edema. Pulmonary/Chest: Effort normal. No respiratory distress. She has wheezes (posterior lower bases, diffuse). She has no rales. She exhibits no tenderness. No retractions. Speaks full sentences. Pacemaker in L chest, NT   Abdominal: Soft. Bowel sounds are normal. She exhibits no distension and no mass. There is no tenderness. There is no rebound and no guarding. Musculoskeletal: Normal range of motion. She exhibits no edema, tenderness or deformity. Neurological: She is alert and oriented to person, place, and time. She displays normal reflexes. No cranial nerve deficit. She exhibits normal muscle tone. Coordination normal.   Skin: Skin is warm. No rash noted. She is not diaphoretic. Psychiatric: She has a normal mood and affect. Nursing note and vitals reviewed.     Diagnostic Study Results     Labs -     Recent Results (from the past 12 hour(s))   EKG, 12 LEAD, INITIAL    Collection Time: 12/24/17 10:57 AM   Result Value Ref Range    Ventricular Rate 75 BPM    Atrial Rate 75 BPM    P-R Interval 148 ms    QRS Duration 78 ms    Q-T Interval 370 ms    QTC Calculation (Bezet) 413 ms    Calculated P Axis 72 degrees    Calculated R Axis 68 degrees    Calculated T Axis 64 degrees    Diagnosis       Atrial-paced rhythm  Abnormal ECG  When compared with ECG of 23-MAR-2017 04:08,  No significant change was found     CBC WITH AUTOMATED DIFF    Collection Time: 12/24/17 11:33 AM Result Value Ref Range    WBC 6.7 3.6 - 11.0 K/uL    RBC 4.16 3.80 - 5.20 M/uL    HGB 12.7 11.5 - 16.0 g/dL    HCT 37.5 35.0 - 47.0 %    MCV 90.1 80.0 - 99.0 FL    MCH 30.5 26.0 - 34.0 PG    MCHC 33.9 30.0 - 36.5 g/dL    RDW 14.1 11.5 - 14.5 %    PLATELET 608 925 - 938 K/uL    NEUTROPHILS 53 32 - 75 %    LYMPHOCYTES 32 12 - 49 %    MONOCYTES 14 (H) 5 - 13 %    EOSINOPHILS 1 0 - 7 %    BASOPHILS 0 0 - 1 %    ABS. NEUTROPHILS 3.6 1.8 - 8.0 K/UL    ABS. LYMPHOCYTES 2.2 0.8 - 3.5 K/UL    ABS. MONOCYTES 0.9 0.0 - 1.0 K/UL    ABS. EOSINOPHILS 0.0 0.0 - 0.4 K/UL    ABS. BASOPHILS 0.0 0.0 - 0.1 K/UL   METABOLIC PANEL, COMPREHENSIVE    Collection Time: 12/24/17 11:33 AM   Result Value Ref Range    Sodium 133 (L) 136 - 145 mmol/L    Potassium 4.0 3.5 - 5.1 mmol/L    Chloride 95 (L) 97 - 108 mmol/L    CO2 35 (H) 21 - 32 mmol/L    Anion gap 3 (L) 5 - 15 mmol/L    Glucose 69 65 - 100 mg/dL    BUN 12 6 - 20 MG/DL    Creatinine 0.69 0.55 - 1.02 MG/DL    BUN/Creatinine ratio 17 12 - 20      GFR est AA >60 >60 ml/min/1.73m2    GFR est non-AA >60 >60 ml/min/1.73m2    Calcium 9.4 8.5 - 10.1 MG/DL    Bilirubin, total 0.3 0.2 - 1.0 MG/DL    ALT (SGPT) 32 12 - 78 U/L    AST (SGOT) 28 15 - 37 U/L    Alk.  phosphatase 98 45 - 117 U/L    Protein, total 7.4 6.4 - 8.2 g/dL    Albumin 3.3 (L) 3.5 - 5.0 g/dL    Globulin 4.1 (H) 2.0 - 4.0 g/dL    A-G Ratio 0.8 (L) 1.1 - 2.2     CK W/ REFLX CKMB    Collection Time: 12/24/17 11:33 AM   Result Value Ref Range    CK 62 26 - 192 U/L   TROPONIN I    Collection Time: 12/24/17 11:33 AM   Result Value Ref Range    Troponin-I, Qt. <0.04 <0.05 ng/mL   NT-PRO BNP    Collection Time: 12/24/17 11:33 AM   Result Value Ref Range    NT pro- (H) 0 - 450 PG/ML   INFLUENZA A & B AG (RAPID TEST)    Collection Time: 12/24/17 11:34 AM   Result Value Ref Range    Influenza A Antigen NEGATIVE  NEG      Influenza B Antigen NEGATIVE  NEG         Radiologic Studies -   XR CHEST PA LAT   Final Result        [facility] ED Course:   Initial assessment performed. The patients presenting problems have been discussed, and they are in agreement with the care plan formulated and outlined with them. I have encouraged them to ask questions as they arise throughout their visit. 12:04 PM  I re-examined the patient and evaluated the effectiveness of breathing treatment they received. I feel that there has been some improvement, but also feel that the patient would benefit clinically from further breathing treatments. I will evaluate the patient again after the next round of treatments. Progress Note:  Pt states she feels much better; pain resolved; denies any nausea; no new symptoms, SOB and wheezing resolved, no hypoxia, VSS; pt able to tolerate PO and ambulate without issues; pt clinically safe for discharge home with close PCP f/u. At time of discharge, pt had stable vitals and had no questions or concerns, and was very satisfied with overall care. Critical Care Time: none    Disposition:    Discharge Note:  12:21 PM  The pt is ready for discharge. The pt's signs, symptoms, diagnosis, and discharge instructions have been discussed and pt has conveyed their understanding. The pt is to follow up as recommended or return to ER should their symptoms worsen. Plan has been discussed and pt is in agreement. PLAN:  1. Current Discharge Medication List      START taking these medications    Details   !! Nebulizer & Compressor machine 1 Each by Does Not Apply route once for 1 dose. Qty: 1 Each, Refills: 0      !! Nebulizer Accessories kit See prescribed  Qty: 1 Kit, Refills: 0      azithromycin (ZITHROMAX Z-JACKIE) 250 mg tablet Take 500mg first day, then 250mg daily x 4 days  Qty: 6 Tab, Refills: 0      guaiFENesin ER (MUCINEX) 600 mg ER tablet Take 1 Tab by mouth two (2) times a day.   Qty: 20 Tab, Refills: 0      albuterol (PROVENTIL VENTOLIN) 2.5 mg /3 mL (0.083 %) nebulizer solution 3 mL by Nebulization route every four (4) hours as needed for Wheezing. Qty: 24 Each, Refills: 0      !! Nebulizer & Compressor machine 1 Each by Does Not Apply route once for 1 dose. Qty: 1 Each, Refills: 0      !! Nebulizer Accessories kit For nebulizer machine  Qty: 1 Kit, Refills: 0      benzonatate (TESSALON PERLES) 100 mg capsule Take 1 Cap by mouth three (3) times daily as needed for Cough for up to 7 days. Qty: 30 Cap, Refills: 0       !! - Potential duplicate medications found. Please discuss with provider. CONTINUE these medications which have NOT CHANGED    Details   fluticasone (FLONASE) 50 mcg/actuation nasal spray 2 Sprays by Both Nostrils route daily. predniSONE (DELTASONE) 5 mg tablet Take  by mouth. apixaban (ELIQUIS) 5 mg tablet Take 1 Tab by mouth two (2) times a day. Qty: 180 Tab, Refills: 2    Associated Diagnoses: Atrial flutter, unspecified type (HCC)      SPIRIVA WITH HANDIHALER 18 mcg inhalation capsule INHALE 1 CAPSULE EVERY DAY  Refills: 4      SPIRIVA RESPIMAT 2.5 mcg/actuation inhaler INHALE 2 PUFFS BY MOUTH ONCE DAILY  Refills: 6      carvedilol (COREG) 12.5 mg tablet TAKE 1 TABLET BY MOUTH TWICE A DAY WITH MEALS  Qty: 180 Tab, Refills: 3      VENTOLIN HFA 90 mcg/actuation inhaler INHALE 2 PUFFS EVERY 4 HOURS AS NEEDED  Refills: 0      atorvastatin (LIPITOR) 20 mg tablet TAKE 1 TABLET BY MOUTH EVERY DAY  Qty: 90 Tab, Refills: 1      lisinopril (PRINIVIL, ZESTRIL) 5 mg tablet Take 1 Tab by mouth daily. Qty: 90 Tab, Refills: 3      aspirin delayed-release 81 mg tablet Take 81 mg by mouth daily. multivitamin (ONE A DAY) tablet Take 1 Tab by mouth daily.            2.   Follow-up Information     Follow up With Details [address], [place]   [address] [place] [address]  [phone]      [facility] EMERGENCY DEPT  As needed, If symptoms worsen [address]  [address] [facility]  [phone]        Return to ED if worse Diagnosis     Clinical Impression:   1. Acute exacerbation of chronic obstructive pulmonary disease (COPD) ([facility] [address]    2. Acute bronchitis, unspecified organism        Attestations: This note is prepared by Tara Chavez, acting as Scribe for Mackenzie Gaston MD.       The scribe's documentation has been prepared under my direction and personally reviewed by me in its entirety. I confirm that the note above accurately reflects all work, treatment, procedures, and medical decision making performed by me. This note will not be viewable in [address]

## 2017-12-24 NOTE — DISCHARGE INSTRUCTIONS
Thank you! Thank you for allowing us to provide you with excellent care today. We hope we addressed all of your concerns and needs. We strive to provide excellent quality care in the Emergency Department. You will receive a survey after your visit to evaluate the care you were provided. Please rate us a level 5 (excellent), as anything less than excellent does not meet our goals. If you feel that you have not received excellent quality care or timely care, please ask to speak to the nurse manager. Please choose us in the future for your continued health care needs. ------------------------------------------------------------------------------------------------------------  The exam and treatment you received in the Emergency Department were for an urgent problem and are not intended as complete care. It is important that you follow up with a doctor, nurse practitioner, or physician assistant for ongoing care. If your symptoms become worse or you do not improve as expected and you are unable to reach your usual health care provider, you should return to the Emergency Department. We are available 24 hours a day. Please take your discharge instructions with you when you go to your follow-up appointment. If you have any problem arranging a follow-up appointment, contact the Emergency Department immediately. If a prescription has been provided, please have it filled as soon as possible to prevent a delay in treatment. Read the entire medication instruction sheet provided to you by the pharmacy. If you have any questions or reservations about taking the medication due to side effects or interactions with other medications, please call your primary care physician or contact the ER to speak with the charge nurse. Make an appointment with your family doctor or the physician you were referred to for follow-up of this visit as instructed on your discharge paperwork, as this is mandatory follow-up. Return to the ER if you are unable to be seen or if you are unable to be seen in a timely manner. If you have any problem arranging the follow-up visit, contact the Emergency Department immediately. Chronic Obstructive Pulmonary Disease (COPD): Care Instructions  Your Care Instructions    Chronic obstructive pulmonary disease (COPD) is a general term for a group of lung diseases, including emphysema and chronic bronchitis. People with COPD have decreased airflow in and out of the lungs, which makes it hard to breathe. The airways also can get clogged with thick mucus. Cigarette smoking is a major cause of COPD. Although there is no cure for COPD, you can slow its progress. Following your treatment plan and taking care of yourself can help you feel better and live longer. Follow-up care is a key part of your treatment and safety. Be sure to make and go to all appointments, and call your doctor if you are having problems. It's also a good idea to know your test results and keep a list of the medicines you take. How can you care for yourself at home? ?Staying healthy  ? · Do not smoke. This is the most important step you can take to prevent more damage to your lungs. If you need help quitting, talk to your doctor about stop-smoking programs and medicines. These can increase your chances of quitting for good. ? · Avoid colds and flu. Get a pneumococcal vaccine shot. If you have had one before, ask your doctor whether you need a second dose. Get the flu vaccine every fall. If you must be around people with colds or the flu, wash your hands often. ? · Avoid secondhand smoke, air pollution, and high altitudes. Also avoid cold, dry air and hot, humid air. Stay at home with your windows closed when air pollution is bad. ?Medicines and oxygen therapy  ? · Take your medicines exactly as prescribed. Call your doctor if you think you are having a problem with your medicine.    ? · You may be taking medicines such as:  ¨ Bronchodilators. These help open your airways and make breathing easier. Bronchodilators are either short-acting (work for 6 to 9 hours) or long-acting (work for 24 hours). You inhale most bronchodilators, so they start to act quickly. Always carry your quick-relief inhaler with you in case you need it while you are away from home. ¨ Corticosteroids (prednisone, budesonide). These reduce airway inflammation. They come in pill or inhaled form. You must take these medicines every day for them to work well. ? · A spacer may help you get more inhaled medicine to your lungs. Ask your doctor or pharmacist if a spacer is right for you. If it is, ask how to use it properly. ? · Do not take any vitamins, over-the-counter medicine, or herbal products without talking to your doctor first.   ? · If your doctor prescribed antibiotics, take them as directed. Do not stop taking them just because you feel better. You need to take the full course of antibiotics. ? · Oxygen therapy boosts the amount of oxygen in your blood and helps you breathe easier. Use the flow rate your doctor has recommended, and do not change it without talking to your doctor first.   Activity  ? · Get regular exercise. Walking is an easy way to get exercise. Start out slowly, and walk a little more each day. ? · Pay attention to your breathing. You are exercising too hard if you cannot talk while you are exercising. ? · Take short rest breaks when doing household chores and other activities. ? · Learn breathing methods-such as breathing through pursed lips-to help you become less short of breath. ? · If your doctor has not set you up with a pulmonary rehabilitation program, talk to him or her about whether rehab is right for you. Rehab includes exercise programs, education about your disease and how to manage it, help with diet and other changes, and emotional support. Diet  ? · Eat regular, healthy meals.  Use bronchodilators about 1 hour before you eat to make it easier to eat. Eat several small meals instead of three large ones. Drink beverages at the end of the meal. Avoid foods that are hard to chew. ? · Eat foods that contain protein so that you do not lose muscle mass. ? · Talk with your doctor if you gain too much weight or if you lose weight without trying. ?Mental health  ? · Talk to your family, friends, or a therapist about your feelings. It is normal to feel frightened, angry, hopeless, helpless, and even guilty. Talking openly about bad feelings can help you cope. If these feelings last, talk to your doctor. When should you call for help? Call 911 anytime you think you may need emergency care. For example, call if:  ? · You have severe trouble breathing. ?Call your doctor now or seek immediate medical care if:  ? · You have new or worse trouble breathing. ? · You cough up blood. ? · You have a fever. ? Watch closely for changes in your health, and be sure to contact your doctor if:  ? · You cough more deeply or more often, especially if you notice more mucus or a change in the color of your mucus. ? · You have new or worse swelling in your legs or belly. ? · You are not getting better as expected. Where can you learn more? Go to http://gisell-shelly.info/. Jason Espinoza in the search box to learn more about \"Chronic Obstructive Pulmonary Disease (COPD): Care Instructions. \"  Current as of: May 12, 2017  Content Version: 11.4  © 2468-4611 [employer]. Care instructions adapted under license by [employer] (which disclaims liability or warranty for this information). If you have questions about a medical condition or this instruction, always ask your healthcare professional. [address] any warranty or liability for your use of this information.          Bronchitis: Care Instructions  Your Care Instructions    Bronchitis is inflammation of the bronchial tubes, which carry air to the lungs. The tubes swell and produce mucus, or phlegm. The mucus and inflamed bronchial tubes make you cough. You may have trouble breathing. Most cases of bronchitis are caused by viruses like those that cause colds. Antibiotics usually do not help and they may be harmful. Bronchitis usually develops rapidly and lasts about 2 to 3 weeks in otherwise healthy people. Follow-up care is a key part of your treatment and safety. Be sure to make and go to all appointments, and call your doctor if you are having problems. It's also a good idea to know your test results and keep a list of the medicines you take. How can you care for yourself at home? · Take all medicines exactly as prescribed. Call your doctor if you think you are having a problem with your medicine. · Get some extra rest.  · Take an over-the-counter pain medicine, such as acetaminophen (Tylenol), ibuprofen (Advil, Motrin), or naproxen (Aleve) to reduce fever and relieve body aches. Read and follow all instructions on the label. · Do not take two or more pain medicines at the same time unless the doctor told you to. Many pain medicines have acetaminophen, which is Tylenol. Too much acetaminophen (Tylenol) can be harmful. · Take an over-the-counter cough medicine that contains dextromethorphan to help quiet a dry, hacking cough so that you can sleep. Avoid cough medicines that have more than one active ingredient. Read and follow all instructions on the label. · Breathe moist air from a humidifier, hot shower, or sink filled with hot water. The heat and moisture will thin mucus so you can cough it out. · Do not smoke. Smoking can make bronchitis worse. If you need help quitting, talk to your doctor about stop-smoking programs and medicines. These can increase your chances of quitting for good. When should you call for help? Call 911 anytime you think you may need emergency care.  For example, call if:  ? · You have severe trouble breathing. ?Call your doctor now or seek immediate medical care if:  ? · You have new or worse trouble breathing. ? · You cough up dark brown or bloody mucus (sputum). ? · You have a new or higher fever. ? · You have a new rash. ? Watch closely for changes in your health, and be sure to contact your doctor if:  ? · You cough more deeply or more often, especially if you notice more mucus or a change in the color of your mucus. ? · You are not getting better as expected. Where can you learn more? Go to http://gisell-shelly.info/. Enter H333 in the search box to learn more about \"Bronchitis: Care Instructions. \"  Current as of: May 12, 2017  Content Version: 11.4  © 3250-9077 [employer]. Care instructions adapted under license by [employer] (which disclaims liability or warranty for this information). If you have questions about a medical condition or this instruction, always ask your healthcare professional. [address] any warranty or liability for your use of this information.

## 2017-12-25 LAB
ATRIAL RATE: 75 BPM
CALCULATED P AXIS, ECG09: 72 DEGREES
CALCULATED R AXIS, ECG10: 68 DEGREES
CALCULATED T AXIS, ECG11: 64 DEGREES
DIAGNOSIS, 93000: NORMAL
P-R INTERVAL, ECG05: 148 MS
Q-T INTERVAL, ECG07: 370 MS
QRS DURATION, ECG06: 78 MS
QTC CALCULATION (BEZET), ECG08: 413 MS
VENTRICULAR RATE, ECG03: 75 BPM

## 2017-12-26 NOTE — PROGRESS NOTES
Late Entry:    CM received consult re: [facility] follow up. CM called [facility] to give referral. Devan Francisco to contact pt re: appointment time. CM to continue to offer discharge planning as needed.      LAUREL Sousa  [facility]  [phone]

## 2017-12-28 ENCOUNTER — HOSPITAL ENCOUNTER (OUTPATIENT)
Dept: CT IMAGING | Age: 77
Discharge: HOME | End: 2017-12-28
Payer: MEDICARE

## 2017-12-28 DIAGNOSIS — R06.02 SHORTNESS OF BREATH: ICD-10-CM

## 2017-12-28 DIAGNOSIS — I26.99 PULMONARY EMBOLISM (HCC): ICD-10-CM

## 2017-12-28 PROCEDURE — 74011636320 HC RX REV CODE- 636/320: Performed by: RADIOLOGY

## 2017-12-28 PROCEDURE — 71275 CT ANGIOGRAPHY CHEST: CPT

## 2017-12-28 PROCEDURE — 74011250636 HC RX REV CODE- 250/636: Performed by: RADIOLOGY

## 2017-12-28 RX ORDER — SODIUM CHLORIDE 9 MG/ML
50 INJECTION, SOLUTION INTRAVENOUS
Status: COMPLETED | OUTPATIENT
Start: 2017-12-28 | End: 2017-12-28

## 2017-12-28 RX ORDER — SODIUM CHLORIDE 0.9 % (FLUSH) 0.9 %
10 SYRINGE (ML) INJECTION
Status: COMPLETED | OUTPATIENT
Start: 2017-12-28 | End: 2017-12-28

## 2017-12-28 RX ADMIN — IOPAMIDOL 100 ML: 755 INJECTION, SOLUTION INTRAVENOUS at 18:29

## 2017-12-28 RX ADMIN — Medication 10 ML: at 18:29

## 2017-12-28 RX ADMIN — SODIUM CHLORIDE 50 ML/HR: 900 INJECTION, SOLUTION INTRAVENOUS at 18:29

## 2018-01-11 ENCOUNTER — CLINICAL SUPPORT (OUTPATIENT)
Dept: CARDIOLOGY CLINIC | Age: 78
End: 2018-01-11

## 2018-01-11 DIAGNOSIS — I44.2 COMPLETE HEART BLOCK (HCC): ICD-10-CM

## 2018-01-11 DIAGNOSIS — Z72.0 TOBACCO ABUSE: ICD-10-CM

## 2018-01-11 DIAGNOSIS — Z95.0 PACEMAKER: ICD-10-CM

## 2018-01-11 DIAGNOSIS — I51.81 TAKOTSUBO CARDIOMYOPATHY: ICD-10-CM

## 2018-01-11 DIAGNOSIS — D37.4 NEOPLASM OF UNCERTAIN BEHAVIOR OF LARGE INTESTINE: ICD-10-CM

## 2018-01-11 DIAGNOSIS — Z86.79 S/P ABLATION OF ATRIAL FLUTTER: ICD-10-CM

## 2018-01-11 DIAGNOSIS — Z98.890 S/P ABLATION OF ATRIAL FLUTTER: ICD-10-CM

## 2018-01-11 DIAGNOSIS — I10 ESSENTIAL HYPERTENSION: ICD-10-CM

## 2018-01-11 DIAGNOSIS — I48.0 PAF (PAROXYSMAL ATRIAL FIBRILLATION) (HCC): Primary | ICD-10-CM

## 2018-01-11 DIAGNOSIS — I21.19 ACUTE MI, INFEROLATERAL WALL (HCC): ICD-10-CM

## 2018-01-19 RX ORDER — LISINOPRIL 5 MG/1
TABLET ORAL
Qty: 90 TAB | Refills: 2 | Status: SHIPPED | OUTPATIENT
Start: 2018-01-19 | End: 2018-04-05 | Stop reason: SDUPTHER

## 2018-02-13 ENCOUNTER — CLINICAL SUPPORT (OUTPATIENT)
Dept: CARDIOLOGY CLINIC | Age: 78
End: 2018-02-13

## 2018-02-13 ENCOUNTER — OFFICE VISIT (OUTPATIENT)
Dept: CARDIOLOGY CLINIC | Age: 78
End: 2018-02-13

## 2018-02-13 VITALS
DIASTOLIC BLOOD PRESSURE: 84 MMHG | OXYGEN SATURATION: 92 % | HEART RATE: 77 BPM | WEIGHT: 145.8 LBS | HEIGHT: 72 IN | BODY MASS INDEX: 19.75 KG/M2 | SYSTOLIC BLOOD PRESSURE: 132 MMHG | RESPIRATION RATE: 16 BRPM

## 2018-02-13 DIAGNOSIS — I44.2 COMPLETE HEART BLOCK (HCC): ICD-10-CM

## 2018-02-13 DIAGNOSIS — I51.81 TAKOTSUBO CARDIOMYOPATHY: ICD-10-CM

## 2018-02-13 DIAGNOSIS — I48.0 PAF (PAROXYSMAL ATRIAL FIBRILLATION) (HCC): ICD-10-CM

## 2018-02-13 DIAGNOSIS — I10 ESSENTIAL HYPERTENSION: ICD-10-CM

## 2018-02-13 DIAGNOSIS — I48.92 ATRIAL FLUTTER, UNSPECIFIED TYPE (HCC): Primary | ICD-10-CM

## 2018-02-13 DIAGNOSIS — Z98.890 S/P ABLATION OF ATRIAL FIBRILLATION: ICD-10-CM

## 2018-02-13 DIAGNOSIS — Z95.0 PACEMAKER: ICD-10-CM

## 2018-02-13 DIAGNOSIS — Z95.0 PACEMAKER: Primary | ICD-10-CM

## 2018-02-13 DIAGNOSIS — Z86.79 S/P ABLATION OF ATRIAL FIBRILLATION: ICD-10-CM

## 2018-02-13 RX ORDER — MOMETASONE FUROATE AND FORMOTEROL FUMARATE DIHYDRATE 200; 5 UG/1; UG/1
AEROSOL RESPIRATORY (INHALATION)
Refills: 3 | COMMUNITY
Start: 2018-02-05 | End: 2019-10-22

## 2018-02-13 RX ORDER — CARVEDILOL 12.5 MG/1
TABLET ORAL
Qty: 180 TAB | Refills: 3 | Status: SHIPPED | OUTPATIENT
Start: 2018-02-13 | End: 2018-04-05 | Stop reason: SDUPTHER

## 2018-02-13 NOTE — PROGRESS NOTES
Subjective:      Risa Bower is a 78 y.o. female is here for device follow up. She is doing well. The patient denies chest pain/ shortness of breath, orthopnea, PND, LE edema, palpitations, syncope, presyncope or fatigue. 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    PAF (paroxysmal atrial fibrillation) ([address] 03/23/2017    Atrial flutter ([address] 03/23/2017    S/P ablation of atrial fibrillation 03/23/2017    S/P ablation of atrial flutter 03/23/2017    Occult blood in stools 01/28/2016    Early satiety 01/28/2016    Femoral hernia with obstruction 11/29/2015    SBO (small bowel obstruction) 11/28/2015    Pacemaker 09/08/2014    Complete heart block ([address] 09/06/2014    Acute MI, inferolateral wall ([address] 09/04/2014    Hypertension 09/04/2014    Tobacco abuse 09/04/2014    Takotsubo cardiomyopathy 09/04/2014    STEMI (ST elevation myocardial infarction) ([address] 09/04/2014      Eliu Cao MD  Past Medical History:   Diagnosis Date    Atrial flutter ([address] 3/23/2017    COPD     BY CHEST XRAY    Early satiety 1/28/2016    Hypertension     Myocardial infarction 2014    Nausea & vomiting     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    Occult blood in stools 1/28/2016    PAF (paroxysmal atrial fibrillation) ([address] 3/23/2017    S/P ablation of atrial fibrillation 3/23/2017    S/P ablation of atrial flutter 3/23/2017      Past Surgical History:   Procedure Laterality Date    ABDOMEN SURGERY PROC UNLISTED      inguinal hernia repair right    COLONOSCOPY N/A 5/11/2017    COLON[facility] at [facility]COPY    COLONOSCOPY N/A 11/30/2017    COLONOSCOPY performed by Pa Álvarez MD at [address] [place] [address]  11/30/2017         HX HERNIA REPAIR  11/29/2015    Incarcerated left femoral hernia repair,.     HX PACEMAKER Left 2014     No Known Allergies   Family History   Problem Relation Age of Onset    Heart Disease Mother     Cancer Father      BLADDER    Other Sister      RA    Other Brother      RA    Alcohol abuse Brother     negative for cardiac disease  Social History     Social History    Marital status:      Spouse name: N/A    Number of children: N/A    Years of education: N/A     Social History Main Topics    Smoking status: Former Smoker     Packs/day: 1.00     Years: 50.00     Types: Cigarettes     Quit date: 9/4/2014    Smokeless tobacco: Never Used      Comment: lives with a smoker    Alcohol use No    Drug use: No    Sexual activity: Not Asked     Other Topics Concern    None     Social History Narrative     Current Outpatient Prescriptions   Medication Sig    DULERA 200-5 mcg/actuation HFA inhaler INHALE 2 PUFFS TWICE DAILY    apixaban (ELIQUIS) 5 mg tablet Take 1 Tab by mouth two (2) times a day.  carvedilol (COREG) 12.5 mg tablet TAKE 1 TABLET BY MOUTH TWICE A DAY WITH MEALS    lisinopril (PRINIVIL, ZESTRIL) 5 mg tablet TAKE 1 TAB BY MOUTH DAILY.  Nebulizer Accessories kit See prescribed    albuterol (PROVENTIL VENTOLIN) 2.5 mg /3 mL (0.083 %) nebulizer solution 3 mL by Nebulization route every four (4) hours as needed for Wheezing.  SPIRIVA RESPIMAT 2.5 mcg/actuation inhaler INHALE 2 PUFFS BY MOUTH ONCE DAILY    atorvastatin (LIPITOR) 20 mg tablet TAKE 1 TABLET BY MOUTH EVERY DAY    aspirin delayed-release 81 mg tablet Take 81 mg by mouth daily.  multivitamin (ONE A DAY) tablet Take 1 Tab by mouth daily. No current facility-administered medications for this visit. Vitals:    02/13/18 1544   BP: 132/84   Pulse: 77   Resp: 16   SpO2: 92%   Weight: 145 lb 12.8 oz (66.1 kg)   Height: 6' (1.829 m)       I have reviewed the nurses notes, vitals, problem list, allergy list, medical history, family, social history and medications.     Review of Symptoms:    General: Pt denies excessive weight gain or loss. Pt is able to conduct ADL's  HEENT: Denies blurred vision, headaches, epistaxis and difficulty swallowing. Respiratory: Denies shortness of breath, BURKETT, wheezing or stridor. Cardiovascular: Denies precordial pain, palpitations, edema or PND  Gastrointestinal: Denies poor appetite, indigestion, abdominal pain or blood in stool  Urinary: Denies dysuria, pyuria  Musculoskeletal: Denies pain or swelling from muscles or joints  Neurologic: Denies tremor, paresthesias, or sensory motor disturbance  Skin: Denies rash, itching or texture change. Psych: Denies depression      Physical Exam:      General: Well developed, in no acute distress. HEENT: Eyes - PERRL, no jvd  Heart:  Normal S1/S2 negative S3 or S4. Regular, no murmur, gallop or rub.   Respiratory: Clear bilaterally x 4, no wheezing or rales  Abdomen:   Soft, non-tender, bowel sounds are active.   Extremities:  No edema, normal cap refill, no cyanosis. Musculoskeletal: No clubbing  Neuro: A&Ox3, speech clear, gait stable. Skin: Skin color is normal. No rashes or lesions.  Non diaphoretic  Vascular: 2+ pulses symmetric in all extremities    Cardiographics    Ekg: atrial pacing  BS PM: 77% AP, 1% RVP  <1% AF    Results for orders placed or performed during the hospital encounter of 12/24/17   EKG, 12 LEAD, INITIAL   Result Value Ref Range    Ventricular Rate 75 BPM    Atrial Rate 75 BPM    P-R Interval 148 ms    QRS Duration 78 ms    Q-T Interval 370 ms    QTC Calculation (Bezet) 413 ms    Calculated P Axis 72 degrees    Calculated R Axis 68 degrees    Calculated T Axis 64 degrees    Diagnosis       Atrial-paced rhythm  Abnormal ECG  When compared with ECG of 23-MAR-2017 04:08,  No significant change was found  Confirmed by Pérez Ordonez ([id number]) on 12/25/2017 12:25:34 PM           Lab Results   Component Value Date/Time    WBC 6.7 12/24/2017 11:33 AM    Hemoglobin (POC) 15.0 09/04/2014 06:16 PM HGB 12.7 12/24/2017 11:33 AM    Hematocrit (POC) 44 09/04/2014 06:16 PM    HCT 37.5 12/24/2017 11:33 AM    PLATELET 080 85/48/7313 11:33 AM    MCV 90.1 12/24/2017 11:33 AM      Lab Results   Component Value Date/Time    Sodium 133 (L) 12/24/2017 11:33 AM    Potassium 4.0 12/24/2017 11:33 AM    Chloride 95 (L) 12/24/2017 11:33 AM    CO2 35 (H) 12/24/2017 11:33 AM    Anion gap 3 (L) 12/24/2017 11:33 AM    Glucose 69 12/24/2017 11:33 AM    BUN 12 12/24/2017 11:33 AM    Creatinine 0.69 12/24/2017 11:33 AM    BUN/Creatinine ratio 17 12/24/2017 11:33 AM    GFR est AA >60 12/24/2017 11:33 AM    GFR est non-AA >60 12/24/2017 11:33 AM    Calcium 9.4 12/24/2017 11:33 AM    Bilirubin, total 0.3 12/24/2017 11:33 AM    AST (SGOT) 28 12/24/2017 11:33 AM    Alk. phosphatase 98 12/24/2017 11:33 AM    Protein, total 7.4 12/24/2017 11:33 AM    Albumin 3.3 (L) 12/24/2017 11:33 AM    Globulin 4.1 (H) 12/24/2017 11:33 AM    A-G Ratio 0.8 (L) 12/24/2017 11:33 AM    ALT (SGPT) 32 12/24/2017 11:33 AM         Assessment:     Assessment:        ICD-10-CM ICD-9-CM    1. Atrial flutter, unspecified type (HCC) I48.92 427.32 AMB POC EKG ROUTINE W/ 12 LEADS, INTER & REP      apixaban (ELIQUIS) 5 mg tablet   2. PAF (paroxysmal atrial fibrillation) (HCC) I48.0 427.31    3. Essential hypertension I10 401.9    4. S/P ablation of atrial fibrillation Z98.890 V45.89     Z86.79     5. Pacemaker Z95.0 V45.01    6. Complete heart block (HCC) I44.2 426.0      Orders Placed This Encounter    AMB POC EKG ROUTINE W/ 12 LEADS, INTER & REP     Order Specific Question:   Reason for Exam:     Answer:   routine    DULERA 200-5 mcg/actuation HFA inhaler     Sig: INHALE 2 PUFFS TWICE DAILY     Refill:  3    apixaban (ELIQUIS) 5 mg tablet     Sig: Take 1 Tab by mouth two (2) times a day.      Dispense:  180 Tab     Refill:  2    carvedilol (COREG) 12.5 mg tablet     Sig: TAKE 1 TABLET BY MOUTH TWICE A DAY WITH MEALS     Dispense:  180 Tab     Refill:  3 Plan:   Ms. Roland Mckeon is here for follow up regarding SSS and hx of AF/AFL s/p Ablation in 3/2017. EKG demonstrates atrial pacing and her device interrogation shows normal functioning (77% AP for her sick sinus, 1%RVP) with <1% AF. She can continue current medical therapy for her htn and copd. follow up with Dr. Ellis Abbasi in one year. Continue medical management for sss, HTN, AF. Thank you for allowing me to participate in Hi Kimball 's care.     NICKI Houston MD, Edil Butterfield

## 2018-02-13 NOTE — PROGRESS NOTES
Chief Complaint   Patient presents with    Irregular Heart Beat     6 month follow up, need refill all cardiac medications     1. Have you been to the ER, urgent care clinic since your last visit? Hospitalized since your last visit? Yes When: 12/24/17 [facility] due to respiratory issues    2. Have you seen or consulted any other health care providers outside of the [address] [place] [address] since your last visit? Include any pap smears or colon screening.  Yes When: Dr [facility]

## 2018-02-13 NOTE — MR AVS SNAPSHOT
[address] [facility] [facility] 
[phone] Patient: Gabrielle Kaufman MRN: [id number] Visit Information Date & Time Provider Department Dept. Phone Encounter #  
 2/13/2018  3:30 PM Quincy Gonzalez [facility] [phone] [id number] Your Appointments 4/5/2018  3:00 PM  
ESTABLISHED PATIENT with Brayden Matthews MD  
[facility] [address]) Appt Note: 6 month follow up [address]  
[phone] [address] [address]  
  
    
 8/14/2018  3:30 PM  
PROCEDURE with PACEMAKER, [address] [facility]) Appt Note: BSC DCPM 6mo check [address]  
[phone] [address] Upcoming Health Maintenance Date Due ZOSTER VACCINE AGE 60> 10/16/2002 DTaP/Tdap/Td series (1 - Tdap) 1/11/2005 GLAUCOMA SCREENING Q2Y 12/16/2007 OSTEOPOROSIS SCREENING (DEXA) 12/16/2007 MEDICARE YEARLY EXAM 12/16/2007 Pneumococcal 65+ Low/Medium Risk (2 of 2 - PCV13) 9/19/2009 Influenza Age 5 to Adult 8/1/2017 Allergies as of 2/13/2018  Review Complete On: 2/13/2018 By: Gloria Ceja NP No Known Allergies Current Immunizations  Reviewed on 11/30/2015 Name Date Influenza Vaccine 10/1/2016 Pneumococcal Polysaccharide (PPSV-23) 9/19/2008 Td 1/10/2005 Not reviewed this visit You Were Diagnosed With   
  
 Codes Comments Atrial flutter, unspecified type ([facility]    -  Primary ICD-10-CM: I48.92 
ICD-9-CM: 427.32   
 PAF (paroxysmal atrial fibrillation) (HCC)     ICD-10-CM: I48.0 ICD-9-CM: 427.31 Essential hypertension     ICD-10-CM: I10 
ICD-9-CM: 401.9  S/P ablation of atrial fibrillation     ICD-10-CM: Z98.890, Z86.79 
 ICD-9-CM: V45.89 Pacemaker     ICD-10-CM: Z95.0 ICD-9-CM: V45.01 Complete heart block (HCC)     ICD-10-CM: I44.2 ICD-9-CM: 426.0 Vitals BP Pulse Resp Height(growth percentile) Weight(growth percentile) SpO2  
 132/84 (BP 1 Location: Left arm, BP Patient Position: Sitting) 77 16 6' (1.829 m) 145 lb 12.8 oz (66.1 kg) 92% BMI OB Status Smoking Status 19.77 kg/m2 Postmenopausal Former Smoker Vitals History BMI and BSA Data Body Mass Index Body Surface Area  
 19.77 kg/m 2 1.83 m 2 Preferred Pharmacy Pharmacy Name Phone [id number] [phone] [phone] Your Updated Medication List  
  
   
This list is accurate as of: 2/13/18  4:16 PM.  Always use your most recent med list.  
  
  
  
  
 albuterol 2.5 mg /3 mL (0.083 %) nebulizer solution Commonly known as:  PROVENTIL VENTOLIN  
3 mL by Nebulization route every four (4) hours as needed for Wheezing. apixaban 5 mg tablet Commonly known as:  Sanchez Nipper Take 1 Tab by mouth two (2) times a day. aspirin delayed-release 81 mg tablet Take 81 mg by mouth daily. atorvastatin 20 mg tablet Commonly known as:  LIPITOR  
TAKE 1 TABLET BY MOUTH EVERY DAY  
  
 carvedilol 12.5 mg tablet Commonly known as:  COREG  
TAKE 1 TABLET BY MOUTH TWICE A DAY WITH MEALS  
  
 DULERA 200-5 mcg/actuation HFA inhaler Generic drug:  mometasone-formoterol INHALE 2 PUFFS TWICE DAILY  
  
 lisinopril 5 mg tablet Commonly known as:  PRINIVIL, ZESTRIL  
TAKE 1 TAB BY MOUTH DAILY. multivitamin tablet Commonly known as:  ONE A DAY Take 1 Tab by mouth daily. Nebulizer Accessories Kit See prescribed SPIRIVA RESPIMAT 2.5 mcg/actuation inhaler Generic drug:  tiotropium bromide INHALE 2 PUFFS BY MOUTH ONCE DAILY Prescriptions Sent to Pharmacy Refills apixaban (ELIQUIS) 5 mg tablet 2 Sig: Take 1 Tab by mouth two (2) times a day. Class: Normal  
 Pharmacy: [address] Ph #: [phone] Route: Oral  
 carvedilol (COREG) 12.5 mg tablet 3 Sig: TAKE 1 TABLET BY MOUTH TWICE A DAY WITH MEALS Class: Normal  
 Pharmacy: [address] Ph #: [phone] We Performed the Following AMB POC EKG ROUTINE W/ 12 LEADS, INTER & REP [97260 CPT(R)] Introducing [facility] SERVICES! [facility] introduces Poptip patient portal. Now you can access parts of your medical record, email your doctor's office, and request medication refills online. 1. In your internet browser, go to [email or web address] 2. Click on the First Time User? Click Here link in the Sign In box. You will see the New Member Sign Up page. 3. Enter your Poptip Access Code exactly as it appears below. You will not need to use this code after youve completed the sign-up process. If you do not sign up before the expiration date, you must request a new code. · Poptip Access Code: [email or web address] Expires: 5/14/2018  4:16 PM 
 
4. Enter the last four digits of your Social Security Number (xxxx) and Date of Birth (mm/dd/yyyy) as indicated and click Submit. You will be taken to the next sign-up page. 5. Create a [employer] ID. This will be your Poptip login ID and cannot be changed, so think of one that is secure and easy to remember. 6. Create a Poptip password. You can change your password at any time. 7. Enter your Password Reset Question and Answer. This can be used at a later time if you forget your password. 8. Enter your e-mail address. You will receive e-mail notification when new information is available in [address] 9. Click Sign Up. You can now view and download portions of your medical record. 10. Click the Download Summary menu link to download a portable copy of your medical information. If you have questions, please visit the Frequently Asked Questions section of the [facility] website. Remember, [facility] is NOT to be used for urgent needs. For medical emergencies, dial 911. Now available from your iPhone and Android! Please provide this summary of care documentation to your next provider. Your primary care clinician is listed as Kamille Ricks. If you have any questions after today's visit, please call [phone].

## 2018-04-05 ENCOUNTER — OFFICE VISIT (OUTPATIENT)
Dept: CARDIOLOGY CLINIC | Age: 78
End: 2018-04-05

## 2018-04-05 VITALS
DIASTOLIC BLOOD PRESSURE: 72 MMHG | HEIGHT: 72 IN | WEIGHT: 146 LBS | BODY MASS INDEX: 19.77 KG/M2 | SYSTOLIC BLOOD PRESSURE: 130 MMHG | RESPIRATION RATE: 20 BRPM | OXYGEN SATURATION: 95 % | HEART RATE: 70 BPM

## 2018-04-05 DIAGNOSIS — E78.2 MIXED HYPERLIPIDEMIA: ICD-10-CM

## 2018-04-05 DIAGNOSIS — I10 ESSENTIAL HYPERTENSION: ICD-10-CM

## 2018-04-05 DIAGNOSIS — J44.9 CHRONIC OBSTRUCTIVE PULMONARY DISEASE, UNSPECIFIED COPD TYPE (HCC): ICD-10-CM

## 2018-04-05 DIAGNOSIS — I48.92 ATRIAL FLUTTER, UNSPECIFIED TYPE (HCC): ICD-10-CM

## 2018-04-05 DIAGNOSIS — I51.81 TAKOTSUBO CARDIOMYOPATHY: ICD-10-CM

## 2018-04-05 DIAGNOSIS — I48.0 PAF (PAROXYSMAL ATRIAL FIBRILLATION) (HCC): Primary | ICD-10-CM

## 2018-04-05 RX ORDER — LISINOPRIL 5 MG/1
TABLET ORAL
Qty: 90 TAB | Refills: 2 | Status: SHIPPED | OUTPATIENT
Start: 2018-04-05 | End: 2019-06-30 | Stop reason: SDUPTHER

## 2018-04-05 RX ORDER — CARVEDILOL 12.5 MG/1
TABLET ORAL
Qty: 180 TAB | Refills: 3 | Status: SHIPPED | OUTPATIENT
Start: 2018-04-05 | End: 2019-06-30 | Stop reason: SDUPTHER

## 2018-04-05 NOTE — PROGRESS NOTES
1. Have you been to the ER, urgent care clinic since your last visit? Hospitalized since your last visit? NO    2. Have you seen or consulted any other health care providers outside of the [address] [place] [address] since your last visit? Include any pap smears or colon screening. NO    6 MONTH FOLLOW UP. C/O TWINGES IN CHEST OFF AND ON TODAY RADIATING ACROSS LEFT SHOULDBER AND DOWN LEFT ARM.  SOB. NEED REFILL ON CARDIAC MEDS.

## 2018-04-05 NOTE — PROGRESS NOTES
[address], [address] [facility]  [phone]     Subjective:      Janessa Kang is a 78 y.o. female is here for routine f/u. Reports new onset L anterior sharp CP that waxes and wanes when she first woke up this morning. Currently feels fine. Denies shortness of breath, orthopnea, PND, palpitations, syncope, or presyncope.        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    PAF (paroxysmal atrial fibrillation) ([address] 03/23/2017    Atrial flutter ([address] 03/23/2017    S/P ablation of atrial fibrillation 03/23/2017    S/P ablation of atrial flutter 03/23/2017    Occult blood in stools 01/28/2016    Early satiety 01/28/2016    Femoral hernia with obstruction 11/29/2015    SBO (small bowel obstruction) ([address] 11/28/2015    Pacemaker 09/08/2014    Complete heart block ([address] 09/06/2014    Acute MI, inferolateral wall ([address] 09/04/2014    Hypertension 09/04/2014    Tobacco abuse 09/04/2014    Takotsubo cardiomyopathy 09/04/2014    STEMI (ST elevation myocardial infarction) ([address] 09/04/2014      James Knott MD  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    Occult blood in stools 1/28/2016    PAF (paroxysmal atrial fibrillation) ([address] 3/23/2017    S/P ablation of atrial fibrillation 3/23/2017    S/P ablation of atrial flutter 3/23/2017      Past Surgical History:   Procedure Laterality Date    ABDOMEN SURGERY PROC UNLISTED      inguinal hernia repair right    COLONOSCOPY N/A 5/11/2017    COLONOSCOPY performed by Faisal Swenson MD at [facility] ENDOSCOPY    COLONOSCOPY N/A 11/30/2017    COLONOSCOPY performed by Faisal Swenson MD at [facility] ENDOSCOPY  COLONOSCOPY,REMV JENARON,SNARE  11/30/2017         HX HERNIA REPAIR  11/29/2015    Incarcerated left femoral hernia repair,.  HX PACEMAKER Left 2014     No Known Allergies   Family History   Problem Relation Age of Onset    Heart Disease Mother     Cancer Father      BLADDER    Other Sister      RA    Other Brother      RA    Alcohol abuse Brother       Social History     Social History    Marital status:      Spouse name: N/A    Number of children: N/A    Years of education: N/A     Occupational History    Not on file. Social History Main Topics    Smoking status: Former Smoker     Packs/day: 1.00     Years: 50.00     Types: Cigarettes     Quit date: 9/4/2014    Smokeless tobacco: Never Used      Comment: lives with a smoker    Alcohol use No    Drug use: No    Sexual activity: Not on file     Other Topics Concern    Not on file     Social History Narrative      Current Outpatient Prescriptions   Medication Sig    DULERA 200-5 mcg/actuation HFA inhaler INHALE 2 PUFFS TWICE DAILY    apixaban (ELIQUIS) 5 mg tablet Take 1 Tab by mouth two (2) times a day.  carvedilol (COREG) 12.5 mg tablet TAKE 1 TABLET BY MOUTH TWICE A DAY WITH MEALS    lisinopril (PRINIVIL, ZESTRIL) 5 mg tablet TAKE 1 TAB BY MOUTH DAILY.  Nebulizer Accessories kit See prescribed    SPIRIVA RESPIMAT 2.5 mcg/actuation inhaler INHALE 2 PUFFS BY MOUTH ONCE DAILY    atorvastatin (LIPITOR) 20 mg tablet TAKE 1 TABLET BY MOUTH EVERY DAY    aspirin delayed-release 81 mg tablet Take 81 mg by mouth daily.  multivitamin (ONE A DAY) tablet Take 1 Tab by mouth daily.  albuterol (PROVENTIL VENTOLIN) 2.5 mg /3 mL (0.083 %) nebulizer solution 3 mL by Nebulization route every four (4) hours as needed for Wheezing. No current facility-administered medications for this visit.           Review of Symptoms:  11 systems reviewed, negative other than as stated in the HPI    Physical ExamPhysical Exam:    Vitals: 04/05/18 1501 04/05/18 1513   BP: 122/70 130/72   Pulse: 70    Resp: 20    SpO2: 95%    Weight: 146 lb (66.2 kg)    Height: 6' (1.829 m)      Body mass index is 19.8 kg/(m^2). General PE   Gen:  NAD  Mental Status - Alert. General Appearance - Not in acute distress. Chest and Lung Exam   Inspection: Accessory muscles - No use of accessory muscles in breathing. Auscultation:   Breath sounds: - Normal.   Cardiovascular   Inspection: Jugular vein - Bilateral - Inspection Normal.   Palpation/Percussion:   Apical Impulse: - Normal.   Auscultation: Rhythm - Regular. Heart Sounds - S1 WNL and S2 WNL. No S3 or S4. Murmurs & Other Heart Sounds: Auscultation of the heart reveals - No Murmurs. Peripheral Vascular   Upper Extremity: Inspection - Bilateral - No Cyanotic nailbeds or Digital clubbing. Lower Extremity:   Palpation: Edema - Bilateral - cool to touch, + 1, chronic venous stasis changes  Abdomen:   Soft, non-tender, bowel sounds are active. Neuro: A&O times 3, CN and motor grossly WNL    Labs:   Lab Results   Component Value Date/Time    Cholesterol, total 183 09/05/2014 04:30 AM    HDL Cholesterol 61 09/05/2014 04:30 AM    LDL, calculated 105.6 (H) 09/05/2014 04:30 AM    Triglyceride 82 09/05/2014 04:30 AM    CHOL/HDL Ratio 3.0 09/05/2014 04:30 AM     Lab Results   Component Value Date/Time    CK 54 11/28/2015 12:33 AM     Lab Results   Component Value Date/Time    Sodium 133 (L) 12/24/2017 11:33 AM    Potassium 4.0 12/24/2017 11:33 AM    Chloride 95 (L) 12/24/2017 11:33 AM    CO2 35 (H) 12/24/2017 11:33 AM    Anion gap 3 (L) 12/24/2017 11:33 AM    Glucose 69 12/24/2017 11:33 AM    BUN 12 12/24/2017 11:33 AM    Creatinine 0.69 12/24/2017 11:33 AM    BUN/Creatinine ratio 17 12/24/2017 11:33 AM    GFR est AA >60 12/24/2017 11:33 AM    GFR est non-AA >60 12/24/2017 11:33 AM    Calcium 9.4 12/24/2017 11:33 AM    Bilirubin, total 0.3 12/24/2017 11:33 AM    AST (SGOT) 28 12/24/2017 11:33 AM    Alk.  phosphatase 98 12/24/2017 11:33 AM    Protein, total 7.4 12/24/2017 11:33 AM    Albumin 3.3 (L) 12/24/2017 11:33 AM    Globulin 4.1 (H) 12/24/2017 11:33 AM    A-G Ratio 0.8 (L) 12/24/2017 11:33 AM    ALT (SGPT) 32 12/24/2017 11:33 AM       EKG:  Apaced     Assessment:     Assessment:      1. PAF (paroxysmal atrial fibrillation) ([address]    2. Essential hypertension    3. Takotsubo cardiomyopathy    4. Mixed hyperlipidemia        Orders Placed This Encounter    AMB POC EKG ROUTINE W/ 12 LEADS, INTER & REP     Order Specific Question:   Reason for Exam:     Answer:   ROUTINE        Plan:     Takotsubo CM  Normal coronaries per cardiac cath 09/14 01/18 EF 55-60%, mild MR, moderate TR   Continue ASA, Coreg, Lisinopril    AF/SSS, post ablation/pacer  Maintained on BB, Eliquis for [address] [place] [address]. Follows with Dr Jose López    HTN  Controlled with current therapy    HLD  On statin therapy  Lipids and labs followed by PCP  Requested recent labwork from PCP    COPD  On inhalers. Followed by pulmonary    Continue current care and f/u in 6 months. Carmella Freeman NP       [facility]    4/5/2018         Patient seen, examined by me personally. Plan discussed as detailed. Agree with note as outlined by  NP. I confirm findings in history and physical exam. No additional findings noted. Agree with plan as outlined above.      Jody Morales MD

## 2018-04-10 RX ORDER — CARVEDILOL 12.5 MG/1
TABLET ORAL
Qty: 180 TAB | Refills: 3 | Status: SHIPPED | OUTPATIENT
Start: 2018-04-10 | End: 2018-10-24 | Stop reason: SDUPTHER

## 2018-08-14 ENCOUNTER — CLINICAL SUPPORT (OUTPATIENT)
Dept: CARDIOLOGY CLINIC | Age: 78
End: 2018-08-14

## 2018-08-14 DIAGNOSIS — I48.0 PAF (PAROXYSMAL ATRIAL FIBRILLATION) (HCC): ICD-10-CM

## 2018-08-14 DIAGNOSIS — I44.2 COMPLETE HEART BLOCK (HCC): ICD-10-CM

## 2018-08-14 DIAGNOSIS — Z95.0 CARDIAC PACEMAKER IN SITU: Primary | ICD-10-CM

## 2018-10-24 ENCOUNTER — OFFICE VISIT (OUTPATIENT)
Dept: CARDIOLOGY CLINIC | Age: 78
End: 2018-10-24

## 2018-10-24 VITALS
DIASTOLIC BLOOD PRESSURE: 82 MMHG | OXYGEN SATURATION: 97 % | WEIGHT: 153 LBS | BODY MASS INDEX: 20.72 KG/M2 | SYSTOLIC BLOOD PRESSURE: 124 MMHG | RESPIRATION RATE: 18 BRPM | HEIGHT: 72 IN | HEART RATE: 78 BPM

## 2018-10-24 DIAGNOSIS — I48.0 PAF (PAROXYSMAL ATRIAL FIBRILLATION) (HCC): ICD-10-CM

## 2018-10-24 DIAGNOSIS — E78.2 MIXED HYPERLIPIDEMIA: ICD-10-CM

## 2018-10-24 DIAGNOSIS — I51.81 TAKOTSUBO CARDIOMYOPATHY: Primary | ICD-10-CM

## 2018-10-24 DIAGNOSIS — I48.92 ATRIAL FLUTTER, UNSPECIFIED TYPE (HCC): ICD-10-CM

## 2018-10-24 DIAGNOSIS — I10 ESSENTIAL HYPERTENSION: ICD-10-CM

## 2018-10-24 NOTE — PROGRESS NOTES
1. Have you been to the ER, urgent care clinic since your last visit? Hospitalized since your last visit? NO 
 
2. Have you seen or consulted any other health care providers outside of the [address] [place] [address] since your last visit? Include any pap smears or colon screening. NO 
 
C/O SWELLING IN BLE.

## 2018-10-24 NOTE — PROGRESS NOTES
NAME:  Yoana Cornelius :   1940 MRN:   [id number] PCP:  Isabella Hinton MD 
 
    
 
Subjective: The patient is a 78y.o. year old female  who returns for a routine follow-up. Since the last visit, patient reports no change in exercise tolerance, chest pain, edema, medication intolerance, palpitations, shortness of breath, PND/orthopnea wheezing, sputum, syncope, dizziness or light headedness. Doing well. Past Medical History:  
Diagnosis Date  Atrial flutter ([facility] 3/23/2017  COPD   
 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 Takotsubo cardiomyopathy I51.81 429.83   
2. Atrial flutter, unspecified type ([facility] I48.92 427.32 AMB POC EKG ROUTINE W/ 12 LEADS, INTER & REP 3. Essential hypertension I10 401.9 4. Mixed hyperlipidemia E78.2 272.2 5. PAF (paroxysmal atrial fibrillation) ([facility]) I48.0 427.31 Social History Tobacco Use  Smoking status: Former Smoker Packs/day: 1.00 Years: 50.00 Pack years: 50.00 Types: Cigarettes Last attempt to quit: 2014 Years since quittin.1  Smokeless tobacco: Never Used  Tobacco comment: lives with a smoker Substance Use Topics  Alcohol use: No  
  
Family History Problem Relation Age of Onset  Heart Disease Mother  Cancer Father BLADDER  
 Other Sister RA  
 Other Brother RA  
 Alcohol abuse Brother Review of SystemsGeneral: Pt denies excessive weight gain or loss.  Pt is able to conduct ADL's 
 HEENT: Denies blurred vision, headaches, epistaxis and difficulty swallowing. Respiratory: Denies shortness of breath, BURKETT, wheezing or stridor. Cardiovascular: Denies precordial pain, palpitations, edema or PND Gastrointestinal: Denies poor appetite, indigestion, abdominal pain or blood in stool Musculoskeletal: Denies pain or swelling from muscles or joints Neurologic: Denies tremor, paresthesias, or sensory motor disturbance Skin: Denies rash, itching or texture change. Objective:  
 
 
Vitals:  
 10/24/18 4520 10/24/18 0913 BP: 122/80 124/82 Pulse: 78 Resp: 18 SpO2: 97% Weight: 153 lb (69.4 kg) Height: 6' (1.829 m) Body mass index is 20.75 kg/m². [facility] Mental Status - Alert. General Appearance - Not in acute distress. Chest and Lung Exam  Inspection: Accessory muscles - No use of accessory muscles in breathing. Auscultation:  
Breath sounds: - Normal. 
 
Cardiovascular  
Inspection: Jugular vein - Bilateral - Inspection Normal. 
Palpation/Percussion:  
Apical Impulse: - Normal. 
Auscultation: Rhythm - Regular. Heart Sounds - S1 WNL and S2 WNL. No S3 or S4. Murmurs & Other Heart Sounds: Auscultation of the heart reveals - No Murmurs. Peripheral Vascular  
Upper Extremity: Inspection - Bilateral - No Cyanotic nailbeds or Digital clubbing. Lower Extremity:  
Palpation: Edema - Bilateral - No edema. Data Review:  
 
EKG -EKG:paced Medications reviewed Current Outpatient Medications Medication Sig  
 apixaban (ELIQUIS) 5 mg tablet Take 1 Tab by mouth two (2) times a day.  carvedilol (COREG) 12.5 mg tablet TAKE 1 TABLET BY MOUTH TWICE A DAY WITH MEALS  lisinopril (PRINIVIL, ZESTRIL) 5 mg tablet TAKE 1 TAB BY MOUTH DAILY.  DULERA 200-5 mcg/actuation HFA inhaler INHALE 2 PUFFS TWICE DAILY  SPIRIVA RESPIMAT 2.5 mcg/actuation inhaler INHALE 2 PUFFS BY MOUTH ONCE DAILY  atorvastatin (LIPITOR) 20 mg tablet TAKE 1 TABLET BY MOUTH EVERY DAY  
  aspirin delayed-release 81 mg tablet Take 81 mg by mouth daily.  multivitamin (ONE A DAY) tablet Take 1 Tab by mouth daily.  Nebulizer Accessories kit See prescribed  albuterol (PROVENTIL VENTOLIN) 2.5 mg /3 mL (0.083 %) nebulizer solution 3 mL by Nebulization route every four (4) hours as needed for Wheezing. No current facility-administered medications for this visit. Assessment: ICD-10-CM ICD-9-CM 1. Takotsubo cardiomyopathy I51.81 429.83   
2. Atrial flutter, unspecified type ([address] I48.92 427.32 AMB POC EKG ROUTINE W/ 12 LEADS, INTER & REP 3. Essential hypertension I10 401.9 4. Mixed hyperlipidemia E78.2 272.2 5. PAF (paroxysmal atrial fibrillation) ([facility]) I48.0 427.31 Plan:  
 
Patient presents doing well and stable from cardiac standpoint. EF normal by echo 1/18. Paced rhythm. Continue current care and follow up in 6 months.  
 
Judy Leyva MD

## 2019-02-25 ENCOUNTER — CLINICAL SUPPORT (OUTPATIENT)
Dept: CARDIOLOGY CLINIC | Age: 79
End: 2019-02-25

## 2019-02-25 ENCOUNTER — OFFICE VISIT (OUTPATIENT)
Dept: CARDIOLOGY CLINIC | Age: 79
End: 2019-02-25

## 2019-02-25 VITALS
DIASTOLIC BLOOD PRESSURE: 76 MMHG | SYSTOLIC BLOOD PRESSURE: 130 MMHG | HEIGHT: 72 IN | WEIGHT: 153.4 LBS | HEART RATE: 80 BPM | BODY MASS INDEX: 20.78 KG/M2 | RESPIRATION RATE: 18 BRPM | OXYGEN SATURATION: 95 %

## 2019-02-25 DIAGNOSIS — J44.9 CHRONIC OBSTRUCTIVE PULMONARY DISEASE, UNSPECIFIED COPD TYPE (HCC): ICD-10-CM

## 2019-02-25 DIAGNOSIS — I44.2 COMPLETE HEART BLOCK (HCC): ICD-10-CM

## 2019-02-25 DIAGNOSIS — I48.0 PAF (PAROXYSMAL ATRIAL FIBRILLATION) (HCC): Primary | ICD-10-CM

## 2019-02-25 DIAGNOSIS — I10 ESSENTIAL HYPERTENSION: ICD-10-CM

## 2019-02-25 DIAGNOSIS — Z95.0 CARDIAC PACEMAKER IN SITU: Primary | ICD-10-CM

## 2019-02-25 DIAGNOSIS — Z86.79 S/P ABLATION OF ATRIAL FIBRILLATION: ICD-10-CM

## 2019-02-25 DIAGNOSIS — Z95.0 CARDIAC PACEMAKER IN SITU: ICD-10-CM

## 2019-02-25 DIAGNOSIS — Z98.890 S/P ABLATION OF ATRIAL FIBRILLATION: ICD-10-CM

## 2019-02-25 NOTE — PROGRESS NOTES
1. Have you been to the ER, urgent care clinic since your last visit? Hospitalized since your last visit? No 
 
2. Have you seen or consulted any other health care providers outside of the [address] [place] [address] since your last visit? Include any pap smears or colon screening. No 
 
Chief Complaint Patient presents with  Irregular Heart Beat  
  6 mo appt. Denied cardiac symptoms.

## 2019-02-25 NOTE — PROGRESS NOTES
Subjective:  
  
Hannah Cota is a 70 y.o. female is here for follow up of device and AF s/p AF ablation in 2017. The patient denies chest pain/ shortness of breath, orthopnea, PND, LE edema, palpitations, syncope, presyncope or fatigue. Patient Active Problem List  
 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  
 SBO (small bowel obstruction) ([address] 11/28/2015  Pacemaker 09/08/2014  Complete heart block ([address] 09/06/2014  Acute MI, inferolateral wall ([address] 09/04/2014  Hypertension 09/04/2014  Tobacco abuse 09/04/2014  Takotsubo cardiomyopathy 09/04/2014  
 STEMI (ST elevation myocardial infarction) ([address] 09/04/2014 Eileen Devries MD 
Past Medical History:  
Diagnosis Date  Atrial flutter ([address] 3/23/2017  COPD   
 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  
Procedure Laterality Date  ABDOMEN SURGERY PROC UNLISTED    
 inguinal hernia repair right  COLONOSCOPY N/A 5/11/2017 COLONOSCOPY performed by Nadira Reno MD at \[facility]\"" ENDOSCOPY  COLONOSCOPY N/A 11/30/2017 COLONOSCOPY performed by Nadira Reno MD at \[facility]\"" ENDOSCOPY  COLONOSCOPY,REMV LESN,SNARE  11/30/2017  HX HERNIA REPAIR  11/29/2015 Incarcerated left femoral hernia repair,.  HX PACEMAKER Left  No Known Allergies Family History Problem Relation Age of Onset  Heart Disease Mother  Cancer Father BLADDER  
 Other Sister RA  
 Other Brother RA  
 Alcohol abuse Brother   
 negative for cardiac disease Social History Socioeconomic History  Marital status:  Spouse name: Not on file  Number of children: Not on file  Years of education: Not on file  Highest education level: Not on file Tobacco Use  Smoking status: Former Smoker Packs/day: 1.00 Years: 50.00 Pack years: 50.00 Types: Cigarettes Last attempt to quit: 2014 Years since quittin.4  Smokeless tobacco: Never Used  Tobacco comment: lives with a smoker Substance and Sexual Activity  Alcohol use: No  
 Drug use: No  
 
Current Outpatient Medications Medication Sig  
 apixaban (ELIQUIS) 5 mg tablet Take 1 Tab by mouth two (2) times a day.  carvedilol (COREG) 12.5 mg tablet TAKE 1 TABLET BY MOUTH TWICE A DAY WITH MEALS  lisinopril (PRINIVIL, ZESTRIL) 5 mg tablet TAKE 1 TAB BY MOUTH DAILY.  DULERA 200-5 mcg/actuation HFA inhaler INHALE 2 PUFFS TWICE DAILY  Nebulizer Accessories kit See prescribed  albuterol (PROVENTIL VENTOLIN) 2.5 mg /3 mL (0.083 %) nebulizer solution 3 mL by Nebulization route every four (4) hours as needed for Wheezing.  SPIRIVA RESPIMAT 2.5 mcg/actuation inhaler INHALE 2 PUFFS BY MOUTH ONCE DAILY  atorvastatin (LIPITOR) 20 mg tablet TAKE 1 TABLET BY MOUTH EVERY DAY  aspirin delayed-release 81 mg tablet Take 81 mg by mouth daily.  multivitamin (ONE A DAY) tablet Take 1 Tab by mouth daily. No current facility-administered medications for this visit. Vitals:  
 19 1443 BP: 130/76 Pulse: 80 Resp: 18 SpO2: 95% Weight: 153 lb 6.4 oz (69.6 kg) Height: 6' (1.829 m) I have reviewed the nurses notes, vitals, problem list, allergy list, medical history, family, social history and medications. Review of Symptoms: 
 
General: Pt denies excessive weight gain or loss. Pt is able to conduct ADL's HEENT: Denies blurred vision, headaches, epistaxis and difficulty swallowing. Respiratory: Denies shortness of breath, BURKETT, wheezing or stridor. Cardiovascular: Denies precordial pain, palpitations, edema or PND Gastrointestinal: Denies poor appetite, indigestion, abdominal pain or blood in stool Urinary: Denies dysuria, pyuria Musculoskeletal: Denies pain or swelling from muscles or joints Neurologic: Denies tremor, paresthesias, or sensory motor disturbance Skin: Denies rash, itching or texture change. Psych: Denies depression Physical Exam:   
 
General: Well developed, in no acute distress. HEENT: Eyes - PERRL, no jvd Heart:  Normal S1/S2 negative S3 or S4. Regular, no murmur, gallop or rub.  
Respiratory: Clear bilaterally x 4, no wheezing or rales Abdomen:   Soft, non-tender, bowel sounds are active.  
Extremities:  No edema, normal cap refill, no cyanosis. Musculoskeletal: No clubbing Neuro: A&Ox3, speech clear, gait stable. Skin: Skin color is normal. No rashes or lesions. Non diaphoretic Vascular: 2+ pulses symmetric in all extremities Cardiographics Ekg: atrial pacing Results for orders placed or performed during the hospital encounter of 12/24/17 EKG, 12 LEAD, INITIAL Result Value Ref Range Ventricular Rate 75 BPM  
 Atrial Rate 75 BPM  
 P-R Interval 148 ms QRS Duration 78 ms Q-T Interval 370 ms QTC Calculation (Bezet) 413 ms Calculated P Axis 72 degrees Calculated R Axis 68 degrees Calculated T Axis 64 degrees Diagnosis Atrial-paced rhythm Abnormal ECG When compared with ECG of 23-MAR-2017 04:08, No significant change was found Confirmed by Ramy Salas ([id number]) on 12/25/2017 12:25:34 PM 
  
f 
 
 
 Lab Results Component Value Date/Time WBC 6.7 12/24/2017 11:33 AM  
 Hemoglobin (POC) 15.0 09/04/2014 06:16 PM  
 HGB 12.7 12/24/2017 11:33 AM  
 Hematocrit (POC) 44 09/04/2014 06:16 PM  
 HCT 37.5 12/24/2017 11:33 AM  
 PLATELET 413 10/67/4690 11:33 AM  
 MCV 90.1 12/24/2017 11:33 AM  
  
Lab Results Component Value Date/Time Sodium 133 (L) 12/24/2017 11:33 AM  
 Potassium 4.0 12/24/2017 11:33 AM  
 Chloride 95 (L) 12/24/2017 11:33 AM  
 CO2 35 (H) 12/24/2017 11:33 AM  
 Anion gap 3 (L) 12/24/2017 11:33 AM  
 Glucose 69 12/24/2017 11:33 AM  
 BUN 12 12/24/2017 11:33 AM  
 Creatinine 0.69 12/24/2017 11:33 AM  
 BUN/Creatinine ratio 17 12/24/2017 11:33 AM  
 GFR est AA >60 12/24/2017 11:33 AM  
 GFR est non-AA >60 12/24/2017 11:33 AM  
 Calcium 9.4 12/24/2017 11:33 AM  
 Bilirubin, total 0.3 12/24/2017 11:33 AM  
 AST (SGOT) 28 12/24/2017 11:33 AM  
 Alk. phosphatase 98 12/24/2017 11:33 AM  
 Protein, total 7.4 12/24/2017 11:33 AM  
 Albumin 3.3 (L) 12/24/2017 11:33 AM  
 Globulin 4.1 (H) 12/24/2017 11:33 AM  
 A-G Ratio 0.8 (L) 12/24/2017 11:33 AM  
 ALT (SGPT) 32 12/24/2017 11:33 AM  
  
 
 Assessment: 
 
 Assessment: ICD-10-CM ICD-9-CM 1. PAF (paroxysmal atrial fibrillation) (HCC) I48.0 427.31 AMB POC EKG ROUTINE W/ 12 LEADS, INTER & REP 2. Essential hypertension I10 401.9 3. Complete heart block (HCC) I44.2 426.0 4. Cardiac pacemaker in situ Z95.0 V45.01   
5. Chronic obstructive pulmonary disease, unspecified COPD type ([facility] J44.9 496   
6. S/P ablation of atrial fibrillation Z98.890 V45.89   
 Z86.79 Orders Placed This Encounter  AMB POC EKG ROUTINE W/ 12 LEADS, INTER & REP Order Specific Question:   Reason for Exam: Answer:   routine Plan: Ms. Mariangel Mcintosh is here for follow up regarding SSS and hx of AF/AFL s/p Ablation in 3/2017.  EKG demonstrates atrial pacing and her device interrogation shows normal functioning (80% AP for her sick sinus, 1%RVP) with <1% AF/AT, episodes last 6-18 seconds. She denies cardiac complaints. She can continue current medical therapy for her htn and copd. follow up in one year.  
  
Continue medical management for HTN, COPD. Thank you for allowing me to participate in Genaro Gray 's care.  
 
Forrest Gonzales NP

## 2019-04-30 ENCOUNTER — OFFICE VISIT (OUTPATIENT)
Dept: CARDIOLOGY CLINIC | Age: 79
End: 2019-04-30

## 2019-04-30 VITALS
HEIGHT: 72 IN | RESPIRATION RATE: 16 BRPM | BODY MASS INDEX: 21.11 KG/M2 | WEIGHT: 155.9 LBS | OXYGEN SATURATION: 97 % | SYSTOLIC BLOOD PRESSURE: 120 MMHG | DIASTOLIC BLOOD PRESSURE: 78 MMHG | HEART RATE: 77 BPM

## 2019-04-30 DIAGNOSIS — I51.81 TAKOTSUBO CARDIOMYOPATHY: ICD-10-CM

## 2019-04-30 DIAGNOSIS — I48.92 ATRIAL FLUTTER, UNSPECIFIED TYPE (HCC): Primary | ICD-10-CM

## 2019-04-30 DIAGNOSIS — M79.89 LEG SWELLING: ICD-10-CM

## 2019-04-30 DIAGNOSIS — Z95.0 PACEMAKER: ICD-10-CM

## 2019-04-30 DIAGNOSIS — E78.2 MIXED HYPERLIPIDEMIA: ICD-10-CM

## 2019-04-30 DIAGNOSIS — I10 ESSENTIAL HYPERTENSION: ICD-10-CM

## 2019-04-30 RX ORDER — ALBUTEROL SULFATE 90 UG/1
1 AEROSOL, METERED RESPIRATORY (INHALATION) AS NEEDED
Refills: 3 | COMMUNITY
Start: 2019-04-03 | End: 2019-10-22

## 2019-04-30 NOTE — PROGRESS NOTES
1. Have you been to the ER, urgent care clinic since your last visit? Hospitalized since your last visit? NO 
 
2. Have you seen or consulted any other health care providers outside of the [address] [place] [address] since your last visit? Include any pap smears or colon screening. NO  
 
 
6 MONTH FOLLOW UP. C/O SWELLING IN ANKLES, SOB.

## 2019-04-30 NOTE — PROGRESS NOTES
Gareth Christian, [facility] Subjective/HPI:  
 
Ms. Sofie Monsalve is a 70 y.o. female is here for routine f/u. She has a PMHx of PAF s/p AV geovany ablation with PPM, Takotsubo's cardiomyopathy now resolved, HTN, and HLD. She is doing well. She denies complaints of chest pains, dizziness, orthopnea, PND. She has shortness of breath which is not worsened from baseline. Her edema is chronic and stable. She walks daily for 30 minutes, but at a slower pace. PCP Provider Ardeth Fabry, MD 
Past Medical History:  
Diagnosis Date  Atrial flutter ([facility] 3/23/2017  COPD   
 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 3/23/2017  S/P ablation of atrial fibrillation 3/23/2017  S/P ablation of atrial flutter 3/23/2017 Past Surgical History:  
Procedure Laterality Date  ABDOMEN SURGERY PROC UNLISTED    
 inguinal hernia repair right  COLONOSCOPY N/A 5/11/2017 COLONOSCOPY performed by Deven Lockwood MD at [facility] ENDOSCOPY  COLONOSCOPY N/A 11/30/2017 COLONOSCOPY performed by Deven Lockwood MD at [facility] ENDOSCOPY  COLONOSCOPY,REMV LESN,SNARE  11/30/2017  HX HERNIA REPAIR  11/29/2015 Incarcerated left femoral hernia repair,.  HX PACEMAKER Left 2014 Family History Problem Relation Age of Onset  Heart Disease Mother  Cancer Father BLADDER  
 Other Sister RA  
 Other Brother RA  
 Alcohol abuse Brother Social History Socioeconomic History  Marital status:  Spouse name: Not on file  Number of children: Not on file  Years of education: Not on file  Highest education level: Not on file Occupational History  Not on file Social Needs  Financial resource strain: Not on file  Food insecurity:  
  Worry: Not on file Inability: Not on file  Transportation needs:  
  Medical: Not on file Non-medical: Not on file Tobacco Use  Smoking status: Former Smoker Packs/day: 1.00 Years: 50.00 Pack years: 50.00 Types: Cigarettes Last attempt to quit: 2014 Years since quittin.6  Smokeless tobacco: Never Used  Tobacco comment: lives with a smoker Substance and Sexual Activity  Alcohol use: No  
 Drug use: No  
 Sexual activity: Not on file Lifestyle  Physical activity:  
  Days per week: Not on file Minutes per session: Not on file  Stress: Not on file Relationships  Social connections:  
  Talks on phone: Not on file Gets together: Not on file Attends Yazdanism service: Not on file Active member of club or organization: Not on file Attends meetings of clubs or organizations: Not on file Relationship status: Not on file  Intimate partner violence:  
  Fear of current or ex partner: Not on file Emotionally abused: Not on file Physically abused: Not on file Forced sexual activity: Not on file Other Topics Concern  Not on file Social History Narrative  Not on file No Known Allergies Current Outpatient Medications Medication Sig  
 PROAIR HFA 90 mcg/actuation inhaler Take 1 Puff by inhalation as needed.  apixaban (ELIQUIS) 5 mg tablet Take 1 Tab by mouth two (2) times a day.  carvedilol (COREG) 12.5 mg tablet TAKE 1 TABLET BY MOUTH TWICE A DAY WITH MEALS  lisinopril (PRINIVIL, ZESTRIL) 5 mg tablet TAKE 1 TAB BY MOUTH DAILY.  DULERA 200-5 mcg/actuation HFA inhaler INHALE 2 PUFFS TWICE DAILY  Nebulizer Accessories kit See prescribed  albuterol (PROVENTIL VENTOLIN) 2.5 mg /3 mL (0.083 %) nebulizer solution 3 mL by Nebulization route every four (4) hours as needed for Wheezing.  SPIRIVA RESPIMAT 2.5 mcg/actuation inhaler INHALE 2 PUFFS BY MOUTH ONCE DAILY  atorvastatin (LIPITOR) 20 mg tablet TAKE 1 TABLET BY MOUTH EVERY DAY  aspirin delayed-release 81 mg tablet Take 81 mg by mouth daily.  multivitamin (ONE A DAY) tablet Take 1 Tab by mouth daily. No current facility-administered medications for this visit. I have reviewed the problem list, allergy list, medical history, family, social history and medications. Review of Symptoms: 
 
Review of Systems Constitutional: Negative for chills, fever and weight loss. HENT: Negative for nosebleeds. Eyes: Negative for blurred vision and double vision. Respiratory: Negative for cough, shortness of breath and wheezing. Cardiovascular: Negative for chest pain, palpitations, orthopnea, leg swelling and PND. Gastrointestinal: Negative for abdominal pain, blood in stool, diarrhea, nausea and vomiting. Musculoskeletal: Negative for joint pain. Skin: Negative for rash. Neurological: Negative for dizziness, tingling and loss of consciousness. Endo/Heme/Allergies: Does not bruise/bleed easily. Physical Exam:   
 
General: Well developed, in no acute distress, cooperative and alert HEENT: No carotid bruits, no JVD, trach is midline. Neck Supple, PEERL, EOM intact. Heart:  reg rate and rhythm; normal S1/S2; no murmurs, gallops or rubs. Respiratory: Clear bilaterally x 4, no wheezing or rales Abdomen:   Soft, non-tender, no distention, no masses. + BS. Extremities:  Normal cap refill, no cyanosis, atraumatic. Bilateral LE edema 2+ non-pitting, with discoloration and varicosities up to the knees. Neuro: A&Ox3, speech clear, gait stable. Skin: Skin color is normal. No rashes or lesions. Non diaphoretic Vascular: 2+ pulses symmetric in all extremities Vitals:  
 04/30/19 8558 04/30/19 9954 BP: 122/80 120/78 Pulse: 77 Resp: 16 SpO2: 97% Weight: 155 lb 14.4 oz (70.7 kg) Height: 6' (1.829 m) Cardiographics ECG: atrially paced rhythm Results for orders placed or performed during the hospital encounter of 12/24/17 EKG, 12 LEAD, INITIAL Result Value Ref Range Ventricular Rate 75 BPM  
 Atrial Rate 75 BPM  
 P-R Interval 148 ms QRS Duration 78 ms Q-T Interval 370 ms QTC Calculation (Bezet) 413 ms Calculated P Axis 72 degrees Calculated R Axis 68 degrees Calculated T Axis 64 degrees Diagnosis Atrial-paced rhythm Abnormal ECG When compared with ECG of 23-MAR-2017 04:08, No significant change was found Confirmed by Ariadna Herndon ([id number]) on 12/25/2017 12:25:34 PM 
  
 
 
Cardiology Labs: 
Lab Results Component Value Date/Time Cholesterol, total 183 09/05/2014 04:30 AM  
 HDL Cholesterol 61 09/05/2014 04:30 AM  
 LDL, calculated 105.6 (H) 09/05/2014 04:30 AM  
 Triglyceride 82 09/05/2014 04:30 AM  
 CHOL/HDL Ratio 3.0 09/05/2014 04:30 AM  
 
 
Lab Results Component Value Date/Time Sodium 133 (L) 12/24/2017 11:33 AM  
 Potassium 4.0 12/24/2017 11:33 AM  
 Chloride 95 (L) 12/24/2017 11:33 AM  
 CO2 35 (H) 12/24/2017 11:33 AM  
 Anion gap 3 (L) 12/24/2017 11:33 AM  
 Glucose 69 12/24/2017 11:33 AM  
 BUN 12 12/24/2017 11:33 AM  
 Creatinine 0.69 12/24/2017 11:33 AM  
 BUN/Creatinine ratio 17 12/24/2017 11:33 AM  
 GFR est AA >60 12/24/2017 11:33 AM  
 GFR est non-AA >60 12/24/2017 11:33 AM  
 Calcium 9.4 12/24/2017 11:33 AM  
 Bilirubin, total 0.3 12/24/2017 11:33 AM  
 AST (SGOT) 28 12/24/2017 11:33 AM  
 Alk. phosphatase 98 12/24/2017 11:33 AM  
 Protein, total 7.4 12/24/2017 11:33 AM  
 Albumin 3.3 (L) 12/24/2017 11:33 AM  
 Globulin 4.1 (H) 12/24/2017 11:33 AM  
 A-G Ratio 0.8 (L) 12/24/2017 11:33 AM  
 ALT (SGPT) 32 12/24/2017 11:33 AM  
  
 
 
 Assessment: 
 
 Assessment: ICD-10-CM ICD-9-CM 1.  Atrial flutter, unspecified type ([facility] I48.92 427.32 AMB POC EKG ROUTINE W/ 12 LEADS, INTER & REP  
 2. Takotsubo cardiomyopathy I51.81 429.83   
3. Essential hypertension I10 401.9 4. Mixed hyperlipidemia E78.2 272.2 5. Pacemaker Z95.0 V45.01   
6. Leg swelling M79.89 729.81 DUPLEX LOWER EXT VENOUS BILAT Plan: 1. Atrial flutter, unspecified type ([address] S/p AV geovany ablation with PPM placement Doing well Continue with Eliquis and coreg for medical management. - AMB POC EKG ROUTINE W/ 12 LEADS, INTER & REP 2. Takotsubo cardiomyopathy Echo in 1/2018 with preserved LVEF 55-60% with grade 1 DD. Euvolemic Continue with present medical management. 3. Essential hypertension BP well controlled; continue anti-hypertensive therapy and low sodium diet. 4. Mixed hyperlipidemia Continue statin therapy and low fat, low cholesterol diet LDL 70 in 4/2018 Lipids managed by PCP 5. Pacemaker Continue to follow with Dr. Casimiro Baker for routine pacemaker interrogation. 6.  Leg swelling Will obtain venous duplex to r/o venous reflux due to pain, swelling, discoloration. Carolyn Mercedes NP [facility] 4/30/2019 Patient seen, examined by me personally. Plan discussed as detailed. Agree with note as outlined by  NP. I confirm findings in history and physical exam. No additional findings noted. Agree with plan as outlined above.   
 
Patti Duke MD

## 2019-05-17 ENCOUNTER — TELEPHONE (OUTPATIENT)
Dept: CARDIOLOGY CLINIC | Age: 79
End: 2019-05-17

## 2019-05-17 NOTE — TELEPHONE ENCOUNTER
----- Message from Shaunna Mayers NP sent at 5/16/2019  8:22 AM EDT -----  Alberto Jones,    Please call the patient and inform her venous duplex study did show venous insufficiency on the RIGHT leg only. No concern for blood clots. If she'd like, we can set her up to see Dr. Gracie Patrick in [address] [place] [address] to discuss treatment further.     Thanks,  [employer]

## 2019-05-21 ENCOUNTER — OFFICE VISIT (OUTPATIENT)
Dept: CARDIOLOGY CLINIC | Age: 79
End: 2019-05-21

## 2019-05-21 VITALS
DIASTOLIC BLOOD PRESSURE: 76 MMHG | WEIGHT: 155.1 LBS | OXYGEN SATURATION: 98 % | HEART RATE: 77 BPM | RESPIRATION RATE: 16 BRPM | HEIGHT: 72 IN | SYSTOLIC BLOOD PRESSURE: 128 MMHG | BODY MASS INDEX: 21.01 KG/M2

## 2019-05-21 DIAGNOSIS — I83.11 VARICOSE VEINS OF BOTH LOWER EXTREMITIES WITH INFLAMMATION: ICD-10-CM

## 2019-05-21 DIAGNOSIS — I83.12 VARICOSE VEINS OF BOTH LOWER EXTREMITIES WITH INFLAMMATION: ICD-10-CM

## 2019-05-21 DIAGNOSIS — I87.2 VENOUS INSUFFICIENCY OF RIGHT LOWER EXTREMITY: Primary | ICD-10-CM

## 2019-05-21 NOTE — PROGRESS NOTES
1. Have you been to the ER, urgent care clinic since your last visit? Hospitalized since your last visit? No.    2. Have you seen or consulted any other health care providers outside of the [address] [place] [address] since your last visit? Include any pap smears or colon screening.    No.        Chief Complaint   Patient presents with    Results     here to discuss le venous doppler results-legs always cold and swelling for a couple of years

## 2019-05-21 NOTE — PROGRESS NOTES
5/21/2019 3:52 PM      Subjective:     Ms. Verena Metz is a 70 y.o. female who is here for new patient consultation. She has a PMHx of PAF s/p AV geovany ablation with PPM, Takotsubo's cardiomyopathy now resolved, HTN, and HLD. She had venous duplex study done for leg edema with pain and discoloration. Venous Duplex (4/30/19):  · No evidence of acute deep vein thrombosis in the right common femoral, superficial femoral, popliteal, posterior tibial, and peroneal veins. The veins were imaged in the transverse and longitudinal planes. The vessels showed normal color filling and compressibility. Doppler interrogation showed phasic and spontaneous flow. · No evidence of acute deep vein thrombosis in the left common femoral, superficial femoral, popliteal, posterior tibial, and peroneal veins. The veins were imaged in the transverse and longitudinal planes. The vessels showed normal color filling and compressibility. Doppler interrogation showed phasic and spontaneous flow. · Right great saphenous vein displays venous insufficiency. Visit Vitals  /76 (BP 1 Location: Right arm, BP Patient Position: Sitting)   Pulse 77   Resp 16   Ht 6' (1.829 m)   Wt 155 lb 1.6 oz (70.4 kg)   SpO2 98%   BMI 21.04 kg/m²       Current Outpatient Medications   Medication Sig    PROAIR HFA 90 mcg/actuation inhaler Take 1 Puff by inhalation as needed.  apixaban (ELIQUIS) 5 mg tablet Take 1 Tab by mouth two (2) times a day.  carvedilol (COREG) 12.5 mg tablet TAKE 1 TABLET BY MOUTH TWICE A DAY WITH MEALS    lisinopril (PRINIVIL, ZESTRIL) 5 mg tablet TAKE 1 TAB BY MOUTH DAILY.  DULERA 200-5 mcg/actuation HFA inhaler INHALE 2 PUFFS TWICE DAILY    Nebulizer Accessories kit See prescribed    albuterol (PROVENTIL VENTOLIN) 2.5 mg /3 mL (0.083 %) nebulizer solution 3 mL by Nebulization route every four (4) hours as needed for Wheezing.     SPIRIVA RESPIMAT 2.5 mcg/actuation inhaler INHALE 2 PUFFS BY MOUTH ONCE DAILY    atorvastatin (LIPITOR) 20 mg tablet TAKE 1 TABLET BY MOUTH EVERY DAY    aspirin delayed-release 81 mg tablet Take 81 mg by mouth daily.  multivitamin (ONE A DAY) tablet Take 1 Tab by mouth daily. No current facility-administered medications for this visit. Objective:      Visit Vitals  /76 (BP 1 Location: Right arm, BP Patient Position: Sitting)   Pulse 77   Resp 16   Ht 6' (1.829 m)   Wt 155 lb 1.6 oz (70.4 kg)   SpO2 98%   BMI 21.04 kg/m²       Data Review:   Lab Results   Component Value Date/Time    Cholesterol, total 183 09/05/2014 04:30 AM    HDL Cholesterol 61 09/05/2014 04:30 AM    LDL, calculated 105.6 (H) 09/05/2014 04:30 AM    Triglyceride 82 09/05/2014 04:30 AM    CHOL/HDL Ratio 3.0 09/05/2014 04:30 AM       Lab Results   Component Value Date/Time    Sodium 133 (L) 12/24/2017 11:33 AM    Potassium 4.0 12/24/2017 11:33 AM    Chloride 95 (L) 12/24/2017 11:33 AM    CO2 35 (H) 12/24/2017 11:33 AM    Anion gap 3 (L) 12/24/2017 11:33 AM    Glucose 69 12/24/2017 11:33 AM    BUN 12 12/24/2017 11:33 AM    Creatinine 0.69 12/24/2017 11:33 AM    BUN/Creatinine ratio 17 12/24/2017 11:33 AM    GFR est AA >60 12/24/2017 11:33 AM    GFR est non-AA >60 12/24/2017 11:33 AM    Calcium 9.4 12/24/2017 11:33 AM    Bilirubin, total 0.3 12/24/2017 11:33 AM    AST (SGOT) 28 12/24/2017 11:33 AM    Alk.  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    Occult blood in stools 1/28/2016    PAF (paroxysmal atrial fibrillation) ([facility] 3/23/2017    S/P ablation of atrial fibrillation 3/23/2017    S/P ablation of atrial flutter 3/23/2017      Past Surgical History:   Procedure Laterality Date    ABDOMEN SURGERY PROC UNLISTED      inguinal hernia repair right    COLONOSCOPY N/A 2017    COLONOSCOPY performed by Rita Diaz MD at hospitals ENDOSCOPY    COLONOSCOPY N/A 2017    COLONOSCOPY performed by Rita Diaz MD at [address] [place] [address]  2017         HX HERNIA REPAIR  2015    Incarcerated left femoral hernia repair,.     HX PACEMAKER Left      No Known Allergies   Family History   Problem Relation Age of Onset    Heart Disease Mother     Cancer Father         BLADDER    Other Sister         RA    Other Brother         RA    Alcohol abuse Brother       Social History     Socioeconomic History    Marital status:      Spouse name: Not on file    Number of children: Not on file    Years of education: Not on file    Highest education level: Not on file   Occupational History    Not on file   Social Needs    Financial resource strain: Not on file    Food insecurity:     Worry: Not on file     Inability: Not on file    Transportation needs:     Medical: Not on file     Non-medical: Not on file   Tobacco Use    Smoking status: Former Smoker     Packs/day: 1.00     Years: 50.00     Pack years: 50.00     Types: Cigarettes     Last attempt to quit: 2014     Years since quittin.7    Smokeless tobacco: Never Used    Tobacco comment: lives with a smoker   Substance and Sexual Activity    Alcohol use: No    Drug use: No    Sexual activity: Not on file   Lifestyle    Physical activity:     Days per week: Not on file     Minutes per session: Not on file    Stress: Not on file   Relationships    Social connections:     Talks on phone: Not on file     Gets together: Not on file     Attends Anglican service: Not on file     Active member of club or organization: Not on file     Attends meetings of clubs or organizations: Not on file     Relationship status: Not on file    Intimate partner violence:     Fear of current or ex partner: Not on file     Emotionally abused: Not on file     Physically abused: Not on file     Forced sexual activity: Not on file   Other Topics Concern    Not on file   Social History Narrative    Not on file       1. Have you ever had vein stripping surgery YES   If yes, when and which leg? 2. Have you ever had vein injections? YES   If yes, which leg and where on the leg? 3. Have you ever had a blood clot? YES   If yes, which leg and when? 4. Have you ever had phlebitis? NO   If yes, which leg and when? Does anyone in your family have (or used to have) varicose veins, spider veins, leg ulcers or swollen legs? Father  NO  Mother NO  Brother(s) NO  Sister(s) NO  Other NO           1. Do you experience any of the following in your legs? Aching/pain? YES  [] One leg [x] Both legs  Heaviness? YES  [] One leg [x] Both legs  Tiredness/fatigue? NO  [] One leg [] Both legs  Itching/burning? YES  [] One leg [x] Both legs  Swollen ankles? YES  [] One leg [x] Both legs  Leg cramps? YES  [] One leg [x] Both legs  Restless legs? YES  [] One leg [x] Both legs  Throbbing? NO  [] One leg [] Both legs  Other? 2.  Have your veins gotten worse in recent months? NO    Describe:     3. Do you take any medication for pain (i.e., Advil, Motrin)  NO       If yes, what medication do you take and how many times/mgs per day? 4. Do you elevate your legs to relieve discomfort? NO    If yes, how long per day do you elevate and does it provide relief? 5. Do you exercise? NO    If yes, what kind of exercise and how often? 6. Do you wear prescription compression stockings?   NO    If yes, what type and gradient? How long have you worn them? If yes, what is the name of the physician who prescribed your compression                         stockings and when were they prescribed?        7.    Do you wear light support hose (i.e., [employer])? NO              If yes, do they provide relief? NO      8. Do you have any problem walking? YES              If yes, describe how it interferes with your activities of daily living, which            Activities? Walking; aching with walking                   9.   What type of work do you do? Retired          How long do you stand (hours per day) at work? 1 At home? 2-3        10. Have you ever had any test(s) done on your veins? YES          11. Were you diagnosed with saphenous vein reflux? YES    Review of Systems   Review of Systems   Constitutional: Negative for chills, fever and weight loss. HENT: Negative for nosebleeds. Eyes: Negative for blurred vision and double vision. Respiratory: Negative for cough, shortness of breath and wheezing. Cardiovascular: Positive for leg swelling. Negative for chest pain, palpitations, orthopnea and PND. Gastrointestinal: Negative for abdominal pain, blood in stool, diarrhea, nausea and vomiting. Musculoskeletal: Negative for joint pain. Skin: Negative for rash. Neurological: Negative for dizziness, tingling and loss of consciousness. Endo/Heme/Allergies: Does not bruise/bleed easily. Physical Exam   General: Well developed, in no acute distress, cooperative and alert  HEENT: No carotid bruits, no JVD, trach is midline. Neck Supple, PEERL, EOM intact. Heart:  reg rate and rhythm; normal S1/S2; no murmurs, gallops or rubs.   Respiratory: Clear bilaterally x 4, no wheezing or rales  Abdomen:   Soft, non-tender, no distention, no masses. + BS.   Extremities:  Normal cap refill, no cyanosis, atraumatic.   Bilateral LE edema; varicosities and spider veins around the ankle  Neuro: A&Ox3, speech clear, gait stable. Skin: Skin color is normal. No rashes or lesions. Non diaphoretic  Vascular: 2+ pulses symmetric in all extremities    PHYSICIAN TO COMPLETE BELOW THIS POINT    Date of Initial Physician Evaluation:___5/21/19____  Check all that apply:  [x] Reviewed Venous history  [x] Physical examination of the affected leg(s)   [x] Duplex or Doppler Scan results reviewed. Duplex or Doppler Ultrasound of the venous system demonstrate:  [x] Absence of deep venous thrombosis  [x] Greater and/or lesser saphenous vein or  valvular incompetence/reflux that correlates with        patients symptoms  []   valvular incompetence/reflux that correlates with patients symptoms    [x] Graduated, elasticized compression stockings (20-30 mmHg), has been prescribed. [x] Standing Photos taken of leg(s)  [] Front     [] Back     [x] Front and back   [x] Other causes of patients leg(s) symptoms have been ruled out             Assessment:       ICD-10-CM ICD-9-CM    1. Venous insufficiency of right lower extremity I87.2 459.81    2. Varicose veins of both lower extremities with inflammation I83.11 454.1     I83.12         Plan:     1. Venous insufficiency of right lower extremity  Venous duplex with right GSV insufficiency; very mild left venous insufficiency, but symptoms equal on both lower extremities. Will prescribe thigh high compression stockings, 20-30 mmHg. Plan to f/u in 2 months. If improvement, then will proceed with right GSV RF ablation. 1.  - Instruction given on daily leg elevation   2.  - Instruction given for mild exercise    Marisol Buenrostro MD  5/21/2019    Patient seen and examined by me with nurse practitioner n vascular clinic. Miguel A Amaya personally performed all components of the history, physical, and medical decision making and agree with the assessment and plan with minor modifications as noted. LE venous reflux study reviewed. Non significant GSV reflux on left side.    Cardiac care per Dr. Jennifer Clemente.      Pricila Moore MD

## 2019-05-28 ENCOUNTER — HOSPITAL ENCOUNTER (OUTPATIENT)
Dept: ULTRASOUND IMAGING | Age: 79
Discharge: HOME | End: 2019-05-28
Attending: FAMILY MEDICINE
Payer: MEDICARE

## 2019-05-28 DIAGNOSIS — K76.9 LIVER DISEASE: ICD-10-CM

## 2019-05-28 PROCEDURE — 76700 US EXAM ABDOM COMPLETE: CPT

## 2019-05-29 ENCOUNTER — CLINICAL SUPPORT (OUTPATIENT)
Dept: CARDIOLOGY CLINIC | Age: 79
End: 2019-05-29

## 2019-05-29 DIAGNOSIS — I44.2 COMPLETE HEART BLOCK (HCC): ICD-10-CM

## 2019-05-29 DIAGNOSIS — Z95.0 CARDIAC PACEMAKER IN SITU: Primary | ICD-10-CM

## 2019-06-21 ENCOUNTER — HOSPITAL ENCOUNTER (OUTPATIENT)
Dept: CT IMAGING | Age: 79
Discharge: HOME | End: 2019-06-21
Attending: PHYSICIAN ASSISTANT
Payer: MEDICARE

## 2019-06-21 DIAGNOSIS — R94.5 ABNORMAL LIVER FUNCTION: ICD-10-CM

## 2019-06-21 DIAGNOSIS — Z79.01 LONG TERM (CURRENT) USE OF ANTICOAGULANTS: ICD-10-CM

## 2019-06-21 DIAGNOSIS — Z86.010 PERSONAL HISTORY OF COLONIC POLYPS: ICD-10-CM

## 2019-06-21 DIAGNOSIS — I48.91 ATRIAL FIBRILLATION (HCC): ICD-10-CM

## 2019-06-21 DIAGNOSIS — K83.9: ICD-10-CM

## 2019-06-21 DIAGNOSIS — Z95.0 CARDIAC PACEMAKER IN SITU: ICD-10-CM

## 2019-06-21 LAB — CREAT BLD-MCNC: 0.6 MG/DL (ref 0.6–1.3)

## 2019-06-21 PROCEDURE — 82565 ASSAY OF CREATININE: CPT

## 2019-06-21 PROCEDURE — 74170 CT ABD WO CNTRST FLWD CNTRST: CPT

## 2019-06-21 PROCEDURE — 74011636320 HC RX REV CODE- 636/320: Performed by: PHYSICIAN ASSISTANT

## 2019-06-21 RX ORDER — SODIUM CHLORIDE 0.9 % (FLUSH) 0.9 %
10 SYRINGE (ML) INJECTION
Status: COMPLETED | OUTPATIENT
Start: 2019-06-21 | End: 2019-06-21

## 2019-06-21 RX ADMIN — IOPAMIDOL 100 ML: 755 INJECTION, SOLUTION INTRAVENOUS at 08:06

## 2019-06-21 RX ADMIN — Medication 10 ML: at 08:06

## 2019-06-21 RX ADMIN — IOHEXOL 50 ML: 240 INJECTION, SOLUTION INTRATHECAL; INTRAVASCULAR; INTRAVENOUS; ORAL at 08:06

## 2019-07-09 ENCOUNTER — HOSPITAL ENCOUNTER (OUTPATIENT)
Age: 79
Setting detail: OUTPATIENT SURGERY
Discharge: HOME | End: 2019-07-09
Attending: SPECIALIST | Admitting: SPECIALIST
Payer: MEDICARE

## 2019-07-09 ENCOUNTER — ANESTHESIA (OUTPATIENT)
Dept: ENDOSCOPY | Age: 79
End: 2019-07-09
Payer: MEDICARE

## 2019-07-09 ENCOUNTER — ANESTHESIA EVENT (OUTPATIENT)
Dept: ENDOSCOPY | Age: 79
End: 2019-07-09
Payer: MEDICARE

## 2019-07-09 VITALS
HEART RATE: 75 BPM | DIASTOLIC BLOOD PRESSURE: 71 MMHG | RESPIRATION RATE: 21 BRPM | SYSTOLIC BLOOD PRESSURE: 145 MMHG | OXYGEN SATURATION: 98 % | TEMPERATURE: 97.9 F | BODY MASS INDEX: 20.99 KG/M2 | WEIGHT: 155 LBS | HEIGHT: 72 IN

## 2019-07-09 PROCEDURE — 77030013992 HC SNR POLYP ENDOSC BSC -B: Performed by: SPECIALIST

## 2019-07-09 PROCEDURE — 74011250637 HC RX REV CODE- 250/637: Performed by: SPECIALIST

## 2019-07-09 PROCEDURE — 74011250636 HC RX REV CODE- 250/636: Performed by: SPECIALIST

## 2019-07-09 PROCEDURE — 77030021593 HC FCPS BIOP ENDOSC BSC -A: Performed by: SPECIALIST

## 2019-07-09 PROCEDURE — 88305 TISSUE EXAM BY PATHOLOGIST: CPT

## 2019-07-09 PROCEDURE — 74011250636 HC RX REV CODE- 250/636

## 2019-07-09 PROCEDURE — 76060000033 HC ANESTHESIA 1 TO 1.5 HR: Performed by: SPECIALIST

## 2019-07-09 PROCEDURE — 77030027957 HC TBNG IRR ENDOGTR BUSS -B: Performed by: SPECIALIST

## 2019-07-09 PROCEDURE — 76040000008: Performed by: SPECIALIST

## 2019-07-09 RX ORDER — EPINEPHRINE 0.1 MG/ML
1 INJECTION INTRACARDIAC; INTRAVENOUS
Status: DISCONTINUED | OUTPATIENT
Start: 2019-07-09 | End: 2019-07-09 | Stop reason: HOSPADM

## 2019-07-09 RX ORDER — SODIUM CHLORIDE 0.9 % (FLUSH) 0.9 %
5-40 SYRINGE (ML) INJECTION EVERY 8 HOURS
Status: DISCONTINUED | OUTPATIENT
Start: 2019-07-09 | End: 2019-07-09 | Stop reason: HOSPADM

## 2019-07-09 RX ORDER — DEXTROMETHORPHAN/PSEUDOEPHED 2.5-7.5/.8
1.2 DROPS ORAL
Status: DISCONTINUED | OUTPATIENT
Start: 2019-07-09 | End: 2019-07-09 | Stop reason: HOSPADM

## 2019-07-09 RX ORDER — LIDOCAINE HYDROCHLORIDE 20 MG/ML
INJECTION, SOLUTION EPIDURAL; INFILTRATION; INTRACAUDAL; PERINEURAL AS NEEDED
Status: DISCONTINUED | OUTPATIENT
Start: 2019-07-09 | End: 2019-07-09 | Stop reason: HOSPADM

## 2019-07-09 RX ORDER — SODIUM CHLORIDE 9 MG/ML
50 INJECTION, SOLUTION INTRAVENOUS CONTINUOUS
Status: DISCONTINUED | OUTPATIENT
Start: 2019-07-09 | End: 2019-07-09 | Stop reason: HOSPADM

## 2019-07-09 RX ORDER — NALOXONE HYDROCHLORIDE 0.4 MG/ML
0.4 INJECTION, SOLUTION INTRAMUSCULAR; INTRAVENOUS; SUBCUTANEOUS
Status: DISCONTINUED | OUTPATIENT
Start: 2019-07-09 | End: 2019-07-09 | Stop reason: HOSPADM

## 2019-07-09 RX ORDER — FLUMAZENIL 0.1 MG/ML
0.2 INJECTION INTRAVENOUS
Status: DISCONTINUED | OUTPATIENT
Start: 2019-07-09 | End: 2019-07-09 | Stop reason: HOSPADM

## 2019-07-09 RX ORDER — PROPOFOL 10 MG/ML
INJECTION, EMULSION INTRAVENOUS AS NEEDED
Status: DISCONTINUED | OUTPATIENT
Start: 2019-07-09 | End: 2019-07-09 | Stop reason: HOSPADM

## 2019-07-09 RX ORDER — ATROPINE SULFATE 0.1 MG/ML
0.5 INJECTION INTRAVENOUS
Status: DISCONTINUED | OUTPATIENT
Start: 2019-07-09 | End: 2019-07-09 | Stop reason: HOSPADM

## 2019-07-09 RX ORDER — SODIUM CHLORIDE 0.9 % (FLUSH) 0.9 %
5-40 SYRINGE (ML) INJECTION AS NEEDED
Status: DISCONTINUED | OUTPATIENT
Start: 2019-07-09 | End: 2019-07-09 | Stop reason: HOSPADM

## 2019-07-09 RX ORDER — SODIUM CHLORIDE 9 MG/ML
INJECTION, SOLUTION INTRAVENOUS
Status: DISCONTINUED | OUTPATIENT
Start: 2019-07-09 | End: 2019-07-09 | Stop reason: HOSPADM

## 2019-07-09 RX ADMIN — PROPOFOL 50 MG: 10 INJECTION, EMULSION INTRAVENOUS at 09:47

## 2019-07-09 RX ADMIN — LIDOCAINE HYDROCHLORIDE 100 MG: 20 INJECTION, SOLUTION EPIDURAL; INFILTRATION; INTRACAUDAL; PERINEURAL at 09:31

## 2019-07-09 RX ADMIN — PROPOFOL 50 MG: 10 INJECTION, EMULSION INTRAVENOUS at 09:55

## 2019-07-09 RX ADMIN — PROPOFOL 50 MG: 10 INJECTION, EMULSION INTRAVENOUS at 09:31

## 2019-07-09 RX ADMIN — PROPOFOL 50 MG: 10 INJECTION, EMULSION INTRAVENOUS at 09:34

## 2019-07-09 RX ADMIN — PROPOFOL 50 MG: 10 INJECTION, EMULSION INTRAVENOUS at 09:39

## 2019-07-09 RX ADMIN — SODIUM CHLORIDE: 9 INJECTION, SOLUTION INTRAVENOUS at 09:04

## 2019-07-09 NOTE — PROCEDURES
[address]  [address] [facility], [address] [place] [address]                 Colonoscopy Procedure Note    Indications:   See Preoperative Diagnosis above  Referring Physician: Jacquelin Doss MD  Anesthesia/Sedation: MAC anesthesia Propofol  Endoscopist:  Dr. Rafia Avendano  Assistant:  Endoscopy Technician-1: Padmini Cornell  Endoscopy RN-1: Rene Harden  Preoperative diagnosis: PERSONAL HISTORY COLONIC POLYPS, change in bowel habits  Postoperative diagnosis: 1. Diverticulosis  2. Ascending colon polyp  3. Transverse colon polyp    Procedure in Detail:  Informed consent was obtained for the procedure, including sedation. Risks of perforation, hemorrhage, adverse drug reaction, and aspiration were discussed. The patient was placed in the left lateral decubitus position. Based on the pre-procedure assessment, including review of the patient's medical history, medications, allergies, and review of systems, she had been deemed to be an appropriate candidate for moderate sedation; she was therefore sedated with the medications listed above. The patient was monitored continuously with ECG tracing, pulse oximetry, blood pressure monitoring, and direct observations. A rectal examination was performed. The [id number] was inserted into the rectum and advanced under direct vision to the cecum, which was identified by the ileocecal valve and appendiceal orifice. The quality of the colonic preparation was adequate. A careful inspection was made as the colonoscope was withdrawn, including a retroflexed view of the rectum; findings and interventions are described below. Appropriate photodocumentation was obtained. Findings:  Rectum: normal  Sigmoid: severe diverticulosis;   Descending Colon: severe diverticulosis;  Transverse Colon: mild diverticulosis; 3 mm polyp removed with cold biopsy  Ascending Colon: 8 mm sessile polyp removed with hot snare  Cecum: normal    Specimens:    colon polyps    EBL: None    Complications: None; patient tolerated the procedure well. Recommendations:     - Await pathology. - Repeat colonoscopy in 3 years. - High fiber diet.      - Start Miralax 17 gm PO BID      Signed By: Elaine Minor MD                        July 9, 2019

## 2019-07-09 NOTE — DISCHARGE INSTRUCTIONS
Timoteo Clements  [id number]  1940    COLON DISCHARGE INSTRUCTIONS  Discomfort:  Redness at IV site- apply warm compress to area; if redness or soreness persist- contact your physician  There may be a slight amount of blood passed from the rectum  Gaseous discomfort- walking, belching will help relieve any discomfort  You may not operate a vehicle for 12 hours  You may not engage in an occupation involving machinery or appliances for rest of today  You may not drink alcoholic beverages for at least 12 hours  Avoid making any critical decisions for at least 24 hour  DIET:   High fiber diet. - however -  remember your colon is empty and a heavy meal will produce gas. Avoid these foods:  vegetables, fried / greasy foods, carbonated drinks for today. MEDICATIONS:      Regarding Aspirin or Nonsteroidal medications, please see below. ACTIVITY:  You may resume your normal daily activities it is recommended that you spend the remainder of the day resting -  avoid any strenuous activity. CALL M.D. ANY SIGN OF:  Increasing pain, nausea, vomiting  Abdominal distension (swelling)  New increased bleeding (oral or rectal)  Fever (chills)  Pain in chest area  Bloody discharge from nose or mouth  Shortness of breath    You may not  take any Advil, Aspirin, Ibuprofen, Motrin, Aleve, or Goodys for 10 days, ONLY  Tylenol as needed for pain. Post procedure diagnosis: 1. Diverticulosis  2. Ascending colon polyp  3. Transverse colon polyp      Follow-up Instructions:   Call Dr. Shaylee Tracy  Results of procedure / biopsy in 10 days, Take Miralax 17 gm PO BID, restart Eliquis in 3 days.  FU with Dr Siomara Knowles in 3 months  Telephone #  [phone]      Dedra Velasquez from Nurse    The following personal items collected during your admission are returned to you:   Dental Appliance: Dental Appliances: None  Vision:    Hearing Aid:    Jewelry:    Clothing:    Other Valuables:    Valuables sent to safe:      Patient Education Patient Education        Diverticulosis: Care Instructions  Your Care Instructions  In diverticulosis, pouches called diverticula form in the wall of the large intestine (colon). The pouches do not cause any pain or other symptoms. Most people who have diverticulosis do not know they have it. But the pouches sometimes bleed, and if they become infected, they can cause pain and other symptoms. When this happens, it is called diverticulitis. Diverticula form when pressure pushes the wall of the colon outward at certain weak points. A diet that is too low in fiber can cause diverticula. Follow-up care is a key part of your treatment and safety. Be sure to make and go to all appointments, and call your doctor if you are having problems. It's also a good idea to know your test results and keep a list of the medicines you take. How can you care for yourself at home? · Include fruits, leafy green vegetables, beans, and whole grains in your diet each day. These foods are high in fiber. · Take a fiber supplement, such as Citrucel or Metamucil, every day if needed. Read and follow all instructions on the label. · Drink plenty of fluids, enough so that your urine is light yellow or clear like water. If you have kidney, heart, or liver disease and have to limit fluids, talk with your doctor before you increase the amount of fluids you drink. · Get at least 30 minutes of exercise on most days of the week. Walking is a good choice. You also may want to do other activities, such as running, swimming, cycling, or playing tennis or team sports. · Cut out foods that cause gas, pain, or other symptoms. When should you call for help?   Call your doctor now or seek immediate medical care if:    · You have belly pain.     · You pass maroon or very bloody stools.     · You have a fever.     · You have nausea and vomiting.     · You have unusual changes in your bowel movements or abdominal swelling.     · You have burning pain when you urinate.     · You have abnormal vaginal discharge.     · You have shoulder pain.     · You have cramping pain that does not get better when you have a bowel movement or pass gas.     · You pass gas or stool from your urethra while urinating.    Watch closely for changes in your health, and be sure to contact your doctor if you have any problems. Where can you learn more? Go to http://gisell-shelly.info/. Enter T150 in the search box to learn more about \"Diverticulosis: Care Instructions. \"  Current as of: March 27, 2018  Content Version: 11.9  © 7479-6357 [employer]. Care instructions adapted under license by [employer] (which disclaims liability or warranty for this information). If you have questions about a medical condition or this instruction, always ask your healthcare professional. [address] any warranty or liability for your use of this information. Colon Polyps: Care Instructions  Your Care Instructions    Colon polyps are growths in the colon or the rectum. The cause of most colon polyps is not known, and most people who get them do not have any problems. But a certain kind can turn into cancer. For this reason, regular testing for colon polyps is important for people age 48 and older and anyone who has an increased risk for colon cancer. Polyps are usually found through routine colon cancer screening tests. Although most colon polyps are not cancerous, they are usually removed and then tested for cancer. Screening for colon cancer saves lives because the cancer can usually be cured if it is caught early. If you have a polyp that is the type that can turn into cancer, you may need more tests to examine your entire colon. The doctor will remove any other polyps that he or she finds, and you will be tested more often. Follow-up care is a key part of your treatment and safety.  Be sure to make and go to all appointments, and call your doctor if you are having problems. It's also a good idea to know your test results and keep a list of the medicines you take. How can you care for yourself at home? Regular exams to look for colon polyps are the best way to prevent polyps from turning into colon cancer. These can include stool tests, sigmoidoscopy, colonoscopy, and CT colonography. Talk with your doctor about a testing schedule that is right for you. To prevent polyps  There is no home treatment that can prevent colon polyps. But these steps may help lower your risk for cancer. · Stay active. Being active can help you get to and stay at a healthy weight. Try to exercise on most days of the week. Walking is a good choice. · Eat well. Choose a variety of vegetables, fruits, legumes (such as peas and beans), fish, poultry, and whole grains. · Do not smoke. If you need help quitting, talk to your doctor about stop-smoking programs and medicines. These can increase your chances of quitting for good. · If you drink alcohol, limit how much you drink. Limit alcohol to 2 drinks a day for men and 1 drink a day for women. When should you call for help? Call your doctor now or seek immediate medical care if:    · You have severe belly pain.     · Your stools are maroon or very bloody.    Watch closely for changes in your health, and be sure to contact your doctor if:    · You have a fever.     · You have nausea or vomiting.     · You have a change in bowel habits (new constipation or diarrhea).     · Your symptoms get worse or are not improving as expected. Where can you learn more? Go to http://gisell-shelly.info/. Enter 95 [id number] in the search box to learn more about \"Colon Polyps: Care Instructions. \"  Current as of: March 27, 2018  Content Version: 11.9  © 0004-9171 [employer].  Care instructions adapted under license by [employer] (which disclaims liability or warranty for this information). If you have questions about a medical condition or this instruction, always ask your healthcare professional. [place] [address] any warranty or liability for your use of this information.

## 2019-07-09 NOTE — ROUTINE PROCESS
Steffi Hernandez 1940 
[id number] Situation: 
Verbal report received from: [address] [place] [address] RN Procedure: Procedure(s): 
COLONOSCOPY 
ENDOSCOPIC POLYPECTOMY Background: 
 
Preoperative diagnosis: ABNORMAL LIVER FUNCTION/DISORDER OF BILE DUCT/EROSIVE GASTRITIS/LONG TERM ANTICOAGULANT USE/LOSS OF APPETITE/LOSS OF WEIGHT/MELENA/ 
PERSONAL HISTORY COLONIC POLYPS Postoperative diagnosis: 1. Diverticulosis 2. Ascending colon polyp 3. Transverse colon polyp :  Dr. Danna Denis Assistant(s): Endoscopy Technician-1: Daylin Tatum Endoscopy RN-1: Rachel Chin Specimens:  
ID Type Source Tests Collected by Time Destination 1 : transverse colon polyp Preservative Colon, Abndar Vince  David Albert MD 7/9/2019 6629 Pathology 2 : ascending colon polyp Preservative Colon, Ascending  David Albert MD 7/9/2019 1002 Pathology H. Pylori  no Assessment: 
Intra-procedure medications Anesthesia gave intra-procedure sedation and medications, see anesthesia flow sheet yes Intravenous fluids: NS@ Dexter Welsh Vital signs stable Abdominal assessment: round and soft Recommendation: 
Discharge patient per MD order. Family or Friend Permission to share finding with family or friend yes

## 2019-07-09 NOTE — ANESTHESIA PREPROCEDURE EVALUATION
Relevant Problems   No relevant active problems       Anesthetic History     PONV          Review of Systems / Medical History  Patient summary reviewed, nursing notes reviewed and pertinent labs reviewed    Pulmonary    COPD      Smoker         Neuro/Psych   Within defined limits           Cardiovascular    Hypertension        Dysrhythmias : atrial fibrillation and atrial flutter  Pacemaker, past MI and CAD         GI/Hepatic/Renal  Within defined limits              Endo/Other  Within defined limits           Other Findings              Physical Exam    Airway  Mallampati: II  TM Distance: > 6 cm  Neck ROM: normal range of motion   Mouth opening: Normal     Cardiovascular  Regular rate and rhythm,  S1 and S2 normal,  no murmur, click, rub, or gallop             Dental  No notable dental hx       Pulmonary  Breath sounds clear to auscultation               Abdominal  GI exam deferred       Other Findings            Anesthetic Plan    ASA: 3  Anesthesia type: MAC            Anesthetic plan and risks discussed with: Patient

## 2019-07-09 NOTE — ANESTHESIA POSTPROCEDURE EVALUATION
Post-Anesthesia Evaluation and Assessment    Patient: Dora Del Castillo MRN: [id number]  SSN: [id number]    YOB: 1940  Age: 70 y.o. Sex: female      I have evaluated the patient and they are stable and ready for discharge from the PACU. Cardiovascular Function/Vital Signs  Visit Vitals  /53   Pulse 81   Temp 37.1 °C (98.7 °F)   Resp 14   Ht 6' (1.829 m)   Wt 70.3 kg (155 lb)   SpO2 100%   Breastfeeding? No   BMI 21.02 kg/m²       Patient is status post MAC anesthesia for Procedure(s):  COLONOSCOPY  ENDOSCOPIC POLYPECTOMY. Nausea/Vomiting: None    Postoperative hydration reviewed and adequate. Pain:  Pain Scale 1: Numeric (0 - 10) (07/09/19 0908)  Pain Intensity 1: 0 (07/09/19 0908)   Managed    Neurological Status: At baseline    Mental Status, Level of Consciousness: Alert and  oriented to person, place, and time    Pulmonary Status:   O2 Device: CO2 nasal cannula (07/09/19 1008)   Adequate oxygenation and airway patent    Complications related to anesthesia: None    Post-anesthesia assessment completed. No concerns    Signed By: Venancio Gusman MD     July 9, 2019              Procedure(s):  COLONOSCOPY  ENDOSCOPIC POLYPECTOMY. MAC    <BSHSIANPOST>    Vitals Value Taken Time   BP     Temp     Pulse     Resp 0 7/9/2019 10:13 AM   SpO2     Vitals shown include unvalidated device data.

## 2019-07-09 NOTE — H&P
Colonoscopy History and Physical      The patient was seen and examined. Date of last colonoscopy: , Polyps  Yes      The airway was assessed and documented. The problem list, past medical history, and medications were reviewed.      Patient Active Problem List   Diagnosis Code    Acute MI, inferolateral wall (HCC) I21.19    Hypertension I10    Tobacco abuse Z72.0    Takotsubo cardiomyopathy I51.81    STEMI (ST elevation myocardial infarction) ([facility] [address] I21.3    Complete heart block (HCC) I44.2    Pacemaker Z95.0    [facility] K56.609    Femoral hernia with obstruction K41.30    Occult blood in stools R19.5    Early satiety R68.81    PAF (paroxysmal atrial fibrillation) ([facility]) I48.0    Atrial flutter (HCC) I48.92    S/P ablation of atrial fibrillation Z98.890, Z86.79    S/P ablation of atrial flutter Z98.890, Z86.79    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00     Years: 50.00     Pack years: 50.00     Types: Cigarettes     Last attempt to quit: 2014     Years since quittin.8    Smokeless tobacco: Never Used    Tobacco comment: lives with a smoker   Substance and Sexual Activity    Alcohol use: No    Drug use: No    Sexual activity: Not on file   Lifestyle    Physical activity:     Days per week: Not on file     Minutes per session: Not on file    Stress: Not on file   Relationships    Social connections:     Talks on phone: Not on file     Gets together: Not on file     Attends Nondenominational service: Not on file     Active member of club or organization: Not on file     Attends meetings of clubs or organizations: Not on file     Relationship status: Not on file    Intimate partner violence:     Fear of current or ex partner: Not on file     Emotionally abused: Not on file     Physically abused: Not on file     Forced sexual activity: Not on file   Other Topics Concern    Not on file   Social History Narrative    Not on file     Past Medical History:   Diagnosis Date    Atrial flutter ([facility] [address] 3/23/2017    COPD     BY CHEST XRAY    Early satiety 1/28/2016    Hypertension     Myocardial infarction ([facility] [address] 2014    Nausea & vomiting     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 Taking? DULERA 200-5 mcg/actuation HFA inhaler 7/8/2019 at Unknown time  Yes Yes   Sig: INHALE 2 PUFFS TWICE DAILY   ELIQUIS 5 mg tablet 7/7/2019  No No   Sig: TAKE 1 TABLET BY MOUTH TWICE A DAY   Nebulizer Accessories kit   No No   Sig: See prescribed   PROAIR HFA 90 mcg/actuation inhaler 7/8/2019 at Unknown time  Yes Yes   Sig: Take 1 Puff by inhalation as needed. SPIRIVA RESPIMAT 2.5 mcg/actuation inhaler 7/9/2019 at Unknown time  Yes Yes   Sig: INHALE 2 PUFFS BY MOUTH ONCE DAILY   albuterol (PROVENTIL VENTOLIN) 2.5 mg /3 mL (0.083 %) nebulizer solution   No No   Sig: 3 mL by Nebulization route every four (4) hours as needed for Wheezing. aspirin delayed-release 81 mg tablet 7/8/2019 at Unknown time Self Yes Yes   Sig: Take 81 mg by mouth daily. atorvastatin (LIPITOR) 20 mg tablet 7/8/2019 at Unknown time Self No Yes   Sig: TAKE 1 TABLET BY MOUTH EVERY DAY   carvedilol (COREG) 12.5 mg tablet 7/8/2019 at Unknown time  No Yes   Sig: TAKE 1 TABLET BY MOUTH TWICE A DAY WITH MEALS   lisinopril (PRINIVIL, ZESTRIL) 5 mg tablet 7/8/2019 at Unknown time  No Yes   Sig: TAKE 1 TAB BY MOUTH DAILY. multivitamin (ONE A DAY) tablet 7/8/2019 at Unknown time Self Yes Yes   Sig: Take 1 Tab by mouth daily. Facility-Administered Medications: None       The patient was seen and examined in the endoscopy suite. The airway was assessed and docuemented. The problem list and medications were reviewed. Chief complaint, history of present illness, and review of systems and Past medical History are positive for: history of polyps and change in bowel habits, A Fib, CAD on Eliquis. The heart, lungs and mental status were satisfactory for the administration of sedation and for the procedure. I discussed with the patient the objectives, risks, consequences and alternatives to the procedure.      Plan: Endoscopic procedure with sedation     Reid Myers MD   7/9/2019  9:29 AM

## 2019-07-09 NOTE — PERIOP NOTES

## 2019-08-28 ENCOUNTER — OFFICE VISIT (OUTPATIENT)
Dept: CARDIOLOGY CLINIC | Age: 79
End: 2019-08-28

## 2019-08-28 DIAGNOSIS — Z95.0 CARDIAC PACEMAKER IN SITU: Primary | ICD-10-CM

## 2019-08-28 DIAGNOSIS — I48.92 ATRIAL FLUTTER, UNSPECIFIED TYPE (HCC): ICD-10-CM

## 2019-10-22 ENCOUNTER — APPOINTMENT (OUTPATIENT)
Dept: GENERAL RADIOLOGY | Age: 79
DRG: 482 | End: 2019-10-22
Attending: EMERGENCY MEDICINE
Payer: MEDICARE

## 2019-10-22 ENCOUNTER — HOSPITAL ENCOUNTER (INPATIENT)
Age: 79
LOS: 3 days | Discharge: NURSING, REHAB OR LONG-TERM CARE | DRG: 482 | End: 2019-10-25
Attending: EMERGENCY MEDICINE | Admitting: FAMILY MEDICINE
Payer: MEDICARE

## 2019-10-22 ENCOUNTER — APPOINTMENT (OUTPATIENT)
Dept: CT IMAGING | Age: 79
DRG: 482 | End: 2019-10-22
Attending: PHYSICIAN ASSISTANT
Payer: MEDICARE

## 2019-10-22 ENCOUNTER — APPOINTMENT (OUTPATIENT)
Dept: CT IMAGING | Age: 79
DRG: 482 | End: 2019-10-22
Attending: EMERGENCY MEDICINE
Payer: MEDICARE
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
## 2019-10-22 DIAGNOSIS — S72.002A CLOSED FRACTURE OF NECK OF LEFT FEMUR, INITIAL ENCOUNTER (HCC): ICD-10-CM

## 2019-10-22 DIAGNOSIS — S32.402A CLOSED DISPLACED FRACTURE OF LEFT ACETABULUM, UNSPECIFIED PORTION OF ACETABULUM, INITIAL ENCOUNTER (HCC): ICD-10-CM

## 2019-10-22 DIAGNOSIS — S72.002A DISPLACED FRACTURE OF LEFT FEMORAL NECK (HCC): Primary | ICD-10-CM

## 2019-10-22 PROBLEM — S72.92XA FEMUR FRACTURE, LEFT (HCC): Status: ACTIVE | Noted: 2019-10-22

## 2019-10-22 LAB
ALBUMIN SERPL-MCNC: 3.1 G/DL (ref 3.5–5)
ALBUMIN/GLOB SERPL: 0.8 {RATIO} (ref 1.1–2.2)
ALP SERPL-CCNC: 92 U/L (ref 45–117)
ALT SERPL-CCNC: 50 U/L (ref 12–78)
ANION GAP SERPL CALC-SCNC: 3 MMOL/L (ref 5–15)
APPEARANCE UR: CLEAR
AST SERPL-CCNC: 55 U/L (ref 15–37)
ATRIAL RATE: 300 BPM
BACTERIA URNS QL MICRO: NEGATIVE /HPF
BASOPHILS # BLD: 0 K/UL (ref 0–0.1)
BASOPHILS NFR BLD: 0 % (ref 0–1)
BILIRUB SERPL-MCNC: 0.5 MG/DL (ref 0.2–1)
BILIRUB UR QL: NEGATIVE
BUN SERPL-MCNC: 11 MG/DL (ref 6–20)
BUN/CREAT SERPL: 17 (ref 12–20)
CALCIUM SERPL-MCNC: 9.4 MG/DL (ref 8.5–10.1)
CALCULATED R AXIS, ECG10: 55 DEGREES
CALCULATED T AXIS, ECG11: 34 DEGREES
CHLORIDE SERPL-SCNC: 103 MMOL/L (ref 97–108)
CO2 SERPL-SCNC: 29 MMOL/L (ref 21–32)
COLOR UR: ABNORMAL
COMMENT, HOLDF: NORMAL
CREAT SERPL-MCNC: 0.65 MG/DL (ref 0.55–1.02)
DIAGNOSIS, 93000: NORMAL
DIFFERENTIAL METHOD BLD: ABNORMAL
EOSINOPHIL # BLD: 0.1 K/UL (ref 0–0.4)
EOSINOPHIL NFR BLD: 1 % (ref 0–7)
EPITH CASTS URNS QL MICRO: ABNORMAL /LPF
ERYTHROCYTE [DISTWIDTH] IN BLOOD BY AUTOMATED COUNT: 13.8 % (ref 11.5–14.5)
GLOBULIN SER CALC-MCNC: 3.8 G/DL (ref 2–4)
GLUCOSE SERPL-MCNC: 112 MG/DL (ref 65–100)
GLUCOSE UR STRIP.AUTO-MCNC: NEGATIVE MG/DL
HCT VFR BLD AUTO: 37.6 % (ref 35–47)
HGB BLD-MCNC: 12.5 G/DL (ref 11.5–16)
HGB UR QL STRIP: ABNORMAL
HYALINE CASTS URNS QL MICRO: ABNORMAL /LPF (ref 0–5)
IMM GRANULOCYTES # BLD AUTO: 0.2 K/UL (ref 0–0.04)
IMM GRANULOCYTES NFR BLD AUTO: 2 % (ref 0–0.5)
KETONES UR QL STRIP.AUTO: NEGATIVE MG/DL
LEUKOCYTE ESTERASE UR QL STRIP.AUTO: NEGATIVE
LYMPHOCYTES # BLD: 1.4 K/UL (ref 0.8–3.5)
LYMPHOCYTES NFR BLD: 9 % (ref 12–49)
MCH RBC QN AUTO: 31.9 PG (ref 26–34)
MCHC RBC AUTO-ENTMCNC: 33.2 G/DL (ref 30–36.5)
MCV RBC AUTO: 95.9 FL (ref 80–99)
MONOCYTES # BLD: 1 K/UL (ref 0–1)
MONOCYTES NFR BLD: 6 % (ref 5–13)
NEUTS SEG # BLD: 12.8 K/UL (ref 1.8–8)
NEUTS SEG NFR BLD: 82 % (ref 32–75)
NITRITE UR QL STRIP.AUTO: NEGATIVE
NRBC # BLD: 0 K/UL (ref 0–0.01)
NRBC BLD-RTO: 0 PER 100 WBC
PH UR STRIP: 6 [PH] (ref 5–8)
PLATELET # BLD AUTO: 211 K/UL (ref 150–400)
PMV BLD AUTO: 10.9 FL (ref 8.9–12.9)
POTASSIUM SERPL-SCNC: 4 MMOL/L (ref 3.5–5.1)
PROT SERPL-MCNC: 6.9 G/DL (ref 6.4–8.2)
PROT UR STRIP-MCNC: NEGATIVE MG/DL
Q-T INTERVAL, ECG07: 338 MS
QRS DURATION, ECG06: 82 MS
QTC CALCULATION (BEZET), ECG08: 446 MS
RBC # BLD AUTO: 3.92 M/UL (ref 3.8–5.2)
RBC #/AREA URNS HPF: ABNORMAL /HPF (ref 0–5)
SAMPLES BEING HELD,HOLD: NORMAL
SODIUM SERPL-SCNC: 135 MMOL/L (ref 136–145)
SP GR UR REFRACTOMETRY: 1.01 (ref 1–1.03)
TROPONIN I SERPL-MCNC: <0.05 NG/ML
UR CULT HOLD, URHOLD: NORMAL
UROBILINOGEN UR QL STRIP.AUTO: 0.2 EU/DL (ref 0.2–1)
VENTRICULAR RATE, ECG03: 105 BPM
WBC # BLD AUTO: 15.6 K/UL (ref 3.6–11)
WBC URNS QL MICRO: ABNORMAL /HPF (ref 0–4)

## 2019-10-22 PROCEDURE — 77010033678 HC OXYGEN DAILY

## 2019-10-22 PROCEDURE — 72125 CT NECK SPINE W/O DYE: CPT

## 2019-10-22 PROCEDURE — 96376 TX/PRO/DX INJ SAME DRUG ADON: CPT

## 2019-10-22 PROCEDURE — 93005 ELECTROCARDIOGRAM TRACING: CPT

## 2019-10-22 PROCEDURE — 85025 COMPLETE CBC W/AUTO DIFF WBC: CPT

## 2019-10-22 PROCEDURE — 74011250637 HC RX REV CODE- 250/637: Performed by: EMERGENCY MEDICINE

## 2019-10-22 PROCEDURE — 65270000029 HC RM PRIVATE

## 2019-10-22 PROCEDURE — 72192 CT PELVIS W/O DYE: CPT

## 2019-10-22 PROCEDURE — 84484 ASSAY OF TROPONIN QUANT: CPT

## 2019-10-22 PROCEDURE — 70450 CT HEAD/BRAIN W/O DYE: CPT

## 2019-10-22 PROCEDURE — 99285 EMERGENCY DEPT VISIT HI MDM: CPT

## 2019-10-22 PROCEDURE — 81001 URINALYSIS AUTO W/SCOPE: CPT

## 2019-10-22 PROCEDURE — 71045 X-RAY EXAM CHEST 1 VIEW: CPT

## 2019-10-22 PROCEDURE — 74011250636 HC RX REV CODE- 250/636: Performed by: FAMILY MEDICINE

## 2019-10-22 PROCEDURE — 96375 TX/PRO/DX INJ NEW DRUG ADDON: CPT

## 2019-10-22 PROCEDURE — 77030005513 HC CATH URETH FOL11 MDII -B

## 2019-10-22 PROCEDURE — 74011250636 HC RX REV CODE- 250/636: Performed by: EMERGENCY MEDICINE

## 2019-10-22 PROCEDURE — 94640 AIRWAY INHALATION TREATMENT: CPT

## 2019-10-22 PROCEDURE — 74011250637 HC RX REV CODE- 250/637: Performed by: FAMILY MEDICINE

## 2019-10-22 PROCEDURE — 94664 DEMO&/EVAL PT USE INHALER: CPT

## 2019-10-22 PROCEDURE — 74011000250 HC RX REV CODE- 250: Performed by: FAMILY MEDICINE

## 2019-10-22 PROCEDURE — 73502 X-RAY EXAM HIP UNI 2-3 VIEWS: CPT

## 2019-10-22 PROCEDURE — 96374 THER/PROPH/DIAG INJ IV PUSH: CPT

## 2019-10-22 PROCEDURE — 80053 COMPREHEN METABOLIC PANEL: CPT

## 2019-10-22 RX ORDER — ACETAMINOPHEN 325 MG/1
650 TABLET ORAL
Status: DISCONTINUED | OUTPATIENT
Start: 2019-10-22 | End: 2019-10-25 | Stop reason: HOSPADM

## 2019-10-22 RX ORDER — IPRATROPIUM BROMIDE AND ALBUTEROL SULFATE 2.5; .5 MG/3ML; MG/3ML
3 SOLUTION RESPIRATORY (INHALATION)
Status: DISCONTINUED | OUTPATIENT
Start: 2019-10-22 | End: 2019-10-24

## 2019-10-22 RX ORDER — DEXTROSE MONOHYDRATE AND SODIUM CHLORIDE 5; .9 G/100ML; G/100ML
75 INJECTION, SOLUTION INTRAVENOUS CONTINUOUS
Status: DISCONTINUED | OUTPATIENT
Start: 2019-10-22 | End: 2019-10-22

## 2019-10-22 RX ORDER — SODIUM CHLORIDE 0.9 % (FLUSH) 0.9 %
5-40 SYRINGE (ML) INJECTION EVERY 8 HOURS
Status: DISCONTINUED | OUTPATIENT
Start: 2019-10-22 | End: 2019-10-25 | Stop reason: HOSPADM

## 2019-10-22 RX ORDER — FENTANYL CITRATE 50 UG/ML
50 INJECTION, SOLUTION INTRAMUSCULAR; INTRAVENOUS
Status: COMPLETED | OUTPATIENT
Start: 2019-10-22 | End: 2019-10-22

## 2019-10-22 RX ORDER — SODIUM CHLORIDE 0.9 % (FLUSH) 0.9 %
5-40 SYRINGE (ML) INJECTION AS NEEDED
Status: DISCONTINUED | OUTPATIENT
Start: 2019-10-22 | End: 2019-10-25 | Stop reason: HOSPADM

## 2019-10-22 RX ORDER — MORPHINE SULFATE 2 MG/ML
2 INJECTION, SOLUTION INTRAMUSCULAR; INTRAVENOUS
Status: DISCONTINUED | OUTPATIENT
Start: 2019-10-22 | End: 2019-10-25 | Stop reason: HOSPADM

## 2019-10-22 RX ORDER — LISINOPRIL 5 MG/1
5 TABLET ORAL DAILY
Status: DISCONTINUED | OUTPATIENT
Start: 2019-10-22 | End: 2019-10-22

## 2019-10-22 RX ORDER — ASPIRIN 81 MG/1
81 TABLET ORAL DAILY
Status: DISCONTINUED | OUTPATIENT
Start: 2019-10-22 | End: 2019-10-22

## 2019-10-22 RX ORDER — LISINOPRIL 5 MG/1
5 TABLET ORAL DAILY
Status: DISCONTINUED | OUTPATIENT
Start: 2019-10-23 | End: 2019-10-24

## 2019-10-22 RX ORDER — DIPHENHYDRAMINE HYDROCHLORIDE 50 MG/ML
12.5 INJECTION, SOLUTION INTRAMUSCULAR; INTRAVENOUS
Status: DISCONTINUED | OUTPATIENT
Start: 2019-10-22 | End: 2019-10-25 | Stop reason: HOSPADM

## 2019-10-22 RX ORDER — MORPHINE SULFATE 10 MG/ML
6 INJECTION, SOLUTION INTRAMUSCULAR; INTRAVENOUS
Status: COMPLETED | OUTPATIENT
Start: 2019-10-22 | End: 2019-10-22

## 2019-10-22 RX ORDER — ONDANSETRON 4 MG/1
8 TABLET, ORALLY DISINTEGRATING ORAL
Status: COMPLETED | OUTPATIENT
Start: 2019-10-22 | End: 2019-10-22

## 2019-10-22 RX ORDER — DEXTROSE MONOHYDRATE AND SODIUM CHLORIDE 5; .9 G/100ML; G/100ML
75 INJECTION, SOLUTION INTRAVENOUS CONTINUOUS
Status: DISCONTINUED | OUTPATIENT
Start: 2019-10-22 | End: 2019-10-25 | Stop reason: HOSPADM

## 2019-10-22 RX ORDER — CARVEDILOL 12.5 MG/1
12.5 TABLET ORAL 2 TIMES DAILY WITH MEALS
Status: DISCONTINUED | OUTPATIENT
Start: 2019-10-22 | End: 2019-10-22

## 2019-10-22 RX ORDER — CARVEDILOL 12.5 MG/1
25 TABLET ORAL 2 TIMES DAILY WITH MEALS
Status: DISCONTINUED | OUTPATIENT
Start: 2019-10-22 | End: 2019-10-25 | Stop reason: HOSPADM

## 2019-10-22 RX ORDER — ALBUTEROL SULFATE 90 UG/1
2 AEROSOL, METERED RESPIRATORY (INHALATION)
COMMUNITY

## 2019-10-22 RX ORDER — ATORVASTATIN CALCIUM 10 MG/1
20 TABLET, FILM COATED ORAL
Status: DISCONTINUED | OUTPATIENT
Start: 2019-10-22 | End: 2019-10-25 | Stop reason: HOSPADM

## 2019-10-22 RX ORDER — HYDROCODONE BITARTRATE AND ACETAMINOPHEN 5; 325 MG/1; MG/1
1 TABLET ORAL
Status: DISCONTINUED | OUTPATIENT
Start: 2019-10-22 | End: 2019-10-24

## 2019-10-22 RX ORDER — ONDANSETRON 2 MG/ML
4 INJECTION INTRAMUSCULAR; INTRAVENOUS
Status: DISCONTINUED | OUTPATIENT
Start: 2019-10-22 | End: 2019-10-24 | Stop reason: ALTCHOICE

## 2019-10-22 RX ORDER — ASPIRIN 81 MG/1
81 TABLET ORAL DAILY
Status: DISCONTINUED | OUTPATIENT
Start: 2019-10-23 | End: 2019-10-25 | Stop reason: HOSPADM

## 2019-10-22 RX ORDER — DOCUSATE SODIUM 100 MG/1
100 CAPSULE, LIQUID FILLED ORAL 2 TIMES DAILY
Status: DISCONTINUED | OUTPATIENT
Start: 2019-10-22 | End: 2019-10-25 | Stop reason: HOSPADM

## 2019-10-22 RX ORDER — MORPHINE SULFATE 2 MG/ML
4 INJECTION, SOLUTION INTRAMUSCULAR; INTRAVENOUS
Status: COMPLETED | OUTPATIENT
Start: 2019-10-22 | End: 2019-10-22

## 2019-10-22 RX ADMIN — ATORVASTATIN CALCIUM 20 MG: 20 TABLET, FILM COATED ORAL at 21:21

## 2019-10-22 RX ADMIN — HYDROCODONE BITARTRATE AND ACETAMINOPHEN 1 TABLET: 5; 325 TABLET ORAL at 12:31

## 2019-10-22 RX ADMIN — ONDANSETRON 4 MG: 2 INJECTION INTRAMUSCULAR; INTRAVENOUS at 17:01

## 2019-10-22 RX ADMIN — IPRATROPIUM BROMIDE AND ALBUTEROL SULFATE 3 ML: .5; 3 SOLUTION RESPIRATORY (INHALATION) at 11:42

## 2019-10-22 RX ADMIN — IPRATROPIUM BROMIDE AND ALBUTEROL SULFATE 3 ML: .5; 3 SOLUTION RESPIRATORY (INHALATION) at 18:17

## 2019-10-22 RX ADMIN — FENTANYL CITRATE 50 MCG: 50 INJECTION, SOLUTION INTRAMUSCULAR; INTRAVENOUS at 07:49

## 2019-10-22 RX ADMIN — MORPHINE SULFATE 4 MG: 2 INJECTION, SOLUTION INTRAMUSCULAR; INTRAVENOUS at 09:19

## 2019-10-22 RX ADMIN — HYDROCODONE BITARTRATE AND ACETAMINOPHEN 1 TABLET: 5; 325 TABLET ORAL at 21:21

## 2019-10-22 RX ADMIN — HYDROCODONE BITARTRATE AND ACETAMINOPHEN 1 TABLET: 5; 325 TABLET ORAL at 17:01

## 2019-10-22 RX ADMIN — DOCUSATE SODIUM 100 MG: 100 CAPSULE, LIQUID FILLED ORAL at 12:31

## 2019-10-22 RX ADMIN — CARVEDILOL 25 MG: 12.5 TABLET, FILM COATED ORAL at 17:01

## 2019-10-22 RX ADMIN — MORPHINE SULFATE 6 MG: 10 INJECTION INTRAVENOUS at 08:31

## 2019-10-22 RX ADMIN — ONDANSETRON 8 MG: 4 TABLET, ORALLY DISINTEGRATING ORAL at 07:37

## 2019-10-22 RX ADMIN — SODIUM CHLORIDE 5 MG: 9 INJECTION INTRAMUSCULAR; INTRAVENOUS; SUBCUTANEOUS at 21:20

## 2019-10-22 RX ADMIN — IPRATROPIUM BROMIDE AND ALBUTEROL SULFATE 3 ML: .5; 3 SOLUTION RESPIRATORY (INHALATION) at 22:22

## 2019-10-22 RX ADMIN — DOCUSATE SODIUM 100 MG: 100 CAPSULE, LIQUID FILLED ORAL at 17:01

## 2019-10-22 NOTE — PROGRESS NOTES
TRANSFER - IN REPORT:    Verbal report received from Sonia (name) on Freddy Larsen  being received from ED (unit) for routine progression of care      Report consisted of patients Situation, Background, Assessment and   Recommendations(SBAR). Information from the following report(s) SBAR, Kardex, ED Summary, Intake/Output and MAR was reviewed with the receiving nurse. Opportunity for questions and clarification was provided. Assessment completed upon patients arrival to unit and care assumed. Primary Nurse Audie Olivarez RN and Nely Mcmahan RN performed a dual skin assessment on this patient Impairment noted- see wound doc flow sheet  Garth score is 16  Hematoma/scrape over left eye  Scattered bruising      Bedside shift change report given to Elia Hand (oncoming nurse) by Joss Rivers (offgoing nurse). Report included the following information SBAR, Kardex, ED Summary, Intake/Output and MAR.

## 2019-10-22 NOTE — PROGRESS NOTES
Review of pacemaker interrogation showing event of   ATR early this am @426bpm with V rate of 102 for 21min    Also on 10/16 non sustained V=tach rate of 162 for 6 sec    Pt's cardiologist is Ricci Sanz at [address] [place] [address] their office but they do not come here   will increase coreg from 12.5 bid to 25bid  Initially put in consult for [facility] for further eval/tx recs but received a call back from Eddye Min and he said that Dr Joan Bhandari from EP should see her  Changed consult to Dr Joan Bhandari

## 2019-10-22 NOTE — PROGRESS NOTES
[phone]. A representative should be coming out to interrogate patient's pacemaker.

## 2019-10-22 NOTE — ROUTINE PROCESS
TRANSFER - OUT REPORT: 
 
Verbal report given to HERNANDO Abreu(name) on Melanie Sheets  being transferred to [facility](unit) for routine progression of care Report consisted of patients Situation, Background, Assessment and  
Recommendations(SBAR). Information from the following report(s) SBAR and Kardex was reviewed with the receiving nurse. Lines:  
Peripheral IV 10/22/19 Left Antecubital (Active) Site Assessment Clean, dry, & intact 10/22/2019  7:54 AM  
Phlebitis Assessment 0 10/22/2019  7:54 AM  
Infiltration Assessment 0 10/22/2019  7:54 AM  
Dressing Status Clean, dry, & intact 10/22/2019  7:54 AM  
Hub Color/Line Status Green 10/22/2019  7:54 AM  
  
 
Opportunity for questions and clarification was provided. Patient transported with: 
 [employer]

## 2019-10-22 NOTE — ED PROVIDER NOTES
60-year-old white female past medical history atrial flutter, COPD, early satiety, hypertension, acute myocardial infarction, nausea and vomiting, neoplasm of the large intestine (tubovillous adenoma of the rectum 2016), occult blood in stools, proximal A. fib status post ablation for the A. fib and a flutter and pacemaker is brought in ambulance secondary to unwitnessed ground-level fall at the nursing home this morning complaining of left hip pain left eyebrow contusion patient states she does not remember the fall does not remember why she fell. She has been unable to ambulate since the fall.  She is actively vomiting 2ary to the pain;     pt denies HA, vison changes, diff swallowing, CP, SOB, Abd pain, F/Ch, D/Cons or other current systemic complaints;     Social History    Socioeconomic History      Marital status:       Spouse name: Not on file      Number of children: Not on file      Years of education: Not on file      Highest education level: Not on file    Occupational History      Not on file    Social Needs      Financial resource strain: Not on file      Food insecurity:        Worry: Not on file        Inability: Not on file      Transportation needs:        Medical: Not on file        Non-medical: Not on file    Tobacco Use      Smoking status: Former Smoker        Packs/day: 1.00        Years: 50.00        Pack years: 48        Types: Cigarettes        Quit date: 2014        Years since quittin.1      Smokeless tobacco: Never Used      Tobacco comment: lives with a smoker    Substance and Sexual Activity      Alcohol use: No      Drug use: No      Sexual activity: Not on file    Lifestyle      Physical activity:        Days per week: Not on file        Minutes per session: Not on file      Stress: Not on file    Relationships      Social connections:        Talks on phone: Not on file        Gets together: Not on file        Attends Pentecostal service: Not on file        Active member of club or organization: Not on file        Attends meetings of clubs or organizations: Not on file        Relationship status: Not on file      Intimate partner violence:        Fear of current or ex partner: Not on file        Emotionally abused: Not on file        Physically abused: Not on file        Forced sexual activity: Not on file    Other Topics      Concerns:        Not on file    Social History Narrative      Not on file             Past Medical History:   Diagnosis Date    Atrial flutter ([facility] [address] 3/23/2017    COPD     BY CHEST XRAY    Early satiety 1/28/2016    Hypertension     Myocardial infarction ([facility] [address] 2014    Nausea & vomiting     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    Occult blood in stools 1/28/2016    PAF (paroxysmal atrial fibrillation) ([facility] [address] 3/23/2017    S/P ablation of atrial fibrillation 3/23/2017    S/P ablation of atrial flutter 3/23/2017       Past Surgical History:   Procedure Laterality Date    ABDOMEN SURGERY PROC UNLISTED      inguinal hernia repair right    COLONOSCOPY N/A 5/11/2017    COLONOSCOPY performed by [facility] Crandall MD at [facility] ENDOSCOPY    COLONOSCOPY N/A 11/30/2017    COLONOSCOPY performed by [facility] MD at [facility] ENDOSCOPY    COLONOSCOPY Left 7/9/2019    COLON[facility]COPY performed [facility] Joseph [facility] MD at [facility] [address]  11/30/2017         HX HERNIA REPAIR  11/29/2015    Incarcerated left femoral hernia repair,.     HX PACEMAKER Left 2014         Family History:   Problem Relation Age of Onset    Heart Disease Mother     Cancer Father         BLADDER    Other Sister         RA    Other Brother         RA    Alcohol abuse Brother        Social History     Socioeconomic History    Marital status:      Spouse name: Not on file    Number of children: Not on file    Years of education: Not on file    Highest education level: Not on file   Occupational History    Not on file   Social Needs    Financial resource strain: Not on file    Food insecurity:     Worry: Not on file     Inability: Not on file    Transportation needs:     Medical: Not on file     Non-medical: Not on file   Tobacco Use    Smoking status: Former Smoker     Packs/day: 1.00     Years: 50.00     Pack years: 50.00     Types: Cigarettes     Last attempt to quit: 2014     Years since quittin.1    Smokeless tobacco: Never Used    Tobacco comment: lives with a smoker   Substance and Sexual Activity    Alcohol use: No    Drug use: No    Sexual activity: Not on file   Lifestyle    Physical activity:     Days per week: Not on file     Minutes per session: Not on file    Stress: Not on file   Relationships    Social connections:     Talks on phone: Not on file     Gets together: Not on file     Attends Presybeterian service: Not on file     Active member of club or organization: Not on file     Attends meetings of clubs or organizations: Not on file     Relationship status: Not on file    Intimate partner violence:     Fear of current or ex partner: Not on file     Emotionally abused: Not on file     Physically abused: Not on file     Forced sexual activity: Not on file   Other Topics Concern    Not on file   Social History Narrative    Not on file         ALLERGIES: Patient has no known allergies. Review of Systems   Constitutional: Negative for chills and fever. HENT: Negative for trouble swallowing and voice change. Respiratory: Negative for chest tightness. Musculoskeletal: Positive for gait problem and joint swelling. Skin: Negative for rash. Vitals:    10/22/19 0720 10/22/19 0721 10/22/19 0722   BP: 146/74     Pulse: 75     Resp: 16     Temp: 97.3 °F (36.3 °C)     SpO2: (!) 80% (!) 82% 92%            Physical Exam   Constitutional: She is oriented to person, place, and time. She appears well-developed and well-nourished.    NAD, AxOx4, speaking in complete sentences  gcs = 15    c-collar placed here   HENT:   Head: Normocephalic. Right Ear: External ear normal.   Left Ear: External ear normal.   Nose: Nose normal.   Mouth/Throat: Oropharynx is clear and moist.   Cn intact    L brow swelling/ hematoma; No obvious deformities, no septal hematoma or bilat hemotympanum; Bite wnl, no blood in mouth, no crepitus in neck. Eyes: Pupils are equal, round, and reactive to light. Conjunctivae and EOM are normal. Right eye exhibits no discharge. Left eye exhibits no discharge. No scleral icterus. Neck: Normal range of motion. Neck supple. No JVD present. No tracheal deviation present. Cardiovascular: Normal rate, regular rhythm, normal heart sounds and intact distal pulses. Exam reveals no gallop and no friction rub. No murmur heard. Pulmonary/Chest: Effort normal and breath sounds normal. No respiratory distress. She has no wheezes. She has no rales. She exhibits no tenderness. Abdominal: Soft. Bowel sounds are normal. There is no tenderness. There is no rebound and no guarding. nttp     Genitourinary: No vaginal discharge found. Genitourinary Comments: L hip ttp; decreased ROM 2ary to pain/ shortened L leg length (heel); pt has distal motor/ CV/ Sensation grossly intact L foot; Pt had central/ paraspinal C/T/L spines, Upper/Lower ext long bones, Abdomen,  Pelvis, hands /feet and all joints palpated and tolerated well (except as above) ; Pt has motor/ CV / Sensation grossly intact to all extremities;   Musculoskeletal: Normal range of motion. She exhibits no edema or tenderness. Neurological: She is alert and oriented to person, place, and time. She displays normal reflexes. No cranial nerve deficit or sensory deficit. She exhibits normal muscle tone. Coordination normal.   pt has motor/ CV/ Sensation grossly intact to all extremities, R = L in strength;   Skin: Skin is warm and dry. Capillary refill takes less than 2 seconds. No rash noted. No erythema. No pallor. Psychiatric: She has a normal mood and affect. Her behavior is normal. Thought content normal.   Nursing note and vitals reviewed. MDM       Procedures             Chief Complaint   Patient presents with    Fall    Hip Pain       7:49 AM  The patients presenting problems have been discussed, and they are in agreement with the care plan formulated and outlined with them. I have encouraged them to ask questions as they arise throughout their visit. MEDICATIONS GIVEN:  Medications   fentaNYL citrate (PF) injection 50 mcg (has no administration in time range)   ondansetron (ZOFRAN ODT) tablet 8 mg (8 mg Oral Given 10/22/19 0737)       LABS REVIEWED:  Labs Reviewed   SAMPLES BEING HELD   URINALYSIS W/ RFLX MICROSCOPIC   CBC WITH AUTOMATED DIFF   METABOLIC PANEL, COMPREHENSIVE   TROPONIN I       RADIOLOGY RESULTS:  The following have been ordered and reviewed:  _____________________________________________________________________  _____________________________________________________________________    EKG interpretation:   Rhythm: sinus tachycardia rhythm w/ occasional pacer spike noted; ; and ir-regular due to PVC. Rate (approx.): 104; Axis: normal; P wave: normal; QRS interval: normal ; ST/T wave: normal; Negative acute significant segmental elevations/ compared to study dated 04/30/2019  PROCEDURES:        CONSULTATIONS:       PROGRESS NOTES:      DIAGNOSIS:    No diagnosis found. PLAN:  1-      ED COURSE: The patients hospital course has been uncomplicated. CONSULT  NOTE  9:07 AM  Osmin Valverde MD communicated via [employer]  with Dr Vita Robledo. Specialty: Ortho  Discussed pt's hx, disposition, and available diagnostic and imaging results. Reviewed care plans. Consulting physician agrees with plans as outlined 'Will see the Patient'.         Hospitalist Ken for Admission  9:08 AM    ED Room Number: RT56/38  Patient Name and age:  Dl Dobbins 70 y.o.  female  Working Diagnosis: 1. Displaced fracture of left femoral neck ([address]    2.  Fall, initial encounter      Readmission: no  Isolation Requirements:  no  Recommended Level of Care:  Telemetry  Code Status:  Full Code  Department:[facility] Adult ED - 21   Other:  L femoral neck fracture/ unknown reason for fall/ ortho aware

## 2019-10-22 NOTE — H&P
History and Physical  Primary Care Provider: Mark Vinson MD    Subjective:     72-year-old white female past medical history atrial flutter, COPD, early satiety, hypertension, acute myocardial infarction, nausea and vomiting, occult blood in stools, proximal A. fib status post ablation for the A. fib and a flutter and pacemaker is brought in ambulance secondary to unwitnessed ground-level fall in the garage of her townhouse this morning while walking back to the house after going out to get her newspaper. She is complaining of left hip pain, left eyebrow contusion- patient states she does not remember the fall does not remember why she fell. She has been unable to ambulate since the fall. She was vomiting this am but that seems to have resolved. She denies any other acute issues and reports pain from cervical collar and dry mouth. Review of Systems:    A comprehensive review of systems was negative except for that written in the History of Present Illness.      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    Occult blood in stools 1/28/2016    PAF (paroxysmal atrial fibrillation) ([address] 3/23/2017    S/P ablation of atrial fibrillation 3/23/2017    S/P ablation of atrial flutter 3/23/2017      Past Surgical History:   Procedure Laterality Date    ABDOMEN SURGERY PROC UNLISTED      inguinal hernia repair right    COLONOSCOPY N/A 5/11/2017    COLONOSCOPY performed by Brook Del Toro MD at hospitals ENDOSCOPY    COLONOSCOPY N/A 11/30/2017    COLONOSCOPY performed by Brook Del Toro MD at hospitals ENDOSCOPY    COLONOSCOPY Left 7/9/2019    COLONOSCOPY performed by Nabila Dinh MD at [address] [place] [address]  11/30/2017         HX HERNIA REPAIR  11/29/2015    Incarcerated left femoral hernia repair,.  HX PACEMAKER Left 2014   bilat cataract surgery    Prior to Admission medications    Medication Sig Start Date End Date Taking? Authorizing Provider   mometasone-formoterol (DULERA) 200-5 mcg/actuation HFA inhaler Take 2 Puffs by inhalation two (2) times a day. Yes Provider, Historical   apixaban (ELIQUIS) 5 mg tablet Take 5 mg by mouth every twelve (12) hours. Yes Provider, Historical   albuterol (PROVENTIL HFA, VENTOLIN HFA, PROAIR HFA) 90 mcg/actuation inhaler Take 2 Puffs by inhalation every four (4) hours as needed for Wheezing. Yes Provider, Historical   tiotropium bromide (SPIRIVA RESPIMAT) 2.5 mcg/actuation inhaler Take 2 Puffs by inhalation daily. Yes Provider, Historical   lisinopril (PRINIVIL, ZESTRIL) 5 mg tablet TAKE 1 TAB BY MOUTH DAILY. 7/1/19  Yes Stephanie Burt MD   carvedilol (COREG) 12.5 mg tablet TAKE 1 TABLET BY MOUTH TWICE A DAY WITH MEALS 7/1/19  Yes Rogelio Marti MD   atorvastatin (LIPITOR) 20 mg tablet TAKE 1 TABLET BY MOUTH EVERY DAY 2/20/17  Yes Rosenda Haq NP   aspirin delayed-release 81 mg tablet Take 81 mg by mouth daily. Yes Provider, Historical   multivitamin (ONE A DAY) tablet Take 1 Tab by mouth daily. Yes Provider, Historical     No Known Allergies   Family History   Problem Relation Age of Onset    Heart Disease Mother     Cancer Father         BLADDER    Other Sister         RA    Other Brother         RA    Alcohol abuse Brother         SOCIAL HISTORY:  Patient resides at Home. - 3 children  Lives in a townhouse independently and still drives  Patient ambulates independently without any devices. Smoking history:she continues to smoke   Alcohol history:none        Objective:       Physical Exam:   Visit Vitals  /77   Pulse (!) 110   Temp 97.3 °F (36.3 °C)   Resp 17   SpO2 90%     General:  Alert, cooperative, no distress, appears stated age. Head:  Bruising with hematoma above left eye- some evidence of recent bleeding   Eyes:  Conjunctivae/corneas clear. PERRL, EOMs intact. Fundi benign. Throat:  Teeth and gums normal.   Neck: Wearing cervical collar when seen in the ER   Back:   Symmetric, no curvature. ROM normal. No CVA tenderness. Lungs:   Clear to auscultation bilaterally. Chest wall:  No tenderness or deformity. Left subclavian pacemaker   Heart:  Regular rate and rhythm, S1, S2 normal, no murmur, click, rub or gallop. Abdomen:   Soft, non-tender. Bowel sounds normal. No masses,  No organomegaly. Extremities: Mild edema of bilat LE,warm,. Pulses: 2+ and symmetric all extremities. Skin: Skin texture, turgor normal.    Neurologic: CNII-XII intact. Normal strength, sensation and reflexes throughout. Data Review: All diagnostic labs and studies have been reviewed. Recent Results (from the past 24 hour(s))   SAMPLES BEING HELD    Collection Time: 10/22/19  7:27 AM   Result Value Ref Range    SAMPLES BEING HELD 1LAV,1PST,1RED,1BLUE     COMMENT        Add-on orders for these samples will be processed based on acceptable specimen integrity and analyte stability, which may vary by analyte. CBC WITH AUTOMATED DIFF    Collection Time: 10/22/19  7:27 AM   Result Value Ref Range    WBC 15.6 (H) 3.6 - 11.0 K/uL    RBC 3.92 3.80 - 5.20 M/uL    HGB 12.5 11.5 - 16.0 g/dL    HCT 37.6 35.0 - 47.0 %    MCV 95.9 80.0 - 99.0 FL    MCH 31.9 26.0 - 34.0 PG    MCHC 33.2 30.0 - 36.5 g/dL    RDW 13.8 11.5 - 14.5 %    PLATELET 102 322 - 396 K/uL    MPV 10.9 8.9 - 12.9 FL    NRBC 0.0 0  WBC    ABSOLUTE NRBC 0.00 0.00 - 0.01 K/uL    NEUTROPHILS 82 (H) 32 - 75 %    LYMPHOCYTES 9 (L) 12 - 49 %    MONOCYTES 6 5 - 13 %    EOSINOPHILS 1 0 - 7 %    BASOPHILS 0 0 - 1 %    IMMATURE GRANULOCYTES 2 (H) 0.0 - 0.5 %    ABS. NEUTROPHILS 12.8 (H) 1.8 - 8.0 K/UL    ABS. LYMPHOCYTES 1.4 0.8 - 3.5 K/UL    ABS. MONOCYTES 1.0 0.0 - 1.0 K/UL    ABS.  EOSINOPHILS 0.1 0.0 - 0.4 K/UL    ABS. BASOPHILS 0.0 0.0 - 0.1 K/UL    ABS. IMM. GRANS. 0.2 (H) 0.00 - 0.04 K/UL    DF AUTOMATED     METABOLIC PANEL, COMPREHENSIVE    Collection Time: 10/22/19  7:27 AM   Result Value Ref Range    Sodium 135 (L) 136 - 145 mmol/L    Potassium 4.0 3.5 - 5.1 mmol/L    Chloride 103 97 - 108 mmol/L    CO2 29 21 - 32 mmol/L    Anion gap 3 (L) 5 - 15 mmol/L    Glucose 112 (H) 65 - 100 mg/dL    BUN 11 6 - 20 MG/DL    Creatinine 0.65 0.55 - 1.02 MG/DL    BUN/Creatinine ratio 17 12 - 20      GFR est AA >60 >60 ml/min/1.73m2    GFR est non-AA >60 >60 ml/min/1.73m2    Calcium 9.4 8.5 - 10.1 MG/DL    Bilirubin, total 0.5 0.2 - 1.0 MG/DL    ALT (SGPT) 50 12 - 78 U/L    AST (SGOT) 55 (H) 15 - 37 U/L    Alk.  phosphatase 92 45 - 117 U/L    Protein, total 6.9 6.4 - 8.2 g/dL    Albumin 3.1 (L) 3.5 - 5.0 g/dL    Globulin 3.8 2.0 - 4.0 g/dL    A-G Ratio 0.8 (L) 1.1 - 2.2     TROPONIN I    Collection Time: 10/22/19  7:27 AM   Result Value Ref Range    Troponin-I, Qt. <0.05 <0.05 ng/mL   EKG, 12 LEAD, INITIAL    Collection Time: 10/22/19  7:41 AM   Result Value Ref Range    Ventricular Rate 105 BPM    Atrial Rate 300 BPM    QRS Duration 82 ms    Q-T Interval 338 ms    QTC Calculation (Bezet) 446 ms    Calculated R Axis 55 degrees    Calculated T Axis 34 degrees    Diagnosis        Suspect unspecified pacemaker failure  Atrial fibrillation with occasional ventricular-paced complexes  When compared with ECG of 24-DEC-2017 10:57,  Electronic ventricular pacemaker has replaced Electronic atrial pacemaker     URINALYSIS W/ RFLX MICROSCOPIC    Collection Time: 10/22/19  9:18 AM   Result Value Ref Range    Color YELLOW/STRAW      Appearance CLEAR CLEAR      Specific gravity 1.007 1.003 - 1.030      pH (UA) 6.0 5.0 - 8.0      Protein NEGATIVE  NEG mg/dL    Glucose NEGATIVE  NEG mg/dL    Ketone NEGATIVE  NEG mg/dL    Bilirubin NEGATIVE  NEG      Blood TRACE (A) NEG      Urobilinogen 0.2 0.2 - 1.0 EU/dL    Nitrites NEGATIVE NEG      Leukocyte Esterase NEGATIVE  NEG      WBC 0-4 0 - 4 /hpf    RBC 5-10 0 - 5 /hpf    Epithelial cells FEW FEW /lpf    Bacteria NEGATIVE  NEG /hpf    Hyaline cast 0-2 0 - 5 /lpf   URINE CULTURE HOLD SAMPLE    Collection Time: 10/22/19  9:18 AM   Result Value Ref Range    Urine culture hold        URINE ON HOLD IN MICROBIOLOGY DEPT FOR 3 DAYS. IF UNPRESERVED URINE IS SUBMITTED, IT CANNOT BE USED FOR ADDITIONAL TESTING AFTER 24 HRS, RECOLLECTION WILL BE REQUIRED. CT Results  (Last 48 hours)               10/22/19 1024  CT PELV WO CONT Final result    Impression:  IMPRESSION: Acute impacted subcapital left femur neck fracture with associated   left inferior pubic ramus fracture and left S1 ala fracture. Narrative:  EXAM: CT PELV WO CONT       INDICATION: Left hip and pelvis fractures       COMPARISON: Plain film of earlier today. CONTRAST: None. TECHNIQUE:    Multislice helical CT was performed from the iliac crest to the symphysis pubis   without intravenous contrast administration Oral contrast was/was not   administered. Coronal and sagittal reformations were generated. CT dose   reduction was achieved through use of a standardized protocol tailored for this   examination and automatic exposure control for dose modulation. FINDING:       The visualized bowel within the pelvis shows noninflamed appearing left and   sigmoid colon diverticula but otherwise is normal.        There is no pelvic mass or adenopathy. There is no pelvic ascites or fluid   collection. Postsurgical changes of right and one hernia repair are noted. De Jesus   catheter in position with balloon in urinary bladder lumen. There is a slightly impacted acute subcapital left femur neck fracture   redemonstrated. Nondisplaced left inferior pubic ramus fracture is   redemonstrated as well. Additionally, there is an acute fracture of the inferior   aspect of the left S1 ala.  There is no aggressive lesion of bone or other   fracture. Bones appear diffusely osteopenic.           10/22/19 0823  CT HEAD WO CONT Final result    Impression:  IMPRESSION:   1. No evidence of acute intracranial abnormality. 2. Small left periorbital hematoma. Narrative:  EXAM:  CT HEAD WO CONT       INDICATION:   Fall/ unknown why/ L hip pain       COMPARISON: None. TECHNIQUE: Unenhanced CT of the head was performed using 5 mm images. Brain and   bone windows were generated. CT dose reduction was achieved through use of a   standardized protocol tailored for this examination and automatic exposure   control for dose modulation. FINDINGS:   The ventricles are normal in size and position. Basilar cisterns are patent. No   midline shift. There is no evidence of acute infarct, hemorrhage, or extraaxial   fluid collection. The paranasal sinuses, mastoid air cells, and middle ears are clear. The orbital   contents are within normal limits with bilateral lens implants. Small left   periorbital soft tissue hematoma. 10/22/19 0823  CT SPINE CERV WO CONT Final result    Impression:  IMPRESSION: No acute fracture or subluxation. Narrative:  INDICATION: Fall/ unknown why/ L hip pain        Exam: Noncontrast CT of the cervical spine is performed with 2.5 mm collimation. Sagittal and coronal reformatted images were also performed. CT dose reduction was achieved through use of a standardized protocol tailored   for this examination and automatic exposure control for dose modulation. FINDINGS: There is no acute fracture or subluxation. The prevertebral soft   tissues are within normal limits. Bones are diffusely demineralized. Remaining   visualized soft tissues are normal. There is biapical pleural parenchymal   scarring. There are mild biapical emphysemetous changes. There is a 4 mm nodular   density in the left upper lobe.                 Assessment:     Active Problems:    Femur fracture, left ([address] (10/22/2019)        Plan: 1. Ortho-Acute impacted fracture of the subcapital left femoral neck. Acute displaced fracture of the left inferior pubic ramus. left S1 ala fracture    Bedrest for now, NPO exc meds, bedrest, ortho consulted for eval and tx recs  2. CV- history atrial fib/flutter s/p ablation & pacemaker, HTN, MI- resume home meds-    Past echo showing mod-sev TR, hold eliquis for pending surgery  3. Pulm- COPD due to smoking- hold dulera, spiriva, and albuterol inhaler- start scheduled nebs, CXR stable on admission,  4. GI- nausea and vomiting- due to pain? resolved,  5. HLD- resume statin meds  6. Full Code  Patient should be low risk and cleared for surgery with exception of increased risk of bleeding due to 81mg aspirin and eliquis blood thinner.      She has 3 children Jacky Frost - [phone]      FUNCTIONAL STATUS PRIOR TO HOSPITALIZATION independent and walking without assistance and driving her car    Signed By: Jenny Alejandro MD     October 22, 2019

## 2019-10-22 NOTE — PROGRESS NOTES
Admission Medication Reconciliation:    Information obtained from:  patient   RxQuery data available¹:  YES    Comments/Recommendations: No significant changes were made to the PTA medication list.      The patient brought a list of medications to the hospital.  She was able to provide some information but it was limited due to pain. ¹RxQuery pharmacy benefit data reflects medications filled and processed through the patient's insurance, however   this data does NOT capture whether the medication was picked up or is currently being taken by the patient. Allergies:  Patient has no known allergies. 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 Taking? albuterol (PROVENTIL HFA, VENTOLIN HFA, PROAIR HFA) 90 mcg/actuation inhaler   Yes Yes   Sig: Take 2 Puffs by inhalation every four (4) hours as needed for Wheezing. apixaban (ELIQUIS) 5 mg tablet 10/21/2019 at pm  Yes Yes   Sig: Take 5 mg by mouth every twelve (12) hours. aspirin delayed-release 81 mg tablet 10/21/2019 Self Yes Yes   Sig: Take 81 mg by mouth daily.    atorvastatin (LIPITOR) 20 mg tablet 10/21/2019 Self No Yes   Sig: TAKE 1 TABLET BY MOUTH EVERY DAY   carvedilol (COREG) 12.5 mg tablet 10/21/2019  No Yes   Sig: TAKE 1 TABLET BY MOUTH TWICE A DAY WITH MEALS   lisinopril (PRINIVIL, ZESTRIL) 5 mg tablet 10/21/2019  No Yes   Sig: TAKE 1 TAB BY MOUTH DAILY. mometasone-formoterol (DULERA) 200-5 mcg/actuation HFA inhaler 10/21/2019  Yes Yes   Sig: Take 2 Puffs by inhalation two (2) times a day. multivitamin (ONE A DAY) tablet 10/21/2019 Self Yes Yes   Sig: Take 1 Tab by mouth daily. tiotropium bromide (SPIRIVA RESPIMAT) 2.5 mcg/actuation inhaler 10/21/2019  Yes Yes   Sig: Take 2 Puffs by inhalation daily. Facility-Administered Medications: None       Please contact the main inpatient pharmacy with any questions or concerns at [phone] and we will direct you to the clinical pharmacist covering this patient's care while in-house.    Christi Petersen, PHARMD

## 2019-10-22 NOTE — CONSULTS
[address]    Name:  Shade Salvador  MR#:  [id number]  :  1940  ACCOUNT #:  [de-identified]  DATE OF SERVICE:  10/22/2019      CHIEF COMPLAINT:  Hip pain. HISTORY OF PRESENT ILLNESS:  The patient is a pleasant 62-year-old female who has a history of a ground level fall in her garage yesterday with a left hip pain, also facial contusion and vomiting which has improved. She is admitted through the emergency room, and we were consulted for a left femoral neck fracture that is valgusly impacted. PAST MEDICAL HISTORY:  Includes:  1. COPD. 2.  Aflutter. 3.  Hypertension. 4.  MI.  5.  AFib. PAST SURGICAL HISTORY:  Includes:  1. Colonoscopies. 2.  Hernia repair. 3.  Pacemaker. MEDICATIONS:  Include:  1. Albuterol. 2.  Atorvastatin. 3.  Eliquis. SOCIAL HISTORY:  She lives alone at this time. FAMILY HISTORY:  Noncontributory. REVIEW OF SYSTEMS:  Negative for any other endocrine, vascular, musculoskeletal, dermatological, GI, or  complaints except the nausea and vomiting that she has had. PHYSICAL EXAMINATION:  GENERAL:  Exam today does show she is well appearing. She does have a contusion over the left eye. She is not in any distress while lying in bed. She is alert and oriented. EXTREMITIES:  Upper extremities are intact, C5 through T1 without motor or sensory deficits. Lower extremities are intact L2 through S1 without motor or sensory deficits on the right. On the left, she dorsiflexes and plantar flexes her foot. Sensation is intact. She has pain with rotation of the hip. Feet are warm. Good pulses. DIAGNOSTIC DATA:  CT scan of the hip shows valgusly impacted femoral neck fracture consistent with x-ray. It also shows a sacral fracture and also a left inferior pubic rami fracture. PLAN:  At this time, she most likely needs percutaneous pinning of the hip. She is on Plavix.   I will have probably Dr. Soledad Fuentes take over and do this tomorrow as I am at a different hospital.  She understands the risks and benefits and, by tomorrow, the blood thinner would be off her system as the Eliquis will be held for a day. At this time, she understands the risks and benefits of surgery and elects to proceed tomorrow, which includes nonunion, failure of the hardware, need to have the hip replacement, continued pain, being no better, being worse, nerve damage and infection. She elects to go forward.       Mitzy Felipe MD      [email or web address]  D:  10/22/2019 12:44  T:  10/22/2019 16:50  JOB #:  [id number]

## 2019-10-22 NOTE — ED TRIAGE NOTES
Pt arrives from home via EMS after having an unwitnessed fall when she tripped on her way car and her head hit the concrete. She was down for 10 min before. Swelling noted on LEFT forehead. Denies LOC. Ecchymosis noted on LEFT ankle. Was given 50 mcg fentanyl en route. +LEFT hip pain. Pt states she normally uses O2 via NC but unsure how many liters.  On eliquis    Hx of pacemaker, afib and copd

## 2019-10-22 NOTE — CONSULTS
ORTHOPAEDIC CONSULT NOTE    Subjective:     Date of Consultation:  October 22, 2019  Referring Physician:  Keiry Pope is a 70 y.o. female w PMH significant for A fib with pacemaker and on Eliquis (last taken night of 10/21), HTN, MI, rectal cancer and current smoker is seen for left hip and groin pain after a fall early this morning. She was walking back into her home from getting a newspaper when she fell landing on her left side. She does not specifically remember everything about the fall or what caused her to fall. She complains of left hip and thigh pain but denies low back, knee or ankle pain. She denies tingling or numbness. She was brought to the ED and found to have a left femoral neck fracture as well as some pelvis fractures for which we have been asked to see the patient. She current lives alone in a townhouse and uses no assistive devices for ambulation. She denies any current Orthopedic relationship.     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 05/11/2017    PAF (paroxysmal atrial fibrillation) ([address] 03/23/2017    Atrial flutter ([address] 03/23/2017    S/P ablation of atrial fibrillation 03/23/2017    S/P ablation of atrial flutter 03/23/2017    Occult blood in stools 01/28/2016    Early satiety 01/28/2016    Femoral hernia with obstruction 11/29/2015    SBO (small bowel obstruction) ([address] 11/28/2015    Pacemaker 09/08/2014    Complete heart block ([address] 09/06/2014    Acute MI, inferolateral wall ([address] 09/04/2014    Hypertension 09/04/2014    Tobacco abuse 09/04/2014    Takotsubo cardiomyopathy 09/04/2014    STEMI (ST elevation myocardial infarction) ([address] 09/04/2014     Family History   Problem Relation Age of Onset    Heart Disease Mother     Cancer Father         BLADDER    Other Sister RA    Other Brother         RA    Alcohol abuse Brother       Social History     Tobacco Use    Smoking status: Former Smoker     Packs/day: 1.00     Years: 50.00     Pack years: 50.00     Types: Cigarettes     Last attempt to quit: 2014     Years since quittin.1    Smokeless tobacco: Never Used    Tobacco comment: lives with a smoker   Substance Use Topics    Alcohol use: No     Past Medical History:   Diagnosis Date    Atrial flutter ([facility] 3/23/2017    COPD     BY CHEST XRAY    Early satiety 2016    Hypertension     Myocardial infarction ([facility] 2014    Nausea & vomiting     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 2017    Tubulovillous adenoma rectum     Occult blood in stools 2016    PAF (paroxysmal atrial fibrillation) ([facility] 3/23/2017    S/P ablation of atrial fibrillation 3/23/2017    S/P ablation of atrial flutter 3/23/2017      Past Surgical History:   Procedure Laterality Date    ABDOMEN SURGERY PROC UNLISTED      inguinal hernia repair right    COLONOSCOPY N/A 2017    COLONOSCOPY [facility] by [facility]ecia Blanton [facility] at [facility]COPY    COLONOSCOPY N/A 2017    COLONOSCOPY performed by [facility]ia Blanton MD at [facility] ENDOSCOPY    COLONOSCOPY Left 2019    COLONOSCOPY performed by Roby Sandra MD at [address] [place] [address]  2017         HX HERNIA REPAIR  2015    Incarcerated left femoral hernia repair,.  HX PACEMAKER Left       Prior to Admission medications    Medication Sig Start Date End Date Taking? Authorizing Provider   mometasone-formoterol (DULERA) 200-5 mcg/actuation HFA inhaler Take 2 Puffs by inhalation two (2) times a day. Yes Provider, Historical   apixaban (ELIQUIS) 5 mg tablet Take 5 mg by mouth every twelve (12) hours. Yes Provider, Historical   albuterol (PROVENTIL HFA, VENTOLIN HFA, PROAIR HFA) 90 mcg/actuation inhaler Take 2 Puffs by inhalation every four (4) hours as needed for Wheezing. Yes Provider, Historical   tiotropium bromide (SPIRIVA RESPIMAT) 2.5 mcg/actuation inhaler Take 2 Puffs by inhalation daily. Yes Provider, Historical   lisinopril (PRINIVIL, ZESTRIL) 5 mg tablet TAKE 1 TAB BY MOUTH DAILY. 7/1/19  Yes Polo Henley MD   carvedilol (COREG) 12.5 mg tablet TAKE 1 TABLET BY MOUTH TWICE A DAY WITH MEALS 7/1/19  Yes Rogelio Marti MD   atorvastatin (LIPITOR) 20 mg tablet TAKE 1 TABLET BY MOUTH EVERY DAY 2/20/17  Yes Sangeetha Werner NP   aspirin delayed-release 81 mg tablet Take 81 mg by mouth daily. Yes Provider, Historical   multivitamin (ONE A DAY) tablet Take 1 Tab by mouth daily. Yes Provider, Historical     Current Facility-Administered Medications   Medication Dose Route Frequency    sodium chloride (NS) flush 5-40 mL  5-40 mL IntraVENous Q8H    sodium chloride (NS) flush 5-40 mL  5-40 mL IntraVENous PRN    acetaminophen (TYLENOL) tablet 650 mg  650 mg Oral Q4H PRN    HYDROcodone-acetaminophen (NORCO) 5-325 mg per tablet 1 Tab  1 Tab Oral Q4H PRN    morphine injection 2 mg  2 mg IntraVENous Q3H PRN    diphenhydrAMINE (BENADRYL) injection 12.5 mg  12.5 mg IntraVENous Q4H PRN    ondansetron (ZOFRAN) injection 4 mg  4 mg IntraVENous Q4H PRN    docusate sodium (COLACE) capsule 100 mg  100 mg Oral BID    albuterol-ipratropium (DUO-NEB) 2.5 MG-0.5 MG/3 ML  3 mL Nebulization Q4H RT    atorvastatin (LIPITOR) tablet 20 mg  20 mg Oral QHS    carvedilol (COREG) tablet 12.5 mg  12.5 mg Oral BID WITH MEALS    [START ON 10/23/2019] aspirin delayed-release tablet 81 mg  81 mg Oral DAILY    [START ON 10/23/2019] lisinopril (PRINIVIL, ZESTRIL) tablet 5 mg  5 mg Oral DAILY    dextrose 5% and 0.9% NaCl infusion  75 mL/hr IntraVENous CONTINUOUS      No Known Allergies     Review of Systems:  Pertinent items are noted in HPI.     Mental Status: no dementia    Objective:     Patient Vitals for the past 8 hrs:   BP Temp Pulse Resp SpO2   10/22/19 1102 124/69 97.3 °F (36.3 °C) 90 20 97 %   10/22/19 0945 108/50  90 14 96 %   10/22/19 0930 104/49  87 15 91 %   10/22/19 0915 138/74  88 22 (!) 89 %   10/22/19 0900 138/69  87 25 92 %   10/22/19 0845 126/69  84 19 96 %   10/22/19 0830 144/77  (!) 110 17 90 %   10/22/19 0722     92 %   10/22/19 0721     (!) 82 %   10/22/19 0720 146/74 97.3 °F (36.3 °C) 75 16 (!) 80 %     Temp (24hrs), Av.3 °F (36.3 °C), Min:97.3 °F (36.3 °C), Max:97.3 °F (36.3 °C)      EXAM: Awake and alert lying in bed; appears uncomfortable but not in distress; agreeable to exam  Contusion over left eye without active bleeding  Left leg without significant shortening or external rotation and held in position of hip and knee flexion for comfort; log roll of leg increases pain  Moves ankles and toes actively to command  Distal sensory function grossly intact  Distal pulses palpable    Imaging Review: FINDING:     The visualized bowel within the pelvis shows noninflamed appearing left and  sigmoid colon diverticula but otherwise is normal.      There is no pelvic mass or adenopathy. There is no pelvic ascites or fluid  collection. Postsurgical changes of right and one hernia repair are noted. De Jesus  catheter in position with balloon in urinary bladder lumen.     There is a slightly impacted acute subcapital left femur neck fracture  redemonstrated. Nondisplaced left inferior pubic ramus fracture is  redemonstrated as well. Additionally, there is an acute fracture of the inferior  aspect of the left S1 ala. There is no aggressive lesion of bone or other  fracture. Bones appear diffusely osteopenic.     IMPRESSION  IMPRESSION: Acute impacted subcapital left femur neck fracture with associated  left inferior pubic ramus fracture and left S1 ala fracture.     Labs:   Recent Results (from the past 24 hour(s))   SAMPLES BEING HELD    Collection Time: 10/22/19  7:27 AM   Result Value Ref Range    SAMPLES BEING HELD 1LAV,1PST,1RED,1BLUE     COMMENT Add-on orders for these samples will be processed based on acceptable specimen integrity and analyte stability, which may vary by analyte. CBC WITH AUTOMATED DIFF    Collection Time: 10/22/19  7:27 AM   Result Value Ref Range    WBC 15.6 (H) 3.6 - 11.0 K/uL    RBC 3.92 3.80 - 5.20 M/uL    HGB 12.5 11.5 - 16.0 g/dL    HCT 37.6 35.0 - 47.0 %    MCV 95.9 80.0 - 99.0 FL    MCH 31.9 26.0 - 34.0 PG    MCHC 33.2 30.0 - 36.5 g/dL    RDW 13.8 11.5 - 14.5 %    PLATELET 534 870 - 665 K/uL    MPV 10.9 8.9 - 12.9 FL    NRBC 0.0 0  WBC    ABSOLUTE NRBC 0.00 0.00 - 0.01 K/uL    NEUTROPHILS 82 (H) 32 - 75 %    LYMPHOCYTES 9 (L) 12 - 49 %    MONOCYTES 6 5 - 13 %    EOSINOPHILS 1 0 - 7 %    BASOPHILS 0 0 - 1 %    IMMATURE GRANULOCYTES 2 (H) 0.0 - 0.5 %    ABS. NEUTROPHILS 12.8 (H) 1.8 - 8.0 K/UL    ABS. LYMPHOCYTES 1.4 0.8 - 3.5 K/UL    ABS. MONOCYTES 1.0 0.0 - 1.0 K/UL    ABS. EOSINOPHILS 0.1 0.0 - 0.4 K/UL    ABS. BASOPHILS 0.0 0.0 - 0.1 K/UL    ABS. IMM. GRANS. 0.2 (H) 0.00 - 0.04 K/UL    DF AUTOMATED     METABOLIC PANEL, COMPREHENSIVE    Collection Time: 10/22/19  7:27 AM   Result Value Ref Range    Sodium 135 (L) 136 - 145 mmol/L    Potassium 4.0 3.5 - 5.1 mmol/L    Chloride 103 97 - 108 mmol/L    CO2 29 21 - 32 mmol/L    Anion gap 3 (L) 5 - 15 mmol/L    Glucose 112 (H) 65 - 100 mg/dL    BUN 11 6 - 20 MG/DL    Creatinine 0.65 0.55 - 1.02 MG/DL    BUN/Creatinine ratio 17 12 - 20      GFR est AA >60 >60 ml/min/1.73m2    GFR est non-AA >60 >60 ml/min/1.73m2    Calcium 9.4 8.5 - 10.1 MG/DL    Bilirubin, total 0.5 0.2 - 1.0 MG/DL    ALT (SGPT) 50 12 - 78 U/L    AST (SGOT) 55 (H) 15 - 37 U/L    Alk.  phosphatase 92 45 - 117 U/L    Protein, total 6.9 6.4 - 8.2 g/dL    Albumin 3.1 (L) 3.5 - 5.0 g/dL    Globulin 3.8 2.0 - 4.0 g/dL    A-G Ratio 0.8 (L) 1.1 - 2.2     TROPONIN I    Collection Time: 10/22/19  7:27 AM   Result Value Ref Range    Troponin-I, Qt. <0.05 <0.05 ng/mL   EKG, 12 LEAD, INITIAL    Collection Time: 10/22/19 7:41 AM   Result Value Ref Range    Ventricular Rate 105 BPM    Atrial Rate 300 BPM    QRS Duration 82 ms    Q-T Interval 338 ms    QTC Calculation (Bezet) 446 ms    Calculated R Axis 55 degrees    Calculated T Axis 34 degrees    Diagnosis       Atrial fibrillation with occasional ventricular-paced complexes  When compared with ECG of 24-DEC-2017 10:57,  Electronic ventricular pacemaker has replaced Electronic atrial pacemaker  Confirmed by Austyn Dominguez MD., --- ([id number]) on 10/22/2019 12:47:09 PM     URINALYSIS W/ RFLX MICROSCOPIC    Collection Time: 10/22/19  9:18 AM   Result Value Ref Range    Color YELLOW/STRAW      Appearance CLEAR CLEAR      Specific gravity 1.007 1.003 - 1.030      pH (UA) 6.0 5.0 - 8.0      Protein NEGATIVE  NEG mg/dL    Glucose NEGATIVE  NEG mg/dL    Ketone NEGATIVE  NEG mg/dL    Bilirubin NEGATIVE  NEG      Blood TRACE (A) NEG      Urobilinogen 0.2 0.2 - 1.0 EU/dL    Nitrites NEGATIVE  NEG      Leukocyte Esterase NEGATIVE  NEG      WBC 0-4 0 - 4 /hpf    RBC 5-10 0 - 5 /hpf    Epithelial cells FEW FEW /lpf    Bacteria NEGATIVE  NEG /hpf    Hyaline cast 0-2 0 - 5 /lpf   URINE CULTURE HOLD SAMPLE    Collection Time: 10/22/19  9:18 AM   Result Value Ref Range    Urine culture hold        URINE ON HOLD IN MICROBIOLOGY DEPT FOR 3 DAYS. IF UNPRESERVED URINE IS SUBMITTED, IT CANNOT BE USED FOR ADDITIONAL TESTING AFTER 24 HRS, RECOLLECTION WILL BE REQUIRED.          Impression:     Active Problems:    Femur fracture, left ([address] (10/22/2019)        Plan:     Acute nondisplaced fracture of left subcapital femoral neck - will require surgical fixation; risks and benefits of operative vs conservative treatment discussed with patient and son including bleeding, infection, nerve damage, blood clots, pneumonia discussed with patient and son who agree to proceed; will plan on Perc Pinning on Tues to allow for Eliquis washout    Fractures of left inferior pubic ramus and left sacral ala - conservative treatment; normally WBAT but will confirm weight bearing status based on fixation of hip fracture    Admitting to Medicine for pre-op eval and medical management  Hold Eliquis for now - SCDs for clot prevention  Bed Rest  NPO after midnight except for meds  Consent for perc pinning left hip  To OR tues for fixation    Dr Isha Gallo aware of patient and in agreement with current plan of care    Ailyn Mahmood PA-C  Orthopedic Trauma Service  [address]

## 2019-10-22 NOTE — ED NOTES
Verbal shift change report given to HERNANDO Flores (oncoming nurse) by Glendy Santana  (offgoing nurse). Report included the following information SBAR, ED Summary, Intake/Output, MAR and Recent Results.

## 2019-10-23 ENCOUNTER — APPOINTMENT (OUTPATIENT)
Dept: GENERAL RADIOLOGY | Age: 79
DRG: 482 | End: 2019-10-23
Attending: ORTHOPAEDIC SURGERY
Payer: MEDICARE

## 2019-10-23 ENCOUNTER — ANESTHESIA EVENT (OUTPATIENT)
Dept: SURGERY | Age: 79
DRG: 482 | End: 2019-10-23
Payer: MEDICARE

## 2019-10-23 ENCOUNTER — ANESTHESIA (OUTPATIENT)
Dept: SURGERY | Age: 79
DRG: 482 | End: 2019-10-23
Payer: MEDICARE

## 2019-10-23 LAB — MAGNESIUM SERPL-MCNC: 1.7 MG/DL (ref 1.6–2.4)

## 2019-10-23 PROCEDURE — 74011250636 HC RX REV CODE- 250/636: Performed by: PHYSICIAN ASSISTANT

## 2019-10-23 PROCEDURE — 65660000000 HC RM CCU STEPDOWN

## 2019-10-23 PROCEDURE — 0QH734Z INSERTION OF INTERNAL FIXATION DEVICE INTO LEFT UPPER FEMUR, PERCUTANEOUS APPROACH: ICD-10-PCS | Performed by: ORTHOPAEDIC SURGERY

## 2019-10-23 PROCEDURE — 77010033678 HC OXYGEN DAILY

## 2019-10-23 PROCEDURE — 76060000032 HC ANESTHESIA 0.5 TO 1 HR: Performed by: ORTHOPAEDIC SURGERY

## 2019-10-23 PROCEDURE — 74011000250 HC RX REV CODE- 250: Performed by: FAMILY MEDICINE

## 2019-10-23 PROCEDURE — 77030008684 HC TU ET CUF COVD -B: Performed by: ANESTHESIOLOGY

## 2019-10-23 PROCEDURE — 94640 AIRWAY INHALATION TREATMENT: CPT

## 2019-10-23 PROCEDURE — 77030008462 HC STPLR SKN PROX J&J -A: Performed by: ORTHOPAEDIC SURGERY

## 2019-10-23 PROCEDURE — 74011000250 HC RX REV CODE- 250: Performed by: NURSE ANESTHETIST, CERTIFIED REGISTERED

## 2019-10-23 PROCEDURE — 77030013079 HC BLNKT BAIR HGGR 3M -A: Performed by: ANESTHESIOLOGY

## 2019-10-23 PROCEDURE — C1713 ANCHOR/SCREW BN/BN,TIS/BN: HCPCS | Performed by: ORTHOPAEDIC SURGERY

## 2019-10-23 PROCEDURE — 77030002982 HC SUT POLYSRB J&J -A: Performed by: ORTHOPAEDIC SURGERY

## 2019-10-23 PROCEDURE — 74011250637 HC RX REV CODE- 250/637: Performed by: FAMILY MEDICINE

## 2019-10-23 PROCEDURE — 74011250636 HC RX REV CODE- 250/636: Performed by: NURSE ANESTHETIST, CERTIFIED REGISTERED

## 2019-10-23 PROCEDURE — 77030002933 HC SUT MCRYL J&J -A: Performed by: ORTHOPAEDIC SURGERY

## 2019-10-23 PROCEDURE — 36415 COLL VENOUS BLD VENIPUNCTURE: CPT

## 2019-10-23 PROCEDURE — 73501 X-RAY EXAM HIP UNI 1 VIEW: CPT

## 2019-10-23 PROCEDURE — 74011250636 HC RX REV CODE- 250/636: Performed by: FAMILY MEDICINE

## 2019-10-23 PROCEDURE — 94664 DEMO&/EVAL PT USE INHALER: CPT

## 2019-10-23 PROCEDURE — 76010000138 HC OR TIME 0.5 TO 1 HR: Performed by: ORTHOPAEDIC SURGERY

## 2019-10-23 PROCEDURE — 83735 ASSAY OF MAGNESIUM: CPT

## 2019-10-23 PROCEDURE — 77030018836 HC SOL IRR NACL ICUM -A: Performed by: ORTHOPAEDIC SURGERY

## 2019-10-23 PROCEDURE — 74011000258 HC RX REV CODE- 258: Performed by: FAMILY MEDICINE

## 2019-10-23 PROCEDURE — 77030012935 HC DRSG AQUACEL BMS -B: Performed by: ORTHOPAEDIC SURGERY

## 2019-10-23 PROCEDURE — 77030011640 HC PAD GRND REM COVD -A: Performed by: ORTHOPAEDIC SURGERY

## 2019-10-23 PROCEDURE — 77030020788: Performed by: ORTHOPAEDIC SURGERY

## 2019-10-23 PROCEDURE — 74011250636 HC RX REV CODE- 250/636: Performed by: ORTHOPAEDIC SURGERY

## 2019-10-23 PROCEDURE — 74011250636 HC RX REV CODE- 250/636: Performed by: ANESTHESIOLOGY

## 2019-10-23 PROCEDURE — 77030040922 HC BLNKT HYPOTHRM STRY -A

## 2019-10-23 PROCEDURE — 76210000016 HC OR PH I REC 1 TO 1.5 HR: Performed by: ORTHOPAEDIC SURGERY

## 2019-10-23 DEVICE — SCREW BNE L90MM DIA6.5MM THRD L16MM CANC S STL SELF DRL ST: Type: IMPLANTABLE DEVICE | Site: HIP | Status: FUNCTIONAL

## 2019-10-23 DEVICE — SCREW BNE L85MM DIA6.5MM THRD L16MM CANC S STL SELF DRL ST: Type: IMPLANTABLE DEVICE | Site: HIP | Status: FUNCTIONAL

## 2019-10-23 DEVICE — SCREW BNE L95MM DIA6.5MM THRD L16MM S STL CANN HEX SOCK: Type: IMPLANTABLE DEVICE | Site: HIP | Status: FUNCTIONAL

## 2019-10-23 RX ORDER — LIDOCAINE HYDROCHLORIDE 10 MG/ML
0.1 INJECTION, SOLUTION EPIDURAL; INFILTRATION; INTRACAUDAL; PERINEURAL AS NEEDED
Status: DISCONTINUED | OUTPATIENT
Start: 2019-10-23 | End: 2019-10-23 | Stop reason: HOSPADM

## 2019-10-23 RX ORDER — SODIUM CHLORIDE 0.9 % (FLUSH) 0.9 %
5-40 SYRINGE (ML) INJECTION EVERY 8 HOURS
Status: DISCONTINUED | OUTPATIENT
Start: 2019-10-23 | End: 2019-10-23 | Stop reason: HOSPADM

## 2019-10-23 RX ORDER — ONDANSETRON 2 MG/ML
4 INJECTION INTRAMUSCULAR; INTRAVENOUS AS NEEDED
Status: DISCONTINUED | OUTPATIENT
Start: 2019-10-23 | End: 2019-10-23 | Stop reason: HOSPADM

## 2019-10-23 RX ORDER — KETAMINE HYDROCHLORIDE 10 MG/ML
INJECTION, SOLUTION INTRAMUSCULAR; INTRAVENOUS AS NEEDED
Status: DISCONTINUED | OUTPATIENT
Start: 2019-10-23 | End: 2019-10-23 | Stop reason: HOSPADM

## 2019-10-23 RX ORDER — SODIUM CHLORIDE 9 MG/ML
125 INJECTION, SOLUTION INTRAVENOUS CONTINUOUS
Status: DISPENSED | OUTPATIENT
Start: 2019-10-23 | End: 2019-10-24

## 2019-10-23 RX ORDER — GLYCOPYRROLATE 0.2 MG/ML
INJECTION INTRAMUSCULAR; INTRAVENOUS AS NEEDED
Status: DISCONTINUED | OUTPATIENT
Start: 2019-10-23 | End: 2019-10-23 | Stop reason: HOSPADM

## 2019-10-23 RX ORDER — PROPOFOL 10 MG/ML
INJECTION, EMULSION INTRAVENOUS AS NEEDED
Status: DISCONTINUED | OUTPATIENT
Start: 2019-10-23 | End: 2019-10-23 | Stop reason: HOSPADM

## 2019-10-23 RX ORDER — MIDAZOLAM HYDROCHLORIDE 1 MG/ML
0.5 INJECTION, SOLUTION INTRAMUSCULAR; INTRAVENOUS
Status: DISCONTINUED | OUTPATIENT
Start: 2019-10-23 | End: 2019-10-23 | Stop reason: HOSPADM

## 2019-10-23 RX ORDER — SODIUM CHLORIDE 0.9 % (FLUSH) 0.9 %
5-40 SYRINGE (ML) INJECTION AS NEEDED
Status: DISCONTINUED | OUTPATIENT
Start: 2019-10-23 | End: 2019-10-23 | Stop reason: HOSPADM

## 2019-10-23 RX ORDER — FENTANYL CITRATE 50 UG/ML
50 INJECTION, SOLUTION INTRAMUSCULAR; INTRAVENOUS AS NEEDED
Status: DISCONTINUED | OUTPATIENT
Start: 2019-10-23 | End: 2019-10-23 | Stop reason: HOSPADM

## 2019-10-23 RX ORDER — CEFAZOLIN SODIUM/WATER 2 G/20 ML
2 SYRINGE (ML) INTRAVENOUS ONCE
Status: COMPLETED | OUTPATIENT
Start: 2019-10-23 | End: 2019-10-23

## 2019-10-23 RX ORDER — MIDAZOLAM HYDROCHLORIDE 1 MG/ML
1 INJECTION, SOLUTION INTRAMUSCULAR; INTRAVENOUS AS NEEDED
Status: DISCONTINUED | OUTPATIENT
Start: 2019-10-23 | End: 2019-10-23 | Stop reason: HOSPADM

## 2019-10-23 RX ORDER — SUCCINYLCHOLINE CHLORIDE 20 MG/ML
INJECTION INTRAMUSCULAR; INTRAVENOUS AS NEEDED
Status: DISCONTINUED | OUTPATIENT
Start: 2019-10-23 | End: 2019-10-23 | Stop reason: HOSPADM

## 2019-10-23 RX ORDER — CEFAZOLIN SODIUM/WATER 2 G/20 ML
2 SYRINGE (ML) INTRAVENOUS EVERY 8 HOURS
Status: COMPLETED | OUTPATIENT
Start: 2019-10-24 | End: 2019-10-24

## 2019-10-23 RX ORDER — ACETAMINOPHEN 325 MG/1
650 TABLET ORAL ONCE
Status: DISCONTINUED | OUTPATIENT
Start: 2019-10-23 | End: 2019-10-23 | Stop reason: HOSPADM

## 2019-10-23 RX ORDER — ONDANSETRON 2 MG/ML
INJECTION INTRAMUSCULAR; INTRAVENOUS AS NEEDED
Status: DISCONTINUED | OUTPATIENT
Start: 2019-10-23 | End: 2019-10-23 | Stop reason: HOSPADM

## 2019-10-23 RX ORDER — MORPHINE SULFATE 10 MG/ML
2 INJECTION, SOLUTION INTRAMUSCULAR; INTRAVENOUS
Status: DISCONTINUED | OUTPATIENT
Start: 2019-10-23 | End: 2019-10-23 | Stop reason: HOSPADM

## 2019-10-23 RX ORDER — HYDROMORPHONE HYDROCHLORIDE 1 MG/ML
0.2 INJECTION, SOLUTION INTRAMUSCULAR; INTRAVENOUS; SUBCUTANEOUS
Status: DISCONTINUED | OUTPATIENT
Start: 2019-10-23 | End: 2019-10-23 | Stop reason: HOSPADM

## 2019-10-23 RX ORDER — SODIUM CHLORIDE, SODIUM LACTATE, POTASSIUM CHLORIDE, CALCIUM CHLORIDE 600; 310; 30; 20 MG/100ML; MG/100ML; MG/100ML; MG/100ML
125 INJECTION, SOLUTION INTRAVENOUS CONTINUOUS
Status: DISCONTINUED | OUTPATIENT
Start: 2019-10-23 | End: 2019-10-23 | Stop reason: HOSPADM

## 2019-10-23 RX ORDER — SODIUM CHLORIDE, SODIUM LACTATE, POTASSIUM CHLORIDE, CALCIUM CHLORIDE 600; 310; 30; 20 MG/100ML; MG/100ML; MG/100ML; MG/100ML
INJECTION, SOLUTION INTRAVENOUS
Status: DISCONTINUED | OUTPATIENT
Start: 2019-10-23 | End: 2019-10-23 | Stop reason: HOSPADM

## 2019-10-23 RX ORDER — FENTANYL CITRATE 50 UG/ML
INJECTION, SOLUTION INTRAMUSCULAR; INTRAVENOUS AS NEEDED
Status: DISCONTINUED | OUTPATIENT
Start: 2019-10-23 | End: 2019-10-23 | Stop reason: HOSPADM

## 2019-10-23 RX ORDER — FENTANYL CITRATE 50 UG/ML
25 INJECTION, SOLUTION INTRAMUSCULAR; INTRAVENOUS
Status: DISCONTINUED | OUTPATIENT
Start: 2019-10-23 | End: 2019-10-23 | Stop reason: HOSPADM

## 2019-10-23 RX ORDER — OXYCODONE AND ACETAMINOPHEN 5; 325 MG/1; MG/1
1 TABLET ORAL AS NEEDED
Status: DISCONTINUED | OUTPATIENT
Start: 2019-10-23 | End: 2019-10-23 | Stop reason: HOSPADM

## 2019-10-23 RX ORDER — ROCURONIUM BROMIDE 10 MG/ML
INJECTION, SOLUTION INTRAVENOUS AS NEEDED
Status: DISCONTINUED | OUTPATIENT
Start: 2019-10-23 | End: 2019-10-23 | Stop reason: HOSPADM

## 2019-10-23 RX ORDER — NEOSTIGMINE METHYLSULFATE 1 MG/ML
INJECTION INTRAVENOUS AS NEEDED
Status: DISCONTINUED | OUTPATIENT
Start: 2019-10-23 | End: 2019-10-23 | Stop reason: HOSPADM

## 2019-10-23 RX ORDER — PHENYLEPHRINE HCL IN 0.9% NACL 0.4MG/10ML
SYRINGE (ML) INTRAVENOUS AS NEEDED
Status: DISCONTINUED | OUTPATIENT
Start: 2019-10-23 | End: 2019-10-23 | Stop reason: HOSPADM

## 2019-10-23 RX ORDER — SODIUM CHLORIDE 9 MG/ML
25 INJECTION, SOLUTION INTRAVENOUS CONTINUOUS
Status: DISCONTINUED | OUTPATIENT
Start: 2019-10-23 | End: 2019-10-23 | Stop reason: HOSPADM

## 2019-10-23 RX ORDER — DIPHENHYDRAMINE HYDROCHLORIDE 50 MG/ML
12.5 INJECTION, SOLUTION INTRAMUSCULAR; INTRAVENOUS AS NEEDED
Status: DISCONTINUED | OUTPATIENT
Start: 2019-10-23 | End: 2019-10-23 | Stop reason: HOSPADM

## 2019-10-23 RX ADMIN — Medication 10 ML: at 07:15

## 2019-10-23 RX ADMIN — PHENYLEPHRINE HYDROCHLORIDE 50 MCG/MIN: 10 INJECTION INTRAVENOUS at 17:31

## 2019-10-23 RX ADMIN — FENTANYL CITRATE 50 MCG: 50 INJECTION, SOLUTION INTRAMUSCULAR; INTRAVENOUS at 17:09

## 2019-10-23 RX ADMIN — FENTANYL CITRATE 25 MCG: 50 INJECTION, SOLUTION INTRAMUSCULAR; INTRAVENOUS at 19:15

## 2019-10-23 RX ADMIN — ATORVASTATIN CALCIUM 20 MG: 20 TABLET, FILM COATED ORAL at 23:26

## 2019-10-23 RX ADMIN — IPRATROPIUM BROMIDE AND ALBUTEROL SULFATE 3 ML: .5; 3 SOLUTION RESPIRATORY (INHALATION) at 04:34

## 2019-10-23 RX ADMIN — KETAMINE HYDROCHLORIDE 20 MG: 10 INJECTION, SOLUTION INTRAMUSCULAR; INTRAVENOUS at 17:09

## 2019-10-23 RX ADMIN — Medication 2 G: at 17:15

## 2019-10-23 RX ADMIN — DEXTROSE MONOHYDRATE AND SODIUM CHLORIDE 75 ML/HR: 5; .9 INJECTION, SOLUTION INTRAVENOUS at 00:23

## 2019-10-23 RX ADMIN — LISINOPRIL 5 MG: 5 TABLET ORAL at 09:39

## 2019-10-23 RX ADMIN — CARVEDILOL 25 MG: 12.5 TABLET, FILM COATED ORAL at 09:39

## 2019-10-23 RX ADMIN — NEOSTIGMINE METHYLSULFATE 2 MG: 1 INJECTION, SOLUTION INTRAMUSCULAR; INTRAVENOUS; SUBCUTANEOUS at 17:46

## 2019-10-23 RX ADMIN — Medication 80 MCG: at 17:22

## 2019-10-23 RX ADMIN — FENTANYL CITRATE 25 MCG: 50 INJECTION, SOLUTION INTRAMUSCULAR; INTRAVENOUS at 18:50

## 2019-10-23 RX ADMIN — PROPOFOL 50 MG: 10 INJECTION, EMULSION INTRAVENOUS at 17:09

## 2019-10-23 RX ADMIN — Medication 80 MCG: at 17:18

## 2019-10-23 RX ADMIN — ONDANSETRON HYDROCHLORIDE 4 MG: 2 INJECTION, SOLUTION INTRAMUSCULAR; INTRAVENOUS at 17:46

## 2019-10-23 RX ADMIN — SODIUM CHLORIDE, POTASSIUM CHLORIDE, SODIUM LACTATE AND CALCIUM CHLORIDE 125 ML/HR: 600; 310; 30; 20 INJECTION, SOLUTION INTRAVENOUS at 14:30

## 2019-10-23 RX ADMIN — MORPHINE SULFATE 2 MG: 2 INJECTION, SOLUTION INTRAMUSCULAR; INTRAVENOUS at 19:25

## 2019-10-23 RX ADMIN — IPRATROPIUM BROMIDE AND ALBUTEROL SULFATE 3 ML: .5; 3 SOLUTION RESPIRATORY (INHALATION) at 08:45

## 2019-10-23 RX ADMIN — SODIUM CHLORIDE, POTASSIUM CHLORIDE, SODIUM LACTATE AND CALCIUM CHLORIDE: 600; 310; 30; 20 INJECTION, SOLUTION INTRAVENOUS at 16:52

## 2019-10-23 RX ADMIN — DOCUSATE SODIUM 100 MG: 100 CAPSULE, LIQUID FILLED ORAL at 09:39

## 2019-10-23 RX ADMIN — ROCURONIUM BROMIDE 5 MG: 10 SOLUTION INTRAVENOUS at 17:11

## 2019-10-23 RX ADMIN — GLYCOPYRROLATE 0.4 MG: 0.2 INJECTION, SOLUTION INTRAMUSCULAR; INTRAVENOUS at 17:46

## 2019-10-23 RX ADMIN — Medication 80 MCG: at 17:14

## 2019-10-23 RX ADMIN — SUCCINYLCHOLINE CHLORIDE 120 MG: 20 INJECTION, SOLUTION INTRAMUSCULAR; INTRAVENOUS at 17:11

## 2019-10-23 RX ADMIN — Medication 10 ML: at 23:26

## 2019-10-23 RX ADMIN — HYDROCODONE BITARTRATE AND ACETAMINOPHEN 1 TABLET: 5; 325 TABLET ORAL at 07:15

## 2019-10-23 RX ADMIN — SODIUM CHLORIDE 125 ML/HR: 900 INJECTION, SOLUTION INTRAVENOUS at 18:15

## 2019-10-23 RX ADMIN — FENTANYL CITRATE 25 MCG: 50 INJECTION, SOLUTION INTRAMUSCULAR; INTRAVENOUS at 18:30

## 2019-10-23 RX ADMIN — PROPOFOL 50 MG: 10 INJECTION, EMULSION INTRAVENOUS at 17:11

## 2019-10-23 RX ADMIN — DEXTROSE MONOHYDRATE AND SODIUM CHLORIDE 75 ML/HR: 5; .9 INJECTION, SOLUTION INTRAVENOUS at 14:05

## 2019-10-23 NOTE — CONSULTS
Cardiac Electrophysiology Hospital Consultation Note     Subjective:      Rima Bolanos is a 70 y.o. patient who is seen for evaluation/management of reported ventricular tachycardia. She was admitted on 10/22/2019 after unwitnessed ground level fall at home. Patient is unsure whether she fell or passed out, unable to recall events pre-event other than she was in the garage walking back from getting the newspaper. States she has fallen before, but that it doesn't occur frequently. Hip x-ray showed acute impacted fracture of subcapital left femoral neck, acute displaced fracture of left inferior pubic ramus, suspected acute fracture of left anterior column of acetabulum. Confirmed by CT pelvis. VT alarms reported on telemetry, but upon review these appear to be artifact. K & Mg WNL. Device interrogation shows proper function, reports several episodes of NSVT, mainly on 06/13/2019. Review of available EGMs show that this was AT/AF. Longest episode of AF noted was 21 minutes. Hypertensive during current admission, last /79. ECG on date of admission showed AF with ventricular rate 105 bpm.    Current medications include carvedilol & lisinopril, also on ASA 81 mg & Eliquis 5 mg. Eliquis has been held for possible surgery, last reported dose 10/21/2019. No known recent bleeding issues. Plan is for hip surgery later today, likely afternoon, as she is add on case. Dr. Isabela Ritchie saw patient, deemed her stable to proceed to OR. Echo reportedly ordered for this morning. Previous:  Echo (01/11/2018): LVEF 55-60%, no RWMA, grade 1 diastolic dysfunction. Mild MR. Moderate TR. PASP 45 mmHg, mild to mod pulmonary HTN. [address] dual chamber pacemaker (DOI 08/08/2014) for SSS. Last check on 08/28/2019 showed proper lead & generator function. AP 80%,  0%. Single ATR, AFL x 7 seconds.   10 HVRs 157-160 bpm.    S/p ablation of AF & AFL 03/22/2017 by Dr. Geovanni Camargo.    Cardiac cath (09/2014): LVEF 30%, coronaries WNL. History Takotsubo's cardiomyopathy. Usually managed by Dr. Raffi Hardy at [facility].         Problem List  Date Reviewed: 5/21/2019          Codes Class Noted    Femur fracture, left (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 ICD-10-CM: I48.0  ICD-9-CM: 427.31  3/23/2017        Atrial flutter ([facility] ICD-10-CM: C85.10  ICD-9-CM: 427.32  3/23/2017        S/P ablation of atrial fibrillation ICD-10-CM: Z98.890, Z86.79  ICD-9-CM: V45.89  3/23/2017        S/P ablation of atrial flutter ICD-10-CM: Z98.890, Z86.79  ICD-9-CM: V45.89  3/23/2017        Occult blood in stools ICD-10-CM: R19.5  ICD-9-CM: 792.1  1/28/2016        Early satiety ICD-10-CM: R68.81  ICD-9-CM: 780.94  1/28/2016        Femoral hernia with obstruction ICD-10-CM: K41.30  ICD-9-CM: 552.00  11/29/2015        SBO (small bowel obstruction) ([facility] ICD-10-CM: K56.609  ICD-9-CM: 560.9  11/28/2015        Pacemaker ICD-10-CM: Z95.0  ICD-9-CM: V45.01  9/8/2014        Complete heart block ([facility] ICD-10-CM: I44.2  ICD-9-CM: 426.0  9/6/2014        Acute MI, inferolateral wall (HCC) ICD-10-CM: I21.19  ICD-9-CM: 410.20  9/4/2014        Hypertension ICD-10-CM: I10  ICD-9-CM: 401.9  9/4/2014        Tobacco abuse ICD-10-CM: Z72.0  ICD-9-CM: 305.1  9/4/2014        Takotsubo cardiomyopathy ICD-10-CM: I51.81  ICD-9-CM: 429.83  9/4/2014        STEMI (ST elevation myocardial infarction) [facility]) ICD-10-CM: I21.3  ICD-9-CM: 410.90  9/4/2014                Current Facility-Administered Medications   Medication Dose Route Frequency Provider Last Rate Last Dose    sodium chloride (NS) flush 5-40 mL  5-40 mL IntraVENous Q8H Charis Kanner, MD   10 mL at 10/23/19 0715    sodium chloride (NS) flush 5-40 mL  5-40 mL IntraVENous PRN Charis Kanner, MD        acetaminophen (TYLENOL) tablet 650 mg  650 mg Oral Q4H PRN Charis Kanner, MD        HYDROcodone-acetaminophen [facility]) 5-325 mg per tablet 1 Tab  1 Tab Oral Q4H PRN Charis Kanner, MD   1 Tab at 10/23/19 0715    morphine injection 2 mg  2 mg IntraVENous Q3H PRN Charis Kanner, MD        diphenhydrAMINE (BENADRYL) injection 12.5 mg  12.5 mg IntraVENous Q4H PRN Charis Kanner, MD        ondansetron [facility]) injection 4 mg  4 mg IntraVENous Q4H PRN Charis Kanner, MD   4 mg at 10/22/19 1701    docusate sodium (COLACE) capsule 100 mg  100 mg Oral BID Charis Kanner, MD   100 mg at 10/22/19 1701    albuterol-ipratropium (DUO-NEB) 2.5 MG-0.5 MG/3 ML  3 mL Nebulization Q4H RT Charis Kanner, MD   3 mL at 10/23/19 0845    atorvastatin (LIPITOR) tablet 20 mg  20 mg Oral QHS Charis Kanner, MD   20 mg at 10/22/19 2121    aspirin delayed-release tablet 81 mg  81 mg Oral DAILY Charis Kanner, MD        lisinopril (PRINIVIL, ZESTRIL) tablet 5 mg  5 mg Oral DAILY Charis Kanner, MD        dextrose 5% and 0.9% NaCl infusion  75 mL/hr IntraVENous CONTINUOUS Charis Kanner, MD 75 mL/hr at 10/23/19 0023 75 mL/hr at 10/23/19 0023    carvedilol (COREG) tablet 25 mg  25 mg Oral BID WITH MEALS Charis Kanner, MD   25 mg at 10/22/19 1701    prochlorperazine (COMPAZINE) with saline injection 5 mg  5 mg IntraVENous Q6H PRN Charis Kanner, MD   5 mg at 10/22/19 2120     No Known Allergies  Past Medical History:   Diagnosis Date    Atrial flutter ([address] 3/23/2017    COPD     BY CHEST XRAY    Early satiety 1/28/2016    Hypertension     Myocardial infarction ([facility] 2014    Nausea & vomiting     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    Occult blood in stools 2016    PAF (paroxysmal atrial fibrillation) [facility] 3/23/2017    S/P ablation of atrial fibrillation 3/23/2017    S/P ablation of atrial flutter 3/23/2017     Past Surgical History:   Procedure Laterality Date    ABDOMEN SURGERY PROC UNLISTED      inguinal hernia repair right    COLONOSCOPY N/A 2017    COLON[facility]son [facility]    COLONOSCOPY N/A 2017    COLONOSCOPY performed by [facility]    COLONOSCOPY Left 2019    COLONOSCOPY performed by Law Hager MD at [facility] [address]  2017         HX HERNIA REPAIR  2015    Incarcerated left femoral hernia repair,.  HX PACEMAKER Left      Family History   Problem Relation Age of Onset    Heart Disease Mother     Cancer Father         BLADDER    Other Sister         RA    Other Brother         RA    Alcohol abuse Brother      Social History     Tobacco Use    Smoking status: Former Smoker     Packs/day: 1.00     Years: 50.00     Pack years: 50.00     Types: Cigarettes     Last attempt to quit: 2014     Years since quittin.1    Smokeless tobacco: Never Used    Tobacco comment: lives with a smoker   Substance Use Topics    Alcohol use: No        Review of Systems:   Constitutional: Negative for fever, chills, weight loss, malaise/fatigue. HEENT: Negative for nosebleeds, vision changes. Respiratory: Negative for cough, hemoptysis. + chronic BURKETT, sleeps with supplemental oxygen  Cardiovascular: Negative for chest pain, palpitations, orthopnea, claudication, leg swelling, syncope, and PND. Gastrointestinal: Negative for nausea, vomiting, diarrhea, blood in stool and melena. Genitourinary: Negative for dysuria, and hematuria. Musculoskeletal: + left hip pain, + chronic back pain  Skin: Negative for rash. Heme: Does not bleed or bruise easily.    Neurological: Negative for speech change and focal weakness Objective:     Visit Vitals  /79 (BP 1 Location: Left arm, BP Patient Position: At rest)   Pulse 90   Temp 98.6 °F (37 °C)   Resp 18   Wt 158 lb 4.6 oz (71.8 kg)   SpO2 98%   BMI 21.47 kg/m²      Physical Exam:   Constitutional: Well-developed and well-nourished. No respiratory distress. Head: Normocephalic and atraumatic. Eyes: Pupils are equal, round  ENT: Hearing normal  Neck: Supple. No JVD present. Cardiovascular: Normal rate, irregular rhythm. Exam reveals no gallop and no friction rub. No murmur heard. Pulmonary/Chest: Effort normal and breath sounds normal. No wheezes. Abdominal: Soft, no tenderness. Musculoskeletal: No edema. Neurological: Alert,oriented. Skin: Skin is warm and dry. Pacemaker site well healed. Scattered ecchymosis noted. Psychiatric: Normal mood and affect. Behavior is normal. Judgment and thought content normal.      EKG: AF, ventricular rate 105 bpm.    Assessment/Plan:     Ms. Tiffanie Castro was admitted with hip fracture after ground level fall, unwitnessed. Telemetry alarms for VT correlate with artifact; she has not had true VT noted during admission. Check of [address] dual chamber pacemaker reported NSVT, but available EGMs are consistent with AT/AF. Known paroxysmal AF, is s/p AF/AFL ablation in 2017 by Dr. Luther De Los Santos for embolic CVA prophylaxis, but this has been held due to upcoming surgery. Echo pending. Last echo in 01/2018 showed normal LVEF & no RWMA. History of Takotsubo's cardiomyopathy & MI in 2014, but cardiac cath at that time showed normal coronaries. On carvedilol & lisinopril. Outpatient stress test may have some utility. Patient appears to be stable for hip surgery from cardiac perspective.     REINA Luciano [address] [facility] [place]  10/23/19    Addendum from EP attending:   I have seen, examined patient, and discussed with nurse practitioner, registered nurse, reviewed, updated note and agree with the assessment and plan    I have talked to her this am. Her son and friend were in the room too  Dr Linda Alfred and Vicky Plascencia asked me to see her for NSVT noted on telemetry and pacemaker [employer] rep checked  Vital signs as above  Exam shows regular rhythm   Lungs clear  Left sided pacer  Assessment and Plan:  Echo pending  No angina or CHF  Telemetry strips showed artifacts, not true VT  Pacer check showed NSVT in arrhythmia log but 4 months ago and there was no EGMs of those  EGMs rep printed out were AFIB and atrial tach  I discussed with Dr Roney Urban  May proceed with left hip surgery    Thank you for involving me in this patient's care and please call with further concerns or questions. Akilah Witt M.D.   Electrophysiology/Cardiology  [facility] [place]  [address] [place] [address]  [phone]

## 2019-10-23 NOTE — PERIOP NOTES
TRANSFER - IN REPORT:    Verbal report received from [facility] RN(name) on Anali Cifuentes  being received from 5W(unit) for ordered procedure      Report consisted of patients Situation, Background, Assessment and   Recommendations(SBAR). Information from the following report(s) SBAR was reviewed with the receiving nurse. Opportunity for questions and clarification was provided. Assessment completed upon patients arrival to unit and care assumed.

## 2019-10-23 NOTE — CONSULTS
[facility] Consult         [address] [facility] [address], [address]   [phone] fax [phone]    Name: Kay Jones  1940 [id number]  10/22/2019 9:27 PM    Assessment/Plan: 1. Preop eval- stable to proceed to OR, echo ordered for am  2. Afib, aflutter, s/p ablation, s/p pacer on eliquis at admit, holding for OR  3. -160  4. Stress cardiomyopathy in 2014  5. Mod sev TR  6. dyslipidemia- cont statin  7. VT- check mag, echo am, cont coreg may add amio  8. - nonspecific in current setting. Cath 2014 with EF 20%, normal cors, Takotsubo  Soc hx tobacco  FHx +heart disease    Admit Date: 10/22/2019     Admit Diagnosis: Femur fracture, left [facility]) [S72.92XA]  Primary Care Physician:Rosalva Boo MD     Attending Provider: Alicja Lebron MD  Primary Cardiologist: Tyler Ramos Cardiologist: Khloe Acevedo  Requesting Provider: Zaheer Villalobos FOR CONSULT: VT  Subjective:     Kay Jones is a 70 y.o. female admitted for Femur fracture, left ([address] George Nicholas. Patient complains of  Falling or passing out and hitting her head, bleeding, not able to recall events pre-event. No chest pain, no dyspnea, no edema, no palpitations. Has had several alarms for VT here on the floor- but telemetry appears to be artifact. Currently resting in bed, no pain. No headache. Has been seeing RCA for pacer checks, stable. Has chronic dyspnea related to COPD. Review of Symptoms:  A comprehensive review of systems was negative except for that written in the HPI. Previous treatment/evaluation includes echocardiogram .  Cardiac risk factors: family history, dyslipidemia, diabetes mellitus, obesity, sedentary life style, male gender, hypertension, stress.     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    Occult blood in stools 1/28/2016    PAF (paroxysmal atrial fibrillation) ([address] 3/23/2017    S/P ablation of atrial fibrillation 3/23/2017    S/P ablation of atrial flutter 3/23/2017     Past Surgical History:   Procedure Laterality Date    ABDOMEN SURGERY PROC UNLISTED      inguinal hernia repair right    COLONOSCOPY N/A 5/11/2017    COLON[facility]COPY [facility]ales [facility] at [facility]    COLONOSCOPY N/A 11/30/2017    COLONOSCOPY performed by [facility] at [facility]COPY    COLONOSCOPY Left 7/9/2019    COLONOSCOPY performed by Tee Jiménez MD at [facility] [address]  11/30/2017         HX HERNIA REPAIR  11/29/2015    Incarcerated left femoral hernia repair,.     HX PACEMAKER Left 2014     Current Facility-Administered Medications   Medication Dose Route Frequency    sodium chloride (NS) flush 5-40 mL  5-40 mL IntraVENous Q8H    sodium chloride (NS) flush 5-40 mL  5-40 mL IntraVENous PRN    acetaminophen (TYLENOL) tablet 650 mg  650 mg Oral Q4H PRN    HYDROcodone-acetaminophen (NORCO) 5-325 mg per tablet 1 Tab  1 Tab Oral Q4H PRN    morphine injection 2 mg  2 mg IntraVENous Q3H PRN    diphenhydrAMINE (BENADRYL) injection 12.5 mg  12.5 mg IntraVENous Q4H PRN    ondansetron (ZOFRAN) injection 4 mg  4 mg IntraVENous Q4H PRN    docusate sodium (COLACE) capsule 100 mg  100 mg Oral BID    albuterol-ipratropium (DUO-NEB) 2.5 MG-0.5 MG/3 ML  3 mL Nebulization Q4H RT    atorvastatin (LIPITOR) tablet 20 mg  20 mg Oral QHS    [START ON 10/23/2019] aspirin delayed-release tablet 81 mg  81 mg Oral DAILY    [START ON 10/23/2019] lisinopril (PRINIVIL, ZESTRIL) tablet 5 mg  5 mg Oral DAILY    dextrose 5% and 0.9% NaCl infusion  75 mL/hr IntraVENous CONTINUOUS    carvedilol (COREG) tablet 25 mg  25 mg Oral BID WITH MEALS    prochlorperazine (COMPAZINE) with saline injection 5 mg  5 mg IntraVENous Q6H PRN       No Known Allergies   Family History   Problem Relation Age of Onset    Heart Disease Mother     Cancer Father         BLADDER    Other Sister         RA    Other Brother         RA    Alcohol abuse Brother       Social History     Socioeconomic History    Marital status:      Spouse name: Not on file    Number of children: Not on file    Years of education: Not on file    Highest education level: Not on file   Tobacco Use    Smoking status: Former Smoker     Packs/day: 1.00     Years: 50.00     Pack years: 50.00     Types: Cigarettes     Last attempt to quit: 2014     Years since quittin.1    Smokeless tobacco: Never Used    Tobacco comment: lives with a smoker   Substance and Sexual Activity    Alcohol use: No    Drug use: No          Objective:      Physical Exam  Vitals:    10/22/19 1456 10/22/19 1818 10/22/19 2012 10/22/19 2025   BP: 166/81  116/64 138/87   Pulse: 83  83 86   Resp: 16  16 18   Temp: 99.5 °F (37.5 °C)  98 °F (36.7 °C) 98 °F (36.7 °C)   SpO2: 99% 99% 93% 95%       General:  Alert, cooperative, pale   Eyes:  Conjunctivae/corneas clear. Bruising left orbit    Ears:  Normal external ear canals both ears. Nose:  No drainage . Mouth/Throat: Moist mucous membranes. Dentition normal.    Neck: Symmetrical, trachea midline, no carotid bruit and no JVD. Back:   No kyphosis   Lungs:   Clear to auscultation bilaterally. Heart:  Irregular rate and rhythm    Abdomen:   Soft, non-tender. Bowel sounds normal. No masses,  No organomegaly. Extremities: Extremities  atraumatic, no cyanosis or edema. Vascular: 2+ and symmetric UE/LE bilat   Skin: Skin color pale.+bruising   Lymph nodes: No Lymphadenopathy   Neurologic: CNII-XII intact.             Data Review:     Recent Labs     10/22/19  0727   TROIQ <0.05     Recent Labs     10/22/19  0727   *   K 4.0      CO2 29   BUN 11   CREA 0.65   *   CA 9.4     Recent Labs 10/22/19  0727   WBC 15.6*   HGB 12.5   HCT 37.6        Recent Labs     10/22/19  0727   SGOT 55*   AP 92     No results for input(s): CHOL, LDLC in the last 72 hours. No lab exists for component: TGL, HDLC,  HBA1C  No results for input(s): CRP, TSH, TSHEXT in the last 72 hours. No lab exists for component: ESR  Thank you very much for this referral. I appreciate the opportunity to participate in this patient's care.       MD Jordon Pendleton MD

## 2019-10-23 NOTE — PROGRESS NOTES
Ortho Progress Note    Met with patient. Plan for left hip percutaneous pinning today with Dr. Yulia Luis. Consent signed. Remain NPO. To OR this afternoon.     Bhavana Padron PA-C

## 2019-10-23 NOTE — BRIEF OP NOTE
BRIEF OPERATIVE NOTE    Date of Procedure: 10/23/2019   Preoperative Diagnosis: LEFT HIP FRACTURE  Postoperative Diagnosis: LEFT HIP FRACTURE    Procedure(s):  LEFT HIP PERCUTANEOUS PINNING  REQ TF  Surgeon(s) and Role:     * Vicki Payne MD - Primary         Surgical Assistant: none    Surgical Staff:  Circ-1: Viki Linares RN  Circ-Relief: Chandana Richard RN  Scrub Tech-1: Erika Fuller  Event Time In Time Out   Incision Start 10/23/2019 1728    Incision Close 10/23/2019 1750      Anesthesia: General   Estimated Blood Loss: min  Specimens: * No specimens in log *   Findings: as abvoe   Complications: none  Implants: * No implants in log *

## 2019-10-23 NOTE — ANESTHESIA POSTPROCEDURE EVALUATION
Procedure(s):  LEFT HIP PERCUTANEOUS PINNING  REQ TF.    general    <BSHSIANPOST>    Vitals Value Taken Time   /44 10/23/2019  6:45 PM   Temp 36.8 °C (98.2 °F) 10/23/2019  6:04 PM   Pulse 81 10/23/2019  7:01 PM   Resp 16 10/23/2019  7:01 PM   SpO2 100 % 10/23/2019  7:01 PM   Vitals shown include unvalidated device data.

## 2019-10-23 NOTE — PROGRESS NOTES
KELLY:  1. Patient getting surgery today. Care Management Interventions  PCP Verified by CM: Yes  Palliative Care Criteria Met (RRAT>21 & CHF Dx)?: No  Mode of Transport at Discharge: Other (see comment)  MyChart Signup: No  Discharge Durable Medical Equipment: No  Health Maintenance Reviewed: Yes  Physical Therapy Consult: No  Occupational Therapy Consult: No  Speech Therapy Consult: No  Current Support Network: Lives Alone, Family Lives Nearby  Confirm Follow Up Transport: Family  Plan discussed with Pt/Family/Caregiver: Yes   Resource Information Provided?: No  Discharge Location  Discharge Placement: Unable to determine at this time    Reason for Admission:   Left femur fracture. RRAT Score:    16              Do you (patient/family) have any concerns for transition/discharge? No concerns at this time. Plan for utilizing home health:   TBD    Current Advanced Directive/Advance Care Plan:   Not on file. Transition of Care Plan:        Reviewed chart for transitions of care, and discussed in rounds. CM met with patient at bedside to explain role and offer support. Patient is alert and oriented x4, and confirmed demographics. She lives alone, but her son lives nearby and she also has one daughter. Prior to admission, patient is independent with ADLs and IADLs with no DME use. CM to follow for discharge needs.     [facility]

## 2019-10-23 NOTE — PROGRESS NOTES
Problem: Falls - Risk of  Goal: *Absence of Falls  Description  Document Maikel  Fall Risk and appropriate interventions in the flowsheet.   Outcome: Progressing Towards Goal  Note:   Fall Risk Interventions:            Medication Interventions: Evaluate medications/consider consulting pharmacy    Elimination Interventions: Call light in reach, Patient to call for help with toileting needs, Toileting schedule/hourly rounds    History of Falls Interventions: Consult care management for discharge planning, Door open when patient unattended, Investigate reason for fall, Room close to nurse's station

## 2019-10-23 NOTE — ANESTHESIA PREPROCEDURE EVALUATION
Relevant Problems   No relevant active problems       Anesthetic History     PONV          Review of Systems / Medical History  Patient summary reviewed, nursing notes reviewed and pertinent labs reviewed    Pulmonary    COPD      Smoker         Neuro/Psych   Within defined limits           Cardiovascular    Hypertension        Dysrhythmias : atrial fibrillation and atrial flutter  Pacemaker, past MI and CAD      Comments: Episodes of VT   GI/Hepatic/Renal  Within defined limits              Endo/Other  Within defined limits           Other Findings              Physical Exam    Airway  Mallampati: II  TM Distance: > 6 cm  Neck ROM: normal range of motion   Mouth opening: Normal     Cardiovascular  Regular rate and rhythm,  S1 and S2 normal,  no murmur, click, rub, or gallop             Dental  No notable dental hx       Pulmonary  Breath sounds clear to auscultation               Abdominal  GI exam deferred       Other Findings            Anesthetic Plan    ASA: 4  Anesthesia type: general            Anesthetic plan and risks discussed with: Patient

## 2019-10-24 ENCOUNTER — APPOINTMENT (OUTPATIENT)
Dept: NON INVASIVE DIAGNOSTICS | Age: 79
DRG: 482 | End: 2019-10-24
Attending: ORTHOPAEDIC SURGERY
Payer: MEDICARE

## 2019-10-24 LAB
ANION GAP SERPL CALC-SCNC: 6 MMOL/L (ref 5–15)
BUN SERPL-MCNC: 10 MG/DL (ref 6–20)
BUN/CREAT SERPL: 20 (ref 12–20)
CALCIUM SERPL-MCNC: 8.4 MG/DL (ref 8.5–10.1)
CHLORIDE SERPL-SCNC: 101 MMOL/L (ref 97–108)
CO2 SERPL-SCNC: 28 MMOL/L (ref 21–32)
CREAT SERPL-MCNC: 0.49 MG/DL (ref 0.55–1.02)
GLUCOSE SERPL-MCNC: 97 MG/DL (ref 65–100)
HGB BLD-MCNC: 9 G/DL (ref 11.5–16)
POTASSIUM SERPL-SCNC: 4.1 MMOL/L (ref 3.5–5.1)
SODIUM SERPL-SCNC: 135 MMOL/L (ref 136–145)

## 2019-10-24 PROCEDURE — 65270000029 HC RM PRIVATE

## 2019-10-24 PROCEDURE — 74011250637 HC RX REV CODE- 250/637: Performed by: ORTHOPAEDIC SURGERY

## 2019-10-24 PROCEDURE — 97161 PT EVAL LOW COMPLEX 20 MIN: CPT

## 2019-10-24 PROCEDURE — 94640 AIRWAY INHALATION TREATMENT: CPT

## 2019-10-24 PROCEDURE — 97535 SELF CARE MNGMENT TRAINING: CPT

## 2019-10-24 PROCEDURE — 74011000250 HC RX REV CODE- 250: Performed by: FAMILY MEDICINE

## 2019-10-24 PROCEDURE — 85018 HEMOGLOBIN: CPT

## 2019-10-24 PROCEDURE — 36415 COLL VENOUS BLD VENIPUNCTURE: CPT

## 2019-10-24 PROCEDURE — 74011250636 HC RX REV CODE- 250/636: Performed by: ORTHOPAEDIC SURGERY

## 2019-10-24 PROCEDURE — 93306 TTE W/DOPPLER COMPLETE: CPT

## 2019-10-24 PROCEDURE — 97530 THERAPEUTIC ACTIVITIES: CPT

## 2019-10-24 PROCEDURE — 74011250636 HC RX REV CODE- 250/636: Performed by: PHYSICIAN ASSISTANT

## 2019-10-24 PROCEDURE — 80048 BASIC METABOLIC PNL TOTAL CA: CPT

## 2019-10-24 PROCEDURE — 97165 OT EVAL LOW COMPLEX 30 MIN: CPT

## 2019-10-24 PROCEDURE — 74011250636 HC RX REV CODE- 250/636: Performed by: FAMILY MEDICINE

## 2019-10-24 RX ORDER — ENOXAPARIN SODIUM 100 MG/ML
40 INJECTION SUBCUTANEOUS DAILY
Status: DISCONTINUED | OUTPATIENT
Start: 2019-10-24 | End: 2019-10-25 | Stop reason: HOSPADM

## 2019-10-24 RX ORDER — IPRATROPIUM BROMIDE AND ALBUTEROL SULFATE 2.5; .5 MG/3ML; MG/3ML
3 SOLUTION RESPIRATORY (INHALATION) 4 TIMES DAILY
Status: DISCONTINUED | OUTPATIENT
Start: 2019-10-24 | End: 2019-10-25 | Stop reason: HOSPADM

## 2019-10-24 RX ORDER — FERROUS SULFATE, DRIED 160(50) MG
1 TABLET, EXTENDED RELEASE ORAL
Status: DISCONTINUED | OUTPATIENT
Start: 2019-10-24 | End: 2019-10-25 | Stop reason: HOSPADM

## 2019-10-24 RX ORDER — POLYETHYLENE GLYCOL 3350 17 G/17G
17 POWDER, FOR SOLUTION ORAL DAILY
Status: DISCONTINUED | OUTPATIENT
Start: 2019-10-24 | End: 2019-10-25 | Stop reason: HOSPADM

## 2019-10-24 RX ORDER — HYDROCODONE BITARTRATE AND ACETAMINOPHEN 5; 325 MG/1; MG/1
2 TABLET ORAL
Status: DISCONTINUED | OUTPATIENT
Start: 2019-10-24 | End: 2019-10-25 | Stop reason: HOSPADM

## 2019-10-24 RX ORDER — SODIUM CHLORIDE 0.9 % (FLUSH) 0.9 %
5-40 SYRINGE (ML) INJECTION AS NEEDED
Status: DISCONTINUED | OUTPATIENT
Start: 2019-10-24 | End: 2019-10-25 | Stop reason: HOSPADM

## 2019-10-24 RX ORDER — AMOXICILLIN 250 MG
1 CAPSULE ORAL 2 TIMES DAILY
Status: DISCONTINUED | OUTPATIENT
Start: 2019-10-24 | End: 2019-10-25 | Stop reason: HOSPADM

## 2019-10-24 RX ORDER — ONDANSETRON 2 MG/ML
4 INJECTION INTRAMUSCULAR; INTRAVENOUS
Status: ACTIVE | OUTPATIENT
Start: 2019-10-24 | End: 2019-10-25

## 2019-10-24 RX ORDER — CEFAZOLIN SODIUM/WATER 2 G/20 ML
2 SYRINGE (ML) INTRAVENOUS EVERY 8 HOURS
Status: DISCONTINUED | OUTPATIENT
Start: 2019-10-24 | End: 2019-10-24 | Stop reason: SDUPTHER

## 2019-10-24 RX ORDER — NALOXONE HYDROCHLORIDE 0.4 MG/ML
0.4 INJECTION, SOLUTION INTRAMUSCULAR; INTRAVENOUS; SUBCUTANEOUS AS NEEDED
Status: DISCONTINUED | OUTPATIENT
Start: 2019-10-24 | End: 2019-10-25 | Stop reason: HOSPADM

## 2019-10-24 RX ORDER — FACIAL-BODY WIPES
10 EACH TOPICAL DAILY PRN
Status: DISCONTINUED | OUTPATIENT
Start: 2019-10-25 | End: 2019-10-25 | Stop reason: HOSPADM

## 2019-10-24 RX ORDER — SODIUM CHLORIDE 0.9 % (FLUSH) 0.9 %
5-40 SYRINGE (ML) INJECTION EVERY 8 HOURS
Status: DISCONTINUED | OUTPATIENT
Start: 2019-10-24 | End: 2019-10-25 | Stop reason: HOSPADM

## 2019-10-24 RX ADMIN — Medication 1 TABLET: at 11:55

## 2019-10-24 RX ADMIN — IPRATROPIUM BROMIDE AND ALBUTEROL SULFATE 3 ML: .5; 3 SOLUTION RESPIRATORY (INHALATION) at 09:01

## 2019-10-24 RX ADMIN — Medication 10 ML: at 06:40

## 2019-10-24 RX ADMIN — HYDROCODONE BITARTRATE AND ACETAMINOPHEN 1 TABLET: 5; 325 TABLET ORAL at 09:24

## 2019-10-24 RX ADMIN — DOCUSATE SODIUM 100 MG: 100 CAPSULE, LIQUID FILLED ORAL at 17:15

## 2019-10-24 RX ADMIN — POLYETHYLENE GLYCOL 3350 17 G: 17 POWDER, FOR SOLUTION ORAL at 09:25

## 2019-10-24 RX ADMIN — Medication 10 ML: at 23:21

## 2019-10-24 RX ADMIN — ATORVASTATIN CALCIUM 20 MG: 20 TABLET, FILM COATED ORAL at 23:21

## 2019-10-24 RX ADMIN — Medication 10 ML: at 13:43

## 2019-10-24 RX ADMIN — Medication 10 ML: at 13:44

## 2019-10-24 RX ADMIN — SENNOSIDES, DOCUSATE SODIUM 1 TABLET: 50; 8.6 TABLET, FILM COATED ORAL at 09:25

## 2019-10-24 RX ADMIN — IPRATROPIUM BROMIDE AND ALBUTEROL SULFATE 3 ML: .5; 3 SOLUTION RESPIRATORY (INHALATION) at 12:37

## 2019-10-24 RX ADMIN — DOCUSATE SODIUM 100 MG: 100 CAPSULE, LIQUID FILLED ORAL at 09:24

## 2019-10-24 RX ADMIN — IPRATROPIUM BROMIDE AND ALBUTEROL SULFATE 3 ML: .5; 3 SOLUTION RESPIRATORY (INHALATION) at 16:00

## 2019-10-24 RX ADMIN — IPRATROPIUM BROMIDE AND ALBUTEROL SULFATE 3 ML: .5; 3 SOLUTION RESPIRATORY (INHALATION) at 19:42

## 2019-10-24 RX ADMIN — ENOXAPARIN SODIUM 40 MG: 40 INJECTION SUBCUTANEOUS at 09:25

## 2019-10-24 RX ADMIN — Medication 1 TABLET: at 09:26

## 2019-10-24 RX ADMIN — Medication 2 G: at 09:24

## 2019-10-24 RX ADMIN — SENNOSIDES, DOCUSATE SODIUM 1 TABLET: 50; 8.6 TABLET, FILM COATED ORAL at 17:15

## 2019-10-24 RX ADMIN — Medication 2 G: at 00:35

## 2019-10-24 RX ADMIN — Medication 10 ML: at 23:22

## 2019-10-24 RX ADMIN — SODIUM CHLORIDE 250 ML: 900 INJECTION, SOLUTION INTRAVENOUS at 15:00

## 2019-10-24 RX ADMIN — HYDROCODONE BITARTRATE AND ACETAMINOPHEN 1 TABLET: 5; 325 TABLET ORAL at 13:42

## 2019-10-24 RX ADMIN — ASPIRIN 81 MG: 81 TABLET, COATED ORAL at 09:24

## 2019-10-24 RX ADMIN — Medication 2 G: at 17:14

## 2019-10-24 RX ADMIN — Medication 1 TABLET: at 17:15

## 2019-10-24 NOTE — PROGRESS NOTES
Problem: Mobility Impaired (Adult and Pediatric)  Goal: *Acute Goals and Plan of Care (Insert Text)  Description  FUNCTIONAL STATUS PRIOR TO ADMISSION: Patient was independent and active without use of DME.    HOME SUPPORT PRIOR TO ADMISSION: The patient lived alone with no local support. Physical Therapy Goals  Initiated 10/24/2019  1. Patient will move from supine to sit and sit to supine , scoot up and down and roll side to side in bed with modified independence within 7 day(s). 2.  Patient will transfer from bed to chair and chair to bed with modified independence using the least restrictive device within 7 day(s). 3.  Patient will perform sit to stand with modified independence within 7 day(s). 4.  Patient will ambulate with modified independence for 150 feet with the least restrictive device within 7 day(s). 5.  Patient will ascend/descend 4 stairs with 1 handrail(s) with supervision/set-up within 7 day(s). Outcome: Progressing Towards Goal     PHYSICAL THERAPY EVALUATION  Patient: Mamie Galvan (43 y.o. female)  Date: 10/24/2019  Primary Diagnosis: Femur fracture, left (H[facility]) [S72.92XA]  Procedure(s) (LRB):  LEFT HIP PERCUTANEOUS PINNING  REQ TF (Left) 1 Day Post-Op   Precautions: WB 50% LLE  PWB(50%, pelvic fracture)      ASSESSMENT  Based on the objective data described below, the patient presents with decreased LLE ROM and strength, impaired balance without UE support, and overall decreased independence with mobility following fall at home, L pelvis fracture involving the left inferior pubic ramus and possible acute fracture of the left anterior column of the acetabulum with apparent disruption of the iliopectineal line, L fem neck fx with percutaneous pins POD# 1. Prior to admission, patient was independent with all mobility and lives alone.  Patient overall high fear and decreased ability to participate in bed mobility due to pain, max A for supine <> sit, max A to  pivot position for approx 2 min. Patient returned to bed at end of session, refusing chair at this time. Recommend SNF and ordering of RW prior to discharge. Current Level of Function Impacting Discharge (mobility/balance): max A for all mobility    Functional Outcome Measure: The patient scored Total: 20/100 on the Barthel Index which is indicative of high impaired ability to care for basic self needs/dependency on others. Patient will benefit from skilled therapy intervention to address the above noted impairments. PLAN :  Recommendations and Planned Interventions: bed mobility training, transfer training, gait training, therapeutic exercises, neuromuscular re-education, patient and family training/education and therapeutic activities      Frequency/Duration: Patient will be followed by physical therapy:  twice daily to address goals.     Recommendation for discharge: (in order for the patient to meet his/her long term goals)  Therapy up to 5 days/week in SNF setting    This discharge recommendation:  Has been made in collaboration with the attending provider and/or case management    IF patient discharges home will need the following DME: rolling walker and to be determined (TBD)         SUBJECTIVE:   Patient stated I just cant right now    OBJECTIVE DATA SUMMARY:   HISTORY:    Past Medical History:   Diagnosis Date    COPD     BY CHEST XRAY    Early satiety 1/28/2016    Hypertension     ICD (implantable cardioverter-defibrillator) in place     Myocardial infarction ([facility] 2014    Nausea & vomiting     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    Occult blood in stools 1/28/2016    Pacemaker     S/P ablation of atrial fibrillation 3/23/2017    S/P ablation of atrial flutter 3/23/2017     Past Surgical History:   Procedure Laterality Date    ABDOMEN SURGERY PROC UNLISTED      inguinal hernia repair right    COLONOSCOPY N/A 5/11/2017 COLONOSCOPY [facility]    COLONOSCOPY N/A 11/30/2017    COLONOSCOPY performed by [facility]    COLONOSCOPY Left 7/9/2019    COLONOSCOPY performed by Joseph Jones MD at [facility] [address]  11/30/2017         HX HERNIA REPAIR  11/29/2015    Incarcerated left femoral hernia repair,.  HX PACEMAKER Left 2014       Personal factors and/or comorbidities impacting plan of care:     Home Situation  Home Environment: Private residence  One/Two Story Residence: One story  Living Alone: Yes  Support Systems: Family member(s)  Patient Expects to be Discharged to[de-identified] Rehabilitation facility  Current DME Used/Available at Home: None    EXAMINATION/PRESENTATION/DECISION MAKING:   Critical Behavior:  Neurologic State: Drowsy  Orientation Level: Oriented X4  Cognition: Follows commands     Hearing: Auditory  Auditory Impairment: None  Skin:  Intact x incision  Edema: moderate around fx site   Range Of Motion:  AROM: Generally decreased, functional           PROM: Generally decreased, functional           Strength:    Strength: Generally decreased, functional                    Tone & Sensation:   Tone: Normal              Sensation: Intact               Coordination:  Coordination: Grossly decreased, non-functional  Vision:      Functional Mobility:  Bed Mobility:  Rolling: Maximum assistance;Assist x2  Supine to Sit: Maximum assistance;Assist x2  Sit to Supine: Maximum assistance  Scooting: Maximum assistance  Transfers:  Sit to Stand: Maximum assistance  Stand to Sit: Maximum assistance                       Balance:   Sitting: Impaired; Without support  Sitting - Static: Fair (occasional); Good (unsupported)  Sitting - Dynamic: Fair (occasional)  Standing: Impaired;Pull to stand; With support  Standing - Static: Fair;Constant support  Standing - Dynamic : Not tested  Ambulation/Gait Training:      Left Side Weight Bearing: Partial (%)(50% WB) Functional Measure:  Barthel Index:    Bathin  Bladder: 0  Bowels: 5  Groomin  Dressin  Feedin  Mobility: 0  Stairs: 0  Toilet Use: 0  Transfer (Bed to Chair and Back): 5  Total: 20/100       The Barthel ADL Index: Guidelines  1. The index should be used as a record of what a patient does, not as a record of what a patient could do. 2. The main aim is to establish degree of independence from any help, physical or verbal, however minor and for whatever reason. 3. The need for supervision renders the patient not independent. 4. A patient's performance should be established using the best available evidence. Asking the patient, friends/relatives and nurses are the usual sources, but direct observation and common sense are also important. However direct testing is not needed. 5. Usually the patient's performance over the preceding 24-48 hours is important, but occasionally longer periods will be relevant. 6. Middle categories imply that the patient supplies over 50 per cent of the effort. 7. Use of aids to be independent is allowed. Lauren Lambert., Barthel, D.W. (9698). Functional evaluation: the Barthel Index. [facility] (14)2. [place] Mayer eleazar HERB Sutherland, Melly Sandra., Palmira Guan., [place], [address] [place] [address] (). Measuring the change indisability after inpatient rehabilitation; comparison of the responsiveness of the Barthel Index and Functional [place] Measure. Journal of Neurology, Neurosurgery, and Psychiatry, 66(4), 344-047. Mikaela Vitale, N.J.A, [place] Khalil, & Richard Kilgore, M.A. (2004.) Assessment of post-stroke quality of life in cost-effectiveness studies: The usefulness of the Barthel Index and the EuroQoL-5D.  Quality of Life Research, 15, 565-07        Physical Therapy Evaluation Charge Determination   History Examination Presentation Decision-Making   HIGH Complexity :3+ comorbidities / personal factors will impact the outcome/ POC  HIGH Complexity : 4+ Standardized tests and measures addressing body structure, function, activity limitation and / or participation in recreation  MEDIUM Complexity : Evolving with changing characteristics  Other outcome measures barthel  HIGH       Based on the above components, the patient evaluation is determined to be of the following complexity level: MEDIUM    Pain Rating:  C/o severe pain with movement, controlled at rest    Activity Tolerance:   Fair and requires rest breaks  Please refer to the flowsheet for vital signs taken during this treatment. After treatment patient left in no apparent distress:   Supine in bed, Call bell within reach and Caregiver / family present    COMMUNICATION/EDUCATION:   The patients plan of care was discussed with: Registered Nurse and . Fall prevention education was provided and the patient/caregiver indicated understanding. and Patient/family have participated as able in goal setting and plan of care.     Thank you for this referral.  Jaswant Ordonez, PT   Time Calculation: 40 mins

## 2019-10-24 NOTE — OP NOTES
[address]  OPERATIVE REPORT    Name:  Candace Menendez  MR#:  [id number]  :  1940  ACCOUNT #:  [de-identified]  DATE OF SERVICE:  10/23/2019      PREOPERATIVE DIAGNOSIS:   Nondisplaced left femoral neck fracture. POSTOPERATIVE DIAGNOSIS:  Nondisplaced left femoral neck fracture. PROCEDURE PERFORMED:   Percutaneous pinning of left femoral neck fracture. SURGEON:  Chung Lamb MD    ASSISTANT:  none    ANESTHESIA:  General.    COMPLICATIONS:  None. SPECIMENS REMOVED:  none. IMPLANTS:   Synthes 6.5 cannulated screws x3. ESTIMATED BLOOD LOSS:  Minimal.    DISPOSITION:  The patient was taken to the recovery room in stable condition. OPERATIVE INDICATION:  The patient is a 79-year-old female who presented to [facility] after a fall. She was found to have a nondisplaced left femoral neck fracture. The patient was admitted to the hospitalist service and cleared for surgery. I met with the patient and recommended percutaneous pinning of her injury including all risks and benefits. She wished to proceed. Informed consent was obtained. She wished to go forward. PROCEDURE:  The patient was identified in the preoperative holding area. The left lower extremity was marked to the patient. All preoperative questions were answered. She was seen by Anesthesia Department in the operating room and transferred to the operating room table in supine position. Once appropriate anesthesia was obtained, she was placed on a fracture table. The right leg was placed in the well leg partida. The left leg was in the fracture table. Fluoroscopy was brought in and images were taken showing a nondisplaced femoral neck fracture. The left hip was prepped and draped in the usual sterile fashion. A time-out was conducted indicating correct operative site. The patient received IV Ancef and cephalosporin antibiotics prior to the incision being made.   Next, using fluoroscopy, percutaneous pinning of the hip was undertaken. Three guidewires from the Synthes 6.5 cannulated screw sets were introduced first in the appropriate position. Once all three guidewires were placed to the percutaneous incision, then a knife was used to open these incisions around the guidewires. All three were drilled and then short partially threaded screws were placed ranging in 85 to 95 mm in length. Once all 3 screws were in place, the guidewires were removed. There was excellent purchase and fixation. Final images were taken in all planes showing an appropriate alignment of a percutaneous pinning of a femoral neck fracture. Final images were taken. The incisions were irrigated with saline and closed with Vicryl sutures and skin staples. Sterile dressings were placed. The patient was awoken and taken to the recovery room in stable condition.         MD MARIAELENA Garcia/[email or web address]_I/[email or web address]_P  D:  10/23/2019 17:52  T:  10/24/2019 3:11  JOB #:  [id number]

## 2019-10-24 NOTE — PROGRESS NOTES
POD#1  Doing ok    Left leg thigh soft dressing intact  Moving toes    hgb 9      PT 50% PWB left leg   S/p hip pinning

## 2019-10-24 NOTE — PROGRESS NOTES
Problem: Self Care Deficits Care Plan (Adult)  Goal: *Acute Goals and Plan of Care (Insert Text)  Description    FUNCTIONAL STATUS PRIOR TO ADMISSION: Patient was independent and active without use of DME.     HOME SUPPORT: The patient lived alone with family to provide assistance. Occupational Therapy Goals  Initiated 10/24/2019  1. Patient will perform bathing with moderate assistance within 7 day(s). 2.  Patient will perform lower body dressing with moderate assistance  within 7 day(s). 3.  Patient will perform toilet transfers with moderate assistance  within 7 day(s). 4.  Patient will perform all aspects of toileting with moderate assistance  within 7 day(s). 5.  Patient will participate in upper extremity therapeutic exercise/activities with modified independence for 10 minutes within 7 day(s). 6.  Patient will utilize energy conservation techniques during functional activities with verbal cues within 7 day(s). Outcome: Progressing Towards Goal     OCCUPATIONAL THERAPY EVALUATION  Patient: Nimesh Christensen (57 y.o. female)  Date: 10/24/2019  Primary Diagnosis: Femur fracture, left (HCC) [S72.92XA]  Procedure(s) (LRB):  LEFT HIP PERCUTANEOUS PINNING  REQ TF (Left) 1 Day Post-Op   Precautions:      ASSESSMENT  Based on the objective data described below, the patient presents with impaired ADL and mobility due to pain and fear of pain post surgery. S/p pinning is 50% WBing but also has pelvic fracture and is having difficulty WBing through L side in sitting    Current Level of Function Impacting Discharge (ADLs/self-care): Needs up to Total A for LB ADLS/toileting    Functional Outcome Measure: The patient scored Total: 20/100 on the Barthel Index outcome measure which is indicative of 80% impaired ability to care for basic self needs/dependency on others.       Other factors to consider for discharge: lives alone     Patient will benefit from skilled therapy intervention to address the above noted impairments. PLAN :  Recommendations and Planned Interventions: self care training, functional mobility training, therapeutic exercise, balance training, therapeutic activities, patient education, home safety training and family training/education    Frequency/Duration: Patient will be followed by occupational therapy 5 times a week to address goals. Recommendation for discharge: (in order for the patient to meet his/her long term goals)  Therapy up to 5 days/week in SNF setting    This discharge recommendation:  A follow-up discussion with the attending provider and/or case management is planned    IF patient discharges home will need the following DME: to be determined (TBD)       SUBJECTIVE:   Patient stated don't make me get into the chair. 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    Occult blood in stools 1/28/2016    Pacemaker     S/P ablation of atrial fibrillation 3/23/2017    S/P ablation of atrial flutter 3/23/2017     Past Surgical History:   Procedure Laterality Date    ABDOMEN SURGERY PROC UNLISTED      inguinal hernia repair right    COLONOSCOPY N/A 5/11/2017    COLONOSCOPY performed by Merritt Habermann, MD at [facility] ENDOSCOPY    COLONOSCOPY N/A 11/30/2017    COLONOSCOPY performed by Merritt Habermann, MD at [facility] ENDOSCOPY    COLONOSCOPY Left 7/9/2019    COLONOSCOPY [facility] by Andie Gonzalez MD [facility]  11/30/2017         HX HERNIA REPAIR  11/29/2015    Incarcerated left femoral hernia repair,.     HX PACEMAKER Left 2014       Expanded or extensive additional review of patient history:     Home Situation  Home Environment: Private residence  One/Two Story Residence: One story  Living Alone: Yes  Support Systems: Family member(s)  Patient Expects to be Discharged to[de-identified] Rehabilitation facility  Current DME Used/Available at Home: None    Hand dominance: Right    EXAMINATION OF PERFORMANCE DEFICITS:  Cognitive/Behavioral Status:                  anxious    Skin: bruising, surgical incision    Edema: L LE and L forehead    Hearing: Auditory  Auditory Impairment: None    Vision/Perceptual:                    WDL                 Range of Motion:    AROM: Generally decreased, functional  PROM: Generally decreased, functional                      Strength:    Strength: Generally decreased, functional                Coordination:  Coordination: Grossly decreased, non-functional            Tone & Sensation:    Tone: Normal  Sensation: Intact                      Balance:  Sitting: Impaired; Without support  Sitting - Static: Fair (occasional); Good (unsupported)  Sitting - Dynamic: Fair (occasional)  Standing: Impaired;Pull to stand; With support  Standing - Static: Fair;Constant support  Standing - Dynamic : Not tested    Functional Mobility and Transfers for ADLs:  Bed Mobility:  Rolling: Maximum assistance;Assist x2  Supine to Sit: Maximum assistance;Assist x2  Sit to Supine: Maximum assistance  Scooting: Maximum assistance    Transfers:  Sit to Stand: Maximum assistance  Stand to Sit: Maximum assistance    ADL Assessment:                                            ADL Intervention and task modifications:       Grooming  Grooming Assistance: Set-up; Stand-by assistance(seated EOB)                   Lower Body Dressing Assistance  Socks: Total assistance (dependent)                 Functional Measure:  Barthel Index:    Bathin  Bladder: 0  Bowels: 5  Groomin  Dressin  Feedin  Mobility: 0  Stairs: 0  Toilet Use: 0  Transfer (Bed to Chair and Back): 5  Total: 20/100        The Barthel ADL Index: Guidelines  1.  The index should be used as a record of what a patient does, not as a record of what a patient could do. 2. The main aim is to establish degree of independence from any help, physical or verbal, however minor and for whatever reason. 3. The need for supervision renders the patient not independent. 4. A patient's performance should be established using the best available evidence. Asking the patient, friends/relatives and nurses are the usual sources, but direct observation and common sense are also important. However direct testing is not needed. 5. Usually the patient's performance over the preceding 24-48 hours is important, but occasionally longer periods will be relevant. 6. Middle categories imply that the patient supplies over 50 per cent of the effort. 7. Use of aids to be independent is allowed. Alvaro Sanderson, Barthel, D.W. (2633). Functional evaluation: the Barthel Index. [facility] (14)2. HERB Singh, Jonny Lozoya., Jordi Franklin., [place], [facility] (1999). Measuring the change indisability after inpatient rehabilitation; comparison of the responsiveness of the Barthel Index and Functional [place] Measure. Journal of Neurology, Neurosurgery, and Psychiatry, 66(4), 720-447. Carter Sandhoff, N.J.A, ARIN Khalil.JONN, & Andre Watts, M.A. (2004.) Assessment of post-stroke quality of life in cost-effectiveness studies: The usefulness of the Barthel Index and the EuroQoL-5D.  Quality of Life Research, 15, 236-18         Occupational Therapy Evaluation Charge Determination   History Examination Decision-Making   LOW Complexity : Brief history review  LOW Complexity : 1-3 performance deficits relating to physical, cognitive , or psychosocial skils that result in activity limitations and / or participation restrictions  LOW Complexity : No comorbidities that affect functional and no verbal or physical assistance needed to complete eval tasks       Based on the above components, the patient evaluation is determined to be of the following complexity level: LOW   Pain Rating:  Nr, high pain levels with activity    Activity Tolerance:   Fair  Please refer to the flowsheet for vital signs taken during this treatment. After treatment patient left in no apparent distress:    Supine in bed    COMMUNICATION/EDUCATION:   The patients plan of care was discussed with: Physical Therapist and Registered Nurse. Home safety education was provided and the patient/caregiver indicated understanding., Patient/family have participated as able in goal setting and plan of care. and Patient/family agree to work toward stated goals and plan of care. This patients plan of care is appropriate for delegation to [facility].     Thank you for this referral.  Sammi Leigh  Time Calculation: 35 mins

## 2019-10-24 NOTE — PROGRESS NOTES
KELLY:  1. [facility] referral pending. 2. Patient does not need a [facility], her son will transition her to [facility]. CM contacted patient son, Kelli Liner [phone] to discuss SNF choice. He would like referral sent to [address] [place] [address],[address].      Kylie Horvath [facility]

## 2019-10-24 NOTE — PERIOP NOTES
TRANSFER - OUT REPORT:    Verbal report given to Brandee Davis on Jet Osorio  being transferred to [phone] for routine post - op       Report consisted of patients Situation, Background, Assessment and   Recommendations(SBAR). Time Pre op antibiotic given:1715  Anesthesia Stop time: 6662  De Jesus Present on Transfer to floor:yes  Order for De Jesus on Chart:yes  Discharge Prescriptions with Chart:no    Information from the following report(s) SBAR, OR Summary, Intake/Output and MAR was reviewed with the receiving nurse. Opportunity for questions and clarification was provided. Is the patient on 02? YES       L/Min 4       Other     Is the patient on a monitor? NO    Is the nurse transporting with the patient? NO    Surgical Waiting Area notified of patient's transfer from PACU? YES      The following personal items collected during your admission accompanied patient upon transfer:   Dental Appliance: Dental Appliances: None  Vision: Visual Aid: Glasses  Hearing Aid:    Jewelry: Jewelry: None  Clothing: Clothing: At bedside  Other Valuables: Other Valuables:  At bedside  Valuables sent to safe:

## 2019-10-24 NOTE — PROGRESS NOTES
Pt noted to be mildly hypotensive at this time, about to receive fluid bolus, c/o pain and refusing therapy at this time, with MD present and increasing doses of pain medicine once BP improves.  Will continue to attempt tomorrow    Kaylynn Garcia, MERARIT, PT

## 2019-10-25 VITALS
RESPIRATION RATE: 14 BRPM | OXYGEN SATURATION: 98 % | DIASTOLIC BLOOD PRESSURE: 80 MMHG | BODY MASS INDEX: 23.16 KG/M2 | SYSTOLIC BLOOD PRESSURE: 122 MMHG | HEIGHT: 72 IN | WEIGHT: 171 LBS | TEMPERATURE: 98.1 F | HEART RATE: 84 BPM

## 2019-10-25 LAB
ECHO AO ROOT DIAM: 3.11 CM
ECHO AV PEAK GRADIENT: 6.5 MMHG
ECHO AV PEAK VELOCITY: 127.28 CM/S
ECHO LA MAJOR AXIS: 3.51 CM
ECHO LA TO AORTIC ROOT RATIO: 1.13
ECHO LV E' LATERAL VELOCITY: 14.37 CM/S
ECHO LV E' SEPTAL VELOCITY: 7.88 CM/S
ECHO LV INTERNAL DIMENSION DIASTOLIC: 4.41 CM (ref 3.9–5.3)
ECHO LV INTERNAL DIMENSION SYSTOLIC: 2.87 CM
ECHO LV IVSD: 0.96 CM (ref 0.6–0.9)
ECHO LV MASS 2D: 165.9 G (ref 67–162)
ECHO LV MASS INDEX 2D: 83.2 G/M2 (ref 43–95)
ECHO LV POSTERIOR WALL DIASTOLIC: 1 CM (ref 0.6–0.9)
ECHO MV A VELOCITY: 92.53 CM/S
ECHO MV AREA PHT: 3.1 CM2
ECHO MV E DECELERATION TIME (DT): 245.9 MS
ECHO MV E VELOCITY: 88.14 CM/S
ECHO MV E/A RATIO: 0.95
ECHO MV E/E' LATERAL: 6.13
ECHO MV E/E' RATIO (AVERAGED): 8.66
ECHO MV E/E' SEPTAL: 11.19
ECHO MV PRESSURE HALF TIME (PHT): 71.3 MS
ECHO PV MAX VELOCITY: 63.94 CM/S
ECHO PV PEAK GRADIENT: 1.6 MMHG
ECHO RV INTERNAL DIMENSION: 2.83 CM
ECHO RV TAPSE: 3.08 CM (ref 1.5–2)
ECHO TV REGURGITANT MAX VELOCITY: 326.02 CM/S
ECHO TV REGURGITANT PEAK GRADIENT: 42.5 MMHG
HGB BLD-MCNC: 8.1 G/DL (ref 11.5–16)
LVFS 2D: 35.01 %
MV DEC SLOPE: 3.58

## 2019-10-25 PROCEDURE — 74011250636 HC RX REV CODE- 250/636: Performed by: ORTHOPAEDIC SURGERY

## 2019-10-25 PROCEDURE — 97535 SELF CARE MNGMENT TRAINING: CPT | Performed by: OCCUPATIONAL THERAPIST

## 2019-10-25 PROCEDURE — 94640 AIRWAY INHALATION TREATMENT: CPT

## 2019-10-25 PROCEDURE — 74011250637 HC RX REV CODE- 250/637: Performed by: ORTHOPAEDIC SURGERY

## 2019-10-25 PROCEDURE — 97530 THERAPEUTIC ACTIVITIES: CPT | Performed by: OCCUPATIONAL THERAPIST

## 2019-10-25 PROCEDURE — 74011000250 HC RX REV CODE- 250: Performed by: FAMILY MEDICINE

## 2019-10-25 PROCEDURE — 74011250637 HC RX REV CODE- 250/637: Performed by: FAMILY MEDICINE

## 2019-10-25 PROCEDURE — 97530 THERAPEUTIC ACTIVITIES: CPT

## 2019-10-25 PROCEDURE — 36415 COLL VENOUS BLD VENIPUNCTURE: CPT

## 2019-10-25 PROCEDURE — 85018 HEMOGLOBIN: CPT

## 2019-10-25 PROCEDURE — 77030036660

## 2019-10-25 PROCEDURE — 77030011943

## 2019-10-25 RX ORDER — HYDROCODONE BITARTRATE AND ACETAMINOPHEN 5; 325 MG/1; MG/1
1-2 TABLET ORAL
Qty: 30 TAB | Refills: 0 | Status: SHIPPED | OUTPATIENT
Start: 2019-10-25 | End: 2019-11-01

## 2019-10-25 RX ORDER — FERROUS SULFATE, DRIED 160(50) MG
1 TABLET, EXTENDED RELEASE ORAL
Qty: 60 TAB | Refills: 1 | Status: SHIPPED | OUTPATIENT
Start: 2019-10-25 | End: 2020-02-14

## 2019-10-25 RX ORDER — POLYETHYLENE GLYCOL 3350 17 G/17G
17 POWDER, FOR SOLUTION ORAL DAILY
Qty: 1 PACKET | Refills: 0 | Status: SHIPPED
Start: 2019-10-26 | End: 2021-03-02 | Stop reason: ALTCHOICE

## 2019-10-25 RX ORDER — DOCUSATE SODIUM 100 MG/1
100 CAPSULE, LIQUID FILLED ORAL 2 TIMES DAILY
Qty: 60 CAP | Refills: 2 | Status: SHIPPED | OUTPATIENT
Start: 2019-10-25 | End: 2020-01-23

## 2019-10-25 RX ORDER — ACETAMINOPHEN 325 MG/1
650 TABLET ORAL
Qty: 30 TAB | Refills: 0 | Status: SHIPPED
Start: 2019-10-25 | End: 2020-02-14

## 2019-10-25 RX ORDER — CARVEDILOL 25 MG/1
25 TABLET ORAL 2 TIMES DAILY WITH MEALS
Qty: 60 TAB | Refills: 2 | Status: SHIPPED | OUTPATIENT
Start: 2019-10-25 | End: 2020-02-14

## 2019-10-25 RX ORDER — AMOXICILLIN 250 MG
1 CAPSULE ORAL 2 TIMES DAILY
Qty: 60 TAB | Refills: 0 | Status: SHIPPED
Start: 2019-10-25 | End: 2020-02-14

## 2019-10-25 RX ADMIN — SENNOSIDES, DOCUSATE SODIUM 1 TABLET: 50; 8.6 TABLET, FILM COATED ORAL at 10:04

## 2019-10-25 RX ADMIN — HYDROCODONE BITARTRATE AND ACETAMINOPHEN 2 TABLET: 5; 325 TABLET ORAL at 10:05

## 2019-10-25 RX ADMIN — ASPIRIN 81 MG: 81 TABLET, COATED ORAL at 10:04

## 2019-10-25 RX ADMIN — CARVEDILOL 25 MG: 12.5 TABLET, FILM COATED ORAL at 10:04

## 2019-10-25 RX ADMIN — HYDROCODONE BITARTRATE AND ACETAMINOPHEN 2 TABLET: 5; 325 TABLET ORAL at 04:34

## 2019-10-25 RX ADMIN — POLYETHYLENE GLYCOL 3350 17 G: 17 POWDER, FOR SOLUTION ORAL at 10:03

## 2019-10-25 RX ADMIN — ENOXAPARIN SODIUM 40 MG: 40 INJECTION SUBCUTANEOUS at 10:03

## 2019-10-25 RX ADMIN — IPRATROPIUM BROMIDE AND ALBUTEROL SULFATE 3 ML: .5; 3 SOLUTION RESPIRATORY (INHALATION) at 08:43

## 2019-10-25 RX ADMIN — IPRATROPIUM BROMIDE AND ALBUTEROL SULFATE 3 ML: .5; 3 SOLUTION RESPIRATORY (INHALATION) at 14:15

## 2019-10-25 RX ADMIN — DOCUSATE SODIUM 100 MG: 100 CAPSULE, LIQUID FILLED ORAL at 10:04

## 2019-10-25 RX ADMIN — Medication 1 TABLET: at 10:04

## 2019-10-25 NOTE — DISCHARGE INSTRUCTIONS
PT/OT: 50% PWB with walker per surgeon. Analgesics: Norco.  DVT proph: Lovenox 14 days, then ASA.   Follow up with Dr Unruly Myers as instructed

## 2019-10-25 NOTE — DISCHARGE SUMMARY
Discharge Summary       PATIENT ID: Shala Holbrook  MRN: [id number]   YOB: 1940    DATE OF ADMISSION: 10/22/2019  7:13 AM    DATE OF DISCHARGE:10/25/19 12:30  PRIMARY CARE PROVIDER: Augustine Serrano MD     ATTENDING PHYSICIAN: Rose Marie Barnhart  DISCHARGING PROVIDER: Latricia Del Rio MD    To contact this individual call [phone] and ask the  to page. If unavailable ask to be transferred the Adult Hospitalist Department. CONSULTATIONS: IP CONSULT TO ORTHOPEDIC SURGERY  IP CONSULT TO CARDIOLOGY    PROCEDURES/SURGERIES: Procedure(s):  LEFT HIP PERCUTANEOUS PINNING  REQ TF    ADMITTING DIAGNOSES & HOSPITAL COURSE:   77-year-old white female past medical history atrial flutter, COPD, early satiety, hypertension, acute myocardial infarction, nausea and vomiting, occult blood in stools, proximal A. fib status post ablation for the A. fib and a flutter and pacemaker is brought in ambulance secondary to unwitnessed ground-level fall in the garage of her townhouse this morning while walking back to the house after going out to get her newspaper. She is complaining of left hip pain, left eyebrow contusion- patient states she does not remember the fall does not remember why she fell. Kathryn Corral has been unable to ambulate since the fall. She was vomiting this am but that seems to have resolved. She denies any other acute issues and reports pain from cervical collar and dry mouth.     DISCHARGE DIAGNOSES / PLAN:      1. Ortho-Acute impacted fracture of the subcapital left femoral neck  S/p Percutaneous pinning of left femoral neck fracture by Dr Soledad Fuentes on 10/23/19.               Acute displaced fracture of the left inferior pubic ramus.               left S1 ala fracture               postop mobility orders per ortho - cont OT/PT  2. CV- history atrial fib/flutter s/p ablation & pacemaker,              EKG this admission only showing a-fib with RVR              HTN, MI- stable BP and HR on day of DC  Mild hypotension after surgery- stopped lisinopril              Past echo showing mod-sev TR, hold eliquis for surgery  Cardiology consulted for eval and tx recs  Pacemaker interrogation showing \"alarms for VT correlate with artifact; she has not had true VT noted during admission. [facility] [employer] dual chamber pacemaker reported NSVT, but available EGMs are consistent with AT/AF. 3. Pulm- COPD due to smoking-   resume dulera, spiriva, and albuterol inhaler-    CXR stable on admission,  4. GI- nausea and vomiting- due to pain? resolved,  5. HLD- resume statin meds  6. Full Code     She has 3 children Claire Stewart - [phone]     ADDITIONAL CARE RECOMMENDATIONS:   PT/OT: 50% PWB with walker per surgeon. Analgesics: Norco.  DVT proph: Lovenox 14 days, then ASA. Follow up with Dr Paresh Rahman as instructed       PENDING TEST RESULTS:   At the time of discharge the following test results are still pending: none    FOLLOW UP APPOINTMENTS:    Follow-up Information     Follow up With Specialties Details Why Contact Info    [address] [place] [address] Monique Terrell) [address] [place] [address] Drive [facility] [address], [address]      [facility], [address] [place] [address]  [phone]               DIET: cardiac diet    ACTIVITY: See surgical instructions    WOUND CARE: per ortho instructions    EQUIPMENT needed: none      DISCHARGE MEDICATIONS:  Current Discharge Medication List      START taking these medications    Details   calcium-vitamin D (OYSTER SHELL) 500 mg(1,250mg) -200 unit per tablet Take 1 Tab by mouth three (3) times daily (with meals). Qty: 60 Tab, Refills: 1      acetaminophen (TYLENOL) 325 mg tablet Take 2 Tabs by mouth every four (4) hours as needed for Pain.   Qty: 30 Tab, Refills: 0      HYDROcodone-acetaminophen (NORCO) 5-325 mg per tablet Take 1-2 Tabs by mouth every four (4) hours as needed for Pain for up to 7 days. Max Daily Amount: 12 Tabs. Qty: 30 Tab, Refills: 0    Associated Diagnoses: Closed fracture of neck of left femur, initial encounter ([facility])      docusate sodium (COLACE) 100 mg capsule Take 1 Cap by mouth two (2) times a day for 90 days. Qty: 60 Cap, Refills: 2      polyethylene glycol (MIRALAX) 17 gram packet Take 1 Packet by mouth daily. Qty: 1 Packet, Refills: 0      senna-docusate (PERICOLACE) 8.6-50 mg per tablet Take 1 Tab by mouth two (2) times a day. Qty: 60 Tab, Refills: 0         CONTINUE these medications which have CHANGED    Details   carvedilol (COREG) 25 mg tablet Take 1 Tab by mouth two (2) times daily (with meals). Qty: 60 Tab, Refills: 2         CONTINUE these medications which have NOT CHANGED    Details   mometasone-formoterol (DULERA) 200-5 mcg/actuation HFA inhaler Take 2 Puffs by inhalation two (2) times a day. apixaban (ELIQUIS) 5 mg tablet Take 5 mg by mouth every twelve (12) hours. albuterol (PROVENTIL HFA, VENTOLIN HFA, PROAIR HFA) 90 mcg/actuation inhaler Take 2 Puffs by inhalation every four (4) hours as needed for Wheezing. tiotropium bromide (SPIRIVA RESPIMAT) 2.5 mcg/actuation inhaler Take 2 Puffs by inhalation daily. atorvastatin (LIPITOR) 20 mg tablet TAKE 1 TABLET BY MOUTH EVERY DAY  Qty: 90 Tab, Refills: 1      aspirin delayed-release 81 mg tablet Take 81 mg by mouth daily. multivitamin (ONE A DAY) tablet Take 1 Tab by mouth daily. STOP taking these medications       lisinopril (PRINIVIL, ZESTRIL) 5 mg tablet Comments:   Reason for Stopping:                 NOTIFY YOUR PHYSICIAN FOR ANY OF THE FOLLOWING:   Fever over 101 degrees for 24 hours. Chest pain, shortness of breath, fever, chills, nausea, vomiting, diarrhea, change in mentation, falling, weakness, bleeding. Severe pain or pain not relieved by medications. Or, any other signs or symptoms that you may have questions about.     DISPOSITION:    Home With:   OT  PT  [place] RN      X SNF/Inpatient Rehab    Independent/assisted living    Hospice    Other:       PATIENT CONDITION AT DISCHARGE:     Functional status    Poor    X Deconditioned     Independent      Cognition   X  Lucid     Forgetful     Dementia      Catheters/lines (plus indication)    De Jesus     PICC     PEG    X None      Code status    X Full code     DNR      PHYSICAL EXAMINATION AT DISCHARGE:  Patient Vitals for the past 24 hrs:   Temp Pulse Resp BP SpO2   10/25/19 1053  86  124/70    10/25/19 1036  97  129/73    10/25/19 0759 98.7 °F (37.1 °C) 80 16 150/55 98 %   10/25/19 0237 98 °F (36.7 °C) 95 16 117/67 96 %   10/24/19 2002 97.9 °F (36.6 °C) 90 16 108/68 97 %   10/24/19 1827 98.5 °F (36.9 °C) 88 16 128/72 96 %   10/24/19 1747    104/66    10/24/19 1713 97.8 °F (36.6 °C) 94 16 104/66 96 %   10/24/19 1600     98 %   10/24/19 1459    107/67    10/24/19 1432  98  97/64    10/24/19 1407 97.9 °F (36.6 °C) 96 16 92/60 98 %   10/24/19 1239     90 %     General:          Alert, cooperative, no distress, appears stated age. HEENT:           Atraumatic, anicteric sclerae, pink conjunctivae                          No oral ulcers, mucosa moist, throat clear, dentition fair  Neck:               Supple, symmetrical  Lungs:             Clear to auscultation bilaterally. No Wheezing or Rhonchi. No rales. Chest wall:      No tenderness  No Accessory muscle use. Heart:              Regular  rhythm,  No  murmur   No edema  Abdomen:        Soft, non-tender. Not distended. Bowel sounds normal  Extremities:     No cyanosis. No clubbing,                            Skin turgor normal, Capillary refill normal  Skin:                Not pale. Not Jaundiced  No rashes   Psych:             Not anxious or agitated.   Neurologic:      Alert, moves all extremities, answers questions appropriately and responds to commands   Recent Results (from the past 24 hour(s))   ECHO ADULT COMPLETE    Collection Time: 10/24/19 5:30 PM   Result Value Ref Range    LV E' Lateral Velocity 14.37 cm/s    LV E' Septal Velocity 7.88 cm/s    Tapse 3.08 (A) 1.5 - 2.0 cm    Ao Root D 3.11 cm    Aortic Valve Systolic Peak Velocity 674.65 cm/s    AoV PG 6.5 mmHg    LVIDd 4.41 3.9 - 5.3 cm    LVPWd 1.00 (A) 0.6 - 0.9 cm    LVIDs 2.87 cm    IVSd 0.96 (A) 0.6 - 0.9 cm    MVA (PHT) 3.1 cm2    MV A Johny 92.53 cm/s    MV E Johny 88.14 cm/s    MV E/A 0.95     Left Atrium to Aortic Root Ratio 1.13     RVIDd 2.83 cm    LV Mass .9 (A) 67 - 162 g    LV Mass AL Index 83.2 43 - 95 g/m2    E/E' lateral 6.13     E/E' septal 11.19     E/E' ratio (averaged) 8.66     Mitral Valve E Wave Deceleration Time 245.9 ms    Mitral Valve Pressure Half-time 71.3 ms    Left Atrium Major Axis 3.51 cm    Triscuspid Valve Regurgitation Peak Gradient 42.5 mmHg    Pulmonic Valve Max Velocity 63.94 cm/s    TR Max Velocity 326.02 cm/s    Left Ventricular Fractional Shortening by 2D 26.255020797 %    Mitral Valve Deceleration Adams [id number]     PV peak gradient 1.6 mmHg   HEMOGLOBIN    Collection Time: 10/25/19  2:43 AM   Result Value Ref Range    HGB 8.1 (L) 11.5 - 16.0 g/dL       CHRONIC MEDICAL DIAGNOSES:  Problem List as of 10/25/2019 Date Reviewed: 10/25/2019          Codes Class Noted - Resolved    Femur fracture, left ([facility] [address] ICD-10-CM: S96.15EF  ICD-9-CM: 821.00  10/22/2019 - Present        Varicose veins of both lower extremities with inflammation ICD-10-CM: I83.11, I83.12  ICD-9-CM: 454.1  5/21/2019 - Present        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 ICD-10-CM: D37.4  ICD-9-CM: 235.2  5/11/2017 - Present    Overview Signed 5/11/2017  7:55 AM by Mauro Reid MD     Tubulovillous adenoma rectum 2016             PAF (paroxysmal atrial fibrillation) (HCC) ICD-10-CM: I48.0  ICD-9-CM: 427.31  3/23/2017 - Present        Atrial flutter (HCC) ICD-10-CM: L10.79  ICD-9-CM: 427.32  3/23/2017 - Present        S/P ablation of atrial fibrillation ICD-10-CM: Z98.890, Z86.79  ICD-9-CM: V45.89  3/23/2017 - Present        S/P ablation of atrial flutter ICD-10-CM: Z98.890, Z86.79  ICD-9-CM: V45.89  3/23/2017 - Present        Occult blood in stools ICD-10-CM: R19.5  ICD-9-CM: 792.1  1/28/2016 - Present        Early satiety ICD-10-CM: R68.81  ICD-9-CM: 780.94  1/28/2016 - Present        Femoral hernia with obstruction ICD-10-CM: K41.30  ICD-9-CM: 552.00  11/29/2015 - Present        SBO (small bowel obstruction) ([facility] [address] ICD-10-CM: Z77.567  ICD-9-CM: 560.9  11/28/2015 - Present        Pacemaker ICD-10-CM: Z95.0  ICD-9-CM: V45.01  9/8/2014 - Present        [facility] ([facility] ICD-10-CM: I44.2  ICD-9-CM: 426.0  9/6/2014 - Present        Acute MI, inferolateral wall (HCC) ICD-10-CM: I21.19  ICD-9-CM: 410.20  9/4/2014 - Present        Hypertension ICD-10-CM: I10  ICD-9-CM: 401.9  9/4/2014 - Present        Tobacco abuse ICD-10-CM: Z72.0  ICD-9-CM: 305.1  9/4/2014 - Present        Takotsubo cardiomyopathy ICD-10-CM: I51.81  ICD-9-CM: 429.83  9/4/2014 - Present        STEMI (ST elevation myocardial infarction) [facility]) ICD-10-CM: I21.3  ICD-9-CM: 410.90  9/4/2014 - Present              Greater than 30 minutes were spent with the patient on counseling and coordination of care    Signed:   Stephanie Shrestha MD  10/25/2019  12:27 PM

## 2019-10-25 NOTE — PROGRESS NOTES
ORTHO PROGRESS NOTE      SUBJECTIVE:  Alesia Hawkins states her pain is controlled at rest.    OBJECTIVE:  Patient Vitals for the past 24 hrs:   Temp Pulse BP   10/25/19 1053  86 124/70   10/25/19 1036  97 129/73   10/25/19 0759 98.7 °F (37.1 °C) 80 150/55   10/25/19 0237 98 °F (36.7 °C) 95 117/67   10/24/19 2002 97.9 °F (36.6 °C) 90 108/68   10/24/19 1827 98.5 °F (36.9 °C) 88 128/72   10/24/19 1747   104/66   10/24/19 1713 97.8 °F (36.6 °C) 94 104/66   10/24/19 1459   107/67   10/24/19 1432  98 97/64   10/24/19 1407 97.9 °F (36.6 °C) 96 92/60       Alert, no distress. Sitting up in bed. No family present. Slow to answer questions and slow to follow commands. Respirations unlabored. Min pain with gentle PROM. Thigh soft. Dressing has some contained bloody drainage. No erythema. SILT bilat foot. Moves ankles OK. Calves mildly tender bilat but soft. Recent Labs     10/25/19  0243 10/24/19  0330   HGB 8.1* 9.0*   BUN  --  10   CREA  --  0.49*   GFRAA  --  >60   GFRNA  --  >60       ASSESSMENT:  Procedure: Procedure(s):  LEFT HIP PERCUTANEOUS PINNING  REQ TF  Post Op day: 2 Days Post-Op  stable    PLAN:  PT/OT: 50% PWB with walker per surgeon. Analgesics: Norco.  DVT proph: Lovenox 14 days, then ASA. Disp planning. Lives alone so will likely need SNF.       ALE Matute  Orthopedic Trauma Service  [address]

## 2019-10-25 NOTE — PROGRESS NOTES
Acosta called to Nurse Lauris Severance at [facility] per bsbar protocol. Patient is prepared for transfer, VSS and no personal belonging are here with her. All documents and thrree Rxs sent with the patient.

## 2019-10-25 NOTE — PROGRESS NOTES
Transition of Care    Sn[place]   [place] [address] [facility] [place]    [place] and BCBS  No authorization needed. CM talked with Keisha Montanez at [facility]-- patient can be admitted today. Ivy Bradley Referral sent to [facility] via allscripts and 3:30 pm transport confirmed. Patient was inpatient 10/22/19 at 9:30 am    Dr General Cruz calling son Stephon Lopez with discharge update    CM talked with son and he was in agreement with patient being discharged today to [facility]. He confirmed that patient owns her home and has resources. CM gave hi information for [facility] to assist with AL placement for patient after rehab. Nurse to call report to [phone]     Envelope and ambulance form on chart. Patient signed 2nd medicare letter and it was placed in her chart      Main contact alban Braswell [phone] Patient has a daughter Christophe Crawford ([phone]) and son Gadiel Andrews ([phone])    Transition of Care Plan to SNF/Rehab    SNF/Rehab Transition:  Patient has been accepted to to [facility] and meets criteria for admission. Patient will transported by [facility] and expected to leave at 3:30 pm    Communication to Patient/Family:  Met with patient and she is agreeable to the transition plan. Communication to SNF/Rehab:  [facility] has been notified to update the transition plan to the facility and call report (phone number [phone].   Discharge information has been updated on the AVS.     Discharge instructions available in CC Link    SNF/Rehab Transition:  Ambulatory Care Management: NA    Reviewed and confirmed with facility, [facility], they can manage the patient care needs for the following:     [employer] with (X) only those applicable:    Medication:  [x]  Medications will be available at the facility  []  IV Antibiotics   []  Controlled Substance - hard copy to be sent with patient   []  Weekly Labs   Documents:  [x] Hard RX  [x] MAR  [x] Kardex  [x] AVS  [x]Transfer Summary  [x]Discharge   Equipment:  []  CPAP/BiPAP  []  Wound Vacuum  []  De Jesus or Urinary Device  []  PICC/Central Line  []  Nebulizer  []  Ventilator   Treatment:  []Isolation (for MRSA, VRE, etc.)  []Surgical Drain Management  []Tracheostomy Care  []Dressing Changes  []Dialysis with transportation and chair time  []PEG Care  [x]Oxygen  []Daily Weights for Heart Failure   Dietary:  []Any diet limitations  []Tube Feedings   []Total Parenteral Management (TPN)   Eligible for Medicaid Long Term Services and Supports  Yes:  [] Eligible for medical assistance or will become eligible within 180 days and UAI completed. [] Provider/Patient and/or support system has requested screening. [] UAI copy provided to patient or responsible party,  [] UAI unavailable at discharge will send once processed to SNF provider. [] UAI unavailable at discharged mailed to patient  No:   [] Private pay and is not financially eligible for Medicaid within the next 180 days. [] Reside out-of-state.   [] A residents of a state owned/operated facility that is licensed  by [address] [employer] or [facility]  [] Enrollment in [facility] hospice services  [] [address]  [] Patient /Family declines to have screening completed or provide financial information for screening     Financial Resources:  Medicaid    [] Initiated and application pending   [] Full coverage     Advanced Care Plan:  []Surrogate Decision Maker of Care  []POA  []Communicated Code Status (DDNR\", \"Full\")    Other

## 2019-10-25 NOTE — PROGRESS NOTES
CM verified patient has a qualifying hospital stay for [facility].       Delaine Bamberger, BSW/CRM

## 2019-10-25 NOTE — PROGRESS NOTES
Problem: Self Care Deficits Care Plan (Adult)  Goal: *Acute Goals and Plan of Care (Insert Text)  Description    FUNCTIONAL STATUS PRIOR TO ADMISSION: Patient was independent and active without use of DME.     HOME SUPPORT: The patient lived alone with family to provide assistance. Occupational Therapy Goals  Initiated 10/24/2019  1. Patient will perform bathing with moderate assistance within 7 day(s). 2.  Patient will perform lower body dressing with moderate assistance  within 7 day(s). 3.  Patient will perform toilet transfers with moderate assistance  within 7 day(s). 4.  Patient will perform all aspects of toileting with moderate assistance  within 7 day(s). 5.  Patient will participate in upper extremity therapeutic exercise/activities with modified independence for 10 minutes within 7 day(s). 6.  Patient will utilize energy conservation techniques during functional activities with verbal cues within 7 day(s). Outcome: Progressing Towards Goal     OCCUPATIONAL THERAPY TREATMENT  Patient: Dl Dobbins (59 y.o. female)  Date: 10/25/2019  Diagnosis: Femur fracture, left (HCC) [S72.92XA] <principal problem not specified>  Procedure(s) (LRB):  LEFT HIP PERCUTANEOUS PINNING  REQ TF (Left) 2 Days Post-Op  Precautions: PWB(50%, pelvic fracture)  Chart, occupational therapy assessment, plan of care, and goals were reviewed. ASSESSMENT  Patient continues with skilled OT services and is progressing slowly towards goals. She presented drowsy and mildly confused with slow processing. Pt required total A top manage slipper socks, max A x2 for bed mobility, unable to use UEs to assist with mobility and required max A x2 to clear ITs from bed, but unable to stand. Continue to recommend SNF at discharge.       Current Level of Function Impacting Discharge (ADLs): total A for LE ADLs, toileting and functional mobility    Other factors to consider for discharge: Pt was living alone in senior apartment complex         PLAN :  Patient continues to benefit from skilled intervention to address the above impairments. Continue treatment per established plan of care. to address goals. Recommend with staff: chair position in bed for all meals, encourage active participation in all ADLs    Recommend next OT session: EOB ADLs, UE strengthening exer    Recommendation for discharge: (in order for the patient to meet his/her long term goals)  Therapy up to 5 days/week in SNF setting    This discharge recommendation:  Has been made in collaboration with the attending provider and/or case management    IF patient discharges home will need the following DME: to be determined (TBD)       SUBJECTIVE:   Patient stated I will try.     OBJECTIVE DATA SUMMARY:   Cognitive/Behavioral Status:  Neurologic State: Confused;Drowsy  Orientation Level: Oriented to person;Oriented to situation;Oriented to time;Disoriented to place  Cognition: Decreased attention/concentration;Decreased command following;Poor safety awareness  Perception: Appears intact  Perseveration: No perseveration noted  Safety/Judgement: Decreased awareness of environment;Decreased awareness of need for assistance;Decreased awareness of need for safety;Decreased insight into deficits; Fall prevention    Functional Mobility and Transfers for ADLs:  Bed Mobility:  Supine to Sit: Maximum assistance;Assist x2  Sit to Supine: Maximum assistance;Assist x2  Scooting: Total assistance    Transfers:  Sit to Stand: Maximum assistance; Additional time;Assist x2(only able to clear ITs from bed matress)          Balance:  Sitting: Impaired; Without support  Sitting - Static: Fair (occasional)  Sitting - Dynamic: Fair (occasional)  Standing: (unable to get to standing due to pain/weakness)    ADL Intervention:  Lower Body Dressing Assistance  Socks: Total assistance (dependent)  Position Performed: Long sitting on bed;Seated edge of bed  Cues: Don;Doff;Physical assistance; Tactile cues provided;Verbal cues provided;Visual cues provided    Cognitive Retraining  Safety/Judgement: Decreased awareness of environment;Decreased awareness of need for assistance;Decreased awareness of need for safety;Decreased insight into deficits; Fall prevention    Pain:  8/10 LLE and pelvis    Activity Tolerance:   Poor and requires frequent rest breaks  Please refer to the flowsheet for vital signs taken during this treatment.     After treatment patient left in no apparent distress:   Chair position in bed and Call bell within reach    COMMUNICATION/COLLABORATION:   The patients plan of care was discussed with: Physical Therapist and Registered Nurse    Erika Encarnacion OT  Time Calculation: 32 mins

## 2019-10-25 NOTE — PROGRESS NOTES
Hospitalist Progress Note  Jenny Alejandro MD  Answering service: [phone] OR [phone] from in house phone        Date of Service:  10/24/2019  NAME:  Murray Crook  :  1940  MRN:  [id number]      Admission Summary:   65-year-old white female past medical history atrial flutter, COPD, early satiety, hypertension, acute myocardial infarction, nausea and vomiting, occult blood in stools, proximal A. fib status post ablation for the A. fib and a flutter and pacemaker is brought in ambulance secondary to unwitnessed ground-level fall in the garage of her townhouse this morning while walking back to the house after going out to get her newspaper. She is complaining of left hip pain, left eyebrow contusion- patient states she does not remember the fall does not remember why she fell. Ericka Gunn has been unable to ambulate since the fall. She was vomiting this am but that seems to have resolved. She denies any other acute issues and reports pain from cervical collar and dry mouth. Interval history / Subjective:   Patient with significant amount of pain  Worked with PT today     Assessment & Plan: 1. Ortho-Acute impacted fracture of the subcapital left femoral neck  S/p Percutaneous pinning of left femoral neck fracture by Dr Home George on 10/23/19. Acute displaced fracture of the left inferior pubic ramus. left S1 ala fracture               postop mobility orders per ortho - cont OT/PT  2. CV- history atrial fib/flutter s/p ablation & pacemaker,               HTN, MI- resume home meds-               Past echo showing mod-sev TR, hold eliquis for surgery  Cardiology consulted for eval and tx recs  Pacemaker interrogation showing \"alarms for VT correlate with artifact; she has not had true VT noted during admission. Check of [address] dual chamber pacemaker reported NSVT, but available EGMs are consistent with AT/AF.   3. Pulm- COPD due to smoking- hold dulera, spiriva, and albuterol inhaler- start scheduled nebs, CXR stable on admission,  4. GI- nausea and vomiting- due to pain? resolved,  5. HLD- resume statin meds  6. Full Code     She has 3 children Kinsey Garner - [phone]  Code status: FULL  DVT prophylaxis: Lovenox    Care Plan discussed with: Patient/Family  Disposition: SNF/LTC and TBD     Hospital Problems  Date Reviewed: 10/23/2019          Codes Class Noted POA    Femur fracture, left ([address] ICD-10-CM: G31.78BR  ICD-9-CM: 821.00  10/22/2019 Unknown                Review of Systems:   A comprehensive review of systems was negative except for that written in the HPI. Vital Signs:    Last 24hrs VS reviewed since prior progress note. Most recent are:  Visit Vitals  /72   Pulse 88   Temp 98.5 °F (36.9 °C)   Resp 16   Ht 6' (1.829 m)   Wt 77.6 kg (171 lb)   SpO2 96%   BMI 23.19 kg/m²         Intake/Output Summary (Last 24 hours) at 10/24/2019 2214  Last data filed at 10/24/2019 2028  Gross per 24 hour   Intake    Output 575 ml   Net -575 ml        Physical Examination:             Constitutional:  No acute distress, cooperative, pleasant    HENT:  severe periorbital bruising around left eye/temple. Resp:  decreased breath sounds bilaterally. No wheezing/rhonchi/rales. No accessory muscle use   CV:  IRR, no murmurs, gallops, rubs    GI:  Soft, non distended, non tender. normoactive bowel sounds, no hepatosplenomegaly     Musculoskeletal:  mild LE edema, warm, 2+ pulses throughout    Neurologic:  Moves all extremities. AAOx3, CN II-XII reviewed     Psych:  Good insight, Not anxious nor agitated.        Data Review:    Review and/or order of tests in the radiology section of CPT  Review and/or order of tests in the medicine section of CPT      Labs:     Recent Labs     10/24/19  0330 10/22/19  0727   WBC  --  15.6*   HGB 9.0* 12.5   HCT  --  37.6   PLT  --  211     Recent Labs     10/24/19  0330 10/23/19  0407 10/22/19  0727   *  --  135*   K 4.1  --  4.0     --  103   CO2 28  --  29   BUN 10  --  11   CREA 0.49*  --  0.65   GLU 97  --  112*   CA 8.4*  --  9.4   MG  --  1.7  --      Recent Labs     10/22/19  0727   SGOT 55*   ALT 50   AP 92   TBILI 0.5   TP 6.9   ALB 3.1*   GLOB 3.8     No results for input(s): INR, PTP, APTT, INREXT in the last 72 hours. No results for input(s): FE, TIBC, PSAT, FERR in the last 72 hours. No results found for: FOL, RBCF   No results for input(s): PH, PCO2, PO2 in the last 72 hours.   Recent Labs     10/22/19  0727   TROIQ <0.05     Lab Results   Component Value Date/Time    Cholesterol, total 183 09/05/2014 04:30 AM    HDL Cholesterol 61 09/05/2014 04:30 AM    LDL, calculated 105.6 (H) 09/05/2014 04:30 AM    Triglyceride 82 09/05/2014 04:30 AM    CHOL/HDL Ratio 3.0 09/05/2014 04:30 AM     Lab Results   Component Value Date/Time    Glucose (POC) 103 (H) 11/28/2015 12:21 PM    Glucose (POC) 111 (H) 09/04/2014 06:16 PM    Glucose (POC) 88 04/24/2013 02:28 PM     Lab Results   Component Value Date/Time    Color YELLOW/STRAW 10/22/2019 09:18 AM    Appearance CLEAR 10/22/2019 09:18 AM    Specific gravity 1.007 10/22/2019 09:18 AM    pH (UA) 6.0 10/22/2019 09:18 AM    Protein NEGATIVE  10/22/2019 09:18 AM    Glucose NEGATIVE  10/22/2019 09:18 AM    Ketone NEGATIVE  10/22/2019 09:18 AM    Bilirubin NEGATIVE  10/22/2019 09:18 AM    Urobilinogen 0.2 10/22/2019 09:18 AM    Nitrites NEGATIVE  10/22/2019 09:18 AM    Leukocyte Esterase NEGATIVE  10/22/2019 09:18 AM    Epithelial cells FEW 10/22/2019 09:18 AM    Bacteria NEGATIVE  10/22/2019 09:18 AM    WBC 0-4 10/22/2019 09:18 AM    RBC 5-10 10/22/2019 09:18 AM         Medications Reviewed:     Current Facility-Administered Medications   Medication Dose Route Frequency    sodium chloride (NS) flush 5-40 mL  5-40 mL IntraVENous Q8H    sodium chloride (NS) flush 5-40 mL  5-40 mL IntraVENous PRN    naloxone (NARCAN) injection 0.4 mg  0.4 mg IntraVENous PRN    calcium-vitamin D (OS-GREG) 500 mg-200 unit tablet  1 Tab Oral TID WITH MEALS    senna-docusate (PERICOLACE) 8.6-50 mg per tablet 1 Tab  1 Tab Oral BID    polyethylene glycol (MIRALAX) packet 17 g  17 g Oral DAILY    [START ON 10/25/2019] bisacodyl (DULCOLAX) suppository 10 mg  10 mg Rectal DAILY PRN    enoxaparin (LOVENOX) injection 40 mg  40 mg SubCUTAneous DAILY    ondansetron (ZOFRAN) injection 4 mg  4 mg IntraVENous Q4H PRN    albuterol-ipratropium (DUO-NEB) 2.5 MG-0.5 MG/3 ML  3 mL Nebulization QID    HYDROcodone-acetaminophen (NORCO) 5-325 mg per tablet 2 Tab  2 Tab Oral Q4H PRN    sodium chloride (NS) flush 5-40 mL  5-40 mL IntraVENous Q8H    sodium chloride (NS) flush 5-40 mL  5-40 mL IntraVENous PRN    acetaminophen (TYLENOL) tablet 650 mg  650 mg Oral Q4H PRN    morphine injection 2 mg  2 mg IntraVENous Q3H PRN    diphenhydrAMINE (BENADRYL) injection 12.5 mg  12.5 mg IntraVENous Q4H PRN    docusate sodium (COLACE) capsule 100 mg  100 mg Oral BID    atorvastatin (LIPITOR) tablet 20 mg  20 mg Oral QHS    aspirin delayed-release tablet 81 mg  81 mg Oral DAILY    dextrose 5% and 0.9% NaCl infusion  75 mL/hr IntraVENous CONTINUOUS    carvedilol (COREG) tablet 25 mg  25 mg Oral BID WITH MEALS    prochlorperazine (COMPAZINE) with saline injection 5 mg  5 mg IntraVENous Q6H PRN     ______________________________________________________________________  EXPECTED LENGTH OF STAY: - - -  ACTUAL LENGTH OF STAY:          2                 Ina Granger MD

## 2019-10-25 NOTE — PROGRESS NOTES
Problem: Mobility Impaired (Adult and Pediatric)  Goal: *Acute Goals and Plan of Care (Insert Text)  Description  FUNCTIONAL STATUS PRIOR TO ADMISSION: Patient was independent and active without use of DME.    HOME SUPPORT PRIOR TO ADMISSION: The patient lived alone with no local support. Physical Therapy Goals  Initiated 10/24/2019  1. Patient will move from supine to sit and sit to supine , scoot up and down and roll side to side in bed with modified independence within 7 day(s). 2.  Patient will transfer from bed to chair and chair to bed with modified independence using the least restrictive device within 7 day(s). 3.  Patient will perform sit to stand with modified independence within 7 day(s). 4.  Patient will ambulate with modified independence for 150 feet with the least restrictive device within 7 day(s). 5.  Patient will ascend/descend 4 stairs with 1 handrail(s) with supervision/set-up within 7 day(s). Outcome: Progressing Towards Goal   PHYSICAL THERAPY TREATMENT  Patient: Mahi Martinez (42 y.o. female)  Date: 10/25/2019  Diagnosis: Femur fracture, left (HCC) [S72.92XA] <principal problem not specified>  Procedure(s) (LRB):  LEFT HIP PERCUTANEOUS PINNING  REQ TF (Left) 2 Days Post-Op  Precautions: PWB(50%, pelvic fracture)  Chart, physical therapy assessment, plan of care and goals were reviewed. ASSESSMENT  Patient continues with skilled PT services and is progressing towards goals. She is still quite limited by pain and global weakness, but she was better able to sit EOB with stable VS today. Attempted several times to stand and to laterally scoot in sitting, but she was not able to adequately clear her hips. Added ice packs for pain management of the L hip. Her overall processing appears delayed and she does not initiate much in the way of mobility or conversation, and she is oriented x2. Expect that she will progress slowly, but she was independent prior to admission.   We will continue to progress her activity as she is able to tolerate and recommend SNF level rehab once she is medically ready. Current Level of Function Impacting Discharge (mobility/balance): max assist for mobility supine to sit    Other factors to consider for discharge: patient lived alone and          PLAN :  Patient continues to benefit from skilled intervention to address the above impairments. Continue treatment per established plan of care. to address goals. Recommendation for discharge: (in order for the patient to meet his/her long term goals)  Therapy up to 5 days/week in SNF setting    This discharge recommendation:  Has been made in collaboration with the attending provider and/or case management    IF patient discharges home will need the following DME: to be determined (TBD)       SUBJECTIVE:   Patient stated Rushie Drewch you going to make me stand up? Denisse Aleman    OBJECTIVE DATA SUMMARY:   Critical Behavior:  Neurologic State: Alert, Appropriate for age  Orientation Level: Oriented X4  Cognition: Appropriate decision making     Functional Mobility Training:  Bed Mobility:     Supine to Sit: Maximum assistance;Assist x2  Sit to Supine: Maximum assistance;Assist x2           Transfers:                                   Balance:  Sitting: Impaired; Without support  Sitting - Static: Fair (occasional)  Sitting - Dynamic: Fair (occasional)  Standing: (unable to get to standing due to pain/weakness)  Ambulation/Gait Training:                                                        Stairs: Therapeutic Exercises:   AROM of UEs and LEs  Pain Rating:  Pain L hip and pelvis, but primarily when moving    Activity Tolerance:   Fair and requires frequent rest breaks  Please refer to the flowsheet for vital signs taken during this treatment.     After treatment patient left in no apparent distress:   Call bell within reach, Side rails x 3 and bed in chair position and ice in place     COMMUNICATION/COLLABORATION: The patients plan of care was discussed with: Occupational Therapist, Registered Nurse,  and ALE Abdullahi, PT   Time Calculation: 34 mins

## 2019-10-25 NOTE — PROGRESS NOTES
Bedside and Verbal shift change report given to Violeta (oncoming nurse) by Alejandra Balderas (offgoing nurse). Report included the following information SBAR, Kardex, Intake/Output and MAR.

## 2019-10-25 NOTE — PROGRESS NOTES
Primary Nurse Gradie Angelucci, RN and Christiano Bullard RN, RN performed a dual skin assessment on this patient No impairment noted  Garth score is 20    Shift report per sbar to SARAH Monsivais

## 2019-10-25 NOTE — PROGRESS NOTES
TRANSFER - IN REPORT:    Verbal report received from Tracy(name) on Jet Osorio  being received from [address] [place] [address] for routine progression of care      Report consisted of patients Situation, Background, Assessment and   Recommendations(SBAR). Information from the following report(s) SBAR, Kardex, Intake/Output and MAR was reviewed with the receiving nurse. Opportunity for questions and clarification was provided. Assessment completed upon patients arrival to unit and care assumed.

## 2019-10-25 NOTE — PROGRESS NOTES
Bedside and Verbal shift change report given to HERNANDO Montalvo (oncoming nurse) by Emilia Ferrari RN (offgoing nurse). Report included the following information SBAR, Kardex, OR Summary, Procedure Summary, Intake/Output, MAR and Recent Results.

## 2019-10-28 ENCOUNTER — APPOINTMENT (OUTPATIENT)
Dept: GENERAL RADIOLOGY | Age: 79
End: 2019-10-28
Attending: EMERGENCY MEDICINE
Payer: MEDICARE

## 2019-10-28 ENCOUNTER — HOSPITAL ENCOUNTER (EMERGENCY)
Age: 79
Discharge: NURSING, REHAB OR LONG-TERM CARE | End: 2019-10-28
Attending: EMERGENCY MEDICINE
Payer: MEDICARE

## 2019-10-28 VITALS
TEMPERATURE: 98.3 F | DIASTOLIC BLOOD PRESSURE: 63 MMHG | RESPIRATION RATE: 16 BRPM | HEART RATE: 76 BPM | HEIGHT: 72 IN | SYSTOLIC BLOOD PRESSURE: 139 MMHG | WEIGHT: 168 LBS | BODY MASS INDEX: 22.75 KG/M2 | OXYGEN SATURATION: 100 %

## 2019-10-28 DIAGNOSIS — K59.00 CONSTIPATION, UNSPECIFIED CONSTIPATION TYPE: Primary | ICD-10-CM

## 2019-10-28 DIAGNOSIS — K92.0 HEMATEMESIS WITH NAUSEA: ICD-10-CM

## 2019-10-28 LAB
ALBUMIN SERPL-MCNC: 2.4 G/DL (ref 3.5–5)
ALBUMIN/GLOB SERPL: 0.7 {RATIO} (ref 1.1–2.2)
ALP SERPL-CCNC: 84 U/L (ref 45–117)
ALT SERPL-CCNC: 17 U/L (ref 12–78)
ANION GAP SERPL CALC-SCNC: 2 MMOL/L (ref 5–15)
AST SERPL-CCNC: 31 U/L (ref 15–37)
BILIRUB SERPL-MCNC: 1.1 MG/DL (ref 0.2–1)
BUN SERPL-MCNC: 14 MG/DL (ref 6–20)
BUN/CREAT SERPL: 27 (ref 12–20)
CALCIUM SERPL-MCNC: 9 MG/DL (ref 8.5–10.1)
CHLORIDE SERPL-SCNC: 96 MMOL/L (ref 97–108)
CO2 SERPL-SCNC: 35 MMOL/L (ref 21–32)
CREAT SERPL-MCNC: 0.51 MG/DL (ref 0.55–1.02)
ERYTHROCYTE [DISTWIDTH] IN BLOOD BY AUTOMATED COUNT: 13.9 % (ref 11.5–14.5)
GLOBULIN SER CALC-MCNC: 3.6 G/DL (ref 2–4)
GLUCOSE SERPL-MCNC: 109 MG/DL (ref 65–100)
HCT VFR BLD AUTO: 26.1 % (ref 35–47)
HGB BLD-MCNC: 8.5 G/DL (ref 11.5–16)
INR PPP: 1.1 (ref 0.9–1.1)
LIPASE SERPL-CCNC: 62 U/L (ref 73–393)
MCH RBC QN AUTO: 31 PG (ref 26–34)
MCHC RBC AUTO-ENTMCNC: 32.6 G/DL (ref 30–36.5)
MCV RBC AUTO: 95.3 FL (ref 80–99)
NRBC # BLD: 0 K/UL (ref 0–0.01)
NRBC BLD-RTO: 0 PER 100 WBC
PLATELET # BLD AUTO: 194 K/UL (ref 150–400)
PMV BLD AUTO: 10.5 FL (ref 8.9–12.9)
POTASSIUM SERPL-SCNC: 3.9 MMOL/L (ref 3.5–5.1)
PROT SERPL-MCNC: 6 G/DL (ref 6.4–8.2)
PROTHROMBIN TIME: 10.9 SEC (ref 9–11.1)
RBC # BLD AUTO: 2.74 M/UL (ref 3.8–5.2)
SODIUM SERPL-SCNC: 133 MMOL/L (ref 136–145)
WBC # BLD AUTO: 7.5 K/UL (ref 3.6–11)

## 2019-10-28 PROCEDURE — 36415 COLL VENOUS BLD VENIPUNCTURE: CPT

## 2019-10-28 PROCEDURE — 80053 COMPREHEN METABOLIC PANEL: CPT

## 2019-10-28 PROCEDURE — 74018 RADEX ABDOMEN 1 VIEW: CPT

## 2019-10-28 PROCEDURE — 85027 COMPLETE CBC AUTOMATED: CPT

## 2019-10-28 PROCEDURE — 83690 ASSAY OF LIPASE: CPT

## 2019-10-28 PROCEDURE — 99285 EMERGENCY DEPT VISIT HI MDM: CPT

## 2019-10-28 PROCEDURE — 85610 PROTHROMBIN TIME: CPT

## 2019-10-28 PROCEDURE — 74011250637 HC RX REV CODE- 250/637: Performed by: EMERGENCY MEDICINE

## 2019-10-28 RX ORDER — ONDANSETRON 4 MG/1
4 TABLET, ORALLY DISINTEGRATING ORAL
Qty: 10 TAB | Refills: 0 | Status: SHIPPED | OUTPATIENT
Start: 2019-10-28 | End: 2019-10-28

## 2019-10-28 RX ORDER — MAGNESIUM CITRATE
296 SOLUTION, ORAL ORAL
Status: COMPLETED | OUTPATIENT
Start: 2019-10-28 | End: 2019-10-28

## 2019-10-28 RX ORDER — ONDANSETRON 4 MG/1
4 TABLET, ORALLY DISINTEGRATING ORAL
Qty: 10 TAB | Refills: 0 | Status: SHIPPED | OUTPATIENT
Start: 2019-10-28 | End: 2020-02-14

## 2019-10-28 RX ADMIN — MAGNESIUM CITRATE 296 ML: 1.75 LIQUID ORAL at 16:06

## 2019-10-28 RX ADMIN — SODIUM PHOSPHATE, DIBASIC AND SODIUM PHOSPHATE, MONOBASIC 1 ENEMA: 7; 19 ENEMA RECTAL at 16:20

## 2019-10-28 NOTE — DISCHARGE INSTRUCTIONS
Patient Education        Constipation: Care Instructions  Your Care Instructions    Constipation means that you have a hard time passing stools (bowel movements). People pass stools from 3 times a day to once every 3 days. What is normal for you may be different. Constipation may occur with pain in the rectum and cramping. The pain may get worse when you try to pass stools. Sometimes there are small amounts of bright red blood on toilet paper or the surface of stools. This is because of enlarged veins near the rectum (hemorrhoids). A few changes in your diet and lifestyle may help you avoid ongoing constipation. Your doctor may also prescribe medicine to help loosen your stool. Some medicines can cause constipation. These include pain medicines and antidepressants. Tell your doctor about all the medicines you take. Your doctor may want to make a medicine change to ease your symptoms. Follow-up care is a key part of your treatment and safety. Be sure to make and go to all appointments, and call your doctor if you are having problems. It's also a good idea to know your test results and keep a list of the medicines you take. How can you care for yourself at home? · Drink plenty of fluids, enough so that your urine is light yellow or clear like water. If you have kidney, heart, or liver disease and have to limit fluids, talk with your doctor before you increase the amount of fluids you drink. · Include high-fiber foods in your diet each day. These include fruits, vegetables, beans, and whole grains. · Get at least 30 minutes of exercise on most days of the week. Walking is a good choice. You also may want to do other activities, such as running, swimming, cycling, or playing tennis or team sports. · Take a fiber supplement, such as Citrucel or Metamucil, every day. Read and follow all instructions on the label. · Schedule time each day for a bowel movement. A daily routine may help.  Take your time having your bowel movement. · Support your feet with a small step stool when you sit on the toilet. This helps flex your hips and places your pelvis in a squatting position. · Your doctor may recommend an over-the-counter laxative to relieve your constipation. Examples are Milk of Magnesia and MiraLax. Read and follow all instructions on the label. Do not use laxatives on a long-term basis. When should you call for help? Call your doctor now or seek immediate medical care if:    · You have new or worse belly pain.     · You have new or worse nausea or vomiting.     · You have blood in your stools.    Watch closely for changes in your health, and be sure to contact your doctor if:    · Your constipation is getting worse.     · You do not get better as expected. Where can you learn more? Go to http://gisell-shelly.info/. Enter [phone] in the search box to learn more about \"Constipation: Care Instructions. \"  Current as of: June 26, 2019  Content Version: 12.2  © 1572-6356 [employer]. Care instructions adapted under license by [employer] (which disclaims liability or warranty for this information). If you have questions about a medical condition or this instruction, always ask your healthcare professional. [address] any warranty or liability for your use of this information.

## 2019-10-28 NOTE — PROGRESS NOTES
Date of previous inpatient admission/ ED visit?    10/22/19 to 10/25/19 d/t Dx: L femur fracture    What brought the patient back to ED? Pt had a L hip fracture last Tuesday. Has seen blood in vomit x 3 episodes over 3 days. Pt is n/v in room. Pt arrived from [facility] SNF via EMS. Did patient decline recommended services during last admission/ ED visit (if yes, what)? no    Has patient seen a provider since their last inpatient admission/ED visit (if yes, when)? Pt has been at [facility] since ID on 10/25/19. CM Interventions:  From previous inpatient admission/ED visit: Pt sent to SNF for [facility]. From current inpatient admission/ED visit:  Pt is being evaluated. .     CM will continue to monitor discharge plan.      Sohail Boyle, [address] [facility]

## 2019-10-28 NOTE — ED PROVIDER NOTES
EMERGENCY DEPARTMENT HISTORY AND PHYSICAL EXAM      Date: 10/28/2019  Patient Name: Donita Hickman  Patient Age and Sex: 70 y.o. female    History of Presenting Illness     Chief Complaint   Patient presents with    Blood in [address]     pt had left hip fx last Tuesday. has seen blood in vomit x 3 episodes over 3 days. pt is n/v in room. pt arrived from [facility] via ems       History Provided By: Patient and Patient's Son    Ability to gather history was limited by: None    HPI: Donita Hickman, 70 y.o. female with history of left hip and femur fracture approximately 10 days ago, 1 week status post ORIF, sent from her rehab facility for a few episodes of vomiting over the past few days, possibly with some coffee grounds or digested blood in the emesis. She has no abdominal pain. She notes that she has not had a bowel movement from discharge from the hospital 1 week ago. She has tried MiraLAX at the rehab facility, no bowel movement produced. No fevers or infectious symptoms. Currently she feels mildly nauseated. She is not anticoagulated. Pt denies any other alleviating or exacerbating factors. There are no other complaints, changes or physical findings at this time.      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    Occult blood in stools 1/28/2016    Pacemaker     S/P ablation of atrial fibrillation 3/23/2017    S/P ablation of atrial flutter 3/23/2017     Past Surgical History:   Procedure Laterality Date    ABDOMEN SURGERY PROC UNLISTED      inguinal hernia repair right    COLONOSCOPY N/A 5/11/2017    COLONOSCOPY performed by [facility]uluaga [facility] at [facility] ENDOSCOPY    COLONOSCOPY N/A 11/30/2017    COLONOSCOPY performed by Eladio Robbins MD at [facility] ENDOSCOPY    COLONOSCOPY Left 2019    COLONOSCOPY performed by Alexander Juarez MD at [address]  2017         HX HERNIA REPAIR  2015    Incarcerated left femoral hernia repair,. 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 2017    Tubulovillous adenoma rectum 2016    Occult blood in stools 2016    Pacemaker     S/P ablation of atrial fibrillation 3/23/2017    S/P ablation of atrial flutter 3/23/2017       Past Surgical History:  Past Surgical History:   Procedure Laterality Date    ABDOMEN SURGERY PROC UNLISTED      inguinal hernia repair right    COLONOSCOPY N/A 2017    COLONOSCOPY performed by [facility]bins MD at [facility] ENDOSCOPY    COLONOSCOPY N/A 2017    COLONOSCOPY performed by [facility]io Robbins MD at [facility] ENDOSCOPY    COLONOSCOPY Left 2019    COLONOSCOPY performed by Alexander Juarez MD at [address]  2017         HX HERNIA REPAIR  2015    Incarcerated left femoral hernia repair,.     HX PACEMAKER Left        Family History:  Family History   Problem Relation Age of Onset    Heart Disease Mother     Cancer Father         BLADDER    Other Sister         RA    Other Brother         RA    Alcohol abuse Brother        Social History:  Social History     Tobacco Use    Smoking status: Former Smoker     Packs/day: 1.00     Years: 50.00     Pack years: 50.00     Types: Cigarettes     Last attempt to quit: 2014     Years since quittin.1    Smokeless tobacco: Never Used    Tobacco comment: lives with a smoker Substance Use Topics    Alcohol use: No    Drug use: No       Allergies:  No Known Allergies    Current Medications:  No current facility-administered medications on file prior to encounter. Current Outpatient Medications on File Prior to Encounter   Medication Sig Dispense Refill    carvedilol (COREG) 25 mg tablet Take 1 Tab by mouth two (2) times daily (with meals). 60 Tab 2    calcium-vitamin D (OYSTER SHELL) 500 mg(1,250mg) -200 unit per tablet Take 1 Tab by mouth three (3) times daily (with meals). 60 Tab 1    acetaminophen (TYLENOL) 325 mg tablet Take 2 Tabs by mouth every four (4) hours as needed for Pain. 30 Tab 0    HYDROcodone-acetaminophen (NORCO) 5-325 mg per tablet Take 1-2 Tabs by mouth every four (4) hours as needed for Pain for up to 7 days. Max Daily Amount: 12 Tabs. 30 Tab 0    docusate sodium (COLACE) 100 mg capsule Take 1 Cap by mouth two (2) times a day for 90 days. 60 Cap 2    polyethylene glycol (MIRALAX) 17 gram packet Take 1 Packet by mouth daily. 1 Packet 0    senna-docusate (PERICOLACE) 8.6-50 mg per tablet Take 1 Tab by mouth two (2) times a day. 60 Tab 0    mometasone-formoterol (DULERA) 200-5 mcg/actuation HFA inhaler Take 2 Puffs by inhalation two (2) times a day.  apixaban (ELIQUIS) 5 mg tablet Take 5 mg by mouth every twelve (12) hours.  albuterol (PROVENTIL HFA, VENTOLIN HFA, PROAIR HFA) 90 mcg/actuation inhaler Take 2 Puffs by inhalation every four (4) hours as needed for Wheezing.  tiotropium bromide (SPIRIVA RESPIMAT) 2.5 mcg/actuation inhaler Take 2 Puffs by inhalation daily.  atorvastatin (LIPITOR) 20 mg tablet TAKE 1 TABLET BY MOUTH EVERY DAY 90 Tab 1    aspirin delayed-release 81 mg tablet Take 81 mg by mouth daily.  multivitamin (ONE A DAY) tablet Take 1 Tab by mouth daily. Review of Systems     Review of Systems   Constitutional: Negative for fever. Gastrointestinal: Positive for constipation, nausea and vomiting. Negative for abdominal pain. All other systems reviewed and are negative. Physical Exam   Vital Signs  Patient Vitals for the past 24 hrs:   Temp Pulse Resp BP SpO2   10/28/19 1715  76 16 139/63 100 %   10/28/19 1645  75 18 142/60 100 %   10/28/19 1615  79 24 142/70 99 %   10/28/19 1545  75 17 133/68 100 %   10/28/19 1515  75 16 133/64 100 %   10/28/19 1445  75 18 134/62 100 %   10/28/19 1415  75 21 138/66 99 %   10/28/19 1400  75 18 140/70 100 %   10/28/19 1357 98.3 °F (36.8 °C) 75 14 140/72 99 %       Physical Exam   Constitutional: She is oriented to person, place, and time. She appears well-developed and well-nourished. No distress. HENT:   Head: Normocephalic and atraumatic. There is ecchymosis to the left face and neck after recent fall   Eyes: Conjunctivae are normal. Right eye exhibits no discharge. Left eye exhibits no discharge. Neck: Normal range of motion. Neck supple. No spinous process tenderness and no muscular tenderness present. Cardiovascular: Normal rate, regular rhythm and normal heart sounds. No murmur heard. Pulmonary/Chest: Effort normal and breath sounds normal. No respiratory distress. She has no wheezes. Abdominal: Soft. She exhibits no distension. There is no tenderness. There is no rigidity and no guarding. No significant distention or tenderness by exam   Genitourinary: Rectum normal. Rectal exam shows guaiac negative stool. Genitourinary Comments: Rectal exam was limited secondary to positioning, secondary to patient's recent ORIF. Guaiac negative. Musculoskeletal: Normal range of motion. She exhibits no deformity. Left hip: She exhibits tenderness. She exhibits no deformity. Legs:  Left hip wound clean and dry, surrounding ecchymosis, no erythema or signs of infection. Neurological: She is alert and oriented to person, place, and time. Skin: Skin is warm and dry. No rash noted. Psychiatric: She has a normal mood and affect.  Her behavior is normal. Thought content normal.   Nursing note and vitals reviewed. Diagnostic Study Results   Labs  Recent Results (from the past 24 hour(s))   CBC W/O DIFF    Collection Time: 10/28/19  2:10 PM   Result Value Ref Range    WBC 7.5 3.6 - 11.0 K/uL    RBC 2.74 (L) 3.80 - 5.20 M/uL    HGB 8.5 (L) 11.5 - 16.0 g/dL    HCT 26.1 (L) 35.0 - 47.0 %    MCV 95.3 80.0 - 99.0 FL    MCH 31.0 26.0 - 34.0 PG    MCHC 32.6 30.0 - 36.5 g/dL    RDW 13.9 11.5 - 14.5 %    PLATELET 118 999 - 722 K/uL    MPV 10.5 8.9 - 12.9 FL    NRBC 0.0 0  WBC    ABSOLUTE NRBC 0.00 0.00 - 8.93 K/uL   METABOLIC PANEL, COMPREHENSIVE    Collection Time: 10/28/19  2:10 PM   Result Value Ref Range    Sodium 133 (L) 136 - 145 mmol/L    Potassium 3.9 3.5 - 5.1 mmol/L    Chloride 96 (L) 97 - 108 mmol/L    CO2 35 (H) 21 - 32 mmol/L    Anion gap 2 (L) 5 - 15 mmol/L    Glucose 109 (H) 65 - 100 mg/dL    BUN 14 6 - 20 MG/DL    Creatinine 0.51 (L) 0.55 - 1.02 MG/DL    BUN/Creatinine ratio 27 (H) 12 - 20      GFR est AA >60 >60 ml/min/1.73m2    GFR est non-AA >60 >60 ml/min/1.73m2    Calcium 9.0 8.5 - 10.1 MG/DL    Bilirubin, total 1.1 (H) 0.2 - 1.0 MG/DL    ALT (SGPT) 17 12 - 78 U/L    AST (SGOT) 31 15 - 37 U/L    Alk. phosphatase 84 45 - 117 U/L    Protein, total 6.0 (L) 6.4 - 8.2 g/dL    Albumin 2.4 (L) 3.5 - 5.0 g/dL    Globulin 3.6 2.0 - 4.0 g/dL    A-G Ratio 0.7 (L) 1.1 - 2.2     PROTHROMBIN TIME + INR    Collection Time: 10/28/19  2:10 PM   Result Value Ref Range    INR 1.1 0.9 - 1.1      Prothrombin time 10.9 9.0 - 11.1 sec   LIPASE    Collection Time: 10/28/19  2:10 PM   Result Value Ref Range    Lipase 62 (L) 73 - 393 U/L       Radiologic Studies  XR ABD (KUB)   Final Result   IMPRESSION:     1. No evidence of bowel obstruction. Large volume of stool throughout the colon.            CT Results  (Last 48 hours)    None        CXR Results  (Last 48 hours)    None          Procedures   Procedures    Medical Decision Making     Provider Notes (Medical Decision Making):   77-year-old female complaining of constipation, nausea, and vomiting, with no bowel movement since discharge from the hospital 1 week ago. No significant fevers or abdominal pain or infectious symptoms. Benign abdominal exam, no significant distention or tenderness. Guaiac negative by exam though exam was limited secondary to positioning. H&P is most suggestive of postoperative ileus. CT scan can be deferred at this time. Patient was given mag citrate, enema, will reevaluate for improvement. Matthew Zamora MD  3:20 PM        Consult required? No      Medications Administered During ED Course:  Medications   magnesium citrate solution 296 mL (296 mL Oral Given 10/28/19 1606)   sodium phosphate (FLEET'S) enema 1 Enema (1 Enema Rectal Given 10/28/19 1620)          Diagnosis and Disposition   Disposition:  Discharged    Clinical Impression:   1. Constipation, unspecified constipation type    2. Hematemesis with nausea        Attestation:  I personally performed the services described in this documentation on this date 10/28/2019 for patient Luis Ball. Matthew Zamora MD        I was the first provider for this patient on this visit. To the best of my ability I reviewed relevant prior medical records, electrocardiograms, laboratories, and radiologic studies. The patient's presenting problems were discussed, and the patient was in agreement with the care plan formulated and outlined with them. Matthew Zamora MD    Please note that this dictation was completed with Dragon voice recognition software. Quite often unanticipated grammatical, syntax, homophones, and other interpretive errors are inadvertently transcribed by the computer software. Please disregard these errors and excuse any errors that have escaped final proofreading.

## 2019-10-28 NOTE — ED NOTES
Pt. Presents to ED today for complaints of abdominal pain and blood in vomit. Pt. With hip surgery completed last week and has not had a BM since being discharged to rehab. Pt. With po-op site to L hip. Pt. Alert and oriented x4. Pt. Placed in position of comfort with call bell in reach. Son at bedside.

## 2019-10-28 NOTE — ED NOTES
Pt. With large BM at this time. Pt. Cleaned and placed back in position of comfort with call bell in reach.

## 2019-10-28 NOTE — ED NOTES
Pt. With second large BM at this time. Spoke with Dr. Rosalinda Garduno who is aware and planning to discharge patient back to [facility].

## 2019-10-31 ENCOUNTER — PATIENT OUTREACH (OUTPATIENT)
Dept: CASE MANAGEMENT | Age: 79
End: 2019-10-31

## 2019-10-31 NOTE — CDMP QUERY
Patient has a pacemaker due to history of Sick Sinus Syndrome. Patient has also history of atrial fibrillation and atrial flutter. She had undergone ablation in 
the past but the EKG shows \"AF\" with ventricular rate around 100 on admission. Please, clarify if, in this case, this patient's current diagnosis of \"AF\" stands for: 
 
Atrial flutter Atrial fibrillation Both of the above Other__________ Unspecified The medical record reflects the following: 
 
   Risk Factors: hx MI, afib Clinical Indicators: dcs-Check of [employer] dual chamber pacemaker reported NSVT, but available EGMs are consistent with AT/AF. Treatment: cardiology cx, asa, eliquis, carvedilol & lisinopril Thanks, Jose Blackman RN/CDMp

## 2019-10-31 NOTE — PROGRESS NOTES
Community Care Team documentation for patient in [facility]  Initial Follow Up       Patient was discharged to [facility]. Information included in this progress note has been provided to SNF. Hospital Admission and Diagnosis: [facility] 10/22- 10/25 Acute impacted fracture of the subcapital left femoral neck RAT Score:  16   Advance Care Planning:  Advance Directive on file     PCP : Shobha Hernandez MD    SNF Attending:  Viki Huizar MD    Spoke with SNF team.  PT/OT providing skilled therapy. Currently, not walking, standing in parallel bars, transfers max A x 2, upper set up, lower dependent/max. Plan to discharge in 3-4 weeks due to PLOF of being independent. Plan to return home alone with [facility] and family support. [facility] agency TBD. Community Care Team will follow up weekly with [facility] until discharge. Medications were not reconciled and general patient assessment was not completed during this [facility] outreach.

## 2019-11-07 ENCOUNTER — PATIENT OUTREACH (OUTPATIENT)
Dept: CASE MANAGEMENT | Age: 79
End: 2019-11-07

## 2019-11-15 ENCOUNTER — PATIENT OUTREACH (OUTPATIENT)
Dept: CASE MANAGEMENT | Age: 79
End: 2019-11-15

## 2019-11-22 ENCOUNTER — PATIENT OUTREACH (OUTPATIENT)
Dept: CASE MANAGEMENT | Age: 79
End: 2019-11-22

## 2019-11-22 NOTE — PROGRESS NOTES
Community Care Team Documentation for Patient in [facility]  Subsequent Follow up     [facility] See previous [facility] Team notes. Spoke with SNF team. 2 weeks LOS anticipated. Currently ambulating 20-40ft CGA with walker, transfers CGA to min A, upper supervision, lower max assist. Ortho follow up attended Tuesday 11/19 - 50 percent weight bearing. Plan to return home alone. Friend to assist. Children support. Medications were not reconciled and general patient assessment was not completed during this skilled nursing facility outreach.

## 2019-12-06 ENCOUNTER — PATIENT OUTREACH (OUTPATIENT)
Dept: CASE MANAGEMENT | Age: 79
End: 2019-12-06

## 2019-12-09 NOTE — PROGRESS NOTES
Community Care Team Documentation for Patient in [facility]  Subsequent Follow up     Patient remains at [facility] ([facility]). See previous [facility] Team notes. Spoke with SNF team.  LOS 1wk home alone with [facility], 100ft CGA 1-2times with walker, transfers CGA, upper supervision, lower mod to min A. WB as tolerated. Medications were not reconciled and general patient assessment was not completed during this skilled nursing facility outreach. Laney Ochoa, BSN, [address] Team  [phone]

## 2019-12-12 ENCOUNTER — PATIENT OUTREACH (OUTPATIENT)
Dept: CASE MANAGEMENT | Age: 79
End: 2019-12-12

## 2019-12-16 ENCOUNTER — OFFICE VISIT (OUTPATIENT)
Dept: CARDIOLOGY CLINIC | Age: 79
End: 2019-12-16

## 2019-12-16 DIAGNOSIS — I44.2 COMPLETE HEART BLOCK (HCC): ICD-10-CM

## 2019-12-16 DIAGNOSIS — Z95.0 CARDIAC PACEMAKER IN SITU: Primary | ICD-10-CM

## 2019-12-18 ENCOUNTER — HOME HEALTH ADMISSION (OUTPATIENT)
Dept: HOME HEALTH SERVICES | Facility: HOME HEALTH | Age: 79
End: 2019-12-18
Payer: MEDICARE

## 2019-12-20 ENCOUNTER — HOME CARE VISIT (OUTPATIENT)
Dept: SCHEDULING | Facility: HOME HEALTH | Age: 79
End: 2019-12-20
Payer: MEDICARE

## 2019-12-20 ENCOUNTER — PATIENT OUTREACH (OUTPATIENT)
Dept: CASE MANAGEMENT | Age: 79
End: 2019-12-20

## 2019-12-20 VITALS
TEMPERATURE: 98.1 F | SYSTOLIC BLOOD PRESSURE: 144 MMHG | BODY MASS INDEX: 19.5 KG/M2 | OXYGEN SATURATION: 99 % | WEIGHT: 144 LBS | HEART RATE: 72 BPM | HEIGHT: 72 IN | DIASTOLIC BLOOD PRESSURE: 70 MMHG | RESPIRATION RATE: 18 BRPM

## 2019-12-20 PROCEDURE — G0151 HHCP-SERV OF PT,EA 15 MIN: HCPCS

## 2019-12-20 PROCEDURE — G0299 HHS/HOSPICE OF RN EA 15 MIN: HCPCS

## 2019-12-20 PROCEDURE — 400013 HH SOC

## 2019-12-20 PROCEDURE — 3331090002 HH PPS REVENUE DEBIT

## 2019-12-20 PROCEDURE — 3331090001 HH PPS REVENUE CREDIT

## 2019-12-20 NOTE — PROGRESS NOTES
Community Care Team Documentation for Patient in [facility]  Discharge Note    Patient has been discharged from [facility] ([facility]). See previous [facility] Team notes. PCP : Darren Montoya MD    Spoke with SNF team.  DC 12/13/19 home alone with [address], 100ft x2 SBA with walker, transfers CGA to min A, upper mod A, lower CGA to min A,     Community Care Team will sign off at this time. Medications were not reconciled and general patient assessment was not completed during this skilled nursing facility outreach.      Lay Ellis, MSN, RN, ACNS-BC, [facility]  Manager of Care [facility] [phone]

## 2019-12-21 VITALS
SYSTOLIC BLOOD PRESSURE: 130 MMHG | RESPIRATION RATE: 18 BRPM | TEMPERATURE: 98.1 F | DIASTOLIC BLOOD PRESSURE: 76 MMHG | OXYGEN SATURATION: 98 % | HEART RATE: 72 BPM

## 2019-12-21 PROCEDURE — 3331090002 HH PPS REVENUE DEBIT

## 2019-12-21 PROCEDURE — 3331090001 HH PPS REVENUE CREDIT

## 2019-12-22 PROCEDURE — 3331090001 HH PPS REVENUE CREDIT

## 2019-12-22 PROCEDURE — 3331090002 HH PPS REVENUE DEBIT

## 2019-12-23 ENCOUNTER — HOME CARE VISIT (OUTPATIENT)
Dept: SCHEDULING | Facility: HOME HEALTH | Age: 79
End: 2019-12-23
Payer: MEDICARE

## 2019-12-23 VITALS
HEART RATE: 90 BPM | OXYGEN SATURATION: 96 % | DIASTOLIC BLOOD PRESSURE: 80 MMHG | TEMPERATURE: 98.3 F | SYSTOLIC BLOOD PRESSURE: 150 MMHG

## 2019-12-23 PROCEDURE — 3331090001 HH PPS REVENUE CREDIT

## 2019-12-23 PROCEDURE — G0152 HHCP-SERV OF OT,EA 15 MIN: HCPCS

## 2019-12-23 PROCEDURE — 3331090002 HH PPS REVENUE DEBIT

## 2019-12-23 PROCEDURE — G0157 HHC PT ASSISTANT EA 15: HCPCS

## 2019-12-24 ENCOUNTER — HOME CARE VISIT (OUTPATIENT)
Dept: SCHEDULING | Facility: HOME HEALTH | Age: 79
End: 2019-12-24
Payer: MEDICARE

## 2019-12-24 VITALS
DIASTOLIC BLOOD PRESSURE: 84 MMHG | RESPIRATION RATE: 17 BRPM | OXYGEN SATURATION: 93 % | HEART RATE: 99 BPM | TEMPERATURE: 98 F | SYSTOLIC BLOOD PRESSURE: 122 MMHG

## 2019-12-24 PROCEDURE — 3331090001 HH PPS REVENUE CREDIT

## 2019-12-24 PROCEDURE — G0151 HHCP-SERV OF PT,EA 15 MIN: HCPCS

## 2019-12-24 PROCEDURE — 3331090002 HH PPS REVENUE DEBIT

## 2019-12-24 PROCEDURE — G0299 HHS/HOSPICE OF RN EA 15 MIN: HCPCS

## 2019-12-25 PROCEDURE — 3331090002 HH PPS REVENUE DEBIT

## 2019-12-25 PROCEDURE — 3331090001 HH PPS REVENUE CREDIT

## 2019-12-26 VITALS
OXYGEN SATURATION: 94 % | HEART RATE: 78 BPM | SYSTOLIC BLOOD PRESSURE: 136 MMHG | RESPIRATION RATE: 16 BRPM | DIASTOLIC BLOOD PRESSURE: 81 MMHG | TEMPERATURE: 97.8 F

## 2019-12-26 VITALS
SYSTOLIC BLOOD PRESSURE: 110 MMHG | OXYGEN SATURATION: 94 % | HEART RATE: 78 BPM | TEMPERATURE: 97.6 F | RESPIRATION RATE: 18 BRPM | DIASTOLIC BLOOD PRESSURE: 60 MMHG

## 2019-12-26 PROCEDURE — 3331090002 HH PPS REVENUE DEBIT

## 2019-12-26 PROCEDURE — 3331090001 HH PPS REVENUE CREDIT

## 2019-12-27 ENCOUNTER — HOME CARE VISIT (OUTPATIENT)
Dept: SCHEDULING | Facility: HOME HEALTH | Age: 79
End: 2019-12-27
Payer: MEDICARE

## 2019-12-27 VITALS
SYSTOLIC BLOOD PRESSURE: 122 MMHG | HEART RATE: 80 BPM | DIASTOLIC BLOOD PRESSURE: 83 MMHG | OXYGEN SATURATION: 95 % | TEMPERATURE: 98 F

## 2019-12-27 PROCEDURE — G0152 HHCP-SERV OF OT,EA 15 MIN: HCPCS

## 2019-12-27 PROCEDURE — 3331090001 HH PPS REVENUE CREDIT

## 2019-12-27 PROCEDURE — 3331090002 HH PPS REVENUE DEBIT

## 2019-12-27 PROCEDURE — G0151 HHCP-SERV OF PT,EA 15 MIN: HCPCS

## 2019-12-28 ENCOUNTER — HOME CARE VISIT (OUTPATIENT)
Dept: SCHEDULING | Facility: HOME HEALTH | Age: 79
End: 2019-12-28
Payer: MEDICARE

## 2019-12-28 VITALS
RESPIRATION RATE: 18 BRPM | HEART RATE: 76 BPM | SYSTOLIC BLOOD PRESSURE: 112 MMHG | OXYGEN SATURATION: 95 % | DIASTOLIC BLOOD PRESSURE: 62 MMHG

## 2019-12-28 PROCEDURE — 3331090002 HH PPS REVENUE DEBIT

## 2019-12-28 PROCEDURE — 3331090001 HH PPS REVENUE CREDIT

## 2019-12-28 PROCEDURE — G0300 HHS/HOSPICE OF LPN EA 15 MIN: HCPCS

## 2019-12-29 PROCEDURE — 3331090002 HH PPS REVENUE DEBIT

## 2019-12-29 PROCEDURE — 3331090001 HH PPS REVENUE CREDIT

## 2019-12-30 ENCOUNTER — HOME CARE VISIT (OUTPATIENT)
Dept: SCHEDULING | Facility: HOME HEALTH | Age: 79
End: 2019-12-30
Payer: MEDICARE

## 2019-12-30 VITALS
OXYGEN SATURATION: 95 % | HEART RATE: 80 BPM | DIASTOLIC BLOOD PRESSURE: 63 MMHG | SYSTOLIC BLOOD PRESSURE: 122 MMHG | RESPIRATION RATE: 18 BRPM | TEMPERATURE: 98 F

## 2019-12-30 PROCEDURE — G0157 HHC PT ASSISTANT EA 15: HCPCS

## 2019-12-30 PROCEDURE — 3331090001 HH PPS REVENUE CREDIT

## 2019-12-30 PROCEDURE — 3331090002 HH PPS REVENUE DEBIT

## 2019-12-31 ENCOUNTER — HOME CARE VISIT (OUTPATIENT)
Dept: SCHEDULING | Facility: HOME HEALTH | Age: 79
End: 2019-12-31
Payer: MEDICARE

## 2019-12-31 VITALS
SYSTOLIC BLOOD PRESSURE: 142 MMHG | TEMPERATURE: 97.1 F | OXYGEN SATURATION: 97 % | DIASTOLIC BLOOD PRESSURE: 78 MMHG | HEART RATE: 82 BPM

## 2019-12-31 VITALS
HEART RATE: 93 BPM | SYSTOLIC BLOOD PRESSURE: 124 MMHG | DIASTOLIC BLOOD PRESSURE: 76 MMHG | RESPIRATION RATE: 17 BRPM | OXYGEN SATURATION: 95 % | TEMPERATURE: 98.5 F

## 2019-12-31 PROCEDURE — 3331090001 HH PPS REVENUE CREDIT

## 2019-12-31 PROCEDURE — G0300 HHS/HOSPICE OF LPN EA 15 MIN: HCPCS

## 2019-12-31 PROCEDURE — G0158 HHC OT ASSISTANT EA 15: HCPCS

## 2019-12-31 PROCEDURE — G0157 HHC PT ASSISTANT EA 15: HCPCS

## 2019-12-31 PROCEDURE — 3331090002 HH PPS REVENUE DEBIT

## 2020-01-01 PROCEDURE — 3331090001 HH PPS REVENUE CREDIT

## 2020-01-01 PROCEDURE — 3331090002 HH PPS REVENUE DEBIT

## 2020-01-02 ENCOUNTER — HOME CARE VISIT (OUTPATIENT)
Dept: SCHEDULING | Facility: HOME HEALTH | Age: 80
End: 2020-01-02
Payer: MEDICARE

## 2020-01-02 VITALS
SYSTOLIC BLOOD PRESSURE: 130 MMHG | OXYGEN SATURATION: 97 % | TEMPERATURE: 98.3 F | DIASTOLIC BLOOD PRESSURE: 80 MMHG | HEART RATE: 79 BPM | RESPIRATION RATE: 17 BRPM

## 2020-01-02 VITALS
OXYGEN SATURATION: 94 % | DIASTOLIC BLOOD PRESSURE: 72 MMHG | SYSTOLIC BLOOD PRESSURE: 138 MMHG | TEMPERATURE: 97.6 F | HEART RATE: 86 BPM

## 2020-01-02 PROCEDURE — 3331090001 HH PPS REVENUE CREDIT

## 2020-01-02 PROCEDURE — 3331090002 HH PPS REVENUE DEBIT

## 2020-01-02 PROCEDURE — G0158 HHC OT ASSISTANT EA 15: HCPCS

## 2020-01-03 ENCOUNTER — HOME CARE VISIT (OUTPATIENT)
Dept: SCHEDULING | Facility: HOME HEALTH | Age: 80
End: 2020-01-03
Payer: MEDICARE

## 2020-01-03 VITALS
SYSTOLIC BLOOD PRESSURE: 128 MMHG | TEMPERATURE: 97.3 F | DIASTOLIC BLOOD PRESSURE: 70 MMHG | HEART RATE: 75 BPM | OXYGEN SATURATION: 96 % | RESPIRATION RATE: 18 BRPM

## 2020-01-03 VITALS
TEMPERATURE: 99 F | OXYGEN SATURATION: 95 % | SYSTOLIC BLOOD PRESSURE: 136 MMHG | HEART RATE: 88 BPM | DIASTOLIC BLOOD PRESSURE: 90 MMHG

## 2020-01-03 PROCEDURE — 3331090002 HH PPS REVENUE DEBIT

## 2020-01-03 PROCEDURE — G0157 HHC PT ASSISTANT EA 15: HCPCS

## 2020-01-03 PROCEDURE — 3331090001 HH PPS REVENUE CREDIT

## 2020-01-03 PROCEDURE — G0300 HHS/HOSPICE OF LPN EA 15 MIN: HCPCS

## 2020-01-04 PROCEDURE — 3331090001 HH PPS REVENUE CREDIT

## 2020-01-04 PROCEDURE — 3331090002 HH PPS REVENUE DEBIT

## 2020-01-05 PROCEDURE — 3331090002 HH PPS REVENUE DEBIT

## 2020-01-05 PROCEDURE — 3331090001 HH PPS REVENUE CREDIT

## 2020-01-06 ENCOUNTER — HOME CARE VISIT (OUTPATIENT)
Dept: SCHEDULING | Facility: HOME HEALTH | Age: 80
End: 2020-01-06
Payer: MEDICARE

## 2020-01-06 VITALS
OXYGEN SATURATION: 95 % | TEMPERATURE: 98.9 F | SYSTOLIC BLOOD PRESSURE: 124 MMHG | RESPIRATION RATE: 17 BRPM | DIASTOLIC BLOOD PRESSURE: 80 MMHG | HEART RATE: 81 BPM

## 2020-01-06 PROCEDURE — G0157 HHC PT ASSISTANT EA 15: HCPCS

## 2020-01-06 PROCEDURE — 3331090001 HH PPS REVENUE CREDIT

## 2020-01-06 PROCEDURE — 3331090002 HH PPS REVENUE DEBIT

## 2020-01-07 ENCOUNTER — HOME CARE VISIT (OUTPATIENT)
Dept: SCHEDULING | Facility: HOME HEALTH | Age: 80
End: 2020-01-07
Payer: MEDICARE

## 2020-01-07 ENCOUNTER — HOME CARE VISIT (OUTPATIENT)
Dept: HOME HEALTH SERVICES | Facility: HOME HEALTH | Age: 80
End: 2020-01-07
Payer: MEDICARE

## 2020-01-07 VITALS — OXYGEN SATURATION: 95 % | DIASTOLIC BLOOD PRESSURE: 90 MMHG | SYSTOLIC BLOOD PRESSURE: 132 MMHG | HEART RATE: 76 BPM

## 2020-01-07 PROCEDURE — 3331090001 HH PPS REVENUE CREDIT

## 2020-01-07 PROCEDURE — 3331090002 HH PPS REVENUE DEBIT

## 2020-01-07 PROCEDURE — G0158 HHC OT ASSISTANT EA 15: HCPCS

## 2020-01-08 ENCOUNTER — HOME CARE VISIT (OUTPATIENT)
Dept: SCHEDULING | Facility: HOME HEALTH | Age: 80
End: 2020-01-08
Payer: MEDICARE

## 2020-01-08 ENCOUNTER — HOME CARE VISIT (OUTPATIENT)
Dept: HOME HEALTH SERVICES | Facility: HOME HEALTH | Age: 80
End: 2020-01-08
Payer: MEDICARE

## 2020-01-08 PROCEDURE — G0157 HHC PT ASSISTANT EA 15: HCPCS

## 2020-01-08 PROCEDURE — 3331090002 HH PPS REVENUE DEBIT

## 2020-01-08 PROCEDURE — 3331090001 HH PPS REVENUE CREDIT

## 2020-01-09 ENCOUNTER — HOME CARE VISIT (OUTPATIENT)
Dept: SCHEDULING | Facility: HOME HEALTH | Age: 80
End: 2020-01-09
Payer: MEDICARE

## 2020-01-09 VITALS
DIASTOLIC BLOOD PRESSURE: 62 MMHG | HEART RATE: 76 BPM | SYSTOLIC BLOOD PRESSURE: 122 MMHG | RESPIRATION RATE: 17 BRPM | OXYGEN SATURATION: 92 % | TEMPERATURE: 99.1 F

## 2020-01-09 VITALS
HEART RATE: 79 BPM | DIASTOLIC BLOOD PRESSURE: 84 MMHG | OXYGEN SATURATION: 97 % | TEMPERATURE: 97 F | SYSTOLIC BLOOD PRESSURE: 128 MMHG

## 2020-01-09 VITALS
TEMPERATURE: 97 F | RESPIRATION RATE: 20 BRPM | SYSTOLIC BLOOD PRESSURE: 140 MMHG | DIASTOLIC BLOOD PRESSURE: 80 MMHG | OXYGEN SATURATION: 94 % | HEART RATE: 79 BPM

## 2020-01-09 PROCEDURE — G0158 HHC OT ASSISTANT EA 15: HCPCS

## 2020-01-09 PROCEDURE — G0299 HHS/HOSPICE OF RN EA 15 MIN: HCPCS

## 2020-01-09 PROCEDURE — 3331090002 HH PPS REVENUE DEBIT

## 2020-01-09 PROCEDURE — 3331090001 HH PPS REVENUE CREDIT

## 2020-01-10 ENCOUNTER — HOME CARE VISIT (OUTPATIENT)
Dept: SCHEDULING | Facility: HOME HEALTH | Age: 80
End: 2020-01-10
Payer: MEDICARE

## 2020-01-10 VITALS
TEMPERATURE: 98.4 F | OXYGEN SATURATION: 93 % | HEART RATE: 76 BPM | RESPIRATION RATE: 17 BRPM | SYSTOLIC BLOOD PRESSURE: 128 MMHG | DIASTOLIC BLOOD PRESSURE: 92 MMHG

## 2020-01-10 PROCEDURE — G0157 HHC PT ASSISTANT EA 15: HCPCS

## 2020-01-10 PROCEDURE — 3331090002 HH PPS REVENUE DEBIT

## 2020-01-10 PROCEDURE — 3331090001 HH PPS REVENUE CREDIT

## 2020-01-11 PROCEDURE — 3331090001 HH PPS REVENUE CREDIT

## 2020-01-11 PROCEDURE — 3331090002 HH PPS REVENUE DEBIT

## 2020-01-12 PROCEDURE — 3331090001 HH PPS REVENUE CREDIT

## 2020-01-12 PROCEDURE — 3331090002 HH PPS REVENUE DEBIT

## 2020-01-13 ENCOUNTER — HOME CARE VISIT (OUTPATIENT)
Dept: SCHEDULING | Facility: HOME HEALTH | Age: 80
End: 2020-01-13
Payer: MEDICARE

## 2020-01-13 PROCEDURE — 3331090002 HH PPS REVENUE DEBIT

## 2020-01-13 PROCEDURE — 3331090001 HH PPS REVENUE CREDIT

## 2020-01-13 PROCEDURE — G0157 HHC PT ASSISTANT EA 15: HCPCS

## 2020-01-14 ENCOUNTER — HOME CARE VISIT (OUTPATIENT)
Dept: HOME HEALTH SERVICES | Facility: HOME HEALTH | Age: 80
End: 2020-01-14
Payer: MEDICARE

## 2020-01-14 VITALS
RESPIRATION RATE: 18 BRPM | OXYGEN SATURATION: 97 % | HEART RATE: 77 BPM | TEMPERATURE: 98.1 F | SYSTOLIC BLOOD PRESSURE: 132 MMHG | DIASTOLIC BLOOD PRESSURE: 88 MMHG

## 2020-01-14 PROCEDURE — 3331090002 HH PPS REVENUE DEBIT

## 2020-01-14 PROCEDURE — 3331090001 HH PPS REVENUE CREDIT

## 2020-01-15 ENCOUNTER — HOME CARE VISIT (OUTPATIENT)
Dept: SCHEDULING | Facility: HOME HEALTH | Age: 80
End: 2020-01-15
Payer: MEDICARE

## 2020-01-15 ENCOUNTER — HOME CARE VISIT (OUTPATIENT)
Dept: HOME HEALTH SERVICES | Facility: HOME HEALTH | Age: 80
End: 2020-01-15
Payer: MEDICARE

## 2020-01-15 VITALS
HEART RATE: 83 BPM | DIASTOLIC BLOOD PRESSURE: 80 MMHG | OXYGEN SATURATION: 97 % | TEMPERATURE: 97 F | RESPIRATION RATE: 22 BRPM | SYSTOLIC BLOOD PRESSURE: 124 MMHG

## 2020-01-15 PROCEDURE — 3331090002 HH PPS REVENUE DEBIT

## 2020-01-15 PROCEDURE — 3331090001 HH PPS REVENUE CREDIT

## 2020-01-15 PROCEDURE — G0299 HHS/HOSPICE OF RN EA 15 MIN: HCPCS

## 2020-01-16 ENCOUNTER — HOME CARE VISIT (OUTPATIENT)
Dept: HOME HEALTH SERVICES | Facility: HOME HEALTH | Age: 80
End: 2020-01-16
Payer: MEDICARE

## 2020-01-16 ENCOUNTER — HOME CARE VISIT (OUTPATIENT)
Dept: SCHEDULING | Facility: HOME HEALTH | Age: 80
End: 2020-01-16
Payer: MEDICARE

## 2020-01-16 VITALS
SYSTOLIC BLOOD PRESSURE: 132 MMHG | DIASTOLIC BLOOD PRESSURE: 68 MMHG | RESPIRATION RATE: 18 BRPM | TEMPERATURE: 98 F | HEART RATE: 84 BPM | OXYGEN SATURATION: 98 %

## 2020-01-16 PROCEDURE — G0152 HHCP-SERV OF OT,EA 15 MIN: HCPCS

## 2020-01-16 PROCEDURE — 3331090002 HH PPS REVENUE DEBIT

## 2020-01-16 PROCEDURE — 3331090001 HH PPS REVENUE CREDIT

## 2020-01-16 PROCEDURE — G0151 HHCP-SERV OF PT,EA 15 MIN: HCPCS

## 2020-01-17 PROCEDURE — 3331090002 HH PPS REVENUE DEBIT

## 2020-01-17 PROCEDURE — 3331090001 HH PPS REVENUE CREDIT

## 2020-01-18 PROCEDURE — 3331090001 HH PPS REVENUE CREDIT

## 2020-01-18 PROCEDURE — 3331090002 HH PPS REVENUE DEBIT

## 2020-01-19 PROCEDURE — 3331090001 HH PPS REVENUE CREDIT

## 2020-01-19 PROCEDURE — 3331090002 HH PPS REVENUE DEBIT

## 2020-01-20 PROCEDURE — 3331090001 HH PPS REVENUE CREDIT

## 2020-01-20 PROCEDURE — 3331090002 HH PPS REVENUE DEBIT

## 2020-01-21 ENCOUNTER — HOME CARE VISIT (OUTPATIENT)
Dept: SCHEDULING | Facility: HOME HEALTH | Age: 80
End: 2020-01-21
Payer: MEDICARE

## 2020-01-21 VITALS
TEMPERATURE: 97.6 F | DIASTOLIC BLOOD PRESSURE: 88 MMHG | SYSTOLIC BLOOD PRESSURE: 133 MMHG | HEART RATE: 88 BPM | OXYGEN SATURATION: 96 %

## 2020-01-21 PROCEDURE — 3331090001 HH PPS REVENUE CREDIT

## 2020-01-21 PROCEDURE — 3331090002 HH PPS REVENUE DEBIT

## 2020-01-21 PROCEDURE — G0157 HHC PT ASSISTANT EA 15: HCPCS

## 2020-01-22 ENCOUNTER — HOME CARE VISIT (OUTPATIENT)
Dept: SCHEDULING | Facility: HOME HEALTH | Age: 80
End: 2020-01-22
Payer: MEDICARE

## 2020-01-22 VITALS
SYSTOLIC BLOOD PRESSURE: 140 MMHG | RESPIRATION RATE: 18 BRPM | DIASTOLIC BLOOD PRESSURE: 80 MMHG | HEART RATE: 82 BPM | TEMPERATURE: 98.1 F | OXYGEN SATURATION: 95 %

## 2020-01-22 VITALS
HEART RATE: 85 BPM | DIASTOLIC BLOOD PRESSURE: 78 MMHG | TEMPERATURE: 98.3 F | SYSTOLIC BLOOD PRESSURE: 130 MMHG | OXYGEN SATURATION: 98 %

## 2020-01-22 PROCEDURE — 3331090001 HH PPS REVENUE CREDIT

## 2020-01-22 PROCEDURE — G0158 HHC OT ASSISTANT EA 15: HCPCS

## 2020-01-22 PROCEDURE — G0299 HHS/HOSPICE OF RN EA 15 MIN: HCPCS

## 2020-01-22 PROCEDURE — 3331090002 HH PPS REVENUE DEBIT

## 2020-01-23 ENCOUNTER — HOME CARE VISIT (OUTPATIENT)
Dept: SCHEDULING | Facility: HOME HEALTH | Age: 80
End: 2020-01-23
Payer: MEDICARE

## 2020-01-23 ENCOUNTER — HOME CARE VISIT (OUTPATIENT)
Dept: HOME HEALTH SERVICES | Facility: HOME HEALTH | Age: 80
End: 2020-01-23
Payer: MEDICARE

## 2020-01-23 PROCEDURE — 3331090002 HH PPS REVENUE DEBIT

## 2020-01-23 PROCEDURE — G0151 HHCP-SERV OF PT,EA 15 MIN: HCPCS

## 2020-01-23 PROCEDURE — 3331090001 HH PPS REVENUE CREDIT

## 2020-01-24 ENCOUNTER — HOME CARE VISIT (OUTPATIENT)
Dept: SCHEDULING | Facility: HOME HEALTH | Age: 80
End: 2020-01-24
Payer: MEDICARE

## 2020-01-24 VITALS
TEMPERATURE: 98 F | OXYGEN SATURATION: 96 % | RESPIRATION RATE: 18 BRPM | DIASTOLIC BLOOD PRESSURE: 78 MMHG | HEART RATE: 76 BPM | SYSTOLIC BLOOD PRESSURE: 124 MMHG

## 2020-01-24 VITALS
DIASTOLIC BLOOD PRESSURE: 82 MMHG | SYSTOLIC BLOOD PRESSURE: 126 MMHG | TEMPERATURE: 98.1 F | HEART RATE: 83 BPM | OXYGEN SATURATION: 95 %

## 2020-01-24 PROCEDURE — G0158 HHC OT ASSISTANT EA 15: HCPCS

## 2020-01-24 PROCEDURE — 3331090001 HH PPS REVENUE CREDIT

## 2020-01-24 PROCEDURE — 3331090002 HH PPS REVENUE DEBIT

## 2020-01-25 PROCEDURE — 3331090001 HH PPS REVENUE CREDIT

## 2020-01-25 PROCEDURE — 3331090002 HH PPS REVENUE DEBIT

## 2020-01-26 ENCOUNTER — HOME CARE VISIT (OUTPATIENT)
Dept: SCHEDULING | Facility: HOME HEALTH | Age: 80
End: 2020-01-26
Payer: MEDICARE

## 2020-01-26 VITALS
SYSTOLIC BLOOD PRESSURE: 138 MMHG | OXYGEN SATURATION: 97 % | DIASTOLIC BLOOD PRESSURE: 90 MMHG | HEART RATE: 85 BPM | TEMPERATURE: 98.3 F

## 2020-01-26 PROCEDURE — 3331090001 HH PPS REVENUE CREDIT

## 2020-01-26 PROCEDURE — G0158 HHC OT ASSISTANT EA 15: HCPCS

## 2020-01-26 PROCEDURE — 3331090002 HH PPS REVENUE DEBIT

## 2020-01-27 ENCOUNTER — HOME CARE VISIT (OUTPATIENT)
Dept: SCHEDULING | Facility: HOME HEALTH | Age: 80
End: 2020-01-27
Payer: MEDICARE

## 2020-01-27 VITALS
DIASTOLIC BLOOD PRESSURE: 82 MMHG | OXYGEN SATURATION: 97 % | SYSTOLIC BLOOD PRESSURE: 130 MMHG | HEART RATE: 78 BPM | TEMPERATURE: 98 F | RESPIRATION RATE: 18 BRPM

## 2020-01-27 PROCEDURE — 3331090001 HH PPS REVENUE CREDIT

## 2020-01-27 PROCEDURE — 3331090002 HH PPS REVENUE DEBIT

## 2020-01-27 PROCEDURE — G0151 HHCP-SERV OF PT,EA 15 MIN: HCPCS

## 2020-01-28 ENCOUNTER — HOME CARE VISIT (OUTPATIENT)
Dept: SCHEDULING | Facility: HOME HEALTH | Age: 80
End: 2020-01-28
Payer: MEDICARE

## 2020-01-28 PROCEDURE — 3331090001 HH PPS REVENUE CREDIT

## 2020-01-28 PROCEDURE — G0299 HHS/HOSPICE OF RN EA 15 MIN: HCPCS

## 2020-01-28 PROCEDURE — 3331090002 HH PPS REVENUE DEBIT

## 2020-01-29 ENCOUNTER — HOME CARE VISIT (OUTPATIENT)
Dept: SCHEDULING | Facility: HOME HEALTH | Age: 80
End: 2020-01-29
Payer: MEDICARE

## 2020-01-29 VITALS
DIASTOLIC BLOOD PRESSURE: 80 MMHG | TEMPERATURE: 98.4 F | HEART RATE: 83 BPM | SYSTOLIC BLOOD PRESSURE: 110 MMHG | OXYGEN SATURATION: 93 % | RESPIRATION RATE: 18 BRPM

## 2020-01-29 VITALS
TEMPERATURE: 97.3 F | HEART RATE: 84 BPM | OXYGEN SATURATION: 96 % | DIASTOLIC BLOOD PRESSURE: 92 MMHG | SYSTOLIC BLOOD PRESSURE: 130 MMHG

## 2020-01-29 PROCEDURE — 3331090002 HH PPS REVENUE DEBIT

## 2020-01-29 PROCEDURE — G0158 HHC OT ASSISTANT EA 15: HCPCS

## 2020-01-29 PROCEDURE — 3331090001 HH PPS REVENUE CREDIT

## 2020-01-30 ENCOUNTER — HOME CARE VISIT (OUTPATIENT)
Dept: SCHEDULING | Facility: HOME HEALTH | Age: 80
End: 2020-01-30
Payer: MEDICARE

## 2020-01-30 PROCEDURE — G0151 HHCP-SERV OF PT,EA 15 MIN: HCPCS

## 2020-01-30 PROCEDURE — 3331090002 HH PPS REVENUE DEBIT

## 2020-01-30 PROCEDURE — 3331090001 HH PPS REVENUE CREDIT

## 2020-01-31 VITALS
TEMPERATURE: 98 F | HEART RATE: 78 BPM | SYSTOLIC BLOOD PRESSURE: 132 MMHG | DIASTOLIC BLOOD PRESSURE: 72 MMHG | RESPIRATION RATE: 18 BRPM | OXYGEN SATURATION: 94 %

## 2020-01-31 PROCEDURE — 3331090001 HH PPS REVENUE CREDIT

## 2020-01-31 PROCEDURE — 3331090002 HH PPS REVENUE DEBIT

## 2020-02-01 PROCEDURE — 3331090002 HH PPS REVENUE DEBIT

## 2020-02-01 PROCEDURE — 3331090001 HH PPS REVENUE CREDIT

## 2020-02-02 PROCEDURE — 3331090002 HH PPS REVENUE DEBIT

## 2020-02-02 PROCEDURE — 3331090001 HH PPS REVENUE CREDIT

## 2020-02-03 ENCOUNTER — HOME CARE VISIT (OUTPATIENT)
Dept: SCHEDULING | Facility: HOME HEALTH | Age: 80
End: 2020-02-03
Payer: MEDICARE

## 2020-02-03 VITALS
SYSTOLIC BLOOD PRESSURE: 130 MMHG | OXYGEN SATURATION: 96 % | TEMPERATURE: 98.4 F | HEART RATE: 84 BPM | DIASTOLIC BLOOD PRESSURE: 96 MMHG

## 2020-02-03 PROCEDURE — G0158 HHC OT ASSISTANT EA 15: HCPCS

## 2020-02-03 PROCEDURE — 3331090001 HH PPS REVENUE CREDIT

## 2020-02-03 PROCEDURE — 3331090002 HH PPS REVENUE DEBIT

## 2020-02-04 ENCOUNTER — HOME CARE VISIT (OUTPATIENT)
Dept: SCHEDULING | Facility: HOME HEALTH | Age: 80
End: 2020-02-04
Payer: MEDICARE

## 2020-02-04 PROCEDURE — 3331090002 HH PPS REVENUE DEBIT

## 2020-02-04 PROCEDURE — G0299 HHS/HOSPICE OF RN EA 15 MIN: HCPCS

## 2020-02-04 PROCEDURE — G0151 HHCP-SERV OF PT,EA 15 MIN: HCPCS

## 2020-02-04 PROCEDURE — 3331090001 HH PPS REVENUE CREDIT

## 2020-02-05 VITALS
RESPIRATION RATE: 18 BRPM | TEMPERATURE: 97.9 F | SYSTOLIC BLOOD PRESSURE: 130 MMHG | HEART RATE: 78 BPM | DIASTOLIC BLOOD PRESSURE: 78 MMHG | OXYGEN SATURATION: 98 %

## 2020-02-05 VITALS
TEMPERATURE: 98.6 F | OXYGEN SATURATION: 97 % | SYSTOLIC BLOOD PRESSURE: 120 MMHG | HEART RATE: 82 BPM | DIASTOLIC BLOOD PRESSURE: 70 MMHG | RESPIRATION RATE: 18 BRPM

## 2020-02-05 PROCEDURE — 3331090001 HH PPS REVENUE CREDIT

## 2020-02-05 PROCEDURE — 3331090002 HH PPS REVENUE DEBIT

## 2020-02-06 ENCOUNTER — HOME CARE VISIT (OUTPATIENT)
Dept: SCHEDULING | Facility: HOME HEALTH | Age: 80
End: 2020-02-06
Payer: MEDICARE

## 2020-02-06 PROCEDURE — 3331090002 HH PPS REVENUE DEBIT

## 2020-02-06 PROCEDURE — 3331090001 HH PPS REVENUE CREDIT

## 2020-02-06 PROCEDURE — G0151 HHCP-SERV OF PT,EA 15 MIN: HCPCS

## 2020-02-07 ENCOUNTER — HOME CARE VISIT (OUTPATIENT)
Dept: HOME HEALTH SERVICES | Facility: HOME HEALTH | Age: 80
End: 2020-02-07
Payer: MEDICARE

## 2020-02-07 VITALS
SYSTOLIC BLOOD PRESSURE: 132 MMHG | TEMPERATURE: 97.6 F | HEART RATE: 78 BPM | DIASTOLIC BLOOD PRESSURE: 78 MMHG | OXYGEN SATURATION: 96 % | RESPIRATION RATE: 18 BRPM

## 2020-02-07 PROCEDURE — 3331090002 HH PPS REVENUE DEBIT

## 2020-02-07 PROCEDURE — 3331090001 HH PPS REVENUE CREDIT

## 2020-02-08 PROCEDURE — 3331090002 HH PPS REVENUE DEBIT

## 2020-02-08 PROCEDURE — 3331090001 HH PPS REVENUE CREDIT

## 2020-02-09 PROCEDURE — 3331090001 HH PPS REVENUE CREDIT

## 2020-02-09 PROCEDURE — 3331090002 HH PPS REVENUE DEBIT

## 2020-02-10 ENCOUNTER — HOME CARE VISIT (OUTPATIENT)
Dept: SCHEDULING | Facility: HOME HEALTH | Age: 80
End: 2020-02-10
Payer: MEDICARE

## 2020-02-10 PROCEDURE — 3331090002 HH PPS REVENUE DEBIT

## 2020-02-10 PROCEDURE — G0151 HHCP-SERV OF PT,EA 15 MIN: HCPCS

## 2020-02-10 PROCEDURE — 3331090001 HH PPS REVENUE CREDIT

## 2020-02-11 ENCOUNTER — HOME CARE VISIT (OUTPATIENT)
Dept: SCHEDULING | Facility: HOME HEALTH | Age: 80
End: 2020-02-11
Payer: MEDICARE

## 2020-02-11 VITALS
DIASTOLIC BLOOD PRESSURE: 78 MMHG | HEART RATE: 84 BPM | SYSTOLIC BLOOD PRESSURE: 130 MMHG | TEMPERATURE: 97.6 F | RESPIRATION RATE: 18 BRPM | OXYGEN SATURATION: 97 %

## 2020-02-11 PROCEDURE — 3331090001 HH PPS REVENUE CREDIT

## 2020-02-11 PROCEDURE — 3331090002 HH PPS REVENUE DEBIT

## 2020-02-11 PROCEDURE — G0299 HHS/HOSPICE OF RN EA 15 MIN: HCPCS

## 2020-02-12 ENCOUNTER — HOME CARE VISIT (OUTPATIENT)
Dept: HOME HEALTH SERVICES | Facility: HOME HEALTH | Age: 80
End: 2020-02-12
Payer: MEDICARE

## 2020-02-12 VITALS
RESPIRATION RATE: 20 BRPM | OXYGEN SATURATION: 96 % | DIASTOLIC BLOOD PRESSURE: 80 MMHG | SYSTOLIC BLOOD PRESSURE: 120 MMHG | HEART RATE: 61 BPM | TEMPERATURE: 98.4 F

## 2020-02-12 PROCEDURE — 3331090001 HH PPS REVENUE CREDIT

## 2020-02-12 PROCEDURE — 3331090002 HH PPS REVENUE DEBIT

## 2020-02-13 ENCOUNTER — HOME CARE VISIT (OUTPATIENT)
Dept: SCHEDULING | Facility: HOME HEALTH | Age: 80
End: 2020-02-13
Payer: MEDICARE

## 2020-02-13 ENCOUNTER — HOME CARE VISIT (OUTPATIENT)
Dept: HOME HEALTH SERVICES | Facility: HOME HEALTH | Age: 80
End: 2020-02-13
Payer: MEDICARE

## 2020-02-13 PROCEDURE — 3331090001 HH PPS REVENUE CREDIT

## 2020-02-13 PROCEDURE — G0151 HHCP-SERV OF PT,EA 15 MIN: HCPCS

## 2020-02-13 PROCEDURE — 3331090002 HH PPS REVENUE DEBIT

## 2020-02-14 ENCOUNTER — OFFICE VISIT (OUTPATIENT)
Dept: CARDIOLOGY CLINIC | Age: 80
End: 2020-02-14

## 2020-02-14 VITALS
DIASTOLIC BLOOD PRESSURE: 82 MMHG | SYSTOLIC BLOOD PRESSURE: 148 MMHG | HEIGHT: 72 IN | RESPIRATION RATE: 18 BRPM | HEART RATE: 82 BPM | BODY MASS INDEX: 19.23 KG/M2 | WEIGHT: 142 LBS | OXYGEN SATURATION: 98 %

## 2020-02-14 DIAGNOSIS — Z86.79 S/P ABLATION OF ATRIAL FLUTTER: ICD-10-CM

## 2020-02-14 DIAGNOSIS — Z86.79 S/P ABLATION OF ATRIAL FIBRILLATION: ICD-10-CM

## 2020-02-14 DIAGNOSIS — Z98.890 S/P ABLATION OF ATRIAL FLUTTER: ICD-10-CM

## 2020-02-14 DIAGNOSIS — Z98.890 S/P ABLATION OF ATRIAL FIBRILLATION: ICD-10-CM

## 2020-02-14 DIAGNOSIS — I10 ESSENTIAL HYPERTENSION: ICD-10-CM

## 2020-02-14 DIAGNOSIS — I51.81 TAKOTSUBO CARDIOMYOPATHY: Primary | ICD-10-CM

## 2020-02-14 DIAGNOSIS — I48.92 ATRIAL FLUTTER, UNSPECIFIED TYPE (HCC): ICD-10-CM

## 2020-02-14 PROCEDURE — 3331090002 HH PPS REVENUE DEBIT

## 2020-02-14 PROCEDURE — 3331090001 HH PPS REVENUE CREDIT

## 2020-02-14 RX ORDER — CARVEDILOL 12.5 MG/1
TABLET ORAL 2 TIMES DAILY WITH MEALS
COMMUNITY
End: 2020-08-10

## 2020-02-14 NOTE — PROGRESS NOTES
Tavon Parham, [facility]    Subjective/HPI:     Zhou Tompkins is a 70 y.o. female is here for routine f/u. She has a PMHx of PAF s/p AV geovany ablation with PPM, Takotsubo's cardiomyopathy now resolved, HTN, and HLD. She is doing well. She was admitted to the hospital in 10/2019 for syncopal episode resulting in left hip fracture. Syncopal episode was thought to be due to hypotension and her medications were adjusted accordingly. She was discharged to rehab and has since returned home with home health. She has been doing well. She denies complaints of chest pains, dizziness, orthopnea, PND or shortness of breath. She denies palpitation symptoms. She has lower extremity edema. She was seeing Dr. Darrius Reyes for venous insufficiency, had completed conservative therapy and was planning on having vein ablation, but then ended up being hospitalized. She has not been able to wear compression stockings due to increased lower extremity edema, primarily dependent. She was taken off her lisinopril while in rehab, maintained on Coreg. Midodrine was added for BP support on an as needed basis, and she reports she has not had to use it in many months. She has home health visiting her a few times a week and BPs at home are in the 120s/60s.          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  traMADol (ULTRAM) 50 mg tablet Take 50 mg by mouth every eight (8) hours.  OXYGEN-AIR DELIVERY SYSTEMS Take 3 L/min by inhalation continuous.  midodrine (PROAMITINE) 2.5 mg tablet Take 2.5 mg by mouth daily.  polyethylene glycol (MIRALAX) 17 gram packet Take 1 Packet by mouth daily. 1 Packet 0    mometasone-formoterol (DULERA) 200-5 mcg/actuation HFA inhaler Take 2 Puffs by inhalation two (2) times a day.  apixaban (ELIQUIS) 5 mg tablet Take 5 mg by mouth every twelve (12) hours.  albuterol (PROVENTIL HFA, VENTOLIN HFA, PROAIR HFA) 90 mcg/actuation inhaler Take 2 Puffs by inhalation every four (4) hours as needed for Wheezing.  tiotropium bromide (SPIRIVA RESPIMAT) 2.5 mcg/actuation inhaler Take 2 Puffs by inhalation daily.  atorvastatin (LIPITOR) 20 mg tablet TAKE 1 TABLET BY MOUTH EVERY DAY 90 Tab 1    aspirin delayed-release 81 mg tablet Take 81 mg by mouth daily.  multivitamin (ONE A DAY) tablet Take 1 Tab by mouth daily.  [DISCONTINUED] lidocaine (LIDODERM) 5 % 1 Patch by TransDERmal route every twenty-four (24) hours. may wear up to 12hours      [DISCONTINUED] HYDROcodone-acetaminophen (XODOL) 5-300 mg tablet Take 1 Tab by mouth every four (4) hours as needed for Pain.  [DISCONTINUED] ondansetron (ZOFRAN ODT) 4 mg disintegrating tablet Take 1 Tab by mouth every eight (8) hours as needed for Nausea. 10 Tab 0    [DISCONTINUED] carvedilol (COREG) 25 mg tablet Take 1 Tab by mouth two (2) times daily (with meals). (Patient taking differently: Take 0.5 Tabs by mouth two (2) times daily (with meals). ) 60 Tab 2    [DISCONTINUED] calcium-vitamin D (OYSTER SHELL) 500 mg(1,250mg) -200 unit per tablet Take 1 Tab by mouth three (3) times daily (with meals).  (Patient not taking: Reported on 12/24/2019) 60 Tab 1    [DISCONTINUED] acetaminophen (TYLENOL) 325 mg tablet Take 2 Tabs by mouth every four (4) hours as needed for Pain. (Patient not taking: Reported on 12/24/2019) 30 Tab 0    [DISCONTINUED] senna-docusate (PERICOLACE) 8.6-50 mg per tablet Take 1 Tab by mouth two (2) times a day. (Patient not taking: Reported on 12/24/2019) 60 Tab 0     No facility-administered encounter medications on file as of 2/14/2020. Review of Symptoms:    Review of Systems   Constitutional: Negative for chills, fever and weight loss. HENT: Negative for nosebleeds. Eyes: Negative for blurred vision and double vision. Respiratory: Negative for cough, shortness of breath and wheezing. Cardiovascular: Negative for chest pain, palpitations, orthopnea, leg swelling and PND. Gastrointestinal: Negative for abdominal pain, blood in stool, diarrhea, nausea and vomiting. Musculoskeletal: Negative for joint pain. Skin: Negative for rash. Neurological: Negative for dizziness, tingling and loss of consciousness. Endo/Heme/Allergies: Does not bruise/bleed easily. Physical Exam:      General: Well developed, in no acute distress, cooperative and alert  HEENT: No carotid bruits, no JVD, trach is midline. Neck Supple, PEERL, EOM intact. Heart:  reg rate and rhythm; normal S1/S2; no murmurs, no gallops or rubs. Respiratory: Clear bilaterally x 4, no wheezing or rales  Abdomen:   Soft, non-tender, no distention, no masses. + BS. Extremities:  Normal cap refill, no cyanosis, atraumatic. Bilateral LE edema  Neuro: A&Ox3, speech clear, ambulating using wheelchair  Skin: Skin color is normal. No rashes or lesions.  Non diaphoretic  Vascular: 2+ pulses symmetric in all extremities    Vitals:    02/14/20 1448 02/14/20 1503   BP: 140/78 148/82   Pulse: 82    Resp: 18    SpO2: 98%    Weight: 142 lb (64.4 kg)    Height: 6' (1.829 m)        ECG: sinus rhythm    Cardiology Labs:    Lab Results   Component Value Date/Time    Cholesterol, total 183 09/05/2014 04:30 AM    HDL Cholesterol 61 09/05/2014 04:30 AM    LDL, calculated 105.6 (H) 09/05/2014 04:30 AM    VLDL, calculated 16.4 09/05/2014 04:30 AM    CHOL/HDL Ratio 3.0 09/05/2014 04:30 AM       No results found for: HBA1C, JVG7QGWW, UDG1LPLR, WQL4KLES    Lab Results   Component Value Date/Time    Sodium 133 (L) 10/28/2019 02:10 PM    Potassium 3.9 10/28/2019 02:10 PM    Chloride 96 (L) 10/28/2019 02:10 PM    CO2 35 (H) 10/28/2019 02:10 PM    Glucose 109 (H) 10/28/2019 02:10 PM    BUN 14 10/28/2019 02:10 PM    Creatinine 0.51 (L) 10/28/2019 02:10 PM    BUN/Creatinine ratio 27 (H) 10/28/2019 02:10 PM    GFR est AA >60 10/28/2019 02:10 PM    GFR est non-AA >60 10/28/2019 02:10 PM    Calcium 9.0 10/28/2019 02:10 PM    Anion gap 2 (L) 10/28/2019 02:10 PM    Bilirubin, total 1.1 (H) 10/28/2019 02:10 PM    ALT (SGPT) 17 10/28/2019 02:10 PM    AST (SGOT) 31 10/28/2019 02:10 PM    Alk. phosphatase 84 10/28/2019 02:10 PM    Protein, total 6.0 (L) 10/28/2019 02:10 PM    Albumin 2.4 (L) 10/28/2019 02:10 PM    Globulin 3.6 10/28/2019 02:10 PM    A-G Ratio 0.7 (L) 10/28/2019 02:10 PM          Assessment:       ICD-10-CM ICD-9-CM    1. Takotsubo cardiomyopathy I51.81 429.83 AMB POC EKG ROUTINE W/ 12 LEADS, INTER & REP   2. Essential hypertension I10 401.9    3. Atrial flutter, unspecified type ([address] I48.92 427.32    4. S/P ablation of atrial flutter Z98.890 V45.89     Z86.79     5. S/P ablation of atrial fibrillation Z98.890 V45.89     Z86.79          Plan:     1. Takotsubo cardiomyopathy  Echo done in 10/2019 with preserved ejection fraction 60-65%, grade 1 DD  Remains euvolemic, encouraged leg elevation and compression stockings  Continue Coreg, off lisinopril due to hypotension. 2. Essential hypertension  BP well controlled at home, 120s/60s  Continue Coreg; has midodrine as needed but has not had to use it  If BP trends upwards > 103N systolic, will resume low dose lisinopril.     3. Atrial flutter, unspecified type ([address]  Maintaining sinus rhythm  Continue coreg for rate control  Continue Eliquis for stroke prevention    4. S/P ablation of atrial flutter    5. S/P ablation of atrial fibrillation    6. Venous insufficiency  Will have her return to see Dr. Roddy Escobedo to discuss RF ablation    F/u with Dr. Arron Mabry in 6 months.     Michaelle Gerber NP

## 2020-02-14 NOTE — PROGRESS NOTES
1. Have you been to the ER, urgent care clinic since your last visit? Hospitalized since your last visit? [facility] admit 10-4-2020, broken femur, then to Rehab.    2. Have you seen or consulted any other health care providers outside of the [address] [place] [address] since your last visit? Include any pap smears or colon screening. No      Chief Complaint   Patient presents with    Coronary Artery Disease     Yearly appt. Denied cardiac symptoms.

## 2020-02-15 VITALS
HEART RATE: 78 BPM | RESPIRATION RATE: 18 BRPM | OXYGEN SATURATION: 96 % | TEMPERATURE: 97.6 F | DIASTOLIC BLOOD PRESSURE: 78 MMHG | SYSTOLIC BLOOD PRESSURE: 120 MMHG

## 2020-02-15 PROCEDURE — 3331090001 HH PPS REVENUE CREDIT

## 2020-02-15 PROCEDURE — 3331090002 HH PPS REVENUE DEBIT

## 2020-02-16 PROCEDURE — 3331090001 HH PPS REVENUE CREDIT

## 2020-02-16 PROCEDURE — 3331090002 HH PPS REVENUE DEBIT

## 2020-02-17 PROCEDURE — 3331090002 HH PPS REVENUE DEBIT

## 2020-02-17 PROCEDURE — 3331090003 HH PPS REVENUE ADJ

## 2020-02-17 PROCEDURE — 3331090001 HH PPS REVENUE CREDIT

## 2020-02-18 PROCEDURE — 3331090001 HH PPS REVENUE CREDIT

## 2020-02-18 PROCEDURE — 3331090002 HH PPS REVENUE DEBIT

## 2020-02-19 ENCOUNTER — HOME CARE VISIT (OUTPATIENT)
Dept: SCHEDULING | Facility: HOME HEALTH | Age: 80
End: 2020-02-19
Payer: MEDICARE

## 2020-02-19 PROCEDURE — 400014 HH F/U

## 2020-02-19 PROCEDURE — 3331090001 HH PPS REVENUE CREDIT

## 2020-02-19 PROCEDURE — 3331090002 HH PPS REVENUE DEBIT

## 2020-02-19 PROCEDURE — G0151 HHCP-SERV OF PT,EA 15 MIN: HCPCS

## 2020-02-20 VITALS
RESPIRATION RATE: 18 BRPM | TEMPERATURE: 97.6 F | DIASTOLIC BLOOD PRESSURE: 78 MMHG | HEART RATE: 72 BPM | SYSTOLIC BLOOD PRESSURE: 132 MMHG | OXYGEN SATURATION: 96 %

## 2020-02-20 PROCEDURE — 3331090001 HH PPS REVENUE CREDIT

## 2020-02-20 PROCEDURE — 3331090002 HH PPS REVENUE DEBIT

## 2020-02-21 ENCOUNTER — HOME CARE VISIT (OUTPATIENT)
Dept: SCHEDULING | Facility: HOME HEALTH | Age: 80
End: 2020-02-21
Payer: MEDICARE

## 2020-02-21 PROCEDURE — G0151 HHCP-SERV OF PT,EA 15 MIN: HCPCS

## 2020-02-21 PROCEDURE — 3331090002 HH PPS REVENUE DEBIT

## 2020-02-21 PROCEDURE — 3331090001 HH PPS REVENUE CREDIT

## 2020-02-22 VITALS
DIASTOLIC BLOOD PRESSURE: 72 MMHG | TEMPERATURE: 97.8 F | SYSTOLIC BLOOD PRESSURE: 122 MMHG | HEART RATE: 78 BPM | OXYGEN SATURATION: 98 % | RESPIRATION RATE: 18 BRPM

## 2020-02-22 PROCEDURE — 3331090001 HH PPS REVENUE CREDIT

## 2020-02-22 PROCEDURE — 3331090002 HH PPS REVENUE DEBIT

## 2020-02-23 PROCEDURE — 3331090002 HH PPS REVENUE DEBIT

## 2020-02-23 PROCEDURE — 3331090001 HH PPS REVENUE CREDIT

## 2020-02-24 ENCOUNTER — HOME CARE VISIT (OUTPATIENT)
Dept: SCHEDULING | Facility: HOME HEALTH | Age: 80
End: 2020-02-24
Payer: MEDICARE

## 2020-02-24 PROCEDURE — 3331090001 HH PPS REVENUE CREDIT

## 2020-02-24 PROCEDURE — 3331090002 HH PPS REVENUE DEBIT

## 2020-02-24 PROCEDURE — G0151 HHCP-SERV OF PT,EA 15 MIN: HCPCS

## 2020-02-25 VITALS
RESPIRATION RATE: 18 BRPM | SYSTOLIC BLOOD PRESSURE: 132 MMHG | DIASTOLIC BLOOD PRESSURE: 82 MMHG | TEMPERATURE: 97.8 F | HEART RATE: 82 BPM | OXYGEN SATURATION: 97 %

## 2020-02-25 PROCEDURE — 3331090002 HH PPS REVENUE DEBIT

## 2020-02-25 PROCEDURE — 3331090001 HH PPS REVENUE CREDIT

## 2020-02-26 PROCEDURE — 3331090001 HH PPS REVENUE CREDIT

## 2020-02-26 PROCEDURE — 3331090002 HH PPS REVENUE DEBIT

## 2020-02-27 ENCOUNTER — HOME CARE VISIT (OUTPATIENT)
Dept: SCHEDULING | Facility: HOME HEALTH | Age: 80
End: 2020-02-27
Payer: MEDICARE

## 2020-02-27 PROCEDURE — 3331090001 HH PPS REVENUE CREDIT

## 2020-02-27 PROCEDURE — G0151 HHCP-SERV OF PT,EA 15 MIN: HCPCS

## 2020-02-27 PROCEDURE — 3331090002 HH PPS REVENUE DEBIT

## 2020-02-28 PROCEDURE — 3331090001 HH PPS REVENUE CREDIT

## 2020-02-28 PROCEDURE — 3331090002 HH PPS REVENUE DEBIT

## 2020-02-29 VITALS
TEMPERATURE: 98.6 F | RESPIRATION RATE: 18 BRPM | OXYGEN SATURATION: 97 % | SYSTOLIC BLOOD PRESSURE: 130 MMHG | HEART RATE: 80 BPM | DIASTOLIC BLOOD PRESSURE: 78 MMHG

## 2020-02-29 PROCEDURE — 3331090002 HH PPS REVENUE DEBIT

## 2020-02-29 PROCEDURE — 3331090001 HH PPS REVENUE CREDIT

## 2020-03-01 PROCEDURE — 3331090002 HH PPS REVENUE DEBIT

## 2020-03-01 PROCEDURE — 3331090001 HH PPS REVENUE CREDIT

## 2020-03-02 ENCOUNTER — HOME CARE VISIT (OUTPATIENT)
Dept: SCHEDULING | Facility: HOME HEALTH | Age: 80
End: 2020-03-02
Payer: MEDICARE

## 2020-03-02 VITALS
DIASTOLIC BLOOD PRESSURE: 72 MMHG | RESPIRATION RATE: 18 BRPM | SYSTOLIC BLOOD PRESSURE: 126 MMHG | TEMPERATURE: 97.6 F | HEART RATE: 76 BPM | OXYGEN SATURATION: 98 %

## 2020-03-02 PROCEDURE — 3331090002 HH PPS REVENUE DEBIT

## 2020-03-02 PROCEDURE — G0151 HHCP-SERV OF PT,EA 15 MIN: HCPCS

## 2020-03-02 PROCEDURE — 3331090001 HH PPS REVENUE CREDIT

## 2020-03-03 ENCOUNTER — OFFICE VISIT (OUTPATIENT)
Dept: CARDIOLOGY CLINIC | Age: 80
End: 2020-03-03

## 2020-03-03 VITALS
WEIGHT: 141.8 LBS | DIASTOLIC BLOOD PRESSURE: 80 MMHG | HEIGHT: 72 IN | BODY MASS INDEX: 19.21 KG/M2 | HEART RATE: 88 BPM | OXYGEN SATURATION: 96 % | SYSTOLIC BLOOD PRESSURE: 130 MMHG | RESPIRATION RATE: 16 BRPM

## 2020-03-03 DIAGNOSIS — R60.0 LEG EDEMA: ICD-10-CM

## 2020-03-03 DIAGNOSIS — I83.12 VARICOSE VEINS OF BOTH LOWER EXTREMITIES WITH INFLAMMATION: ICD-10-CM

## 2020-03-03 DIAGNOSIS — Z86.79 S/P ABLATION OF ATRIAL FIBRILLATION: ICD-10-CM

## 2020-03-03 DIAGNOSIS — I10 ESSENTIAL HYPERTENSION: ICD-10-CM

## 2020-03-03 DIAGNOSIS — I83.11 VARICOSE VEINS OF BOTH LOWER EXTREMITIES WITH INFLAMMATION: ICD-10-CM

## 2020-03-03 DIAGNOSIS — I48.0 PAF (PAROXYSMAL ATRIAL FIBRILLATION) (HCC): Primary | ICD-10-CM

## 2020-03-03 DIAGNOSIS — Z98.890 S/P ABLATION OF ATRIAL FIBRILLATION: ICD-10-CM

## 2020-03-03 PROCEDURE — 3331090001 HH PPS REVENUE CREDIT

## 2020-03-03 PROCEDURE — 3331090002 HH PPS REVENUE DEBIT

## 2020-03-03 NOTE — PROGRESS NOTES
1. Have you been to the ER, urgent care clinic since your last visit? Hospitalized since your last visit? NO    2. Have you seen or consulted any other health care providers outside of the [address] [place] [address] since your last visit? Include any pap smears or colon screening.  YES, PCP.    2 MONTH FOLLOW UP. C/O SWELLING IN BLE WITH PAIN

## 2020-03-03 NOTE — PROGRESS NOTES
3/3/2020 2:51 PM      Subjective:     Myla Colon is seen in vascular clinic for f/u. Previously was seen for b/l superficial venous reflux and did well with conservative management, however after recent hip surgery, has been unable to wear compression stockings and LE symptoms and swelling is back. She denies chest pain, chest pressure/discomfort, dyspnea, palpitations, irregular heart beats, near-syncope, syncope, fatigue, orthopnea, paroxysmal nocturnal dyspnea, exertional chest pressure/discomfort, tachypnea. Visit Vitals  /80 (BP 1 Location: Left arm, BP Patient Position: Sitting)   Pulse 88   Resp 16   Ht 6' (1.829 m)   Wt 141 lb 12.8 oz (64.3 kg)   SpO2 96%   BMI 19.23 kg/m²     Current Outpatient Medications   Medication Sig    carvediloL (COREG) 12.5 mg tablet Take  by mouth two (2) times daily (with meals).  traMADol (ULTRAM) 50 mg tablet Take 50 mg by mouth every eight (8) hours.  OXYGEN-AIR DELIVERY SYSTEMS Take 3 L/min by inhalation continuous.  mometasone-formoterol (DULERA) 200-5 mcg/actuation HFA inhaler Take 2 Puffs by inhalation two (2) times a day.  apixaban (ELIQUIS) 5 mg tablet Take 5 mg by mouth every twelve (12) hours.  albuterol (PROVENTIL HFA, VENTOLIN HFA, PROAIR HFA) 90 mcg/actuation inhaler Take 2 Puffs by inhalation every four (4) hours as needed for Wheezing.  tiotropium bromide (SPIRIVA RESPIMAT) 2.5 mcg/actuation inhaler Take 2 Puffs by inhalation daily.  aspirin delayed-release 81 mg tablet Take 81 mg by mouth daily.  multivitamin (ONE A DAY) tablet Take 1 Tab by mouth daily.  midodrine (PROAMITINE) 2.5 mg tablet Take 2.5 mg by mouth daily.  polyethylene glycol (MIRALAX) 17 gram packet Take 1 Packet by mouth daily.  atorvastatin (LIPITOR) 20 mg tablet TAKE 1 TABLET BY MOUTH EVERY DAY     No current facility-administered medications for this visit.           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 ENDOSCOPY    COLONOSCOPY N/A 11/30/2017    COLONOSCOPY performed by [facility] M Dudley MD at [facility] ENDOSCOPY    COLONOSCOPY Left 7/9/2019    COLONOSCOPY performed by Shira Guillen MD at [address] [place] [address]  11/30/2017         HX HERNIA REPAIR  11/29/2015    Incarcerated left femoral hernia repair,.     HX PACEMAKER Left 2014     No Known Allergies   Family History   Problem Relation Age of Onset    Heart Disease Mother     Cancer Father         BLADDER    Other Sister         RA    Other Brother         RA    Alcohol abuse Brother       Social History     Socioeconomic History    Marital status:      Spouse name: Not on file    Number of children: Not on file    Years of education: Not on file    Highest education level: Not on file   Occupational History    Not on file   Social Needs    Financial resource strain: Not on file    Food insecurity:     Worry: Not on file     Inability: Not on file    Transportation needs:     Medical: Not on file     Non-medical: Not on file   Tobacco Use    Smoking status: Former Smoker     Packs/day: 1.00     Years: 50.00     Pack years: 50.00     Types: Cigarettes     Last attempt to quit: 2014     Years since quittin.4    Smokeless tobacco: Never Used    Tobacco comment: lives with a smoker   Substance and Sexual Activity    Alcohol use: No    Drug use: No    Sexual activity: Not on file   Lifestyle    Physical activity:     Days per week: Not on file     Minutes per session: Not on file    Stress: Not on file   Relationships    Social connections:     Talks on phone: Not on file     Gets together: Not on file     Attends Sikh service: Not on file     Active member of club or organization: Not on file     Attends meetings of clubs or organizations: Not on file     Relationship status: Not on file    Intimate partner violence:     Fear of current or ex partner: Not on file     Emotionally abused: Not on file     Physically abused: Not on file     Forced sexual activity: Not on file   Other Topics Concern    Not on file   Social History Narrative    Not on file         Review of Systems     General: Not Present- Anorexia, Chills, Dietary Changes, Fatigue, Fever, Medication Changes, Night Sweats, Weight Gain > 10lbs. and Weight Loss > 10lbs. .  Skin: Not Present- Bruising and Excessive Sweating. HEENT: Not Present- Headache, Visual Loss and Vertigo. Respiratory: Not Present- Cough, Decreased Exercise Tolerance, Difficulty Breathing, Snoring and Wheezing. Cardiovascular: Not Present- Abnormal Blood Pressure, Chest Pain, Difficulty Breathing On Exertion, Fainting / Blacking Out, Irregular Heart Beat, Orthopnea, Palpitations, Paroxysmal Nocturnal Dyspnea, Rapid Heart Rate, Shortness of Breath. Gastrointestinal: Not Present- Black, Tarry Stool, Bloody Stool, Diarrhea, Hematemesis, Rectal Bleeding and Vomiting. Musculoskeletal: Not Present- Muscle Pain and Muscle Weakness.   Neurological: Not Present- Dizziness. Psychiatric: Not Present- Depression. Endocrine: Not Present- Cold Intolerance, Heat Intolerance and Thyroid Problems. Hematology: Not Present- Abnormal Bleeding, Anemia, Blood Clots and Easy Bruising.       Physical Exam   The physical exam findings are as follows:       General   Mental Status - Alert. General Appearance - Not in acute distress. Chest and Lung Exam   Inspection: Accessory muscles - No use of accessory muscles in breathing. Auscultation:   Breath sounds: - Normal.      Cardiovascular   Inspection: Jugular vein - Bilateral - Inspection Normal.  Palpation/Percussion:   Apical Impulse: - Normal.  Auscultation: Rhythm - Regular. Heart Sounds - S1 WNL and S2 WNL. No S3 or S4. Murmurs & Other Heart Sounds: Auscultation of the heart reveals - No Murmurs. Carotid arteries - No Carotid bruit. Peripheral Vascular   Upper Extremity: Inspection - Bilateral - No Cyanotic nailbeds or Digital clubbing. Lower Extremity:   Palpation: Edema - Bilateral - 2+ edema. Assessment:       ICD-10-CM ICD-9-CM    1. PAF (paroxysmal atrial fibrillation) ([facility]) I48.0 427.31 DUPLEX LOWER EXT VENOUS BILAT      CANCELED: AMB POC EKG ROUTINE W/ 12 LEADS, INTER & REP   2. Varicose veins of both lower extremities with inflammation I83.11 454.1 DUPLEX LOWER EXT VENOUS BILAT    I83.12     3. S/P ablation of atrial fibrillation Z98.890 V45.89 DUPLEX LOWER EXT VENOUS BILAT    Z86.79     4. Essential hypertension I10 401.9 DUPLEX LOWER EXT VENOUS BILAT   5. Leg edema R60.0 782.3 DUPLEX LOWER EXT VENOUS BILAT       Plan:     1. Venous insufficiency  Previously did well with conservative management. Now progression of symptoms due to recent hip surgery, dependant position and lack of walking. Recheck venous reflux study to assess for progress. Continue leg elevation and compression stockings. Will let her recover from recent ortho surgery. 2. BP controlled. 3. F/u with Dr. Mili Guy for cardiac care.

## 2020-03-04 ENCOUNTER — HOME CARE VISIT (OUTPATIENT)
Dept: SCHEDULING | Facility: HOME HEALTH | Age: 80
End: 2020-03-04
Payer: MEDICARE

## 2020-03-04 PROCEDURE — 3331090002 HH PPS REVENUE DEBIT

## 2020-03-04 PROCEDURE — 3331090001 HH PPS REVENUE CREDIT

## 2020-03-04 PROCEDURE — G0151 HHCP-SERV OF PT,EA 15 MIN: HCPCS

## 2020-03-05 VITALS
HEART RATE: 78 BPM | TEMPERATURE: 97.8 F | DIASTOLIC BLOOD PRESSURE: 72 MMHG | RESPIRATION RATE: 18 BRPM | SYSTOLIC BLOOD PRESSURE: 132 MMHG | OXYGEN SATURATION: 98 %

## 2020-03-05 PROCEDURE — 3331090002 HH PPS REVENUE DEBIT

## 2020-03-05 PROCEDURE — 3331090001 HH PPS REVENUE CREDIT

## 2020-03-06 PROCEDURE — 3331090002 HH PPS REVENUE DEBIT

## 2020-03-06 PROCEDURE — 3331090001 HH PPS REVENUE CREDIT

## 2020-03-07 PROCEDURE — 3331090002 HH PPS REVENUE DEBIT

## 2020-03-07 PROCEDURE — 3331090001 HH PPS REVENUE CREDIT

## 2020-03-08 PROCEDURE — 3331090001 HH PPS REVENUE CREDIT

## 2020-03-08 PROCEDURE — 3331090002 HH PPS REVENUE DEBIT

## 2020-03-09 ENCOUNTER — HOME CARE VISIT (OUTPATIENT)
Dept: SCHEDULING | Facility: HOME HEALTH | Age: 80
End: 2020-03-09
Payer: MEDICARE

## 2020-03-09 PROCEDURE — 3331090002 HH PPS REVENUE DEBIT

## 2020-03-09 PROCEDURE — 3331090001 HH PPS REVENUE CREDIT

## 2020-03-09 PROCEDURE — G0151 HHCP-SERV OF PT,EA 15 MIN: HCPCS

## 2020-03-10 VITALS
TEMPERATURE: 97.6 F | HEART RATE: 72 BPM | OXYGEN SATURATION: 98 % | SYSTOLIC BLOOD PRESSURE: 110 MMHG | RESPIRATION RATE: 18 BRPM | DIASTOLIC BLOOD PRESSURE: 66 MMHG

## 2020-03-10 PROCEDURE — 3331090001 HH PPS REVENUE CREDIT

## 2020-03-10 PROCEDURE — 3331090002 HH PPS REVENUE DEBIT

## 2020-03-11 ENCOUNTER — HOME CARE VISIT (OUTPATIENT)
Dept: SCHEDULING | Facility: HOME HEALTH | Age: 80
End: 2020-03-11
Payer: MEDICARE

## 2020-03-11 PROCEDURE — 3331090002 HH PPS REVENUE DEBIT

## 2020-03-11 PROCEDURE — G0151 HHCP-SERV OF PT,EA 15 MIN: HCPCS

## 2020-03-11 PROCEDURE — 3331090001 HH PPS REVENUE CREDIT

## 2020-03-12 VITALS
HEART RATE: 78 BPM | DIASTOLIC BLOOD PRESSURE: 72 MMHG | OXYGEN SATURATION: 97 % | TEMPERATURE: 98.1 F | SYSTOLIC BLOOD PRESSURE: 124 MMHG | RESPIRATION RATE: 18 BRPM

## 2020-04-14 RX ORDER — LISINOPRIL 5 MG/1
TABLET ORAL
Qty: 90 TAB | Refills: 2 | OUTPATIENT
Start: 2020-04-14

## 2020-04-22 ENCOUNTER — OFFICE VISIT (OUTPATIENT)
Dept: CARDIOLOGY CLINIC | Age: 80
End: 2020-04-22

## 2020-04-22 DIAGNOSIS — Z95.0 CARDIAC PACEMAKER IN SITU: Primary | ICD-10-CM

## 2020-04-22 DIAGNOSIS — Z98.890 S/P ABLATION OF ATRIAL FIBRILLATION: ICD-10-CM

## 2020-04-22 DIAGNOSIS — Z86.79 S/P ABLATION OF ATRIAL FIBRILLATION: ICD-10-CM

## 2020-04-22 NOTE — PROGRESS NOTES
Addended by: ESTEVAN POWERS on: 7/24/2017 03:42 PM     Modules accepted: Orders     [facility]                                                                                  Enriqueta Morales [facility]    Cardiology Telephone Encounter                                                         Pursuant to the emergency declaration under the [address] [facility], [address]5 waiver authority and the [employer] Act, this phone visit was conducted, with patient's consent, to reduce the patient's risk of exposure to COVID-19 and provide continuity of care for an established patient. She and/or health care decision maker is aware that that she may receive a bill for this telephone service, depending on her insurance coverage, and has provided verbal consent to proceed. This is a Patient Initiated Episode with an Established Patient who has not had a related appointment within my department in the past 7 days or scheduled within the next 24 hours. HISTORY OF PRESENTING ILLNESS      Sivakumar Vazquez is a 70 y.o. female evaluated via telephone on 4/23/2020 due to COVID 19 restrictions. Patient unable to participate in Virtual Visit with synchronous audio/visual technology. Sivakumar Vazquez  denies chest pain, pressure, tightness,  lower extremity edema, palpitations or syncope. Baseline BURKETT with COPD, home O2 and inhalers.       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    Occult blood in stools 1/28/2016    Pacemaker     S/P ablation of atrial fibrillation 3/23/2017    S/P ablation of atrial flutter 3/23/2017      Past Surgical History:   Procedure Laterality Date    ABDOMEN SURGERY PROC UNLISTED      inguinal hernia repair right    COLONOSCOPY N/A 5/11/2017    COLONOSCOPY performed by Josr Peralta MD at [facility] ENDOSCOPY    COLONOSCOPY N/A 11/30/2017    COLONOSCOPY performed by [facility]r Peralta MD at [facility] ENDOSCOPY    COLONOSCOPY Left 7/9/2019    COLONOSCOPY performed by Kylie Landon MD at [facility] [address]  11/30/2017         HX HERNIA REPAIR  11/29/2015    Incarcerated left femoral hernia repair,.  HX PACEMAKER Left 2014     No Known Allergies   Family History   Problem Relation Age of Onset    Heart Disease Mother     Cancer Father         BLADDER    Other Sister         RA    Other Brother         RA    Alcohol abuse Brother       Current Outpatient Medications   Medication Sig    Eliquis 5 mg tablet TAKE 1 TABLET BY MOUTH TWICE A DAY    carvediloL (COREG) 12.5 mg tablet Take  by mouth two (2) times daily (with meals).  OXYGEN-AIR DELIVERY SYSTEMS Take 3 L/min by inhalation continuous.  polyethylene glycol (MIRALAX) 17 gram packet Take 1 Packet by mouth daily.  mometasone-formoterol (DULERA) 200-5 mcg/actuation HFA inhaler Take 2 Puffs by inhalation two (2) times a day.  tiotropium bromide (SPIRIVA RESPIMAT) 2.5 mcg/actuation inhaler Take 2 Puffs by inhalation daily.  atorvastatin (LIPITOR) 20 mg tablet TAKE 1 TABLET BY MOUTH EVERY DAY    aspirin delayed-release 81 mg tablet Take 81 mg by mouth daily.  multivitamin (ONE A DAY) tablet Take 1 Tab by mouth daily.  traMADol (ULTRAM) 50 mg tablet Take 50 mg by mouth every eight (8) hours.  midodrine (PROAMITINE) 2.5 mg tablet Take 2.5 mg by mouth daily.  albuterol (PROVENTIL HFA, VENTOLIN HFA, PROAIR HFA) 90 mcg/actuation inhaler Take 2 Puffs by inhalation every four (4) hours as needed for Wheezing. No current facility-administered medications for this visit. There were no vitals filed for this visit.   Social History     Socioeconomic History    Marital status:  Spouse name: Not on file    Number of children: Not on file    Years of education: Not on file    Highest education level: Not on file   Occupational History    Not on file   Social Needs    Financial resource strain: Not on file    Food insecurity     Worry: Not on file     Inability: Not on file    Transportation needs     Medical: Not on file     Non-medical: Not on file   Tobacco Use    Smoking status: Former Smoker     Packs/day: 1.00     Years: 50.00     Pack years: 50.00     Types: Cigarettes     Last attempt to quit: 2014     Years since quittin.6    Smokeless tobacco: Never Used    Tobacco comment: lives with a smoker   Substance and Sexual Activity    Alcohol use: No    Drug use: No    Sexual activity: Not on file   Lifestyle    Physical activity     Days per week: Not on file     Minutes per session: Not on file    Stress: Not on file   Relationships    Social connections     Talks on phone: Not on file     Gets together: Not on file     Attends Judaism service: Not on file     Active member of club or organization: Not on file     Attends meetings of clubs or organizations: Not on file     Relationship status: Not on file    Intimate partner violence     Fear of current or ex partner: Not on file     Emotionally abused: Not on file     Physically abused: Not on file     Forced sexual activity: Not on file   Other Topics Concern    Not on file   Social History Narrative    Not on file       MEDICATIONS     Current Outpatient Medications   Medication Sig    Eliquis 5 mg tablet TAKE 1 TABLET BY MOUTH TWICE A DAY    carvediloL (COREG) 12.5 mg tablet Take  by mouth two (2) times daily (with meals).  OXYGEN-AIR DELIVERY SYSTEMS Take 3 L/min by inhalation continuous.  polyethylene glycol (MIRALAX) 17 gram packet Take 1 Packet by mouth daily.  mometasone-formoterol (DULERA) 200-5 mcg/actuation HFA inhaler Take 2 Puffs by inhalation two (2) times a day.     tiotropium bromide (SPIRIVA RESPIMAT) 2.5 mcg/actuation inhaler Take 2 Puffs by inhalation daily.  atorvastatin (LIPITOR) 20 mg tablet TAKE 1 TABLET BY MOUTH EVERY DAY    aspirin delayed-release 81 mg tablet Take 81 mg by mouth daily.  multivitamin (ONE A DAY) tablet Take 1 Tab by mouth daily.  traMADol (ULTRAM) 50 mg tablet Take 50 mg by mouth every eight (8) hours.  midodrine (PROAMITINE) 2.5 mg tablet Take 2.5 mg by mouth daily.  albuterol (PROVENTIL HFA, VENTOLIN HFA, PROAIR HFA) 90 mcg/actuation inhaler Take 2 Puffs by inhalation every four (4) hours as needed for Wheezing. No current facility-administered medications for this visit. I have reviewed the nurses notes, vitals, problem list, allergy list, medical history, family, social history and medications. REVIEW OF SYMPTOMS     General: Pt denies excessive weight gain or loss. Pt is able to conduct ADL's  HEENT: Denies blurred vision, headaches, hearing loss, epistaxis and difficulty swallowing. Respiratory: Denies cough, congestion, shortness of breath, BURKETT, wheezing or stridor. Cardiovascular: Denies precordial pain, palpitations, edema or PND  Gastrointestinal: Denies poor appetite, indigestion, abdominal pain or blood in stool  Genitourinary: Denies hematuria, dysuria, increased urinary frequency  Musculoskeletal: Denies joint pain or swelling from muscles or joints  Neurologic: Denies tremor, paresthesias, headache, or sensory motor disturbance  Psychiatric: Denies confusion, insomnia, depression  Integumentray: Denies rash, itching or ulcers. Hematologic: Denies easy bruising, bleeding       PHYSICAL EXAM       Due to this being a telephone encounter a very limited exam was performed  Neurological: A&Ox3, no slurred speech, answering questions appropriately  Respiratory: Non labored, talking in complete sentences, no audible wheeze over the phone        ASSESSMENT        Diagnoses and all orders for this visit:    1.  PAF (paroxysmal atrial fibrillation) ([facility] [address]    2. Takotsubo cardiomyopathy    3. S/P ablation of atrial fibrillation    4. S/P ablation of atrial flutter    5. Cardiac pacemaker in situ        ICD-10-CM ICD-9-CM    1. PAF (paroxysmal atrial fibrillation) ([facility]) I48.0 427.31    2. Takotsubo cardiomyopathy I51.81 429.83    3. S/P ablation of atrial fibrillation Z98.890 V45.89     Z86.79     4. S/P ablation of atrial flutter Z98.890 V45.89     Z86.79     5. Cardiac pacemaker in situ Z95.0 V45.01      No orders of the defined types were placed in this encounter. PLAN     Ms [address] [facility] is for follow up regarding SSS and hx of AF/AFL s/p Ablation in 3/2017. EKG demonstrates atrial pacing and her device interrogation shows normal functioning (51% AP for her sick sinus, 0%RVP) with <1% AF/AT, episodes last 6 beats. She os asymptomatic on  and [address] [place] [address]. Last echo 10/24/19 with EF 61 - 65%. Will continue with current medications. She is scheduled for an in office device check/threshold check in July. Follow up in 1 year. TIME (6 Minutes) SPENT RELATED TO THIS PHONE ENCOUNTER    We discussed the expected course, resolution and complications of the diagnosis(es) in detail. Medication risks, benefits, costs, interactions, and alternatives were discussed as indicated. I advised her to contact the office if her condition worsens, changes or fails to improve as anticipated. She expressed understanding with the diagnosis(es) and plan     FOLLOW-UP       Patient was made aware and verbalized understanding that an appointment will be scheduled for them for a virtual visit and/or office visit within the above time frame. Patient understanding his/her responsibility to call and change time/date if he/she so chooses. Thank you, Crystal Caruso MD for allowing me to participate in the care of this extraordinarily pleasant female. Please do not hesitate to contact me for further questions/concerns.      Enriqueta Rock, ANP

## 2020-04-23 ENCOUNTER — VIRTUAL VISIT (OUTPATIENT)
Dept: CARDIOLOGY CLINIC | Age: 80
End: 2020-04-23

## 2020-04-23 DIAGNOSIS — Z95.0 CARDIAC PACEMAKER IN SITU: ICD-10-CM

## 2020-04-23 DIAGNOSIS — Z98.890 S/P ABLATION OF ATRIAL FLUTTER: ICD-10-CM

## 2020-04-23 DIAGNOSIS — I48.0 PAF (PAROXYSMAL ATRIAL FIBRILLATION) (HCC): Primary | ICD-10-CM

## 2020-04-23 DIAGNOSIS — Z86.79 S/P ABLATION OF ATRIAL FLUTTER: ICD-10-CM

## 2020-04-23 DIAGNOSIS — Z86.79 S/P ABLATION OF ATRIAL FIBRILLATION: ICD-10-CM

## 2020-04-23 DIAGNOSIS — Z98.890 S/P ABLATION OF ATRIAL FIBRILLATION: ICD-10-CM

## 2020-04-23 DIAGNOSIS — I51.81 TAKOTSUBO CARDIOMYOPATHY: ICD-10-CM

## 2020-07-28 ENCOUNTER — CLINICAL SUPPORT (OUTPATIENT)
Dept: CARDIOLOGY CLINIC | Age: 80
End: 2020-07-28

## 2020-07-28 DIAGNOSIS — Z98.890 S/P ABLATION OF ATRIAL FLUTTER: ICD-10-CM

## 2020-07-28 DIAGNOSIS — Z86.79 S/P ABLATION OF ATRIAL FLUTTER: ICD-10-CM

## 2020-07-28 DIAGNOSIS — Z95.0 CARDIAC PACEMAKER IN SITU: Primary | ICD-10-CM

## 2020-08-03 ENCOUNTER — ANCILLARY PROCEDURE (OUTPATIENT)
Dept: CARDIOLOGY CLINIC | Age: 80
End: 2020-08-03
Payer: MEDICARE

## 2020-08-03 PROCEDURE — 93970 EXTREMITY STUDY: CPT | Performed by: INTERNAL MEDICINE

## 2020-08-05 LAB
LEFT GSV AT KNEE DIAM: 0.52 CM
LEFT GSV AT KNEE RFX: 0 S
LEFT GSV BK MID DIAM: 0.45 CM
LEFT GSV BK MID RFX: 3072 S
LEFT GSV JUNC DIAM: 0.74 CM
LEFT GSV JUNC RFX: 0 S
LEFT GSV THIGH PROX DIAM: 0.53 CM
LEFT GSV THIGH PROX RFX: 0 S
LEFT SSV PROX DIAM: 0.28 CM
LEFT SSV PROX RFX: 0 S
RIGHT GSV AK RFX: 0 S
RIGHT GSV AT KNEE DIAM: 0.64 CM
RIGHT GSV BK MID DIAM: 0.53 CM
RIGHT GSV BK MID RFX: 3072 S
RIGHT GSV JUNC DIAM: 0.73 CM
RIGHT GSV JUNC RFX: 0 S
RIGHT GSV THIGH PROX DIAM: 0.7 CM
RIGHT GSV THIGH PROX RFX: 0 S
RIGHT SSV PROX DIAM: 0.63 CM
RIGHT SSV PROX RFX: 0 S

## 2020-08-25 ENCOUNTER — OFFICE VISIT (OUTPATIENT)
Dept: CARDIOLOGY CLINIC | Age: 80
End: 2020-08-25
Payer: MEDICARE

## 2020-08-25 VITALS
DIASTOLIC BLOOD PRESSURE: 60 MMHG | SYSTOLIC BLOOD PRESSURE: 114 MMHG | OXYGEN SATURATION: 95 % | RESPIRATION RATE: 18 BRPM | WEIGHT: 139.4 LBS | HEART RATE: 75 BPM | BODY MASS INDEX: 18.88 KG/M2 | HEIGHT: 72 IN

## 2020-08-25 DIAGNOSIS — I10 ESSENTIAL HYPERTENSION: ICD-10-CM

## 2020-08-25 DIAGNOSIS — I48.0 PAF (PAROXYSMAL ATRIAL FIBRILLATION) (HCC): ICD-10-CM

## 2020-08-25 DIAGNOSIS — R60.0 LEG EDEMA: Primary | ICD-10-CM

## 2020-08-25 DIAGNOSIS — I83.11 VARICOSE VEINS OF BOTH LOWER EXTREMITIES WITH INFLAMMATION: ICD-10-CM

## 2020-08-25 DIAGNOSIS — I83.12 VARICOSE VEINS OF BOTH LOWER EXTREMITIES WITH INFLAMMATION: ICD-10-CM

## 2020-08-25 PROCEDURE — 1101F PT FALLS ASSESS-DOCD LE1/YR: CPT | Performed by: INTERNAL MEDICINE

## 2020-08-25 PROCEDURE — G8754 DIAS BP LESS 90: HCPCS | Performed by: INTERNAL MEDICINE

## 2020-08-25 PROCEDURE — G8432 DEP SCR NOT DOC, RNG: HCPCS | Performed by: INTERNAL MEDICINE

## 2020-08-25 PROCEDURE — 1090F PRES/ABSN URINE INCON ASSESS: CPT | Performed by: INTERNAL MEDICINE

## 2020-08-25 PROCEDURE — 99214 OFFICE O/P EST MOD 30 MIN: CPT | Performed by: INTERNAL MEDICINE

## 2020-08-25 PROCEDURE — G0463 HOSPITAL OUTPT CLINIC VISIT: HCPCS | Performed by: INTERNAL MEDICINE

## 2020-08-25 PROCEDURE — G8420 CALC BMI NORM PARAMETERS: HCPCS | Performed by: INTERNAL MEDICINE

## 2020-08-25 PROCEDURE — G8536 NO DOC ELDER MAL SCRN: HCPCS | Performed by: INTERNAL MEDICINE

## 2020-08-25 PROCEDURE — G8400 PT W/DXA NO RESULTS DOC: HCPCS | Performed by: INTERNAL MEDICINE

## 2020-08-25 PROCEDURE — G8427 DOCREV CUR MEDS BY ELIG CLIN: HCPCS | Performed by: INTERNAL MEDICINE

## 2020-08-25 PROCEDURE — G8752 SYS BP LESS 140: HCPCS | Performed by: INTERNAL MEDICINE

## 2020-08-25 NOTE — PROGRESS NOTES
8/25/2020 2:29 PM      Subjective:     Juani Buck is seen in vascular clinic for f/u visit. Recent LE venous reflux study noted. Now has recovered from ortho surgery and using compression stockings. She denies chest pain, chest pressure/discomfort, dyspnea, palpitations, irregular heart beats, near-syncope, syncope, fatigue, orthopnea, paroxysmal nocturnal dyspnea, exertional chest pressure/discomfort, tachypnea. Visit Vitals  /60 (BP Patient Position: Sitting)   Pulse 75   Resp 18   Ht 6' (1.829 m)   Wt 139 lb 6.4 oz (63.2 kg)   SpO2 95%   BMI 18.91 kg/m²     Current Outpatient Medications   Medication Sig    carvediloL (COREG) 12.5 mg tablet TAKE 1 TABLET BY MOUTH TWICE A DAY WITH MEALS    Eliquis 5 mg tablet TAKE 1 TABLET BY MOUTH TWICE A DAY    traMADol (ULTRAM) 50 mg tablet Take 50 mg by mouth every eight (8) hours.  OXYGEN-AIR DELIVERY SYSTEMS Take 3 L/min by inhalation as needed.  midodrine (PROAMITINE) 2.5 mg tablet Take 2.5 mg by mouth as needed.  polyethylene glycol (MIRALAX) 17 gram packet Take 1 Packet by mouth daily. (Patient taking differently: Take 17 g by mouth daily as needed.)    mometasone-formoterol (DULERA) 200-5 mcg/actuation HFA inhaler Take 2 Puffs by inhalation two (2) times a day.  albuterol (PROVENTIL HFA, VENTOLIN HFA, PROAIR HFA) 90 mcg/actuation inhaler Take 2 Puffs by inhalation every four (4) hours as needed for Wheezing.  tiotropium bromide (SPIRIVA RESPIMAT) 2.5 mcg/actuation inhaler Take 2 Puffs by inhalation daily.  atorvastatin (LIPITOR) 20 mg tablet TAKE 1 TABLET BY MOUTH EVERY DAY    aspirin delayed-release 81 mg tablet Take 81 mg by mouth daily.  multivitamin (ONE A DAY) tablet Take 1 Tab by mouth daily. No current facility-administered medications for this visit.           Objective:      Visit Vitals  /60 (BP Patient Position: Sitting)   Pulse 75   Resp 18   Ht 6' (1.829 m)   Wt 139 lb 6.4 oz (63.2 kg)   SpO2 95%   BMI 18.91 kg/m²       Data Review:     Reviewed and/or ordered medication list tests    Past Medical History:   Diagnosis Date    COPD     BY CHEST XRAY    Early satiety 1/28/2016    Hypertension     ICD (implantable cardioverter-defibrillator) in place     Myocardial infarction ([facility] [address] 2014    Nausea & vomiting     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    Occult blood in stools 1/28/2016    Pacemaker     S/P ablation of atrial fibrillation 3/23/2017    S/P ablation of atrial flutter 3/23/2017      Past Surgical History:   Procedure Laterality Date    ABDOMEN SURGERY PROC UNLISTED      inguinal hernia repair right    COLONOSCOPY N/A 5/11/2017    COLONOSCOPY performed [facility]a Garces MD at [facility] ENDOSCOPY    COLONOSCOPY N/A 11/30/2017    COLONOSCOPY performed by [facility]a Garces MD at [facility] ENDOSCOPY    COLONOSCOPY Left 7/9/2019    COLONOSCOPY performed by Tim Maier MD at [address]  11/30/2017         HX HERNIA REPAIR  11/29/2015    Incarcerated left femoral hernia repair,.     HX PACEMAKER Left 2014     No Known Allergies   Family History   Problem Relation Age of Onset    Heart Disease Mother     Cancer Father         BLADDER    Other Sister         RA    Other Brother         RA    Alcohol abuse Brother       Social History     Socioeconomic History    Marital status:      Spouse name: Not on file    Number of children: Not on file    Years of education: Not on file    Highest education level: Not on file   Occupational History    Not on file   Social Needs    Financial resource strain: Not on file    Food insecurity     Worry: Not on file     Inability: Not on file   [employer] needs     Medical: Not on file     Non-medical: Not on file   Tobacco Use    Smoking status: Former Smoker     Packs/day: 1.00     Years: 50.00     Pack years: 50.00     Types: Cigarettes Last attempt to quit: 2014     Years since quittin.9    Smokeless tobacco: Never Used    Tobacco comment: lives with a smoker   Substance and Sexual Activity    Alcohol use: No    Drug use: No    Sexual activity: Not on file   Lifestyle    Physical activity     Days per week: Not on file     Minutes per session: Not on file    Stress: Not on file   Relationships    Social connections     Talks on phone: Not on file     Gets together: Not on file     Attends Quaker service: Not on file     Active member of club or organization: Not on file     Attends meetings of clubs or organizations: Not on file     Relationship status: Not on file    Intimate partner violence     Fear of current or ex partner: Not on file     Emotionally abused: Not on file     Physically abused: Not on file     Forced sexual activity: Not on file   Other Topics Concern    Not on file   Social History Narrative    Not on file         Review of Systems     General: Not Present- Anorexia, Chills, Dietary Changes, Fatigue, Fever, Medication Changes, Night Sweats, Weight Gain > 10lbs. and Weight Loss > 10lbs. .  Skin: Not Present- Bruising and Excessive Sweating. HEENT: Not Present- Headache, Visual Loss and Vertigo. Respiratory: Not Present- Cough, Decreased Exercise Tolerance, Difficulty Breathing, Snoring and Wheezing. Cardiovascular: Not Present- Abnormal Blood Pressure, Chest Pain, Difficulty Breathing On Exertion, Fainting / Blacking Out, Irregular Heart Beat, Orthopnea, Palpitations, Paroxysmal Nocturnal Dyspnea, Rapid Heart Rate, Shortness of Breath. Gastrointestinal: Not Present- Black, Tarry Stool, Bloody Stool, Diarrhea, Hematemesis, Rectal Bleeding and Vomiting. Musculoskeletal: Not Present- Muscle Pain and Muscle Weakness. Neurological: Not Present- Dizziness. Psychiatric: Not Present- Depression. Endocrine: Not Present- Cold Intolerance, Heat Intolerance and Thyroid Problems.   Hematology: Not Present- Abnormal Bleeding, Anemia, Blood Clots and Easy Bruising. Physical Exam   The physical exam findings are as follows:       General   Mental Status - Alert. General Appearance - Not in acute distress. Chest and Lung Exam   Inspection: Accessory muscles - No use of accessory muscles in breathing. Auscultation:   Breath sounds: - Normal.      Cardiovascular   Inspection: Jugular vein - Bilateral - Inspection Normal.  Palpation/Percussion:   Apical Impulse: - Normal.  Auscultation: Rhythm - Regular. Heart Sounds - S1 WNL and S2 WNL. No S3 or S4. Murmurs & Other Heart Sounds: Auscultation of the heart reveals - No Murmurs. Carotid arteries - No Carotid bruit. Peripheral Vascular   Upper Extremity: Inspection - Bilateral - No Cyanotic nailbeds or Digital clubbing. Lower Extremity:   Palpation: Edema - Bilateral - mild edema. Skin pigmentation. Assessment:       ICD-10-CM ICD-9-CM    1. Leg edema  R60.0 782.3    2. Varicose veins of both lower extremities with inflammation  I83.11 454.1     I83.12     3. Essential hypertension  I10 401.9    4. PAF (paroxysmal atrial fibrillation) ([facility])  I48.0 427.31        Plan:     1.  Venous insufficiency, LE varicose veins with edema and skin pigmentation:   She wants to continue to try compression stocking for now. If symptoms worsen then will proceed with b/l GSV RF ablation. Continue leg elevation, exercise.      2. BP controlled.      3. F/u with Dr. Reynold Opitz for cardiac care.

## 2020-08-25 NOTE — PROGRESS NOTES
1. Have you been to the ER, urgent care clinic since your last visit? Hospitalized since your last visit? No.    2. Have you seen or consulted any other health care providers outside of the [address] [place] [address] since your last visit? Include any pap smears or colon screening.    No.      Chief Complaint   Patient presents with    Results     discuss le venous doppler results

## 2020-08-26 NOTE — PROGRESS NOTES
Subjective/HPI:     Denisse Gotti is a 70 y.o. female is here for routine f/u. She has a PMHx of  PAF s/p AV geovany ablation with PPM, Takotsubo's cardiomyopathy now resolved, HTN, and HLD. No cardiac issues. Murray Cradle 2 months ago and had broken left femur, had surgery. Recovering well. Per patient she was at [facility], no notes in chart. 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  OXYGEN-AIR DELIVERY SYSTEMS Take 3 L/min by inhalation as needed.  midodrine (PROAMITINE) 2.5 mg tablet Take 2.5 mg by mouth as needed.  polyethylene glycol (MIRALAX) 17 gram packet Take 1 Packet by mouth daily. (Patient taking differently: Take 17 g by mouth daily as needed.) 1 Packet 0    mometasone-formoterol (DULERA) 200-5 mcg/actuation HFA inhaler Take 2 Puffs by inhalation two (2) times a day.  albuterol (PROVENTIL HFA, VENTOLIN HFA, PROAIR HFA) 90 mcg/actuation inhaler Take 2 Puffs by inhalation every four (4) hours as needed for Wheezing.       tiotropium bromide (SPIRIVA RESPIMAT) 2.5 mcg/actuation inhaler Take 2 Puffs by inhalation daily.      atorvastatin (LIPITOR) 20 mg tablet TAKE 1 TABLET BY MOUTH EVERY DAY 90 Tab 1    aspirin delayed-release 81 mg tablet Take 81 mg by mouth daily.  multivitamin (ONE A DAY) tablet Take 1 Tab by mouth daily. No facility-administered encounter medications on file as of 8/27/2020. Review of Symptoms:    Review of Systems   Constitutional: Negative. Negative for chills, fever and weight loss. HENT: Negative. Negative for hearing loss and nosebleeds. Eyes: Negative for blurred vision and double vision. Respiratory: Negative. Negative for cough, hemoptysis, shortness of breath and wheezing. Cardiovascular: Negative. Negative for chest pain, palpitations, orthopnea, claudication, leg swelling and PND. Gastrointestinal: Negative. Negative for abdominal pain, blood in stool, diarrhea, nausea and vomiting. Musculoskeletal: Negative for joint pain and myalgias. Skin: Negative. Negative for rash. Neurological: Negative. Negative for dizziness, tingling, loss of consciousness and headaches. Endo/Heme/Allergies: Does not bruise/bleed easily. Physical Exam:      General: Well developed, in no acute distress, cooperative and alert  HEENT: No carotid bruits, no JVD, trach is midline. Neck Supple, PEERL, EOM intact. Heart:  reg rate and rhythm; normal S1/S2; no murmurs, no gallops or rubs. Respiratory: Clear bilaterally x 4, no wheezing or rales  Abdomen:   Soft, non-tender, no distention, no masses. + BS. Extremities:  Normal cap refill, no cyanosis, atraumatic. No edema. Neuro: A&Ox3, speech clear, gait stable. Skin: Skin color is normal. No rashes or lesions. Non diaphoretic  Vascular: 2+ pulses symmetric in all extremities    There were no vitals filed for this visit.     ECG: paced    Cardiology Labs:    Lab Results   Component Value Date/Time    Cholesterol, total 183 09/05/2014 04:30 AM    HDL Cholesterol 61 09/05/2014 04:30 AM    LDL, calculated 105.6 (H) 09/05/2014 04:30 AM    VLDL, calculated 16.4 09/05/2014 04:30 AM    CHOL/HDL Ratio 3.0 09/05/2014 04:30 AM       No results found for: HBA1C, DRM6PXXE, SJB3ORUL, DMW3AFHX    Lab Results   Component Value Date/Time    Sodium 133 (L) 10/28/2019 02:10 PM    Potassium 3.9 10/28/2019 02:10 PM    Chloride 96 (L) 10/28/2019 02:10 PM    CO2 35 (H) 10/28/2019 02:10 PM    Glucose 109 (H) 10/28/2019 02:10 PM    BUN 14 10/28/2019 02:10 PM    Creatinine 0.51 (L) 10/28/2019 02:10 PM    BUN/Creatinine ratio 27 (H) 10/28/2019 02:10 PM    GFR est AA >60 10/28/2019 02:10 PM    GFR est non-AA >60 10/28/2019 02:10 PM    Calcium 9.0 10/28/2019 02:10 PM    Anion gap 2 (L) 10/28/2019 02:10 PM    Bilirubin, total 1.1 (H) 10/28/2019 02:10 PM    ALT (SGPT) 17 10/28/2019 02:10 PM    Alk. phosphatase 84 10/28/2019 02:10 PM    Protein, total 6.0 (L) 10/28/2019 02:10 PM    Albumin 2.4 (L) 10/28/2019 02:10 PM    Globulin 3.6 10/28/2019 02:10 PM    A-G Ratio 0.7 (L) 10/28/2019 02:10 PM          Assessment:       ICD-10-CM ICD-9-CM    1. Atrial flutter, unspecified type ([facility] [address]  I48.92 427.32    2. Takotsubo cardiomyopathy  I51.81 429.83    3. Essential hypertension  I10 401.9    4. Pacemaker  Z95.0 V45.01    5. S/P ablation of atrial flutter  Z98.890 V45.89     Z86.79     6. S/P ablation of atrial fibrillation  Z98.890 V45.89     Z86.79          Plan:     1. Takotsubo cardiomyopathy  Hx of takotsubo's cardiomyopathy, now resolved  Echo done in 10/2019 with preserved ejection fraction 60-65%, grade 1 DD  Remains euvolemic, encouraged leg elevation and compression stockings  Continue Coreg, hold ACEI/ARB due to low normal BPs.     2. Atrial flutter, unspecified type ([address]  Maintaining sinus rhythm  Continue coreg for rate control  Continue Eliquis for stroke prevention    3. Essential hypertension  BP well controlled at home, 120s/60s  Continue carvedilol  Has midodrine to use PRN for hypotension    4.   Pacemaker  Continue with routine device interrogation with Dr. Natasha Hardy     5. S/P ablation of atrial flutter  6. S/P ablation of atrial fibrillation    F/u in 6 months.       Jamaal Sultana MD

## 2020-08-27 ENCOUNTER — OFFICE VISIT (OUTPATIENT)
Dept: CARDIOLOGY CLINIC | Age: 80
End: 2020-08-27
Payer: MEDICARE

## 2020-08-27 VITALS
HEIGHT: 72 IN | WEIGHT: 140.1 LBS | HEART RATE: 76 BPM | DIASTOLIC BLOOD PRESSURE: 70 MMHG | SYSTOLIC BLOOD PRESSURE: 110 MMHG | BODY MASS INDEX: 18.98 KG/M2 | OXYGEN SATURATION: 92 % | RESPIRATION RATE: 16 BRPM

## 2020-08-27 DIAGNOSIS — I10 ESSENTIAL HYPERTENSION: ICD-10-CM

## 2020-08-27 DIAGNOSIS — I48.92 ATRIAL FLUTTER, UNSPECIFIED TYPE (HCC): Primary | ICD-10-CM

## 2020-08-27 DIAGNOSIS — Z98.890 S/P ABLATION OF ATRIAL FLUTTER: ICD-10-CM

## 2020-08-27 DIAGNOSIS — Z95.0 PACEMAKER: ICD-10-CM

## 2020-08-27 DIAGNOSIS — Z86.79 S/P ABLATION OF ATRIAL FIBRILLATION: ICD-10-CM

## 2020-08-27 DIAGNOSIS — I51.81 TAKOTSUBO CARDIOMYOPATHY: ICD-10-CM

## 2020-08-27 DIAGNOSIS — Z86.79 S/P ABLATION OF ATRIAL FLUTTER: ICD-10-CM

## 2020-08-27 DIAGNOSIS — Z98.890 S/P ABLATION OF ATRIAL FIBRILLATION: ICD-10-CM

## 2020-08-27 PROCEDURE — 1101F PT FALLS ASSESS-DOCD LE1/YR: CPT | Performed by: INTERNAL MEDICINE

## 2020-08-27 PROCEDURE — G8400 PT W/DXA NO RESULTS DOC: HCPCS | Performed by: INTERNAL MEDICINE

## 2020-08-27 PROCEDURE — G8754 DIAS BP LESS 90: HCPCS | Performed by: INTERNAL MEDICINE

## 2020-08-27 PROCEDURE — 1090F PRES/ABSN URINE INCON ASSESS: CPT | Performed by: INTERNAL MEDICINE

## 2020-08-27 PROCEDURE — G8432 DEP SCR NOT DOC, RNG: HCPCS | Performed by: INTERNAL MEDICINE

## 2020-08-27 PROCEDURE — G8536 NO DOC ELDER MAL SCRN: HCPCS | Performed by: INTERNAL MEDICINE

## 2020-08-27 PROCEDURE — G8752 SYS BP LESS 140: HCPCS | Performed by: INTERNAL MEDICINE

## 2020-08-27 PROCEDURE — 93005 ELECTROCARDIOGRAM TRACING: CPT | Performed by: INTERNAL MEDICINE

## 2020-08-27 PROCEDURE — 99213 OFFICE O/P EST LOW 20 MIN: CPT | Performed by: INTERNAL MEDICINE

## 2020-08-27 PROCEDURE — G0463 HOSPITAL OUTPT CLINIC VISIT: HCPCS | Performed by: INTERNAL MEDICINE

## 2020-08-27 PROCEDURE — G8420 CALC BMI NORM PARAMETERS: HCPCS | Performed by: INTERNAL MEDICINE

## 2020-08-27 PROCEDURE — 93010 ELECTROCARDIOGRAM REPORT: CPT | Performed by: INTERNAL MEDICINE

## 2020-08-27 PROCEDURE — G8427 DOCREV CUR MEDS BY ELIG CLIN: HCPCS | Performed by: INTERNAL MEDICINE

## 2020-08-27 RX ORDER — CARVEDILOL 12.5 MG/1
TABLET ORAL
Qty: 180 TAB | Refills: 3 | Status: SHIPPED | OUTPATIENT
Start: 2020-08-27 | End: 2021-08-30

## 2020-08-27 NOTE — PROGRESS NOTES
Chief Complaint   Patient presents with    Follow-up     Takotsubo cardiomyopathy       1. Have you been to the ER, urgent care clinic since your last visit? Hospitalized since your last visit? No    2. Have you seen or consulted any other health care providers outside of the [address] [place] [address] since your last visit? Include any pap smears or colon screening.  No

## 2020-08-27 NOTE — LETTER
8/27/20 Patient: Adi Witt YOB: 1940 Date of Visit: 8/27/2020 Ramob Farley MD 
[facility] [address] VIA In Basket Dear Rambo Farley MD, Thank you for referring Ms. Sherrie Ascencio to [address] [place] [address] for evaluation. My notes for this consultation are attached. If you have questions, please do not hesitate to call me. I look forward to following your patient along with you. Sincerely, José Miguel Moy MD

## 2020-10-29 ENCOUNTER — OFFICE VISIT (OUTPATIENT)
Dept: CARDIOLOGY CLINIC | Age: 80
End: 2020-10-29
Payer: MEDICARE

## 2020-10-29 DIAGNOSIS — I44.2 COMPLETE HEART BLOCK (HCC): ICD-10-CM

## 2020-10-29 DIAGNOSIS — Z95.0 CARDIAC PACEMAKER IN SITU: Primary | ICD-10-CM

## 2020-10-29 PROCEDURE — 93294 REM INTERROG EVL PM/LDLS PM: CPT | Performed by: INTERNAL MEDICINE

## 2020-10-29 PROCEDURE — 93296 REM INTERROG EVL PM/IDS: CPT | Performed by: INTERNAL MEDICINE

## 2021-01-28 ENCOUNTER — OFFICE VISIT (OUTPATIENT)
Dept: CARDIOLOGY CLINIC | Age: 81
End: 2021-01-28
Payer: MEDICARE

## 2021-01-28 DIAGNOSIS — Z95.0 CARDIAC PACEMAKER IN SITU: Primary | ICD-10-CM

## 2021-01-28 DIAGNOSIS — I44.2 COMPLETE HEART BLOCK (HCC): ICD-10-CM

## 2021-01-28 PROCEDURE — 93296 REM INTERROG EVL PM/IDS: CPT | Performed by: INTERNAL MEDICINE

## 2021-01-28 PROCEDURE — 93294 REM INTERROG EVL PM/LDLS PM: CPT | Performed by: INTERNAL MEDICINE

## 2021-03-02 ENCOUNTER — OFFICE VISIT (OUTPATIENT)
Dept: CARDIOLOGY CLINIC | Age: 81
End: 2021-03-02
Payer: MEDICARE

## 2021-03-02 ENCOUNTER — DOCUMENTATION ONLY (OUTPATIENT)
Dept: CARDIOLOGY CLINIC | Age: 81
End: 2021-03-02

## 2021-03-02 VITALS
DIASTOLIC BLOOD PRESSURE: 72 MMHG | HEART RATE: 73 BPM | SYSTOLIC BLOOD PRESSURE: 132 MMHG | WEIGHT: 142.6 LBS | HEIGHT: 72 IN | BODY MASS INDEX: 19.31 KG/M2 | RESPIRATION RATE: 18 BRPM | OXYGEN SATURATION: 99 %

## 2021-03-02 DIAGNOSIS — I48.0 PAF (PAROXYSMAL ATRIAL FIBRILLATION) (HCC): ICD-10-CM

## 2021-03-02 DIAGNOSIS — I10 ESSENTIAL HYPERTENSION: ICD-10-CM

## 2021-03-02 DIAGNOSIS — I83.11 VARICOSE VEINS OF BOTH LOWER EXTREMITIES WITH INFLAMMATION: Primary | ICD-10-CM

## 2021-03-02 DIAGNOSIS — I83.12 VARICOSE VEINS OF BOTH LOWER EXTREMITIES WITH INFLAMMATION: Primary | ICD-10-CM

## 2021-03-02 PROCEDURE — 1101F PT FALLS ASSESS-DOCD LE1/YR: CPT | Performed by: INTERNAL MEDICINE

## 2021-03-02 PROCEDURE — G8510 SCR DEP NEG, NO PLAN REQD: HCPCS | Performed by: INTERNAL MEDICINE

## 2021-03-02 PROCEDURE — G8536 NO DOC ELDER MAL SCRN: HCPCS | Performed by: INTERNAL MEDICINE

## 2021-03-02 PROCEDURE — G8752 SYS BP LESS 140: HCPCS | Performed by: INTERNAL MEDICINE

## 2021-03-02 PROCEDURE — G0463 HOSPITAL OUTPT CLINIC VISIT: HCPCS | Performed by: INTERNAL MEDICINE

## 2021-03-02 PROCEDURE — G8427 DOCREV CUR MEDS BY ELIG CLIN: HCPCS | Performed by: INTERNAL MEDICINE

## 2021-03-02 PROCEDURE — 1090F PRES/ABSN URINE INCON ASSESS: CPT | Performed by: INTERNAL MEDICINE

## 2021-03-02 PROCEDURE — G8420 CALC BMI NORM PARAMETERS: HCPCS | Performed by: INTERNAL MEDICINE

## 2021-03-02 PROCEDURE — G8400 PT W/DXA NO RESULTS DOC: HCPCS | Performed by: INTERNAL MEDICINE

## 2021-03-02 PROCEDURE — 99214 OFFICE O/P EST MOD 30 MIN: CPT | Performed by: INTERNAL MEDICINE

## 2021-03-02 PROCEDURE — G8754 DIAS BP LESS 90: HCPCS | Performed by: INTERNAL MEDICINE

## 2021-03-02 RX ORDER — FLUTICASONE PROPIONATE AND SALMETEROL 500; 50 UG/1; UG/1
POWDER RESPIRATORY (INHALATION)
COMMUNITY
Start: 2021-02-02 | End: 2021-08-06

## 2021-03-02 NOTE — PROGRESS NOTES
3/2/2021 1:54 PM      Subjective:     Ms. Alexander Henson is a 68 y.o. female here for f/u after conservative therapy. She has a PMHx of PAF s/p AV geovany ablation with PPM, Takotsubo's cardiomyopathy now resolved, HTN and HLD. Has been wearing compression stockings for about 6 months now. Did find some improvement with less aching in her legs. Still has some pain in the legs. Objective:      Review of Symptoms:    Review of Systems   Constitutional: Negative for chills, fever and weight loss. HENT: Negative for nosebleeds. Eyes: Negative for blurred vision and double vision. Respiratory: Negative for cough, shortness of breath and wheezing. Cardiovascular: Negative for chest pain, palpitations, orthopnea, leg swelling and PND. Gastrointestinal: Negative for abdominal pain, blood in stool, diarrhea, nausea and vomiting. Musculoskeletal: Negative for joint pain. Skin: Negative for rash. Neurological: Negative for dizziness, tingling and loss of consciousness. Endo/Heme/Allergies: Does not bruise/bleed easily. Physical Exam:      General: Well developed, in no acute distress, cooperative and alert  Heart:  reg rate and rhythm; normal S1/S2; no murmurs, no gallops or rubs. Respiratory: Clear bilaterally x 4, no wheezing or rales  Extremities:  Normal cap refill, no cyanosis, atraumatic. No edema. Vascular  - Edema: Right: none Left: none  - Popliteal: Right +2 Left +2  - TP: Right +2 Left +2  - DP: Right +2 Left +2  Venous: varicosities present, telangiectasia present and spider veins      PHYSICIAN TO COMPLETE    Date of Physician Reevaluation:____03/02/2021________  (To review results of trial of conservative therapy-lasting at least 3-6 months):  Patient is symptomatic with varicosities despite compliance with conservative therapy. Has failed conservative treatment.     Check all that apply:  [x] Other causes of patients leg(s) symptoms have been ruled out  [x] Completed conservative treatment to include: compression stockings, medication, leg elevation, mild exercise & weight         reduction (as appropriate). Time length of conservative treatment[de-identified] __3 months_________    Patient is symptomatic with varicosities causing the following: (check all that apply):  [x] Has persistent aching, cramping, burning, pain, itching, and/or swelling during activity or after prolonged standing. [] Significant, recurrent superficial phlebitis  [] Hemorrhage from a ruptured varix  [] Non-healing skin ulceration of the leg  [] Other complications associated:  ___________________      Duplex or Doppler Ultrasound of the venous system demonstrate:  [x] Absence of deep venous thrombosis  [x] Greater and/or lesser saphenous vein or  valvular incompetence/reflux that correlates with        patients symptoms  []   valvular incompetence/reflux that correlates with patients symptoms         Assessment:       ICD-10-CM ICD-9-CM    1. Varicose veins of both lower extremities with inflammation  I83.11 454.1     I83.12     2. Essential hypertension  I10 401.9    3. PAF (paroxysmal atrial fibrillation) ([facility])  I48.0 427.31        Plan:     1. Varicose veins of both lower extremities with inflammation  Some improvement with use of compression stockings bilaterally for almost 6 months  Discussed RF ablation of GSV. She is agreeable. Will plan for right GSV ablation first.  If improved, then can plan for left GSV ablation. I discussed the risks/benefits/alternatives of the procedure with the patient. The patient understands and agrees to proceed.     2. BP controlled.      3. F/u with Dr. Mandi Robbins for cardiac care.       Patient seen and examined by me with nurse practitioner in vascular clinic. I personally performed all components of the history, physical, and medical decision making and agree with the assessment and plan as noted.     Nayeli Lewis MD  3/2/2021

## 2021-03-02 NOTE — PROGRESS NOTES
1. Have you been to the ER, urgent care clinic since your last visit? Hospitalized since your last visit? No    2. Have you seen or consulted any other health care providers outside of the [address] [place] [address] since your last visit? Include any pap smears or colon screening.  No           Chief Complaint   Patient presents with    Varicose Veins     C/O Ankle swelling, leg pain both legs, SOB

## 2021-03-02 NOTE — PROGRESS NOTES
3/2/2021     «PatientAlexiInsurancePayerPlan»  «PatientClausranceAddressFormatted»    Attention: Provider Services    Re: Predetermination of Medical Necessity and Plan Benefits    Patient Name   Gunjan Gudino  Date of Birth   1940    Subscriber Name  «HemanthSuzorancriber»  Date of Birth   Click here to enter a date. Relationship to Patient  «HemanthSubscriberRelatio»  Identification Number  «HemanthMemberID»  Group Number   «HemanthGroupID»          To Whom It May Concern:    Gunjan Gudino is a 68 yrs female who presented to my office for evaluation, diagnosis, and treatment. Gunjan Gudino reports that the symptoms she is experiencing are limiting her ability to perform the activities of daily living. The patient presented with   Aching/pain   [] One leg [x] Both legs  Heaviness     [] One leg [x] Both legs  Tiredness/fatigue   [] One leg [] Both legs  Itching/burning   [] One leg [x] Both legs  Swollen ankles   [] One leg [x] Both legs  Leg cramp    [] One leg [x] Both legs  Restless legs   [] One leg [x] Both legs         Throbbing   [] One leg []Bothlegs. The patient states that she has suffered from these symptoms for long time, and, although conservative therapy does improve the symptoms intermittently, they do not alleviate them to the point that she is impaired in the daily activities of living. During prolonged periods of standing, the patient must sit or take a break due to aching, cramping, burning, itching, or swelling in the lower extremities. To compensate for this functional impairment, the patient has tried conservative therapies; however, these conservative measures have failed to resolve these symptoms.   The conservative treatment measures include the following:     Compression Stocking Therapy  o Period of Time:   - []   Three Weeks  - []  Three Months  - [x]   Six Months         o Strength of Compression:   - []  10 - 20 mmHg  - [x]  20 - 30 mmHg  - [] 30 - 40 mmHg   Use of Over-The-Counter Analgesics     Exercise  o Frequency: daily     Elevation of Lower Extremity Above Heart Level  o Frequency: daily    A bilateral Duplex Ultrasound examination was performed, and the report is attached for review. The patients history reveals no presence of lymphedema, arterial insufficiency, or deep vein thrombosis; nor is the patient within six months post-partem. In conclusion, the summary of my findings and recommended, staged treatment include the following:    Diagnosis  Patient Active Problem List   Diagnosis Code    Hypertension I10    Tobacco abuse Z72.0    Takotsubo cardiomyopathy I51.81    Pacemaker Z95.0    SBO (small bowel obstruction) ([address] K56.609    Femoral hernia with obstruction K41.30    Occult blood in stools R19.5    Early satiety R68.81    PAF (paroxysmal atrial fibrillation) (HCC) I48.0    Atrial flutter (HCC) I48.92    S/P ablation of atrial fibrillation Z98.890, Z86.79    S/P ablation of atrial flutter Z98.890, Z86.79    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 92XA    Leg edema R60.0       Recommended Treatment    Bilateral GSV RF ablation. The procedure(s) will be performed in my office using local tumescent anesthesia. Enclosed you will find the supporting documentation of my findings, which include the following:     History and Physical Examination   Doppler Examination   Lower Extremity Patient Photographs    The patient has requested that we verify coverage for the recommended treatment under their insurance plan and, understandably, she is anxious to move forward in finding relief. I appreciate your review and response at your earliest convenience.   Should you have any questions, please feel free to contact me at [phone].     Sincerely,    Tanya Cota MD    [facility]

## 2021-03-11 ENCOUNTER — OFFICE VISIT (OUTPATIENT)
Dept: CARDIOLOGY CLINIC | Age: 81
End: 2021-03-11
Payer: MEDICARE

## 2021-03-11 VITALS
RESPIRATION RATE: 18 BRPM | BODY MASS INDEX: 19.06 KG/M2 | SYSTOLIC BLOOD PRESSURE: 128 MMHG | DIASTOLIC BLOOD PRESSURE: 70 MMHG | HEIGHT: 72 IN | HEART RATE: 80 BPM | WEIGHT: 140.7 LBS | OXYGEN SATURATION: 98 %

## 2021-03-11 DIAGNOSIS — Z86.79 S/P ABLATION OF ATRIAL FIBRILLATION: ICD-10-CM

## 2021-03-11 DIAGNOSIS — Z98.890 S/P ABLATION OF ATRIAL FIBRILLATION: ICD-10-CM

## 2021-03-11 DIAGNOSIS — I51.81 TAKOTSUBO CARDIOMYOPATHY: Primary | ICD-10-CM

## 2021-03-11 DIAGNOSIS — E78.2 MIXED HYPERLIPIDEMIA: ICD-10-CM

## 2021-03-11 DIAGNOSIS — Z95.0 CARDIAC PACEMAKER IN SITU: ICD-10-CM

## 2021-03-11 DIAGNOSIS — Z86.79 S/P ABLATION OF ATRIAL FLUTTER: ICD-10-CM

## 2021-03-11 DIAGNOSIS — Z98.890 S/P ABLATION OF ATRIAL FLUTTER: ICD-10-CM

## 2021-03-11 DIAGNOSIS — I10 ESSENTIAL HYPERTENSION: ICD-10-CM

## 2021-03-11 DIAGNOSIS — I44.2 COMPLETE HEART BLOCK (HCC): ICD-10-CM

## 2021-03-11 DIAGNOSIS — I48.0 PAF (PAROXYSMAL ATRIAL FIBRILLATION) (HCC): ICD-10-CM

## 2021-03-11 PROCEDURE — G8420 CALC BMI NORM PARAMETERS: HCPCS | Performed by: INTERNAL MEDICINE

## 2021-03-11 PROCEDURE — 1090F PRES/ABSN URINE INCON ASSESS: CPT | Performed by: INTERNAL MEDICINE

## 2021-03-11 PROCEDURE — G0463 HOSPITAL OUTPT CLINIC VISIT: HCPCS | Performed by: INTERNAL MEDICINE

## 2021-03-11 PROCEDURE — 93005 ELECTROCARDIOGRAM TRACING: CPT | Performed by: INTERNAL MEDICINE

## 2021-03-11 PROCEDURE — G8754 DIAS BP LESS 90: HCPCS | Performed by: INTERNAL MEDICINE

## 2021-03-11 PROCEDURE — 93010 ELECTROCARDIOGRAM REPORT: CPT | Performed by: INTERNAL MEDICINE

## 2021-03-11 PROCEDURE — G8432 DEP SCR NOT DOC, RNG: HCPCS | Performed by: INTERNAL MEDICINE

## 2021-03-11 PROCEDURE — 99213 OFFICE O/P EST LOW 20 MIN: CPT | Performed by: INTERNAL MEDICINE

## 2021-03-11 PROCEDURE — G8536 NO DOC ELDER MAL SCRN: HCPCS | Performed by: INTERNAL MEDICINE

## 2021-03-11 PROCEDURE — G8427 DOCREV CUR MEDS BY ELIG CLIN: HCPCS | Performed by: INTERNAL MEDICINE

## 2021-03-11 PROCEDURE — 1101F PT FALLS ASSESS-DOCD LE1/YR: CPT | Performed by: INTERNAL MEDICINE

## 2021-03-11 PROCEDURE — G8752 SYS BP LESS 140: HCPCS | Performed by: INTERNAL MEDICINE

## 2021-03-11 PROCEDURE — G8400 PT W/DXA NO RESULTS DOC: HCPCS | Performed by: INTERNAL MEDICINE

## 2021-03-11 RX ORDER — MOMETASONE FUROATE AND FORMOTEROL FUMARATE DIHYDRATE 200; 5 UG/1; UG/1
AEROSOL RESPIRATORY (INHALATION)
COMMUNITY
Start: 2021-03-04

## 2021-03-11 NOTE — PROGRESS NOTES
Patient did have covid vac [employer] - 3/6/21    Chief Complaint   Patient presents with    Irregular Heart Beat     6 months     Shortness of Breath     has COPD    Leg Swelling     magnus lower legs and feet      1. Have you been to the ER, urgent care clinic since your last visit? Hospitalized since your last visit? No     2. Have you seen or consulted any other health care providers outside of the [address] [place] [address] since your last visit? Include any pap smears or colon screening.   Yes Dermatologist

## 2021-03-11 NOTE — PROGRESS NOTES
Subjective/HPI:     Chika Perez is a 68 y.o. female is here for a f/u. She has a PMHx of afib s/p ablation, SSS with PPM, Takotsubo's cardiomyopathy now resolved, HTN, COPD on home O2 at night, and HLD. Last seen 8/27/20. She was recently seen by Dr Bony Jenkins and they are working on authorization for an RF ablation of her right leg. She denies CP, worsening SOB/BURKETT, orthopnea, or PND. She denies palpitations, lightheadedness or syncope. 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  albuterol (PROVENTIL HFA, VENTOLIN HFA, PROAIR HFA) 90 mcg/actuation inhaler Take 2 Puffs by inhalation every four (4) hours as needed for Wheezing.  tiotropium bromide (SPIRIVA RESPIMAT) 2.5 mcg/actuation inhaler Take 2 Puffs by inhalation daily.  atorvastatin (LIPITOR) 20 mg tablet TAKE 1 TABLET BY MOUTH EVERY DAY 90 Tab 1    aspirin delayed-release 81 mg tablet Take 81 mg by mouth daily.  multivitamin (ONE A DAY) tablet Take 1 Tab by mouth daily.       Wixela Inhub 500-50 mcg/dose diskus inhaler TAKE 1 PUFF(S) INHALATION TWO TIMES A DAY CHANGE FROM DULERA DUE TO INS      traMADol (ULTRAM) 50 mg tablet Take 50 mg by mouth every eight (8) hours.  midodrine (PROAMITINE) 2.5 mg tablet Take 2.5 mg by mouth as needed. No facility-administered encounter medications on file as of 3/11/2021. Review of Symptoms:    Review of Systems   Constitutional: Negative. Negative for chills, fever and weight loss. HENT: Negative. Negative for hearing loss and nosebleeds. Eyes: Negative for blurred vision and double vision. Respiratory: Negative. Negative for cough, hemoptysis, shortness of breath and wheezing. Cardiovascular: Positive for leg swelling. Negative for chest pain, palpitations, orthopnea, claudication and PND. Gastrointestinal: Negative. Negative for abdominal pain, blood in stool, diarrhea, nausea and vomiting. Musculoskeletal: Negative for joint pain and myalgias. Skin: Negative. Negative for rash. Neurological: Negative. Negative for dizziness, tingling, loss of consciousness and headaches. Endo/Heme/Allergies: Does not bruise/bleed easily. Physical Exam:      General: Well developed, in no acute distress, cooperative and alert  HEENT: No carotid bruits, no JVD, trach is midline. Neck Supple, PEERL, EOM intact. Heart:  reg rate and rhythm; normal S1/S2; no murmurs, no gallops or rubs. Respiratory: Clear bilaterally x 4, no wheezing or rales  Abdomen:   Soft, non-tender, no distention, no masses. + BS. Extremities:  Normal cap refill, no cyanosis, atraumatic. Trace to 1+magnus pedal edema. Neuro: A&Ox3, speech clear, gait stable with walker.    Skin: Skin color is normal. Non diaphoretic  Vascular: 2+ pulses symmetric in all extremities    Vitals:    03/11/21 1314   BP: 128/70   Pulse: 80   Resp: 18   SpO2: 98%   Weight: 140 lb 11.2 oz (63.8 kg)   Height: 5' 11.5\" (1.816 m)       ECG: A paced    Cardiology Labs:    Lab Results   Component Value Date/Time    Cholesterol, total 183 09/05/2014 04:30 AM    HDL Cholesterol 61 09/05/2014 04:30 AM    LDL, calculated 105.6 (H) 09/05/2014 04:30 AM    VLDL, calculated 16.4 09/05/2014 04:30 AM    CHOL/HDL Ratio 3.0 09/05/2014 04:30 AM       No results found for: HBA1C, OQO8HZBZ, KMQ9DRXO, KEV3HBDI    Lab Results   Component Value Date/Time    Sodium 133 (L) 10/28/2019 02:10 PM    Potassium 3.9 10/28/2019 02:10 PM    Chloride 96 (L) 10/28/2019 02:10 PM    CO2 35 (H) 10/28/2019 02:10 PM    Glucose 109 (H) 10/28/2019 02:10 PM    BUN 14 10/28/2019 02:10 PM    Creatinine 0.51 (L) 10/28/2019 02:10 PM    BUN/Creatinine ratio 27 (H) 10/28/2019 02:10 PM    GFR est AA >60 10/28/2019 02:10 PM    GFR est non-AA >60 10/28/2019 02:10 PM    Calcium 9.0 10/28/2019 02:10 PM    Anion gap 2 (L) 10/28/2019 02:10 PM    Bilirubin, total 1.1 (H) 10/28/2019 02:10 PM    ALT (SGPT) 17 10/28/2019 02:10 PM    Alk. phosphatase 84 10/28/2019 02:10 PM    Protein, total 6.0 (L) 10/28/2019 02:10 PM    Albumin 2.4 (L) 10/28/2019 02:10 PM    Globulin 3.6 10/28/2019 02:10 PM    A-G Ratio 0.7 (L) 10/28/2019 02:10 PM          Assessment:       ICD-10-CM ICD-9-CM    1. Takotsubo cardiomyopathy  I51.81 429.83    2. PAF (paroxysmal atrial fibrillation) (HCC)  I48.0 427.31 AMB POC EKG ROUTINE W/ 12 LEADS, INTER & REP   3. Essential hypertension  I10 401.9    4. Mixed hyperlipidemia  E78.2 272.2    5. Complete heart block (HCC)  I44.2 426.0    6. Cardiac pacemaker in situ  Z95.0 V45.01    7. S/P ablation of atrial flutter  Z98.890 V45.89     Z86.79     8. S/P ablation of atrial fibrillation  Z98.890 V45.89     Z86.79          Plan:     1. Takotsubo cardiomyopathy  Hx of takotsubo's cardiomyopathy, now resolved with most recent echo 10/2019  EF 60-65%, grade 1 DD  She remains compensated, appears euvolemic. Denies symptoms today. Continue Coreg, cont to hold ACEI/ARB due to low normal BPs.     2.  Atrial flutter, unspecified type ([facility]  Overall she is maintaining SR per device interrogation 1/2021. Short burst for 30 sec or less of aflutter x10 over 6 months. Denies palpitations. Continue coreg for rate control  Continue Eliquis for stroke prevention    3. Essential hypertension  BP well controlled   Continue carvedilol    4. Pacemaker  Continue with routine device interrogation with Dr. Indu Joyce, last completed 1/2021     5. S/P ablation of atrial flutter  6. S/P ablation of atrial fibrillation    F/u in 6 months. Zaki Myers NP       [facility]    3/12/2021         Patient seen, examined by me personally. Plan discussed as detailed. Agree with note as outlined by  NP with modifications as noted. My independent physical exam reveals : Physical Exam   Constitutional: She is oriented to person, place, and time. She appears well-developed and well-nourished. She appears distressed. HENT:   Head: Normocephalic. Eyes: Conjunctivae are normal.   Neck: Neck supple. Cardiovascular: Normal heart sounds. Pulmonary/Chest: Effort normal and breath sounds normal.   Musculoskeletal:         General: No edema. Neurological: She is alert and oriented to person, place, and time. Skin: Skin is warm. She is diaphoretic. Psychiatric: She has a normal mood and affect. Stable. Continue current therapy. No additional findings noted. Agree with plan as outlined above with modifications as noted.      Holley Yo MD

## 2021-03-11 NOTE — LETTER
3/12/2021 Patient: Asia Schafer YOB: 1940 Date of Visit: 3/11/2021 Charli Castañeda MD 
[address] [address] [address] [address] In [employer] Dear Charli Castañeda MD, Thank you for referring Ms. Lucas Joseph to [address] [place] [address] for evaluation. My notes for this consultation are attached. If you have questions, please do not hesitate to call me. I look forward to following your patient along with you. Sincerely, Criselda Easley MD

## 2021-04-29 ENCOUNTER — OFFICE VISIT (OUTPATIENT)
Dept: CARDIOLOGY CLINIC | Age: 81
End: 2021-04-29
Payer: MEDICARE

## 2021-04-29 DIAGNOSIS — I44.2 COMPLETE HEART BLOCK (HCC): ICD-10-CM

## 2021-04-29 DIAGNOSIS — Z95.0 CARDIAC PACEMAKER IN SITU: Primary | ICD-10-CM

## 2021-04-29 PROCEDURE — 93294 REM INTERROG EVL PM/LDLS PM: CPT | Performed by: INTERNAL MEDICINE

## 2021-04-29 PROCEDURE — 93296 REM INTERROG EVL PM/IDS: CPT | Performed by: INTERNAL MEDICINE

## 2021-08-03 ENCOUNTER — OFFICE VISIT (OUTPATIENT)
Dept: CARDIOLOGY CLINIC | Age: 81
End: 2021-08-03
Payer: MEDICARE

## 2021-08-03 VITALS
HEART RATE: 80 BPM | WEIGHT: 138.8 LBS | BODY MASS INDEX: 18.8 KG/M2 | SYSTOLIC BLOOD PRESSURE: 126 MMHG | HEIGHT: 72 IN | RESPIRATION RATE: 18 BRPM | OXYGEN SATURATION: 98 % | DIASTOLIC BLOOD PRESSURE: 80 MMHG

## 2021-08-03 DIAGNOSIS — I48.0 PAF (PAROXYSMAL ATRIAL FIBRILLATION) (HCC): Primary | ICD-10-CM

## 2021-08-03 DIAGNOSIS — I44.2 COMPLETE HEART BLOCK (HCC): ICD-10-CM

## 2021-08-03 DIAGNOSIS — I10 ESSENTIAL HYPERTENSION: ICD-10-CM

## 2021-08-03 DIAGNOSIS — Z95.0 CARDIAC PACEMAKER IN SITU: Primary | ICD-10-CM

## 2021-08-03 DIAGNOSIS — I49.5 SSS (SICK SINUS SYNDROME) (HCC): ICD-10-CM

## 2021-08-03 DIAGNOSIS — E78.2 MIXED HYPERLIPIDEMIA: ICD-10-CM

## 2021-08-03 PROCEDURE — 99213 OFFICE O/P EST LOW 20 MIN: CPT | Performed by: INTERNAL MEDICINE

## 2021-08-03 PROCEDURE — G8432 DEP SCR NOT DOC, RNG: HCPCS | Performed by: INTERNAL MEDICINE

## 2021-08-03 PROCEDURE — G8427 DOCREV CUR MEDS BY ELIG CLIN: HCPCS | Performed by: INTERNAL MEDICINE

## 2021-08-03 PROCEDURE — 1090F PRES/ABSN URINE INCON ASSESS: CPT | Performed by: INTERNAL MEDICINE

## 2021-08-03 PROCEDURE — G8400 PT W/DXA NO RESULTS DOC: HCPCS | Performed by: INTERNAL MEDICINE

## 2021-08-03 PROCEDURE — 93280 PM DEVICE PROGR EVAL DUAL: CPT | Performed by: INTERNAL MEDICINE

## 2021-08-03 PROCEDURE — G8420 CALC BMI NORM PARAMETERS: HCPCS | Performed by: INTERNAL MEDICINE

## 2021-08-03 PROCEDURE — 93005 ELECTROCARDIOGRAM TRACING: CPT | Performed by: INTERNAL MEDICINE

## 2021-08-03 PROCEDURE — G8752 SYS BP LESS 140: HCPCS | Performed by: INTERNAL MEDICINE

## 2021-08-03 PROCEDURE — G8536 NO DOC ELDER MAL SCRN: HCPCS | Performed by: INTERNAL MEDICINE

## 2021-08-03 PROCEDURE — 1101F PT FALLS ASSESS-DOCD LE1/YR: CPT | Performed by: INTERNAL MEDICINE

## 2021-08-03 PROCEDURE — G0463 HOSPITAL OUTPT CLINIC VISIT: HCPCS | Performed by: INTERNAL MEDICINE

## 2021-08-03 PROCEDURE — G8754 DIAS BP LESS 90: HCPCS | Performed by: INTERNAL MEDICINE

## 2021-08-03 NOTE — LETTER
8/3/2021    Patient: Malik Kwan   YOB: 1940   Date of Visit: 8/3/2021     Asuncion Crenshaw, [address]  [facility]  Via In [facility] [address]    Dear Asuncion Crenshaw MD,      Thank you for referring Ms. Agustin Hilario to [facility] [place] [facility] Maldonado for evaluation. My notes for this consultation are attached. If you have questions, please do not hesitate to call me. I look forward to following your patient along with you.       Sincerely,    Fay James MD

## 2021-08-03 NOTE — PROGRESS NOTES
Subjective:      Mattie Vázquez is a 68 y.o. female is here for EP consult. The patient denies chest pain/ shortness of breath, orthopnea, PND, LE edema, palpitations, syncope, presyncope or fatigue.        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    PAF (paroxysmal atrial fibrillation) ([address] 03/23/2017    Atrial flutter ([address] 03/23/2017    S/P ablation of atrial fibrillation 03/23/2017    S/P ablation of atrial flutter 03/23/2017    Occult blood in stools 01/28/2016    Early satiety 01/28/2016    Femoral hernia with obstruction 11/29/2015    SBO (small bowel obstruction) ([address] 11/28/2015    Pacemaker 09/08/2014    Hypertension 09/04/2014    Tobacco abuse 09/04/2014    Takotsubo cardiomyopathy 09/04/2014      Eladio Murdock MD  Past Medical History:   Diagnosis Date    Complete heart block ([address] 9/6/2014    COPD     BY CHEST XRAY    Early satiety 1/28/2016    Hypertension     ICD (implantable cardioverter-defibrillator) in place     Myocardial infarction ([address] 2014    Nausea & vomiting     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    Occult blood in stools 1/28/2016    Pacemaker     S/P ablation of atrial fibrillation 3/23/2017    S/P ablation of atrial flutter 3/23/2017      Past Surgical History:   Procedure Laterality Date    COLONOSCOPY N/A 5/11/2017    COLONOSCOPY [facility] Angel Cam MD at [facility]COPY    COLONOSCOPY N/A 11/30/2017    COLONOSCOPY performed by Angel Cam MD at [facility] ENDOSCOPY    COLONOSCOPY Left 7/9/2019    COLONOSCOPY performed by Campbell Guzman MD at [facility]  11/30/2017         HX HERNIA REPAIR  11/29/2015    Incarcerated left femoral hernia repair,.  HX PACEMAKER Left     [place] ABDOMEN SURGERY PROC UNLISTED      inguinal hernia repair right     No Known Allergies   Family History   Problem Relation Age of Onset    Heart Disease Mother     Cancer Father         BLADDER    Other Sister         RA    Other Brother         RA    Alcohol abuse Brother     negative for cardiac disease  Social History     Socioeconomic History    Marital status:      Spouse name: Not on file    Number of children: Not on file    Years of education: Not on file    Highest education level: Not on file   Tobacco Use    Smoking status: Former Smoker     Packs/day: 1.00     Years: 50.00     Pack years: 50.00     Types: Cigarettes     Quit date: 2014     Years since quittin.9    Smokeless tobacco: Never Used    Tobacco comment: lives with a smoker   Vaping Use    Vaping Use: Never used   Substance and Sexual Activity    Alcohol use: No    Drug use: No     Social Determinants of Health     Financial Resource Strain:     Difficulty of Paying Living Expenses:    Food Insecurity:     Worried About Running Out of Food in the Last Year:     [address] in the Last Year:    Transportation Needs:     Lack of Transportation (Medical):      Lack of Transportation (Non-Medical):    Physical Activity:     Days of Exercise per Week:     Minutes of Exercise per Session:    Stress:     Feeling of Stress :    Social Connections:     Frequency of Communication with Friends and Family:     Frequency of Social Gatherings with Friends and Family:     Attends Mandaeism Services:     Active Member of Clubs or Organizations:     Attends Club or Organization Meetings:     Marital Status:      Current Outpatient Medications   Medication Sig    Dulera 200-5 mcg/actuation HFA inhaler TAKE 2 PUFFS BY MOUTH TWICE A DAY    carvediloL (COREG) 12.5 mg tablet TAKE 1 TABLET BY MOUTH TWICE A DAY WITH MEALS    apixaban (Eliquis) 5 mg tablet TAKE 1 TABLET BY MOUTH TWICE A DAY    OXYGEN-AIR DELIVERY SYSTEMS Take 3 L/min by inhalation as needed.  albuterol (PROVENTIL HFA, VENTOLIN HFA, PROAIR HFA) 90 mcg/actuation inhaler Take 2 Puffs by inhalation every four (4) hours as needed for Wheezing.  tiotropium bromide (SPIRIVA RESPIMAT) 2.5 mcg/actuation inhaler Take 2 Puffs by inhalation daily.  atorvastatin (LIPITOR) 20 mg tablet TAKE 1 TABLET BY MOUTH EVERY DAY    aspirin delayed-release 81 mg tablet Take 81 mg by mouth daily.  multivitamin (ONE A DAY) tablet Take 1 Tab by mouth daily.  Wixela Inhub 500-50 mcg/dose diskus inhaler TAKE 1 PUFF(S) INHALATION TWO TIMES A DAY CHANGE FROM DULERA DUE TO INS (Patient not taking: Reported on 8/3/2021)    traMADol (ULTRAM) 50 mg tablet Take 50 mg by mouth every eight (8) hours. (Patient not taking: Reported on 8/3/2021)     No current facility-administered medications for this visit. Vitals:    08/03/21 1403   BP: 126/80   Pulse: 80   Resp: 18   SpO2: 98%   Weight: 138 lb 12.8 oz (63 kg)   Height: 5' 11.5\" (1.816 m)       I have reviewed the nurses notes, vitals, problem list, allergy list, medical history, family, social history and medications. Review of Symptoms:    General: Pt denies excessive weight gain or loss. Pt is able to conduct ADL's  HEENT: Denies blurred vision, headaches, hearing loss, epistaxis and difficulty swallowing. Respiratory: Denies cough, congestion, shortness of breath, BURKETT, wheezing or stridor. Cardiovascular: Denies precordial pain, palpitations, edema or PND  Gastrointestinal: Denies poor appetite, indigestion, abdominal pain or blood in stool  Genitourinary: Denies hematuria, dysuria, increased urinary frequency  Musculoskeletal: Denies joint pain or swelling from muscles or joints  Neurologic: Denies tremor, paresthesias, headache, or sensory motor disturbance  Psychiatric: Denies confusion, insomnia, depression  Integumentray: Denies rash, itching or ulcers.   Hematologic: Denies easy bruising, bleeding    Physical Exam:      General: Well developed, in no acute distress. HEENT: Eyes - PERRL, no jvd  Heart:  Normal S1/S2 negative S3 or S4. Regular, no murmur, gallop or rub. Respiratory: Clear bilaterally x 4, no wheezing or rales  Abdomen:   Soft, non-tender, bowel sounds are active. Extremities:  No edema, normal cap refill, no cyanosis. Musculoskeletal: No clubbing  Neuro: A&Ox3, speech clear, gait stable. Skin: Skin color is normal. No rashes or lesions.  Non diaphoretic, no ulcers or subcutaneous nodule  Vascular: 2+ pulses symmetric in all extremities  Psych - judgement intact and orientation is wnl     Cardiographics    Ekg: a paced    Pacer - A paced 91%    Results for orders placed or performed during the hospital encounter of 10/22/19   EKG, 12 LEAD, INITIAL   Result Value Ref Range    Ventricular Rate 105 BPM    Atrial Rate 300 BPM    QRS Duration 82 ms    Q-T Interval 338 ms    QTC Calculation (Bezet) 446 ms    Calculated R Axis 55 degrees    Calculated T Axis 34 degrees    Diagnosis       Atrial fibrillation with occasional ventricular-paced complexes  When compared with ECG of 24-DEC-2017 10:57,  Electronic ventricular pacemaker has replaced Electronic atrial pacemaker  Confirmed by [facility] MD., --- ([id number]) on 10/22/2019 12:47:09 PM           Lab Results   Component Value Date/Time    WBC 7.5 10/28/2019 02:10 PM    Hemoglobin (POC) 15.0 09/04/2014 06:16 PM    HGB 8.5 (L) 10/28/2019 02:10 PM    Hematocrit (POC) 44 09/04/2014 06:16 PM    HCT 26.1 (L) 10/28/2019 02:10 PM    PLATELET 785 72/08/1958 02:10 PM    MCV 95.3 10/28/2019 02:10 PM      Lab Results   Component Value Date/Time    Sodium 133 (L) 10/28/2019 02:10 PM    Potassium 3.9 10/28/2019 02:10 PM    Chloride 96 (L) 10/28/2019 02:10 PM    CO2 35 (H) 10/28/2019 02:10 PM    Anion gap 2 (L) 10/28/2019 02:10 PM    Glucose 109 (H) 10/28/2019 02:10 PM    BUN 14 10/28/2019 02:10 PM    Creatinine 0.51 (L) 10/28/2019 02:10 PM    BUN/Creatinine ratio 27 (H) 10/28/2019 02:10 PM    GFR est AA >60 10/28/2019 02:10 PM    GFR est non-AA >60 10/28/2019 02:10 PM    Calcium 9.0 10/28/2019 02:10 PM    Bilirubin, total 1.1 (H) 10/28/2019 02:10 PM    Alk. phosphatase 84 10/28/2019 02:10 PM    Protein, total 6.0 (L) 10/28/2019 02:10 PM    Albumin 2.4 (L) 10/28/2019 02:10 PM    Globulin 3.6 10/28/2019 02:10 PM    A-G Ratio 0.7 (L) 10/28/2019 02:10 PM    ALT (SGPT) 17 10/28/2019 02:10 PM         Assessment:     Assessment:        ICD-10-CM ICD-9-CM    1. PAF (paroxysmal atrial fibrillation) ([facility])  I48.0 427.31 AMB POC EKG ROUTINE W/ 12 LEADS, INTER & REP   2. Essential hypertension  I10 401.9    3. SSS (sick sinus syndrome) ([facility])  I49.5 427.81    4. Mixed hyperlipidemia  E78.2 272.2      Orders Placed This Encounter    AMB POC EKG ROUTINE W/ 12 LEADS, INTER & REP     Order Specific Question:   Reason for Exam:     Answer:   routine        Plan:   Shravan Estrada is doing well. She is A paced 91% for sick sinus. She is stable and compliant with med rx for htn and hyperlipidemia. Being followed remotely in device clinic. She will f/u in one year. Continue medical management for sss, htn and hyperlipidemia. Thank you for allowing me to participate in Shravan Estrada 's care.     Shana Cortes MD, Kyle Lewis

## 2021-08-06 ENCOUNTER — OFFICE VISIT (OUTPATIENT)
Dept: URGENT CARE | Age: 81
End: 2021-08-06
Payer: MEDICARE

## 2021-08-06 VITALS
DIASTOLIC BLOOD PRESSURE: 77 MMHG | HEART RATE: 83 BPM | SYSTOLIC BLOOD PRESSURE: 129 MMHG | TEMPERATURE: 97.9 F | OXYGEN SATURATION: 99 % | RESPIRATION RATE: 18 BRPM

## 2021-08-06 DIAGNOSIS — L72.3 INFECTED SEBACEOUS CYST: Primary | ICD-10-CM

## 2021-08-06 DIAGNOSIS — L08.9 INFECTED SEBACEOUS CYST: Primary | ICD-10-CM

## 2021-08-06 PROCEDURE — 10060 I&D ABSCESS SIMPLE/SINGLE: CPT | Performed by: FAMILY MEDICINE

## 2021-08-06 PROCEDURE — G8754 DIAS BP LESS 90: HCPCS | Performed by: FAMILY MEDICINE

## 2021-08-06 PROCEDURE — 1101F PT FALLS ASSESS-DOCD LE1/YR: CPT | Performed by: FAMILY MEDICINE

## 2021-08-06 PROCEDURE — G8420 CALC BMI NORM PARAMETERS: HCPCS | Performed by: FAMILY MEDICINE

## 2021-08-06 PROCEDURE — G8400 PT W/DXA NO RESULTS DOC: HCPCS | Performed by: FAMILY MEDICINE

## 2021-08-06 PROCEDURE — G8432 DEP SCR NOT DOC, RNG: HCPCS | Performed by: FAMILY MEDICINE

## 2021-08-06 PROCEDURE — G8427 DOCREV CUR MEDS BY ELIG CLIN: HCPCS | Performed by: FAMILY MEDICINE

## 2021-08-06 PROCEDURE — 1090F PRES/ABSN URINE INCON ASSESS: CPT | Performed by: FAMILY MEDICINE

## 2021-08-06 PROCEDURE — G8752 SYS BP LESS 140: HCPCS | Performed by: FAMILY MEDICINE

## 2021-08-06 PROCEDURE — G8536 NO DOC ELDER MAL SCRN: HCPCS | Performed by: FAMILY MEDICINE

## 2021-08-06 RX ORDER — CEPHALEXIN 500 MG/1
500 CAPSULE ORAL 2 TIMES DAILY
Qty: 20 CAPSULE | Refills: 0 | Status: SHIPPED | OUTPATIENT
Start: 2021-08-06 | End: 2021-08-16

## 2021-08-06 RX ORDER — CEPHALEXIN 500 MG/1
500 CAPSULE ORAL 3 TIMES DAILY
Qty: 30 CAPSULE | Refills: 0 | Status: SHIPPED | OUTPATIENT
Start: 2021-08-06 | End: 2021-08-06 | Stop reason: DRUGHIGH

## 2021-08-06 NOTE — PROGRESS NOTES
Brianna Ramos is a 68 y.o. female who presents with worsening redness, swelling, and pain on right upper leg x 2 weeks. Has sebaceous cyst in that area for a while. Denies drainage, fever, chills. The history is provided by the patient. 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    Occult blood in stools 1/28/2016    Pacemaker     S/P ablation of atrial fibrillation 3/23/2017    S/P ablation of atrial flutter 3/23/2017        Past Surgical History:   Procedure Laterality Date    COLONOSCOPY N/A 5/11/2017    COLONOSCOPY performed by Christie Beltran MD at [facility] ENDOSCOPY    COLONOSCOPY N/A 11/30/2017    COLONOSCOPY performed by Christie Beltran MD at [facility] ENDOSCOPY    COLONOSCOPY Left 7/9/2019    COLONOSCOPY performed by Cynthia Christensen MD at [address] [place] [address] Ave  11/30/2017         HX HERNIA REPAIR  11/29/2015    Incarcerated left femoral hernia repair,.     HX PACEMAKER Left 2014    [place] ABDOMEN SURGERY PROC UNLISTED      inguinal hernia repair right         Family History   Problem Relation Age of Onset    Heart Disease Mother     Cancer Father         BLADDER    Other Sister         RA    Other Brother         RA    Alcohol abuse Brother         Social History     Socioeconomic History    Marital status:      Spouse name: Not on file    Number of children: Not on file    Years of education: Not on file    Highest education level: Not on file   Occupational History    Not on file   Tobacco Use    Smoking status: Former Smoker     Packs/day: 1.00     Years: 50.00     Pack years: 50.00     Types: Cigarettes     Quit date: 9/4/2014     Years since quittin.9    Smokeless tobacco: Never Used    Tobacco comment: lives with a smoker   Vaping Use    Vaping Use: Never used   Substance and Sexual Activity    Alcohol use: No    Drug use: No    Sexual activity: Not on file   Other Topics Concern    Not on file   Social History Narrative    Not on file     Social Determinants of Health     Financial Resource Strain:     Difficulty of Paying Living Expenses:    Food Insecurity:     Worried About Running Out of Food in the Last Year:     Ran Out of Food in the Last Year:    Transportation Needs:     Lack of Transportation (Medical):  Lack of Transportation (Non-Medical):    Physical Activity:     Days of Exercise per Week:     Minutes of Exercise per Session:    Stress:     Feeling of Stress :    Social Connections:     Frequency of Communication with Friends and Family:     Frequency of Social Gatherings with Friends and Family:     Attends Scientologist Services:     Active Member of Clubs or Organizations:     Attends Club or Organization Meetings:     Marital Status:    Intimate Partner Violence:     Fear of Current or Ex-Partner:     Emotionally Abused:     Physically Abused:     Sexually Abused: ALLERGIES: Patient has no known allergies. Review of Systems   Constitutional: Negative for chills and fever. Skin: Positive for color change. Vitals:    21 0839   BP: 129/77   Pulse: 83   Resp: 18   Temp: 97.9 °F (36.6 °C)   SpO2: 99%       Physical Exam  Vitals and nursing note reviewed. Constitutional:       General: She is not in acute distress. Appearance: She is well-developed. She is not diaphoretic. Pulmonary:      Effort: Pulmonary effort is normal. No respiratory distress. Breath sounds: Normal breath sounds. No stridor. No wheezing, rhonchi or rales. Skin:     Findings: Abscess (right upper leg at distal, anterior aspect: 1x1cm area of fluctuance, TTP, erythema) present. Neurological:      Mental Status: She is alert. Psychiatric:         Behavior: Behavior normal.         Thought Content: Thought content normal.         Judgment: Judgment normal.         MDM    ICD-10-CM ICD-9-CM   1. Infected sebaceous cyst  L72.3 706.2    L08.9        Orders Placed This Encounter    CULTURE, WOUND W GRAM STAIN     Standing Status:   Future     Number of Occurrences:   1     Standing Expiration Date:   2/6/2022    DISCONTD: cephALEXin (Keflex) 500 mg capsule     Sig: Take 1 Capsule by mouth three (3) times daily for 10 days. Dispense:  30 Capsule     Refill:  0    cephALEXin (Keflex) 500 mg capsule     Sig: Take 1 Capsule by mouth two (2) times a day for 10 days. Dispense:  20 Capsule     Refill:  0        The patient is to follow up with dermalogist - may need cyst removal after infection cleared. If signs and symptoms become worse the pt is to go to the ER. I&D Abcess Simple  Consent: Verbal consent obtained. Consent given by: patient    Date/Time: 8/6/2021 9:08 AM  Performed by: attendingPreparation: skin prepped with Shur-Clens  Pre-procedure re-eval: Immediately prior to the procedure, the patient was reevaluated and found suitable for the planned procedure and any planned medications. Location details: right leg  Anesthesia: local infiltration    Anesthesia:  Local Anesthetic: lidocaine 1% without epinephrine  Anesthetic total: 1 mL  Scalpel size: 11  Incision type: single straight  Complexity: simple  Drainage: purulent  Drainage amount: scant  Wound treatment: wound left open  Post-procedure: dressing applied  Patient tolerance: patient tolerated the procedure well with no immediate complications  My total time at bedside, performing this procedure was 1-15 minutes.

## 2021-08-06 NOTE — PATIENT INSTRUCTIONS
Epidermoid Cyst: Care Instructions  Your Care Instructions  An epidermoid (say \"qa-aid-JQD-ricardo\") cyst is a lump just under the skin. These cysts can form when a hair follicle becomes blocked. They are common in acne and may occur on the face, neck, back, and genitals. However, they can form anywhere on the body. These cysts are not cancer and do not lead to cancer. They tend not to hurt, but they can sometimes become swollen and painful. They also may break open (rupture) and cause scarring. These cysts sometimes do not cause problems and may not need treatment. If you have a cyst that is swollen and hurts, your doctor may inject it with a medicine to help it heal. But it is more likely that a painful cyst will need to be removed. Your doctor will give you a shot of numbing medicine and cut into the cyst to drain it or remove it. This makes the symptoms go away. Follow-up care is a key part of your treatment and safety. Be sure to make and go to all appointments, and call your doctor if you are having problems. It's also a good idea to know your test results and keep a list of the medicines you take. How can you care for yourself at home? · Do not squeeze the cyst or poke it with a needle to open it. This can cause swelling, redness, and infection. · Always have a doctor look at any new lumps you get to make sure that they are not serious. When should you call for help? Watch closely for changes in your health, and be sure to contact your doctor if:    · You have a fever, redness, or swelling after you get a shot of medicine in the cyst.     · You see or feel a new lump on your skin. Where can you learn more? Go to http://www.gray.com/  Enter V764 in the search box to learn more about \"Epidermoid Cyst: Care Instructions. \"  Current as of: July 2, 2020               Content Version: 12.8  © 3229-8651 [employer]    Care instructions adapted under license by [employer] (which disclaims liability or warranty for this information). If you have questions about a medical condition or this instruction, always ask your healthcare professional. [address] any warranty or liability for your use of this information.

## 2021-08-09 LAB
BACTERIA SPEC CULT: ABNORMAL
GRAM STN SPEC: ABNORMAL
GRAM STN SPEC: ABNORMAL
SERVICE CMNT-IMP: ABNORMAL

## 2021-08-30 RX ORDER — CARVEDILOL 12.5 MG/1
TABLET ORAL
Qty: 180 TABLET | Refills: 3 | Status: SHIPPED | OUTPATIENT
Start: 2021-08-30

## 2021-09-16 ENCOUNTER — OFFICE VISIT (OUTPATIENT)
Dept: CARDIOLOGY CLINIC | Age: 81
End: 2021-09-16
Payer: MEDICARE

## 2021-09-16 VITALS
OXYGEN SATURATION: 96 % | SYSTOLIC BLOOD PRESSURE: 128 MMHG | BODY MASS INDEX: 18.91 KG/M2 | WEIGHT: 139.6 LBS | DIASTOLIC BLOOD PRESSURE: 78 MMHG | HEIGHT: 72 IN | RESPIRATION RATE: 18 BRPM | HEART RATE: 82 BPM

## 2021-09-16 DIAGNOSIS — Z95.0 PACEMAKER: ICD-10-CM

## 2021-09-16 DIAGNOSIS — Z86.79 S/P ABLATION OF ATRIAL FIBRILLATION: ICD-10-CM

## 2021-09-16 DIAGNOSIS — I48.0 PAF (PAROXYSMAL ATRIAL FIBRILLATION) (HCC): ICD-10-CM

## 2021-09-16 DIAGNOSIS — Z98.890 S/P ABLATION OF ATRIAL FLUTTER: ICD-10-CM

## 2021-09-16 DIAGNOSIS — I51.81 TAKOTSUBO CARDIOMYOPATHY: Primary | ICD-10-CM

## 2021-09-16 DIAGNOSIS — Z98.890 S/P ABLATION OF ATRIAL FIBRILLATION: ICD-10-CM

## 2021-09-16 DIAGNOSIS — Z86.79 S/P ABLATION OF ATRIAL FLUTTER: ICD-10-CM

## 2021-09-16 DIAGNOSIS — I10 ESSENTIAL HYPERTENSION: ICD-10-CM

## 2021-09-16 PROCEDURE — G8536 NO DOC ELDER MAL SCRN: HCPCS | Performed by: INTERNAL MEDICINE

## 2021-09-16 PROCEDURE — G8420 CALC BMI NORM PARAMETERS: HCPCS | Performed by: INTERNAL MEDICINE

## 2021-09-16 PROCEDURE — 93010 ELECTROCARDIOGRAM REPORT: CPT | Performed by: INTERNAL MEDICINE

## 2021-09-16 PROCEDURE — G8754 DIAS BP LESS 90: HCPCS | Performed by: INTERNAL MEDICINE

## 2021-09-16 PROCEDURE — G8400 PT W/DXA NO RESULTS DOC: HCPCS | Performed by: INTERNAL MEDICINE

## 2021-09-16 PROCEDURE — G8510 SCR DEP NEG, NO PLAN REQD: HCPCS | Performed by: INTERNAL MEDICINE

## 2021-09-16 PROCEDURE — 1101F PT FALLS ASSESS-DOCD LE1/YR: CPT | Performed by: INTERNAL MEDICINE

## 2021-09-16 PROCEDURE — G8427 DOCREV CUR MEDS BY ELIG CLIN: HCPCS | Performed by: INTERNAL MEDICINE

## 2021-09-16 PROCEDURE — 1090F PRES/ABSN URINE INCON ASSESS: CPT | Performed by: INTERNAL MEDICINE

## 2021-09-16 PROCEDURE — 93005 ELECTROCARDIOGRAM TRACING: CPT | Performed by: INTERNAL MEDICINE

## 2021-09-16 PROCEDURE — 99213 OFFICE O/P EST LOW 20 MIN: CPT | Performed by: INTERNAL MEDICINE

## 2021-09-16 PROCEDURE — G0463 HOSPITAL OUTPT CLINIC VISIT: HCPCS | Performed by: INTERNAL MEDICINE

## 2021-09-16 PROCEDURE — G8752 SYS BP LESS 140: HCPCS | Performed by: INTERNAL MEDICINE

## 2021-09-16 NOTE — LETTER
9/16/2021    Patient: Dilcia Aguayo   YOB: 1940   Date of Visit: 9/16/2021     Isra Greene MD  [id number] [address]  [facility] [address]    Dear Isra Greene MD,      Thank you for referring Ms. Zhou Barfield to [address] [place] [address] for evaluation. My notes for this consultation are attached. If you have questions, please do not hesitate to call me. I look forward to following your patient along with you.       Sincerely,    Anna Grigsby MD

## 2021-09-16 NOTE — PROGRESS NOTES
Felix Green, [facility]    Subjective/HPI:     Salma Hoffmann is a 68 y.o. female is here for routine f/u. She has a PMHx of PAF s/p AV geovany ablation with PPM, Takotsubo's cardiomyopathy now resolved, HTN, and HLD. Denies any chest pain, SOB, palpitations. Has edema. Current Outpatient Medications on File Prior to Visit   Medication Sig Dispense Refill    carvediloL (COREG) 12.5 mg tablet TAKE 1 TABLET BY MOUTH TWICE A DAY WITH MEALS 180 Tablet 3    Dulera 200-5 mcg/actuation HFA inhaler TAKE 2 PUFFS BY MOUTH TWICE A DAY      apixaban (Eliquis) 5 mg tablet TAKE 1 TABLET BY MOUTH TWICE A  Tab 3    OXYGEN-AIR DELIVERY SYSTEMS Take 3 L/min by inhalation as needed.  albuterol (PROVENTIL HFA, VENTOLIN HFA, PROAIR HFA) 90 mcg/actuation inhaler Take 2 Puffs by inhalation every four (4) hours as needed for Wheezing.  tiotropium bromide (SPIRIVA RESPIMAT) 2.5 mcg/actuation inhaler Take 2 Puffs by inhalation daily.  atorvastatin (LIPITOR) 20 mg tablet TAKE 1 TABLET BY MOUTH EVERY DAY 90 Tab 1    aspirin delayed-release 81 mg tablet Take 81 mg by mouth daily.  multivitamin (ONE A DAY) tablet Take 1 Tab by mouth daily. No current facility-administered medications on file prior to visit. Review of Symptoms:    Review of Systems   Constitutional: Negative. Negative for chills, fever and weight loss. HENT: Negative. Negative for hearing loss and nosebleeds. Eyes: Negative for blurred vision and double vision. Respiratory: Negative. Negative for cough, hemoptysis, shortness of breath and wheezing. Cardiovascular: Positive for leg swelling. Negative for chest pain, palpitations, orthopnea, claudication and PND. Gastrointestinal: Negative. Negative for nausea and vomiting. Musculoskeletal: Negative for myalgias. Skin: Negative. Negative for rash. Neurological: Negative. Negative for dizziness, loss of consciousness and headaches. Physical Exam:      Physical Exam  Vitals and nursing note reviewed. Constitutional:       Appearance: Normal appearance. Cardiovascular:      Rate and Rhythm: Normal rate and regular rhythm. Heart sounds: Normal heart sounds. Musculoskeletal:      Right lower leg: Edema present. Left lower leg: Edema present. Skin:     General: Skin is warm. Neurological:      Mental Status: She is alert and oriented to person, place, and time. Psychiatric:         Mood and Affect: Mood normal.         There were no vitals filed for this visit. ECG done today shows sinus rythm     Assessment:       ICD-10-CM ICD-9-CM    1. Takotsubo cardiomyopathy  I51.81 429.83    2. PAF (paroxysmal atrial fibrillation) ([facility])  I48.0 427.31    3. Essential hypertension  I10 401.9    4. Pacemaker  Z95.0 V45.01    5. S/P ablation of atrial flutter  Z98.890 V45.89     Z86.79     6. S/P ablation of atrial fibrillation  Z98.890 V45.89     Z86.79          Plan:     1. Takotsubo cardiomyopathy  Hx of takotsubo's cardiomyopathy, now resolved  Echo done in 10/2019 with preserved ejection fraction 60-65%, grade 1 DD  Continue Coreg, hold ACEI/ARB due to low normal BPs.     2. PAF (paroxysmal atrial fibrillation)  Maintaining sinus rhythm, short bursts of AFL < 1 min on device check  Continue coreg for rate control  Continue Eliquis for stroke prevention     3. Essential hypertension  BP well controlled at home, 120s/60s  Continue carvedilol  Has midodrine to use PRN for hypotension     4. Pacemaker  Continue with routine device interrogation with Dr. Alo Hua  3 episodes of [address] [place] [address] with 5-9 beats of NSVT noted on device check on 8/5/21     5. S/P ablation of atrial flutter  6. S/P ablation of atrial fibrillation    Being followed in vascular clinic for edema. Discussed elevation, compression stockings.     F/u with me in 1 year      Niurka Huynh NP

## 2021-09-16 NOTE — PROGRESS NOTES
1. Have you been to the ER, urgent care clinic since your last visit? Hospitalized since your last visit? No    2. Have you seen or consulted any other health care providers outside of the [address] [place] [address] since your last visit? Include any pap smears or colon screening. No     Chief Complaint   Patient presents with    Follow-up     6 mo f/up.  med refill Eliquis and Carvedilol

## 2021-11-04 ENCOUNTER — OFFICE VISIT (OUTPATIENT)
Dept: CARDIOLOGY CLINIC | Age: 81
End: 2021-11-04
Payer: MEDICARE

## 2021-11-04 DIAGNOSIS — I48.0 PAF (PAROXYSMAL ATRIAL FIBRILLATION) (HCC): ICD-10-CM

## 2021-11-04 DIAGNOSIS — Z95.0 CARDIAC PACEMAKER IN SITU: Primary | ICD-10-CM

## 2021-11-04 DIAGNOSIS — I44.2 COMPLETE HEART BLOCK (HCC): ICD-10-CM

## 2021-11-04 PROCEDURE — 93294 REM INTERROG EVL PM/LDLS PM: CPT | Performed by: INTERNAL MEDICINE

## 2021-11-04 PROCEDURE — 93296 REM INTERROG EVL PM/IDS: CPT | Performed by: INTERNAL MEDICINE

## 2022-02-03 ENCOUNTER — OFFICE VISIT (OUTPATIENT)
Dept: CARDIOLOGY CLINIC | Age: 82
End: 2022-02-03
Payer: MEDICARE

## 2022-02-03 DIAGNOSIS — I44.2 COMPLETE HEART BLOCK (HCC): ICD-10-CM

## 2022-02-03 DIAGNOSIS — I48.0 PAF (PAROXYSMAL ATRIAL FIBRILLATION) (HCC): ICD-10-CM

## 2022-02-03 DIAGNOSIS — Z95.0 CARDIAC PACEMAKER IN SITU: Primary | ICD-10-CM

## 2022-02-03 PROCEDURE — 93294 REM INTERROG EVL PM/LDLS PM: CPT | Performed by: INTERNAL MEDICINE

## 2022-02-03 PROCEDURE — 93296 REM INTERROG EVL PM/IDS: CPT | Performed by: INTERNAL MEDICINE

## 2022-03-18 PROBLEM — Z98.890 S/P ABLATION OF ATRIAL FIBRILLATION: Status: ACTIVE | Noted: 2017-03-23

## 2022-03-18 PROBLEM — Z98.890 S/P ABLATION OF ATRIAL FLUTTER: Status: ACTIVE | Noted: 2017-03-23

## 2022-03-18 PROBLEM — Z86.79 S/P ABLATION OF ATRIAL FLUTTER: Status: ACTIVE | Noted: 2017-03-23

## 2022-03-18 PROBLEM — Z86.79 S/P ABLATION OF ATRIAL FIBRILLATION: Status: ACTIVE | Noted: 2017-03-23

## 2022-03-19 PROBLEM — S72.92XA FEMUR FRACTURE, LEFT (HCC): Status: ACTIVE | Noted: 2019-10-22

## 2022-03-19 PROBLEM — D37.4 NEOPLASM OF UNCERTAIN BEHAVIOR OF LARGE INTESTINE: Status: ACTIVE | Noted: 2017-05-11

## 2022-03-19 PROBLEM — I48.0 PAF (PAROXYSMAL ATRIAL FIBRILLATION) (HCC): Status: ACTIVE | Noted: 2017-03-23

## 2022-03-20 PROBLEM — R60.0 LEG EDEMA: Status: ACTIVE | Noted: 2020-03-03

## 2022-03-20 PROBLEM — I83.12 VARICOSE VEINS OF BOTH LOWER EXTREMITIES WITH INFLAMMATION: Status: ACTIVE | Noted: 2019-05-21

## 2022-03-20 PROBLEM — I83.11 VARICOSE VEINS OF BOTH LOWER EXTREMITIES WITH INFLAMMATION: Status: ACTIVE | Noted: 2019-05-21

## 2022-03-20 PROBLEM — I48.92 ATRIAL FLUTTER (HCC): Status: ACTIVE | Noted: 2017-03-23

## 2022-12-25 ENCOUNTER — HOSPITAL ENCOUNTER (INPATIENT)
Age: 82
LOS: 4 days | Discharge: HOME HEALTH | DRG: 177 | End: 2022-12-29
Attending: EMERGENCY MEDICINE | Admitting: INTERNAL MEDICINE
Payer: MEDICARE

## 2022-12-25 ENCOUNTER — APPOINTMENT (OUTPATIENT)
Dept: GENERAL RADIOLOGY | Age: 82
DRG: 177 | End: 2022-12-25
Attending: EMERGENCY MEDICINE
Payer: MEDICARE

## 2022-12-25 DIAGNOSIS — J96.01 ACUTE RESPIRATORY FAILURE WITH HYPOXIA (HCC): Primary | ICD-10-CM

## 2022-12-25 DIAGNOSIS — J18.9 COMMUNITY ACQUIRED PNEUMONIA, UNSPECIFIED LATERALITY: ICD-10-CM

## 2022-12-25 LAB
ALBUMIN SERPL-MCNC: 3.3 G/DL (ref 3.5–5)
ALBUMIN/GLOB SERPL: 0.8 (ref 1.1–2.2)
ALP SERPL-CCNC: 106 U/L (ref 45–117)
ALT SERPL-CCNC: 43 U/L (ref 12–78)
ANION GAP SERPL CALC-SCNC: 2 MMOL/L (ref 5–15)
AST SERPL-CCNC: 46 U/L (ref 15–37)
BASOPHILS # BLD: 0 K/UL (ref 0–0.1)
BASOPHILS NFR BLD: 0 % (ref 0–1)
BILIRUB SERPL-MCNC: 0.4 MG/DL (ref 0.2–1)
BNP SERPL-MCNC: 1256 PG/ML
BUN SERPL-MCNC: 10 MG/DL (ref 6–20)
BUN/CREAT SERPL: 16 (ref 12–20)
CALCIUM SERPL-MCNC: 9.4 MG/DL (ref 8.5–10.1)
CHLORIDE SERPL-SCNC: 96 MMOL/L (ref 97–108)
CO2 SERPL-SCNC: 32 MMOL/L (ref 21–32)
COVID-19 RAPID TEST, COVR: NOT DETECTED
CREAT SERPL-MCNC: 0.62 MG/DL (ref 0.55–1.02)
DIFFERENTIAL METHOD BLD: ABNORMAL
EOSINOPHIL # BLD: 0 K/UL (ref 0–0.4)
EOSINOPHIL NFR BLD: 0 % (ref 0–7)
ERYTHROCYTE [DISTWIDTH] IN BLOOD BY AUTOMATED COUNT: 14.5 % (ref 11.5–14.5)
FLUAV AG NPH QL IA: NEGATIVE
FLUAV RNA SPEC QL NAA+PROBE: NOT DETECTED
FLUBV AG NOSE QL IA: NEGATIVE
FLUBV RNA SPEC QL NAA+PROBE: NOT DETECTED
GLOBULIN SER CALC-MCNC: 4.2 G/DL (ref 2–4)
GLUCOSE SERPL-MCNC: 123 MG/DL (ref 65–100)
HCT VFR BLD AUTO: 38.1 % (ref 35–47)
HGB BLD-MCNC: 12.7 G/DL (ref 11.5–16)
IMM GRANULOCYTES # BLD AUTO: 0 K/UL (ref 0–0.04)
IMM GRANULOCYTES NFR BLD AUTO: 0 % (ref 0–0.5)
LYMPHOCYTES # BLD: 0.5 K/UL (ref 0.8–3.5)
LYMPHOCYTES NFR BLD: 5 % (ref 12–49)
MCH RBC QN AUTO: 31.4 PG (ref 26–34)
MCHC RBC AUTO-ENTMCNC: 33.3 G/DL (ref 30–36.5)
MCV RBC AUTO: 94.1 FL (ref 80–99)
MONOCYTES # BLD: 0.9 K/UL (ref 0–1)
MONOCYTES NFR BLD: 9 % (ref 5–13)
MYOGLOBIN SERPL-MCNC: 57 NG/ML (ref 13–71)
NEUTS SEG # BLD: 9.1 K/UL (ref 1.8–8)
NEUTS SEG NFR BLD: 86 % (ref 32–75)
NRBC # BLD: 0 K/UL (ref 0–0.01)
NRBC BLD-RTO: 0 PER 100 WBC
PLATELET # BLD AUTO: 176 K/UL (ref 150–400)
PMV BLD AUTO: 10.1 FL (ref 8.9–12.9)
POTASSIUM SERPL-SCNC: 3.9 MMOL/L (ref 3.5–5.1)
PROCALCITONIN SERPL-MCNC: <0.05 NG/ML
PROT SERPL-MCNC: 7.5 G/DL (ref 6.4–8.2)
RBC # BLD AUTO: 4.05 M/UL (ref 3.8–5.2)
RBC MORPH BLD: ABNORMAL
SARS-COV-2, COV2: NOT DETECTED
SODIUM SERPL-SCNC: 130 MMOL/L (ref 136–145)
SOURCE, COVRS: NORMAL
TROPONIN-HIGH SENSITIVITY: 12 NG/L (ref 0–51)
WBC # BLD AUTO: 10.5 K/UL (ref 3.6–11)

## 2022-12-25 PROCEDURE — 87636 SARSCOV2 & INF A&B AMP PRB: CPT

## 2022-12-25 PROCEDURE — 80053 COMPREHEN METABOLIC PANEL: CPT

## 2022-12-25 PROCEDURE — 84145 PROCALCITONIN (PCT): CPT

## 2022-12-25 PROCEDURE — 74011250637 HC RX REV CODE- 250/637: Performed by: INTERNAL MEDICINE

## 2022-12-25 PROCEDURE — 74011000258 HC RX REV CODE- 258: Performed by: EMERGENCY MEDICINE

## 2022-12-25 PROCEDURE — 83874 ASSAY OF MYOGLOBIN: CPT

## 2022-12-25 PROCEDURE — 36415 COLL VENOUS BLD VENIPUNCTURE: CPT

## 2022-12-25 PROCEDURE — 71046 X-RAY EXAM CHEST 2 VIEWS: CPT

## 2022-12-25 PROCEDURE — 65270000046 HC RM TELEMETRY

## 2022-12-25 PROCEDURE — 74011250636 HC RX REV CODE- 250/636: Performed by: EMERGENCY MEDICINE

## 2022-12-25 PROCEDURE — 99285 EMERGENCY DEPT VISIT HI MDM: CPT

## 2022-12-25 PROCEDURE — 96368 THER/DIAG CONCURRENT INF: CPT

## 2022-12-25 PROCEDURE — 87040 BLOOD CULTURE FOR BACTERIA: CPT

## 2022-12-25 PROCEDURE — 74011250637 HC RX REV CODE- 250/637: Performed by: EMERGENCY MEDICINE

## 2022-12-25 PROCEDURE — 77010033711 HC HIGH FLOW OXYGEN

## 2022-12-25 PROCEDURE — 93005 ELECTROCARDIOGRAM TRACING: CPT

## 2022-12-25 PROCEDURE — 85025 COMPLETE CBC W/AUTO DIFF WBC: CPT

## 2022-12-25 PROCEDURE — 94761 N-INVAS EAR/PLS OXIMETRY MLT: CPT

## 2022-12-25 PROCEDURE — 96365 THER/PROPH/DIAG IV INF INIT: CPT

## 2022-12-25 PROCEDURE — 87635 SARS-COV-2 COVID-19 AMP PRB: CPT

## 2022-12-25 PROCEDURE — 84484 ASSAY OF TROPONIN QUANT: CPT

## 2022-12-25 PROCEDURE — 83880 ASSAY OF NATRIURETIC PEPTIDE: CPT

## 2022-12-25 PROCEDURE — 87804 INFLUENZA ASSAY W/OPTIC: CPT

## 2022-12-25 RX ORDER — POLYETHYLENE GLYCOL 3350 17 G/17G
17 POWDER, FOR SOLUTION ORAL DAILY PRN
Status: DISCONTINUED | OUTPATIENT
Start: 2022-12-25 | End: 2022-12-29 | Stop reason: HOSPADM

## 2022-12-25 RX ORDER — METRONIDAZOLE 500 MG/100ML
500 INJECTION, SOLUTION INTRAVENOUS EVERY 8 HOURS
Status: DISCONTINUED | OUTPATIENT
Start: 2022-12-25 | End: 2022-12-27

## 2022-12-25 RX ORDER — ACETAMINOPHEN 325 MG/1
650 TABLET ORAL
Status: DISCONTINUED | OUTPATIENT
Start: 2022-12-25 | End: 2022-12-25

## 2022-12-25 RX ORDER — ACETAMINOPHEN 325 MG/1
650 TABLET ORAL
Status: DISCONTINUED | OUTPATIENT
Start: 2022-12-25 | End: 2022-12-29 | Stop reason: HOSPADM

## 2022-12-25 RX ORDER — ATORVASTATIN CALCIUM 20 MG/1
20 TABLET, FILM COATED ORAL DAILY
Status: DISCONTINUED | OUTPATIENT
Start: 2022-12-26 | End: 2022-12-29 | Stop reason: HOSPADM

## 2022-12-25 RX ORDER — ALBUTEROL SULFATE 0.83 MG/ML
2.5 SOLUTION RESPIRATORY (INHALATION)
Status: DISCONTINUED | OUTPATIENT
Start: 2022-12-26 | End: 2022-12-28

## 2022-12-25 RX ORDER — FUROSEMIDE 10 MG/ML
20 INJECTION INTRAMUSCULAR; INTRAVENOUS DAILY
Status: DISCONTINUED | OUTPATIENT
Start: 2022-12-26 | End: 2022-12-29 | Stop reason: HOSPADM

## 2022-12-25 RX ORDER — SODIUM CHLORIDE 0.9 % (FLUSH) 0.9 %
5-40 SYRINGE (ML) INJECTION AS NEEDED
Status: DISCONTINUED | OUTPATIENT
Start: 2022-12-25 | End: 2022-12-29 | Stop reason: HOSPADM

## 2022-12-25 RX ORDER — ONDANSETRON 2 MG/ML
4 INJECTION INTRAMUSCULAR; INTRAVENOUS
Status: DISCONTINUED | OUTPATIENT
Start: 2022-12-25 | End: 2022-12-29 | Stop reason: HOSPADM

## 2022-12-25 RX ORDER — SODIUM CHLORIDE 0.9 % (FLUSH) 0.9 %
5-40 SYRINGE (ML) INJECTION EVERY 8 HOURS
Status: DISCONTINUED | OUTPATIENT
Start: 2022-12-25 | End: 2022-12-29 | Stop reason: HOSPADM

## 2022-12-25 RX ORDER — ONDANSETRON 4 MG/1
4 TABLET, ORALLY DISINTEGRATING ORAL
Status: DISCONTINUED | OUTPATIENT
Start: 2022-12-25 | End: 2022-12-29 | Stop reason: HOSPADM

## 2022-12-25 RX ORDER — CARVEDILOL 12.5 MG/1
12.5 TABLET ORAL 2 TIMES DAILY WITH MEALS
Status: DISCONTINUED | OUTPATIENT
Start: 2022-12-26 | End: 2022-12-29 | Stop reason: HOSPADM

## 2022-12-25 RX ORDER — ASPIRIN 81 MG/1
81 TABLET ORAL DAILY
Status: DISCONTINUED | OUTPATIENT
Start: 2022-12-26 | End: 2022-12-29 | Stop reason: HOSPADM

## 2022-12-25 RX ORDER — ACETAMINOPHEN 650 MG/1
650 SUPPOSITORY RECTAL
Status: DISCONTINUED | OUTPATIENT
Start: 2022-12-25 | End: 2022-12-29 | Stop reason: HOSPADM

## 2022-12-25 RX ORDER — ACETAMINOPHEN 500 MG
1000 TABLET ORAL
Status: COMPLETED | OUTPATIENT
Start: 2022-12-25 | End: 2022-12-25

## 2022-12-25 RX ORDER — ALBUTEROL SULFATE 0.83 MG/ML
2.5 SOLUTION RESPIRATORY (INHALATION)
Status: DISCONTINUED | OUTPATIENT
Start: 2022-12-25 | End: 2022-12-25

## 2022-12-25 RX ADMIN — ACETAMINOPHEN 1000 MG: 500 TABLET ORAL at 17:28

## 2022-12-25 RX ADMIN — SODIUM CHLORIDE 500 ML: 9 INJECTION, SOLUTION INTRAVENOUS at 17:29

## 2022-12-25 RX ADMIN — SODIUM CHLORIDE 1 G: 900 INJECTION INTRAVENOUS at 17:38

## 2022-12-25 RX ADMIN — SODIUM CHLORIDE 500 MG: 9 INJECTION, SOLUTION INTRAVENOUS at 17:38

## 2022-12-25 RX ADMIN — APIXABAN 5 MG: 5 TABLET, FILM COATED ORAL at 21:00

## 2022-12-25 NOTE — Clinical Note
Status[de-identified] INPATIENT [101]   Type of Bed: Telemetry [19]   Cardiac Monitoring Required?: Yes   Inpatient Hospitalization Certified Necessary for the Following Reasons: 3.  Patient receiving treatment that can only be provided in an inpatient setting (further clarification in H&P documentation)   Admitting Diagnosis: CAP (community acquired pneumonia) [[id number]]   Admitting Physician: Vanessa Walker [[id number]]   Attending Physician: Vanessa Walker [[id number]]   Estimated Length of Stay: 2 Midnights   Discharge Plan[de-identified] Home with Office Follow-up

## 2022-12-25 NOTE — ED TRIAGE NOTES
Pt arrives to ed via ems w reports of sob onset 36 hrs pta worsening since onset. Has used inhaler 2x w/o relief. Hx copd w BL 4 L NC. Spo2 93. Hx afib, takes eliquis.

## 2022-12-26 ENCOUNTER — APPOINTMENT (OUTPATIENT)
Dept: CT IMAGING | Age: 82
DRG: 177 | End: 2022-12-26
Attending: GENERAL ACUTE CARE HOSPITAL
Payer: MEDICARE

## 2022-12-26 LAB
ANION GAP SERPL CALC-SCNC: 2 MMOL/L (ref 5–15)
B PERT DNA SPEC QL NAA+PROBE: NOT DETECTED
BORDETELLA PARAPERTUSSIS PCR, BORPAR: NOT DETECTED
BUN SERPL-MCNC: 9 MG/DL (ref 6–20)
BUN/CREAT SERPL: 19 (ref 12–20)
C PNEUM DNA SPEC QL NAA+PROBE: NOT DETECTED
CALCIUM SERPL-MCNC: 8.6 MG/DL (ref 8.5–10.1)
CHLORIDE SERPL-SCNC: 100 MMOL/L (ref 97–108)
CO2 SERPL-SCNC: 28 MMOL/L (ref 21–32)
CREAT SERPL-MCNC: 0.48 MG/DL (ref 0.55–1.02)
ERYTHROCYTE [DISTWIDTH] IN BLOOD BY AUTOMATED COUNT: 14.7 % (ref 11.5–14.5)
FLUAV SUBTYP SPEC NAA+PROBE: NOT DETECTED
FLUBV RNA SPEC QL NAA+PROBE: NOT DETECTED
GLUCOSE SERPL-MCNC: 127 MG/DL (ref 65–100)
HADV DNA SPEC QL NAA+PROBE: NOT DETECTED
HCOV 229E RNA SPEC QL NAA+PROBE: NOT DETECTED
HCOV HKU1 RNA SPEC QL NAA+PROBE: NOT DETECTED
HCOV NL63 RNA SPEC QL NAA+PROBE: NOT DETECTED
HCOV OC43 RNA SPEC QL NAA+PROBE: DETECTED
HCT VFR BLD AUTO: 33.1 % (ref 35–47)
HGB BLD-MCNC: 11.1 G/DL (ref 11.5–16)
HMPV RNA SPEC QL NAA+PROBE: NOT DETECTED
HPIV1 RNA SPEC QL NAA+PROBE: NOT DETECTED
HPIV2 RNA SPEC QL NAA+PROBE: NOT DETECTED
HPIV3 RNA SPEC QL NAA+PROBE: NOT DETECTED
HPIV4 RNA SPEC QL NAA+PROBE: NOT DETECTED
M PNEUMO DNA SPEC QL NAA+PROBE: NOT DETECTED
MCH RBC QN AUTO: 31.3 PG (ref 26–34)
MCHC RBC AUTO-ENTMCNC: 33.5 G/DL (ref 30–36.5)
MCV RBC AUTO: 93.2 FL (ref 80–99)
NRBC # BLD: 0 K/UL (ref 0–0.01)
NRBC BLD-RTO: 0 PER 100 WBC
PLATELET # BLD AUTO: 153 K/UL (ref 150–400)
PMV BLD AUTO: 11.5 FL (ref 8.9–12.9)
POTASSIUM SERPL-SCNC: 4.7 MMOL/L (ref 3.5–5.1)
RBC # BLD AUTO: 3.55 M/UL (ref 3.8–5.2)
RSV RNA SPEC QL NAA+PROBE: NOT DETECTED
RV+EV RNA SPEC QL NAA+PROBE: NOT DETECTED
SARS-COV-2 PCR, COVPCR: NOT DETECTED
SODIUM SERPL-SCNC: 130 MMOL/L (ref 136–145)
WBC # BLD AUTO: 12.3 K/UL (ref 3.6–11)

## 2022-12-26 PROCEDURE — 74011000250 HC RX REV CODE- 250: Performed by: INTERNAL MEDICINE

## 2022-12-26 PROCEDURE — 77010033711 HC HIGH FLOW OXYGEN

## 2022-12-26 PROCEDURE — 94640 AIRWAY INHALATION TREATMENT: CPT

## 2022-12-26 PROCEDURE — 92610 EVALUATE SWALLOWING FUNCTION: CPT | Performed by: SPEECH-LANGUAGE PATHOLOGIST

## 2022-12-26 PROCEDURE — 74011250637 HC RX REV CODE- 250/637: Performed by: GENERAL ACUTE CARE HOSPITAL

## 2022-12-26 PROCEDURE — 86738 MYCOPLASMA ANTIBODY: CPT

## 2022-12-26 PROCEDURE — 0202U NFCT DS 22 TRGT SARS-COV-2: CPT

## 2022-12-26 PROCEDURE — 87899 AGENT NOS ASSAY W/OPTIC: CPT

## 2022-12-26 PROCEDURE — 85027 COMPLETE CBC AUTOMATED: CPT

## 2022-12-26 PROCEDURE — 87449 NOS EACH ORGANISM AG IA: CPT

## 2022-12-26 PROCEDURE — 71250 CT THORAX DX C-: CPT

## 2022-12-26 PROCEDURE — 74011000258 HC RX REV CODE- 258: Performed by: INTERNAL MEDICINE

## 2022-12-26 PROCEDURE — 74011250636 HC RX REV CODE- 250/636: Performed by: INTERNAL MEDICINE

## 2022-12-26 PROCEDURE — 74011250637 HC RX REV CODE- 250/637: Performed by: INTERNAL MEDICINE

## 2022-12-26 PROCEDURE — 36415 COLL VENOUS BLD VENIPUNCTURE: CPT

## 2022-12-26 PROCEDURE — 74011000250 HC RX REV CODE- 250: Performed by: GENERAL ACUTE CARE HOSPITAL

## 2022-12-26 PROCEDURE — 65270000046 HC RM TELEMETRY

## 2022-12-26 PROCEDURE — 80048 BASIC METABOLIC PNL TOTAL CA: CPT

## 2022-12-26 RX ORDER — BUDESONIDE 0.5 MG/2ML
500 INHALANT ORAL
Status: DISCONTINUED | OUTPATIENT
Start: 2022-12-26 | End: 2022-12-29 | Stop reason: HOSPADM

## 2022-12-26 RX ORDER — PREDNISOLONE ACETATE 10 MG/ML
1 SUSPENSION/ DROPS OPHTHALMIC 4 TIMES DAILY
COMMUNITY

## 2022-12-26 RX ORDER — PREDNISOLONE ACETATE 10 MG/ML
1 SUSPENSION/ DROPS OPHTHALMIC 4 TIMES DAILY
Status: DISCONTINUED | OUTPATIENT
Start: 2022-12-26 | End: 2022-12-29 | Stop reason: HOSPADM

## 2022-12-26 RX ORDER — GUAIFENESIN 600 MG/1
600 TABLET, EXTENDED RELEASE ORAL EVERY 12 HOURS
Status: DISCONTINUED | OUTPATIENT
Start: 2022-12-26 | End: 2022-12-29 | Stop reason: HOSPADM

## 2022-12-26 RX ADMIN — SODIUM CHLORIDE, PRESERVATIVE FREE 20 ML: 5 INJECTION INTRAVENOUS at 06:00

## 2022-12-26 RX ADMIN — METRONIDAZOLE 500 MG: 500 INJECTION, SOLUTION INTRAVENOUS at 01:03

## 2022-12-26 RX ADMIN — METHYLPREDNISOLONE SODIUM SUCCINATE 40 MG: 40 INJECTION, POWDER, FOR SOLUTION INTRAMUSCULAR; INTRAVENOUS at 11:28

## 2022-12-26 RX ADMIN — METHYLPREDNISOLONE SODIUM SUCCINATE 40 MG: 40 INJECTION, POWDER, FOR SOLUTION INTRAMUSCULAR; INTRAVENOUS at 01:03

## 2022-12-26 RX ADMIN — SODIUM CHLORIDE, PRESERVATIVE FREE 10 ML: 5 INJECTION INTRAVENOUS at 15:52

## 2022-12-26 RX ADMIN — METRONIDAZOLE 500 MG: 500 INJECTION, SOLUTION INTRAVENOUS at 09:43

## 2022-12-26 RX ADMIN — ALBUTEROL SULFATE 2.5 MG: 2.5 SOLUTION RESPIRATORY (INHALATION) at 20:21

## 2022-12-26 RX ADMIN — CARVEDILOL 12.5 MG: 12.5 TABLET, FILM COATED ORAL at 17:45

## 2022-12-26 RX ADMIN — METHYLPREDNISOLONE SODIUM SUCCINATE 40 MG: 40 INJECTION, POWDER, FOR SOLUTION INTRAMUSCULAR; INTRAVENOUS at 06:00

## 2022-12-26 RX ADMIN — ALBUTEROL SULFATE 2.5 MG: 2.5 SOLUTION RESPIRATORY (INHALATION) at 08:54

## 2022-12-26 RX ADMIN — CARVEDILOL 12.5 MG: 12.5 TABLET, FILM COATED ORAL at 09:45

## 2022-12-26 RX ADMIN — ALBUTEROL SULFATE 2.5 MG: 2.5 SOLUTION RESPIRATORY (INHALATION) at 15:52

## 2022-12-26 RX ADMIN — PREDNISOLONE ACETATE 1 DROP: 10 SUSPENSION/ DROPS OPHTHALMIC at 13:00

## 2022-12-26 RX ADMIN — PREDNISOLONE ACETATE 1 DROP: 10 SUSPENSION/ DROPS OPHTHALMIC at 21:08

## 2022-12-26 RX ADMIN — APIXABAN 5 MG: 5 TABLET, FILM COATED ORAL at 09:43

## 2022-12-26 RX ADMIN — FUROSEMIDE 20 MG: 10 INJECTION, SOLUTION INTRAMUSCULAR; INTRAVENOUS at 09:46

## 2022-12-26 RX ADMIN — SODIUM CHLORIDE, PRESERVATIVE FREE 10 ML: 5 INJECTION INTRAVENOUS at 21:08

## 2022-12-26 RX ADMIN — AZITHROMYCIN MONOHYDRATE 500 MG: 500 INJECTION, POWDER, LYOPHILIZED, FOR SOLUTION INTRAVENOUS at 17:45

## 2022-12-26 RX ADMIN — APIXABAN 5 MG: 5 TABLET, FILM COATED ORAL at 17:45

## 2022-12-26 RX ADMIN — PREDNISOLONE ACETATE 1 DROP: 10 SUSPENSION/ DROPS OPHTHALMIC at 18:00

## 2022-12-26 RX ADMIN — SODIUM CHLORIDE 1 G: 900 INJECTION INTRAVENOUS at 18:54

## 2022-12-26 RX ADMIN — ASPIRIN 81 MG: 81 TABLET, COATED ORAL at 09:43

## 2022-12-26 RX ADMIN — METHYLPREDNISOLONE SODIUM SUCCINATE 40 MG: 40 INJECTION, POWDER, FOR SOLUTION INTRAMUSCULAR; INTRAVENOUS at 18:00

## 2022-12-26 RX ADMIN — BUDESONIDE 500 MCG: 0.5 INHALANT RESPIRATORY (INHALATION) at 20:21

## 2022-12-26 RX ADMIN — GUAIFENESIN 600 MG: 600 TABLET, EXTENDED RELEASE ORAL at 21:22

## 2022-12-26 RX ADMIN — METRONIDAZOLE 500 MG: 500 INJECTION, SOLUTION INTRAVENOUS at 17:46

## 2022-12-26 NOTE — ED NOTES
Attempted to call report to [facility] for bed assignment 6040. Inpatient RN refused report as pt is on HI Flow nasal cannula. ED RN awaiting call back from Martha Ly.

## 2022-12-26 NOTE — PROGRESS NOTES
SPEECH PATHOLOGY BEDSIDE SWALLOW EVALUATION/DISCHARGE  Patient: Katheryn Mcgrath (2 y.o. female)  Date: 12/26/2022  Primary Diagnosis: CAP (community acquired pneumonia) [J18.9]       Precautions: fall       ASSESSMENT :  Based on the objective data described below, the patient presents with functional oropharyngeal swallow based on clinical examination. No acute risk factors for silent aspiration at this juncture. Skilled acute therapy provided by a speech-language pathologist is not indicated at this time. PLAN :  Recommendations:  Regular diet with thin liquids  Discharge Recommendations: None     SUBJECTIVE:   Patient stated Why do I need a speech therapist?.    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    Occult blood in stools 1/28/2016    Pacemaker     S/P ablation of atrial fibrillation 3/23/2017    S/P ablation of atrial flutter 3/23/2017     Past Surgical History:   Procedure Laterality Date    COLONOSCOPY N/A 5/11/2017    COLONOSCOPY performed by Kale Kent MD at [facility] ENDOSCOPY    COLONOSCOPY N/A 11/30/2017    COLONOSCOPY performed by Kale Kent MD at [facility] ENDOSCOPY    COLONOSCOPY Left 7/9/2019    COLONOSCOPY performed by Mai Way MD at [place]  11/30/2017         HX HERNIA REPAIR  11/29/2015    Incarcerated left femoral hernia repair,.     HX PACEMAKER Left 2014    [place] ABDOMEN SURGERY PROC UNLISTED      inguinal hernia repair right          Diet prior to admission: regular with thins  Current Diet:  regular with thins   Cognitive and Communication Status:  Neurologic State: Alert           Perseveration: No perseveration noted     Oral Assessment:  Oral Assessment  Labial: No impairment  Dentition: Intact  Oral Hygiene: moist, clean  Lingual: No impairment  Mandible: No impairment  P.O. Trials:  Patient Position: up in bed  Vocal quality prior to P.O.: No impairment  Consistency Presented: Thin liquid; Solid;Puree  How Presented: Self-fed/presented;Straw;Successive swallows;Spoon     Bolus Acceptance: No impairment  Bolus Formation/Control: No impairment     Propulsion: No impairment  Oral Residue: None        Aspiration Signs/Symptoms: None                          Pain:  Pain Scale 1: Numeric (0 - 10)  Pain Intensity 1: 0     After treatment:   Patient left in no apparent distress in bed and Call bell within reach    COMMUNICATION/EDUCATION:   Patient was educated regarding purpose of SLP visit and recommendations. The patient's plan of care including recommendations, planned interventions, and recommended diet changes were discussed with: Registered nurse.      Thank you for this referral.  Shahida Fernandez SLP  Time Calculation: 13 mins

## 2022-12-26 NOTE — ED PROVIDER NOTES
EMERGENCY DEPARTMENT HISTORY AND PHYSICAL EXAM      Date: 12/25/2022  Patient Name: Andrew Cornelius    History of Presenting Illness     Chief Complaint   Patient presents with    Shortness of Breath       History Provided By: Patient    HPI: Andrew Cornelius, [de-identified] y.o. female presents to the ED with cc of shortness of breath. Patient states she has a prior history of COPD and is on 4 L nasal cannula at baseline. Patient also states prior history of A. fib takes Eliquis does have a pacemaker. Patient states over the last couple days she had noted to be not feeling well. She states that she has not had any subjective fever but has had some chills. She has had some nasal congestion. She states that she has had a cough. She states that she has been moderately short of breath worse with exertion no orthopnea. She denies any chest pain. She denies any abdominal pain, nausea, vomiting or diarrhea. She states she has had decreased p.o. intake. She denies any urinary symptoms. She denies any headache, loss of sensation or loss of strength. There are no other complaints, changes, or physical findings at this time. PCP: Nj Barboza MD    No current facility-administered medications on file prior to encounter. Current Outpatient Medications on File Prior to Encounter   Medication Sig Dispense Refill    apixaban (Eliquis) 5 mg tablet TAKE 1 TABLET BY MOUTH TWICE A  Tablet 3    carvediloL (COREG) 12.5 mg tablet TAKE 1 TABLET BY MOUTH TWICE A DAY WITH MEALS 180 Tablet 3    Dulera 200-5 mcg/actuation HFA inhaler TAKE 2 PUFFS BY MOUTH TWICE A DAY      OXYGEN-AIR DELIVERY SYSTEMS Take 3 L/min by inhalation as needed. albuterol (PROVENTIL HFA, VENTOLIN HFA, PROAIR HFA) 90 mcg/actuation inhaler Take 2 Puffs by inhalation every four (4) hours as needed for Wheezing. tiotropium bromide (SPIRIVA RESPIMAT) 2.5 mcg/actuation inhaler Take 2 Puffs by inhalation daily.       atorvastatin (LIPITOR) 20 mg tablet TAKE 1 TABLET BY MOUTH EVERY DAY 90 Tab 1    aspirin delayed-release 81 mg tablet Take 81 mg by mouth daily. multivitamin (ONE A DAY) tablet Take 1 Tab by mouth daily. 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 2017    Tubulovillous adenoma rectum     Occult blood in stools 2016    Pacemaker     S/P ablation of atrial fibrillation 3/23/2017    S/P ablation of atrial flutter 3/23/2017       Past Surgical History:  Past Surgical History:   Procedure Laterality Date    COLONOSCOPY N/A 2017    COLONOSCOPY performed by Regine Castorena MD at [facility] ENDOSCOPY    COLONOSCOPY N/A 2017    COLONOSCOPY performed by Regine Castorena MD at [facility] ENDOSCOPY    COLONOSCOPY Left 2019    COLONOSCOPY performed by Laurie Villa MD at [facility]  2017         HX HERNIA REPAIR  2015    Incarcerated left femoral hernia repair,.     HX PACEMAKER Left     [place] ABDOMEN SURGERY PROC UNLISTED      inguinal hernia repair right       Family History:  Family History   Problem Relation Age of Onset    Heart Disease Mother     Cancer Father         BLADDER    Other Sister         RA    Other Brother         RA    Alcohol abuse Brother        Social History:  Social History     Tobacco Use    Smoking status: Former     Packs/day: 1.00     Years: 50.00     Pack years: 50.00     Types: Cigarettes     Quit date: 2014     Years since quittin.3    Smokeless tobacco: Never    Tobacco comments:     lives with a smoker   Vaping Use    Vaping Use: Never used   Substance Use Topics Alcohol use: No    Drug use: No       Allergies:  No Known Allergies      Review of Systems   Review of Systems   Constitutional:  Positive for fatigue. Negative for appetite change, chills and fever. HENT: Negative. Negative for congestion, rhinorrhea, sinus pressure and sore throat. Eyes: Negative. Respiratory:  Positive for cough and shortness of breath. Negative for choking, chest tightness and wheezing. Cardiovascular: Negative. Negative for chest pain, palpitations and leg swelling. Gastrointestinal:  Negative for abdominal pain, constipation, diarrhea, nausea and vomiting. Endocrine: Negative. Genitourinary: Negative. Negative for difficulty urinating, dysuria, flank pain and urgency. Musculoskeletal: Negative. Skin: Negative. Neurological: Negative. Negative for dizziness, speech difficulty, weakness, light-headedness, numbness and headaches. Psychiatric/Behavioral: Negative. All other systems reviewed and are negative. Physical Exam   Physical Exam  Vitals and nursing note reviewed. Constitutional:       General: She is in acute distress. Appearance: She is well-developed. She is ill-appearing. She is not diaphoretic. HENT:      Head: Normocephalic and atraumatic. Mouth/Throat:      Pharynx: No oropharyngeal exudate. Eyes:      Conjunctiva/sclera: Conjunctivae normal.      Pupils: Pupils are equal, round, and reactive to light. Neck:      Vascular: No JVD. Trachea: No tracheal deviation. Cardiovascular:      Rate and Rhythm: Normal rate and regular rhythm. Heart sounds: Normal heart sounds. No murmur heard. Comments: PM present    Pulmonary:      Effort: Tachypnea and accessory muscle usage present. No respiratory distress. Breath sounds: No stridor. Examination of the left-lower field reveals decreased breath sounds and rhonchi. Decreased breath sounds and rhonchi present. No wheezing or rales.    Chest:      Chest wall: No tenderness. Abdominal:      General: There is no distension. Palpations: Abdomen is soft. Tenderness: There is no abdominal tenderness. There is no guarding or rebound. Musculoskeletal:         General: Normal range of motion. Cervical back: Normal range of motion and neck supple. Right lower leg: No edema. Left lower leg: No edema. Skin:     General: Skin is warm and dry. Capillary Refill: Capillary refill takes less than 2 seconds. Neurological:      Mental Status: She is alert and oriented to person, place, and time. Cranial Nerves: No cranial nerve deficit. Comments: No gross motor or sensory deficits    Psychiatric:         Mood and Affect: Mood normal.         Behavior: Behavior normal.       Diagnostic Study Results     Labs -     Recent Results (from the past 12 hour(s))   METABOLIC PANEL, COMPREHENSIVE    Collection Time: 12/25/22  4:17 PM   Result Value Ref Range    Sodium 130 (L) 136 - 145 mmol/L    Potassium 3.9 3.5 - 5.1 mmol/L    Chloride 96 (L) 97 - 108 mmol/L    CO2 32 21 - 32 mmol/L    Anion gap 2 (L) 5 - 15 mmol/L    Glucose 123 (H) 65 - 100 mg/dL    BUN 10 6 - 20 MG/DL    Creatinine 0.62 0.55 - 1.02 MG/DL    BUN/Creatinine ratio 16 12 - 20      eGFR >60 >60 ml/min/1.73m2    Calcium 9.4 8.5 - 10.1 MG/DL    Bilirubin, total 0.4 0.2 - 1.0 MG/DL    ALT (SGPT) 43 12 - 78 U/L    AST (SGOT) 46 (H) 15 - 37 U/L    Alk.  phosphatase 106 45 - 117 U/L    Protein, total 7.5 6.4 - 8.2 g/dL    Albumin 3.3 (L) 3.5 - 5.0 g/dL    Globulin 4.2 (H) 2.0 - 4.0 g/dL    A-G Ratio 0.8 (L) 1.1 - 2.2     CBC WITH AUTOMATED DIFF    Collection Time: 12/25/22  4:17 PM   Result Value Ref Range    WBC 10.5 3.6 - 11.0 K/uL    RBC 4.05 3.80 - 5.20 M/uL    HGB 12.7 11.5 - 16.0 g/dL    HCT 38.1 35.0 - 47.0 %    MCV 94.1 80.0 - 99.0 FL    MCH 31.4 26.0 - 34.0 PG    MCHC 33.3 30.0 - 36.5 g/dL    RDW 14.5 11.5 - 14.5 %    PLATELET 260 722 - 739 K/uL    MPV 10.1 8.9 - 12.9 FL    NRBC 0.0 0  WBC    ABSOLUTE NRBC 0.00 0.00 - 0.01 K/uL    NEUTROPHILS 86 (H) 32 - 75 %    LYMPHOCYTES 5 (L) 12 - 49 %    MONOCYTES 9 5 - 13 %    EOSINOPHILS 0 0 - 7 %    BASOPHILS 0 0 - 1 %    IMMATURE GRANULOCYTES 0 0.0 - 0.5 %    ABS. NEUTROPHILS 9.1 (H) 1.8 - 8.0 K/UL    ABS. LYMPHOCYTES 0.5 (L) 0.8 - 3.5 K/UL    ABS. MONOCYTES 0.9 0.0 - 1.0 K/UL    ABS. EOSINOPHILS 0.0 0.0 - 0.4 K/UL    ABS. BASOPHILS 0.0 0.0 - 0.1 K/UL    ABS. IMM. GRANS. 0.0 0.00 - 0.04 K/UL    DF SMEAR SCANNED      RBC COMMENTS NORMOCYTIC, NORMOCHROMIC     MYOGLOBIN    Collection Time: 12/25/22  4:17 PM   Result Value Ref Range    Myoglobin 57 13 - 71 ng/mL   NT-PRO BNP    Collection Time: 12/25/22  4:17 PM   Result Value Ref Range    NT pro-BNP 1,256 (H) <450 PG/ML   COVID-19 RAPID TEST    Collection Time: 12/25/22  5:15 PM   Result Value Ref Range    Specimen source Nasopharyngeal      COVID-19 rapid test Not detected NOTD     INFLUENZA A+B VIRAL AGS    Collection Time: 12/25/22  5:15 PM   Result Value Ref Range    Influenza A Antigen Negative NEG      Influenza B Antigen Negative NEG     TROPONIN-HIGH SENSITIVITY    Collection Time: 12/25/22  5:42 PM   Result Value Ref Range    Troponin-High Sensitivity 12 0 - 51 ng/L   PROCALCITONIN    Collection Time: 12/25/22  5:42 PM   Result Value Ref Range    Procalcitonin <0.05 ng/mL       Radiologic Studies -   XR CHEST PA LAT   Final Result   Patchy consolidation in the left lung base is concerning for pneumonia versus   aspiration. CT Results  (Last 48 hours)      None          CXR Results  (Last 48 hours)                 12/25/22 1640  XR CHEST PA LAT Final result    Impression:  Patchy consolidation in the left lung base is concerning for pneumonia versus   aspiration.             Narrative:  EXAM: XR CHEST PA LAT       INDICATION: sob        COMPARISON: Chest radiograph 10/22/2019, CTA chest 12/28/2017       TECHNIQUE: PA and lateral chest views       FINDINGS:   Left pectoral pacemaker. Cardiac mediastinal silhouette is within normal limits. Patchy consolidation in the left lung base is concerning for pneumonia versus   aspiration. Pleural spaces are grossly clear. Medical Decision Making   I am the first provider for this patient. I reviewed the vital signs, available nursing notes, past medical history, past surgical history, family history and social history. Vital Signs-Reviewed the patient's vital signs. Patient Vitals for the past 12 hrs:   Temp Pulse Resp BP SpO2   12/25/22 1830 -- 84 22 136/67 98 %   12/25/22 1815 -- 84 22 123/73 98 %   12/25/22 1745 -- 88 19 (!) 145/70 97 %   12/25/22 1721 -- 83 17 -- 95 %   12/25/22 1719 -- -- -- -- 95 %   12/25/22 1716 -- -- -- (!) 157/79 --   12/25/22 1700 100.2 °F (37.9 °C) -- -- -- --   12/25/22 1557 99.3 °F (37.4 °C) 86 20 (!) 90/57 94 %   12/25/22 1555 -- -- 26 -- --       Records Reviewed: Nursing Notes, Old Medical Records, Ambulance Run Sheet, Previous Radiology Studies, and Previous Laboratory Studies, patient with a history of paroxysmal A. fib also has a history of Takotsubo cardiomyopathy patient does have a cardiac pacemaker is followed by [facility]. Provider Notes (Medical Decision Making):   DDx-pneumonia, bronchitis, CHF exacerbation, electrolyte abnormality, COVID, flu    ED Course:   Initial assessment performed. The patients presenting problems have been discussed, and they are in agreement with the care plan formulated and outlined with them. I have encouraged them to ask questions as they arise throughout their visit. On 5 L patient still having oxygen saturations in the upper 80s with increased work of breathing we will switch her over to high flow at 15 L. On 15 L by nasal cannula patient tolerating much better oxygen saturations 94 to 96% with improved work of breathing.     Patient with left lower lobe infiltrate patient has not had any vomiting does not have any other risk factors for aspiration we will start the patient on Rocephin azithromycin to treat her for community-acquired pneumonia. Given increased oxygen requirement patient will be admitted. Consult note:  Case discussed with hospitalist.  Patient will be evaluate admitted. Critical Care Time:   CRITICAL CARE NOTE :    9:46 PM    IMPENDING DETERIORATION -Respiratory  ASSOCIATED RISK FACTORS - Hypoxia  MANAGEMENT- Bedside Assessment and Supervision of Care  INTERPRETATION -  Xrays, Blood Pressure, Cardiac Output Measures , and Labs  INTERVENTIONS - Oxygen HFNC, IV Ab  CASE REVIEW - Hospitalist/Intensivist, Nursing,  TREATMENT RESPONSE -Stable  PERFORMED BY - Self    NOTES   :  I have spent 40 minutes of critical care time involved in lab review, consultations with specialist, family decision- making, bedside attention and documentation. This time excludes time spent in any separate billed procedures. During this entire length of time I was immediately available to the patient . Lula Elizabeth DO      Disposition:  Admit     Diagnosis     Clinical Impression:   1. Acute respiratory failure with hypoxia ([address]    2. Community acquired pneumonia, unspecified laterality        Attestations:    Lula Elizabeth DO        Please note that this dictation was completed with [employer], the computer voice recognition software. Quite often unanticipated grammatical, syntax, homophones, and other interpretive errors are inadvertently transcribed by the computer software. Please disregard these errors. Please excuse any errors that have escaped final proofreading. Thank you.

## 2022-12-26 NOTE — ED NOTES
Edel with bed placement advised pt will need to be upgraded to Stepdown if on HI flow. RN paging respiratory at this time.

## 2022-12-26 NOTE — PROGRESS NOTES
Hospitalist Progress Note    NAME: Jenine Gowers   :  1940   MRN:  [id number]       Assessment / Plan:  Acute on chronic hypoxic respiratory failure  Community-acquired pneumonia  Acute COPD exacerbation  -Chest x-ray shows left basilar consolidation  -She is on 4 L at baseline but currently requiring 8 L high flow nasal cannula and saturating at about 94%  -Start ceftriaxone and Zithromax. Start Solu-Medrol. Start DuoNeb. -SARS-CoV-2 PCR and influenza PCR negative  -Given possibility of aspiration pneumonia on chest x-ray, will start Flagyl as well. Consult speech therapy.   Procal neg  Check dry CT chest  Check viral resp panel  CAP w/up  SLP  If not improving then call Pulm consult   Cont Duoneb  Add Pulmicort     Elevated BNP, rule out diastolic congestive heart failure  History of TSaccatsobo cardiomyopathy  -No previous history of congestive heart failure proBNP is elevated at 1250  -We will check 2D echocardiogram  -Strict I's and O's, daily weights and low-salt diet  -Start Lasix 20 mg IV daily    Atrial fibrillation  Hypertension  Dyslipidemia  -Continue home Coreg, Eliquis, Lipitor      Code Status: Full code  Surrogate Decision Maker:     DVT Prophylaxis: Eliquis  GI Prophylaxis: not indicated     Baseline: From home, on 4 L at all times  Recommended Disposition: Home w/Family  Anticipated Discharge Date:  >48 hours        Subjective:     Discussed with RN events overnight. Cough  Sob  On 8 L/M  No CP  No leg swelling     Review of Systems:  Symptom Y/N Comments  Symptom Y/N Comments   Fever/Chills    Chest Pain     Poor Appetite    Edema     Cough    Abdominal Pain     Sputum    Joint Pain     SOB/BRUKETT    Pruritis/Rash     Nausea/vomit    Tolerating PT/OT     Diarrhea    Tolerating Diet     Constipation    Other       PO intake: No data found.     Wt Readings from Last 10 Encounters: 12/25/22 65.8 kg (145 lb)   09/16/21 63.3 kg (139 lb 9.6 oz)   08/03/21 63 kg (138 lb 12.8 oz)   03/11/21 63.8 kg (140 lb 11.2 oz)   03/02/21 64.7 kg (142 lb 9.6 oz)   08/27/20 63.5 kg (140 lb 1.6 oz)   08/25/20 63.2 kg (139 lb 6.4 oz)   03/03/20 64.3 kg (141 lb 12.8 oz)   02/14/20 64.4 kg (142 lb)   12/20/19 65.3 kg (144 lb)       Objective:     VITALS:   Last 24hrs VS reviewed since prior progress note. Most recent are:  Patient Vitals for the past 24 hrs:   Temp Pulse Resp BP SpO2   12/26/22 1600 99 °F (37.2 °C) 84 22 120/61 95 %   12/26/22 1553 -- -- -- -- 95 %   12/26/22 1200 98.1 °F (36.7 °C) 78 25 123/65 95 %   12/26/22 0855 -- -- -- -- 97 %   12/26/22 0800 97.7 °F (36.5 °C) 79 25 134/71 96 %   12/26/22 0500 -- 75 19 (!) 125/57 100 %   12/26/22 0400 -- 75 19 137/63 99 %   12/26/22 0300 -- 79 18 130/67 99 %   12/26/22 0200 -- 78 21 (!) 134/57 100 %   12/26/22 0000 99 °F (37.2 °C) 80 23 (!) 118/56 100 %   12/25/22 2300 -- 79 22 (!) 124/57 100 %   12/25/22 2200 -- 82 23 (!) 107/52 99 %   12/25/22 1930 -- 83 17 (!) 124/59 96 %   12/25/22 1830 -- 84 22 136/67 98 %   12/25/22 1815 -- 84 22 123/73 98 %   12/25/22 1745 -- 88 19 (!) 145/70 97 %   12/25/22 1721 -- 83 17 -- 95 %   12/25/22 1719 -- -- -- -- 95 %   12/25/22 1716 -- -- -- [place] Sera 157/79 --       Intake/Output Summary (Last 24 hours) at 12/26/2022 1711  Last data filed at 12/26/2022 0229  Gross per 24 hour   Intake 1266.67 ml   Output --   Net 1266.67 ml        I had a face to face encounter, and independently examined this patient as outlined below:    PHYSICAL EXAM:  General:    No distress     HEENT: Atraumatic, anicteric sclerae, pink conjunctivae, MMM  Neck:  Supple, symmetrical  Lungs:   CTA with limited air entry b/l. No Wheezing/Rhonchi. No rales. No tenderness. No Accessory muscle use. CVS:   Regular rhythm. No murmur. No JVD   GI/:   Soft. NT. ND. BS normal  Extremities: No edema. No cyanosis. No clubbing. Skin:     Not pale. Not Jaundiced.  No rashes   Psych:  Good insight. Not depressed. Not anxious or agitated. Neurologic: Alert and oriented X 4. EOMs intact. No facial asymmetry. No slurred speech. Symmetrical strength, Sensation grossly intact. Labs     I reviewed today's most current labs and imaging studies. Pertinent labs include:  Recent Labs     12/26/22 0414 12/25/22  1617   WBC 12.3* 10.5   HGB 11.1* 12.7   HCT 33.1* 38.1    176     Recent Labs     12/26/22 0414 12/25/22  1617   * 130*   K 4.7 3.9    96*   CO2 28 32   * 123*   BUN 9 10   CREA 0.48* 0.62   CA 8.6 9.4   ALB  --  3.3*   TBILI  --  0.4   ALT  --  43     XR CHEST PA LAT    Result Date: 12/25/2022  Patchy consolidation in the left lung base is concerning for pneumonia versus aspiration. No results found. 10/22/19    ECHO ADULT COMPLETE 10/25/2019 10/25/2019    Interpretation Summary  · Left Ventricle: Normal cavity size, wall thickness and systolic function (ejection fraction normal). Estimated left ventricular ejection fraction is 61 - 65%. No regional wall motion abnormality noted. Mild (grade 1) left ventricular diastolic dysfunction. · Interatrial Septum: Color flow Doppler was used. · Tricuspid Valve: Mild tricuspid valve regurgitation is present. · Pulmonary Artery: Mild pulmonary hypertension.     Signed by: Reno Basilio MD on 10/25/2019 10:57 AM     All Micro Results       Procedure Component Value Units Date/Time    CULTURE, BLOOD, PAIRED [[id number]] Collected: 12/25/22 1742    Order Status: Completed Specimen: Blood Updated: 12/26/22 0842     Special Requests: NO SPECIAL REQUESTS        Culture result: NO GROWTH AFTER 14 HOURS       COVID-19 WITH INFLUENZA A/B [[id number]] Collected: 12/25/22 1845    Order Status: Completed Specimen: Nasopharyngeal Updated: 12/25/22 2321     SARS-CoV-2 by PCR Not detected        Comment: Not Detected results do not preclude SARS-CoV-2 infection and should not be used as the sole basis for patient management decisions. Results must be combined with clinical observations, patient history, and epidemiological information. Influenza A by PCR Not detected        Influenza B by PCR Not detected        Comment: Testing was performed using alexx Linnette SARS-CoV-2 and Influenza A/B nucleic acid assay. This test is a multiplex Real-Time Reverse Transcriptase Polymerase Chain Reaction (RT-PCR) based in vitro diagnostic test intended for the qualitative detection of nucleic acids from SARS-CoV-2, Influenza A, and Influenza B in nasopharyngeal and nasal swab specimens for use under the FDA's Emergency Use Authorization (EUA) only. Fact sheet for Patients: FindDrives.pl  Fact sheet for Healthcare Providers: FindDrives.pl         COVID-19 RAPID TEST [[id number]] Collected: 12/25/22 1715    Order Status: Completed Specimen: Nasopharyngeal Updated: 12/25/22 1812     Specimen source Nasopharyngeal        COVID-19 rapid test Not detected        Comment: Rapid Abbott ID Now       Rapid NAAT:  The specimen is NEGATIVE for SARS-CoV-2, the novel coronavirus associated with COVID-19. Negative results should be treated as presumptive and, if inconsistent with clinical signs and symptoms or necessary for patient management, should be tested with an alternative molecular assay. Negative results do not preclude SARS-CoV-2 infection and should not be used as the sole basis for patient management decisions. This test has been authorized by the FDA under an Emergency Use Authorization (EUA) for use by authorized laboratories.    Fact sheet for Healthcare Providers:  http://www.dick-donita.joesph/  Fact sheet for Patients: http://www.jennings-duckworth.biz/       Methodology: Isothermal Nucleic Acid Amplification                   Current Medications:     Current Facility-Administered Medications:     prednisoLONE acetate (PRED FORTE) 1 % ophthalmic suspension 1 Drop, 1 Drop, Both Eyes, QID, Solo, Elmer Nuno MD, 1 Drop at 12/26/22 1300    apixaban (ELIQUIS) tablet 5 mg, 5 mg, Oral, BID, Rita Greer MD, 5 mg at 12/26/22 9849    aspirin delayed-release tablet 81 mg, 81 mg, Oral, DAILY, Rita Greer MD, 81 mg at 12/26/22 0943    atorvastatin (LIPITOR) tablet 20 mg, 20 mg, Oral, DAILY, Luke Miller MD    carvediloL (COREG) tablet 12.5 mg, 12.5 mg, Oral, BID WITH MEALS, Luke Pierre MD, 12.5 mg at 12/26/22 0945    sodium chloride (NS) flush 5-40 mL, 5-40 mL, IntraVENous, Q8H, Marcelino Miller MD, 10 mL at 12/26/22 1552    sodium chloride (NS) flush 5-40 mL, 5-40 mL, IntraVENous, PRN, Luke Pierre MD    acetaminophen (TYLENOL) tablet 650 mg, 650 mg, Oral, Q6H PRN **OR** acetaminophen (TYLENOL) suppository 650 mg, 650 mg, Rectal, Q6H PRN, Luke Pierre MD    polyethylene glycol (MIRALAX) packet 17 g, 17 g, Oral, DAILY PRN, Marcelino Miller MD    ondansetron (ZOFRAN ODT) tablet 4 mg, 4 mg, Oral, Q8H PRN **OR** ondansetron (ZOFRAN) injection 4 mg, 4 mg, IntraVENous, Q6H PRN, Luke Pierre MD    furosemide (LASIX) injection 20 mg, 20 mg, IntraVENous, DAILY, Marcelino Miller MD, 20 mg at 12/26/22 0946    azithromycin (ZITHROMAX) 500 mg in 0.9% sodium chloride 250 mL ([id number]), 500 mg, IntraVENous, Q24H, Marcelino Miller MD    cefTRIAXone (ROCEPHIN) 1 g in 0.9% sodium chloride (MBP/ADV) 50 mL MBP, 1 g, IntraVENous, Q24H, Marcelino Miller MD    methylPREDNISolone (PF) (SOLU-MEDROL) injection 40 mg, 40 mg, IntraVENous, Q6H, Marcelino Miller MD, 40 mg at 12/26/22 1128    albuterol (PROVENTIL VENTOLIN) nebulizer solution 2.5 mg, 2.5 mg, Nebulization, Q6HWA RT, Marcelino Miller MD, 2.5 mg at 12/26/22 1552    metroNIDAZOLE (FLAGYL) IVPB premix 500 mg, 500 mg, IntraVENous, Q8H, Marcelino Miller MD, Last Rate: 100 mL/hr at 12/26/22 0943, 500 mg at 12/26/22 0943    Current Outpatient Medications:     prednisoLONE acetate (PRED FORTE) 1 % ophthalmic suspension, Administer 1 Drop to both eyes four (4) times daily. , Disp: , Rfl:     apixaban (Eliquis) 5 mg tablet, TAKE 1 TABLET BY MOUTH TWICE A DAY, Disp: 180 Tablet, Rfl: 3    carvediloL (COREG) 12.5 mg tablet, TAKE 1 TABLET BY MOUTH TWICE A DAY WITH MEALS, Disp: 180 Tablet, Rfl: 3    Dulera 200-5 mcg/actuation HFA inhaler, TAKE 2 PUFFS BY MOUTH TWICE A DAY, Disp: , Rfl:     OXYGEN-AIR DELIVERY SYSTEMS, Take 3 L/min by inhalation as needed. , Disp: , Rfl:     albuterol (PROVENTIL HFA, VENTOLIN HFA, PROAIR HFA) 90 mcg/actuation inhaler, Take 2 Puffs by inhalation every four (4) hours as needed for Wheezing., Disp: , Rfl:     tiotropium bromide (SPIRIVA RESPIMAT) 2.5 mcg/actuation inhaler, Take 2 Puffs by inhalation daily. , Disp: , Rfl:     atorvastatin (LIPITOR) 20 mg tablet, TAKE 1 TABLET BY MOUTH EVERY DAY, Disp: 90 Tab, Rfl: 1    multivitamin (ONE A DAY) tablet, Take 1 Tab by mouth daily. , Disp: , Rfl:      Procedures: see electronic medical records for all procedures/Xrays and details which were not copied into this note but were reviewed prior to creation of Plan. Reviewed most current lab test results and cultures  YES  Reviewed most current radiology test results   YES  Review and summation of old records today    NO  Reviewed patient's current orders and MAR    YES  PMH/ reviewed - no change compared to H&P  ________________________________________________________________________  Care Plan discussed with:    Comments   Patient x    Family  x    RN x    Care Manager     Consultant                        Multidiciplinary team rounds were held today with , nursing, pharmacist and clinical coordinator. Patient's plan of care was discussed; medications were reviewed and discharge planning was addressed.      ________________________________________________________________________  Total NON critical care TIME:  40   Minutes    Total CRITICAL CARE TIME Spent:   Minutes non procedure based Comments   >50% of visit spent in counseling and coordination of care x     This includes time during multidisciplinary rounds if indicated above   ________________________________________________________________________  Brit Mendoza MD

## 2022-12-26 NOTE — H&P
Hospitalist Admission Note    NAME: Madiha Wood   :  1940   MRN:  [id number]     Date/Time:  2022 11:27 PM    Patient PCP: Ramin Tiwari MD  ______________________________________________________________________  Given the patient's current clinical presentation, I have a high level of concern for decompensation if discharged from the emergency department. Complex decision making was performed, which includes reviewing the patient's available past medical records, laboratory results, and x-ray films. My assessment of this patient's clinical condition and my plan of care is as follows. Assessment / Plan:  Acute on chronic hypoxic respiratory failure  Community-acquired pneumonia  Acute COPD exacerbation  -Chest x-ray shows left basilar consolidation  -She is on 4 L at baseline but currently requiring 8 L high flow nasal cannula and saturating at about 94%  -Start ceftriaxone and Zithromax. Start Solu-Medrol. Start DuoNeb. -SARS-CoV-2 PCR and influenza PCR negative  -Given possibility of aspiration pneumonia on chest x-ray, will start Flagyl as well. Consult speech therapy. Elevated BNP, rule out diastolic congestive heart failure  History of TSaccatsobo cardiomyopathy  -No previous history of congestive heart failure proBNP is elevated at 1250  -We will check 2D echocardiogram  -Strict I's and O's, daily weights and low-salt diet  -Start Lasix 20 mg IV daily    Atrial fibrillation  Hypertension  Dyslipidemia  -Continue home Coreg, Eliquis, Lipitor        Code Status: Full code  Surrogate Decision Maker:    DVT Prophylaxis: Eliquis  GI Prophylaxis: not indicated    Baseline: From home, on 4 L at all times      Subjective:   CHIEF COMPLAINT: Shortness of breath    HISTORY OF PRESENT ILLNESS:     Kimberlee Villegas is a [de-identified] y.o.  female who presents with past medical history of COPD, hypertension, dyslipidemia is coming the hospital chief complaint of shortness of breath. Patient reports that she has baseline COPD and is on oxygen at 4 L at all times. She reports increased shortness of breath along with cough and small amount of whitish phlegm. Reports generalized weakness as well. Does not report any chest pain. Does not report any orthopnea or PND. Does not report any abdominal pain, nausea or vomiting. Does report nausea along with poor oral intake in the last few days. On arrival to ED, noted to have fever of 100.2 and blood pressure was borderline low on arrival at 90/57. On labs CBC is normal.  BMP shows sodium of 130, creatinine 0.62. LFTs are normal.  Troponin is 12. Procalcitonin 0.05.  proBNP is 1256. Chest x-ray shows patchy consolidation in the left lung base. We were asked to admit for work up and evaluation of the above problems. 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    Occult blood in stools 1/28/2016    Pacemaker     S/P ablation of atrial fibrillation 3/23/2017    S/P ablation of atrial flutter 3/23/2017        Past Surgical History:   Procedure Laterality Date    COLONOSCOPY N/A 5/11/2017    COLONOSCOPY performed by Venancio Guthrie MD at [facility] ENDOSCOPY    COLONOSCOPY N/A 11/30/2017    COLONOSCOPY performed by Venancio Guthrie MD at [facility] ENDOSCOPY    COLONOSCOPY Left 7/9/2019    COLONOSCOPY performed by Maureen Yanes MD at [facility]  11/30/2017         HX HERNIA REPAIR  11/29/2015    Incarcerated left femoral hernia repair,.     HX PACEMAKER Left 2014    NC ABDOMEN SURGERY PROC UNLISTED      inguinal hernia repair right Social History     Tobacco Use    Smoking status: Former     Packs/day: 1.00     Years: 50.00     Pack years: 50.00     Types: Cigarettes     Quit date: 2014     Years since quittin.3    Smokeless tobacco: Never    Tobacco comments:     lives with a smoker   Substance Use Topics    Alcohol use: No        Family History   Problem Relation Age of Onset    Heart Disease Mother     Cancer Father         BLADDER    Other Sister         RA    Other Brother         RA    Alcohol abuse Brother      No Known Allergies     Prior to Admission medications    Medication Sig Start Date End Date Taking? Authorizing Provider   apixaban (Eliquis) 5 mg tablet TAKE 1 TABLET BY MOUTH TWICE A DAY 21   Rene Kelley NP   carvediloL (COREG) 12.5 mg tablet TAKE 1 TABLET BY MOUTH TWICE A DAY WITH MEALS 21   Rene Kelley NP   Arruben Proud 200-5 mcg/actuation HFA inhaler TAKE 2 PUFFS BY MOUTH TWICE A DAY 3/4/21   Provider, Historical   OXYGEN-AIR DELIVERY SYSTEMS Take 3 L/min by inhalation as needed. 19   Provider, Historical   albuterol (PROVENTIL HFA, VENTOLIN HFA, PROAIR HFA) 90 mcg/actuation inhaler Take 2 Puffs by inhalation every four (4) hours as needed for Wheezing. Provider, Historical   tiotropium bromide (SPIRIVA RESPIMAT) 2.5 mcg/actuation inhaler Take 2 Puffs by inhalation daily. Provider, Historical   atorvastatin (LIPITOR) 20 mg tablet TAKE 1 TABLET BY MOUTH EVERY DAY 17   Nadeem Adhikari NP   aspirin delayed-release 81 mg tablet Take 81 mg by mouth daily. Provider, Historical   multivitamin (ONE A DAY) tablet Take 1 Tab by mouth daily. Provider, Historical       REVIEW OF SYSTEMS:     I am not able to complete the review of systems because:    The patient is intubated and sedated    The patient has altered mental status due to his acute medical problems    The patient has baseline aphasia from prior stroke(s)    The patient has baseline dementia and is not reliable historian    The patient is in acute medical distress and unable to provide information           Total of 12 systems reviewed as follows:       POSITIVE= underlined text  Negative = text not underlined  General:  fever, chills, sweats, generalized weakness, weight loss/gain,      loss of appetite   Eyes:    blurred vision, eye pain, loss of vision, double vision  ENT:    rhinorrhea, pharyngitis   Respiratory:   cough, sputum production, SOB, BURKETT, wheezing, pleuritic pain   Cardiology:   chest pain, palpitations, orthopnea, PND, edema, syncope   Gastrointestinal:  abdominal pain , N/V, diarrhea, dysphagia, constipation, bleeding   Genitourinary:  frequency, urgency, dysuria, hematuria, incontinence   Muskuloskeletal :  arthralgia, myalgia, back pain  Hematology:  easy bruising, nose or gum bleeding, lymphadenopathy   Dermatological: rash, ulceration, pruritis, color change / jaundice  Endocrine:   hot flashes or polydipsia   Neurological:  headache, dizziness, confusion, focal weakness, paresthesia,     Speech difficulties, memory loss, gait difficulty  Psychological: Feelings of anxiety, depression, agitation    Objective:   VITALS:    Visit Vitals  /67   Pulse 84   Temp 100.2 °F (37.9 °C)   Resp 22   Ht 5' 11\" (1.803 m)   Wt 65.8 kg (145 lb)   SpO2 98%   BMI 20.22 kg/m²       PHYSICAL EXAM:    General:    no distress, appears stated age. HEENT: Atraumatic, anicteric sclerae, pink conjunctivae     No oral ulcers, mucosa moist, throat clear, dentition fair  Neck:  Supple, symmetrical,  thyroid: non tender  Lungs:   Bilateral air entry present, crackles present, no wheezing  Chest wall:  No tenderness  No Accessory muscle use. Heart:   Regular  rhythm,  No  murmur   No edema  Abdomen:   Soft, non-tender. Not distended. Bowel sounds normal  Extremities: No cyanosis. No clubbing,      Skin turgor normal, Capillary refill normal, Radial dial pulse 2+  Skin:     Not pale.   Not Jaundiced  No rashes Psych:  Not anxious or agitated. Neurologic: EOMs intact. No facial asymmetry. No aphasia or slurred speech. Symmetrical strength, Sensation grossly intact. Alert and oriented X 4.     _______________________________________________________________________  Care Plan discussed with:    Comments   Patient y    Family      RN y    Care Manager                    Consultant:      _______________________________________________________________________  Expected  Disposition:   Home with Family y   HH/PT/OT/RN    SNF/LTC    KATEY    ________________________________________________________________________  TOTAL TIME:  61  Minutes    Critical Care Provided     Minutes non procedure based      Comments    y Reviewed previous records   >50% of visit spent in counseling and coordination of care y Discussion with patient and/or family and questions answered       ________________________________________________________________________  Signed: Fletcher Judge MD    Procedures: see electronic medical records for all procedures/Xrays and details which were not copied into this note but were reviewed prior to creation of Plan. LAB DATA REVIEWED:    Recent Results (from the past 24 hour(s))   METABOLIC PANEL, COMPREHENSIVE    Collection Time: 12/25/22  4:17 PM   Result Value Ref Range    Sodium 130 (L) 136 - 145 mmol/L    Potassium 3.9 3.5 - 5.1 mmol/L    Chloride 96 (L) 97 - 108 mmol/L    CO2 32 21 - 32 mmol/L    Anion gap 2 (L) 5 - 15 mmol/L    Glucose 123 (H) 65 - 100 mg/dL    BUN 10 6 - 20 MG/DL    Creatinine 0.62 0.55 - 1.02 MG/DL    BUN/Creatinine ratio 16 12 - 20      eGFR >60 >60 ml/min/1.73m2    Calcium 9.4 8.5 - 10.1 MG/DL    Bilirubin, total 0.4 0.2 - 1.0 MG/DL    ALT (SGPT) 43 12 - 78 U/L    AST (SGOT) 46 (H) 15 - 37 U/L    Alk.  phosphatase 106 45 - 117 U/L    Protein, total 7.5 6.4 - 8.2 g/dL    Albumin 3.3 (L) 3.5 - 5.0 g/dL    Globulin 4.2 (H) 2.0 - 4.0 g/dL    A-G Ratio 0.8 (L) 1.1 - 2.2     CBC WITH AUTOMATED DIFF    Collection Time: 12/25/22  4:17 PM   Result Value Ref Range    WBC 10.5 3.6 - 11.0 K/uL    RBC 4.05 3.80 - 5.20 M/uL    HGB 12.7 11.5 - 16.0 g/dL    HCT 38.1 35.0 - 47.0 %    MCV 94.1 80.0 - 99.0 FL    MCH 31.4 26.0 - 34.0 PG    MCHC 33.3 30.0 - 36.5 g/dL    RDW 14.5 11.5 - 14.5 %    PLATELET 272 990 - 732 K/uL    MPV 10.1 8.9 - 12.9 FL    NRBC 0.0 0  WBC    ABSOLUTE NRBC 0.00 0.00 - 0.01 K/uL    NEUTROPHILS 86 (H) 32 - 75 %    LYMPHOCYTES 5 (L) 12 - 49 %    MONOCYTES 9 5 - 13 %    EOSINOPHILS 0 0 - 7 %    BASOPHILS 0 0 - 1 %    IMMATURE GRANULOCYTES 0 0.0 - 0.5 %    ABS. NEUTROPHILS 9.1 (H) 1.8 - 8.0 K/UL    ABS. LYMPHOCYTES 0.5 (L) 0.8 - 3.5 K/UL    ABS. MONOCYTES 0.9 0.0 - 1.0 K/UL    ABS. EOSINOPHILS 0.0 0.0 - 0.4 K/UL    ABS. BASOPHILS 0.0 0.0 - 0.1 K/UL    ABS. IMM.  GRANS. 0.0 0.00 - 0.04 K/UL    DF SMEAR SCANNED      RBC COMMENTS NORMOCYTIC, NORMOCHROMIC     MYOGLOBIN    Collection Time: 12/25/22  4:17 PM   Result Value Ref Range    Myoglobin 57 13 - 71 ng/mL   NT-PRO BNP    Collection Time: 12/25/22  4:17 PM   Result Value Ref Range    NT pro-BNP 1,256 (H) <450 PG/ML   COVID-19 RAPID TEST    Collection Time: 12/25/22  5:15 PM   Result Value Ref Range    Specimen source Nasopharyngeal      COVID-19 rapid test Not detected NOTD     INFLUENZA A+B VIRAL AGS    Collection Time: 12/25/22  5:15 PM   Result Value Ref Range    Influenza A Antigen Negative NEG      Influenza B Antigen Negative NEG     TROPONIN-HIGH SENSITIVITY    Collection Time: 12/25/22  5:42 PM   Result Value Ref Range    Troponin-High Sensitivity 12 0 - 51 ng/L   PROCALCITONIN    Collection Time: 12/25/22  5:42 PM   Result Value Ref Range    Procalcitonin <0.05 ng/mL   COVID-19 WITH INFLUENZA A/B    Collection Time: 12/25/22  6:45 PM   Result Value Ref Range    SARS-CoV-2 by PCR Not detected NOTD      Influenza A by PCR Not detected NOTD      Influenza B by PCR Not detected NOTD

## 2022-12-27 LAB
ALBUMIN SERPL-MCNC: 2.5 G/DL (ref 3.5–5)
ALBUMIN/GLOB SERPL: 0.6 (ref 1.1–2.2)
ALP SERPL-CCNC: 76 U/L (ref 45–117)
ALT SERPL-CCNC: 29 U/L (ref 12–78)
ANION GAP SERPL CALC-SCNC: 7 MMOL/L (ref 5–15)
AST SERPL-CCNC: 25 U/L (ref 15–37)
ATRIAL RATE: 91 BPM
BASOPHILS # BLD: 0 K/UL (ref 0–0.1)
BASOPHILS NFR BLD: 0 % (ref 0–1)
BILIRUB SERPL-MCNC: 0.3 MG/DL (ref 0.2–1)
BUN SERPL-MCNC: 13 MG/DL (ref 6–20)
BUN/CREAT SERPL: 28 (ref 12–20)
CALCIUM SERPL-MCNC: 9.1 MG/DL (ref 8.5–10.1)
CALCULATED P AXIS, ECG09: 77 DEGREES
CALCULATED R AXIS, ECG10: 67 DEGREES
CALCULATED T AXIS, ECG11: 56 DEGREES
CHLORIDE SERPL-SCNC: 96 MMOL/L (ref 97–108)
CO2 SERPL-SCNC: 31 MMOL/L (ref 21–32)
CREAT SERPL-MCNC: 0.47 MG/DL (ref 0.55–1.02)
DIAGNOSIS, 93000: NORMAL
DIFFERENTIAL METHOD BLD: ABNORMAL
EOSINOPHIL # BLD: 0 K/UL (ref 0–0.4)
EOSINOPHIL NFR BLD: 0 % (ref 0–7)
ERYTHROCYTE [DISTWIDTH] IN BLOOD BY AUTOMATED COUNT: 14.6 % (ref 11.5–14.5)
GLOBULIN SER CALC-MCNC: 4 G/DL (ref 2–4)
GLUCOSE SERPL-MCNC: 155 MG/DL (ref 65–100)
HCT VFR BLD AUTO: 32.9 % (ref 35–47)
HGB BLD-MCNC: 11.1 G/DL (ref 11.5–16)
IMM GRANULOCYTES # BLD AUTO: 0 K/UL (ref 0–0.04)
IMM GRANULOCYTES NFR BLD AUTO: 0 % (ref 0–0.5)
LYMPHOCYTES # BLD: 0.5 K/UL (ref 0.8–3.5)
LYMPHOCYTES NFR BLD: 4 % (ref 12–49)
MCH RBC QN AUTO: 30.9 PG (ref 26–34)
MCHC RBC AUTO-ENTMCNC: 33.7 G/DL (ref 30–36.5)
MCV RBC AUTO: 91.6 FL (ref 80–99)
MONOCYTES # BLD: 0.5 K/UL (ref 0–1)
MONOCYTES NFR BLD: 4 % (ref 5–13)
NEUTS SEG # BLD: 10.6 K/UL (ref 1.8–8)
NEUTS SEG NFR BLD: 92 % (ref 32–75)
NRBC # BLD: 0 K/UL (ref 0–0.01)
NRBC BLD-RTO: 0 PER 100 WBC
P-R INTERVAL, ECG05: 150 MS
PLATELET # BLD AUTO: 143 K/UL (ref 150–400)
PMV BLD AUTO: 10.7 FL (ref 8.9–12.9)
POTASSIUM SERPL-SCNC: 3.8 MMOL/L (ref 3.5–5.1)
PROT SERPL-MCNC: 6.5 G/DL (ref 6.4–8.2)
Q-T INTERVAL, ECG07: 334 MS
QRS DURATION, ECG06: 72 MS
QTC CALCULATION (BEZET), ECG08: 410 MS
RBC # BLD AUTO: 3.59 M/UL (ref 3.8–5.2)
RBC MORPH BLD: ABNORMAL
SODIUM SERPL-SCNC: 134 MMOL/L (ref 136–145)
VENTRICULAR RATE, ECG03: 91 BPM
WBC # BLD AUTO: 11.6 K/UL (ref 3.6–11)

## 2022-12-27 PROCEDURE — 94761 N-INVAS EAR/PLS OXIMETRY MLT: CPT

## 2022-12-27 PROCEDURE — 74011250636 HC RX REV CODE- 250/636: Performed by: INTERNAL MEDICINE

## 2022-12-27 PROCEDURE — 80053 COMPREHEN METABOLIC PANEL: CPT

## 2022-12-27 PROCEDURE — 36415 COLL VENOUS BLD VENIPUNCTURE: CPT

## 2022-12-27 PROCEDURE — 74011000250 HC RX REV CODE- 250: Performed by: INTERNAL MEDICINE

## 2022-12-27 PROCEDURE — 94640 AIRWAY INHALATION TREATMENT: CPT

## 2022-12-27 PROCEDURE — 77010033678 HC OXYGEN DAILY

## 2022-12-27 PROCEDURE — 74011000258 HC RX REV CODE- 258: Performed by: INTERNAL MEDICINE

## 2022-12-27 PROCEDURE — 85025 COMPLETE CBC W/AUTO DIFF WBC: CPT

## 2022-12-27 PROCEDURE — 74011000250 HC RX REV CODE- 250: Performed by: GENERAL ACUTE CARE HOSPITAL

## 2022-12-27 PROCEDURE — 65270000029 HC RM PRIVATE

## 2022-12-27 PROCEDURE — 74011250637 HC RX REV CODE- 250/637: Performed by: PHYSICIAN ASSISTANT

## 2022-12-27 PROCEDURE — 74011250637 HC RX REV CODE- 250/637: Performed by: INTERNAL MEDICINE

## 2022-12-27 PROCEDURE — 74011250637 HC RX REV CODE- 250/637: Performed by: GENERAL ACUTE CARE HOSPITAL

## 2022-12-27 RX ORDER — BALSAM PERU/CASTOR OIL
OINTMENT (GRAM) TOPICAL 2 TIMES DAILY
Status: DISCONTINUED | OUTPATIENT
Start: 2022-12-27 | End: 2022-12-29 | Stop reason: HOSPADM

## 2022-12-27 RX ORDER — LANOLIN ALCOHOL/MO/W.PET/CERES
3 CREAM (GRAM) TOPICAL
Status: DISCONTINUED | OUTPATIENT
Start: 2022-12-27 | End: 2022-12-29 | Stop reason: HOSPADM

## 2022-12-27 RX ADMIN — GUAIFENESIN 600 MG: 600 TABLET, EXTENDED RELEASE ORAL at 10:26

## 2022-12-27 RX ADMIN — APIXABAN 5 MG: 5 TABLET, FILM COATED ORAL at 10:26

## 2022-12-27 RX ADMIN — METHYLPREDNISOLONE SODIUM SUCCINATE 40 MG: 40 INJECTION, POWDER, FOR SOLUTION INTRAMUSCULAR; INTRAVENOUS at 19:15

## 2022-12-27 RX ADMIN — PREDNISOLONE ACETATE 1 DROP: 10 SUSPENSION/ DROPS OPHTHALMIC at 13:37

## 2022-12-27 RX ADMIN — PREDNISOLONE ACETATE 1 DROP: 10 SUSPENSION/ DROPS OPHTHALMIC at 10:48

## 2022-12-27 RX ADMIN — METRONIDAZOLE 500 MG: 500 INJECTION, SOLUTION INTRAVENOUS at 20:56

## 2022-12-27 RX ADMIN — METRONIDAZOLE 500 MG: 500 INJECTION, SOLUTION INTRAVENOUS at 01:07

## 2022-12-27 RX ADMIN — GUAIFENESIN 600 MG: 600 TABLET, EXTENDED RELEASE ORAL at 20:56

## 2022-12-27 RX ADMIN — BUDESONIDE 500 MCG: 0.5 INHALANT RESPIRATORY (INHALATION) at 08:36

## 2022-12-27 RX ADMIN — CARVEDILOL 12.5 MG: 12.5 TABLET, FILM COATED ORAL at 10:26

## 2022-12-27 RX ADMIN — ATORVASTATIN CALCIUM 20 MG: 20 TABLET, FILM COATED ORAL at 23:50

## 2022-12-27 RX ADMIN — ALBUTEROL SULFATE 2.5 MG: 2.5 SOLUTION RESPIRATORY (INHALATION) at 20:27

## 2022-12-27 RX ADMIN — SODIUM CHLORIDE, PRESERVATIVE FREE 10 ML: 5 INJECTION INTRAVENOUS at 13:42

## 2022-12-27 RX ADMIN — SODIUM CHLORIDE, PRESERVATIVE FREE 10 ML: 5 INJECTION INTRAVENOUS at 23:57

## 2022-12-27 RX ADMIN — METHYLPREDNISOLONE SODIUM SUCCINATE 40 MG: 40 INJECTION, POWDER, FOR SOLUTION INTRAMUSCULAR; INTRAVENOUS at 06:12

## 2022-12-27 RX ADMIN — FUROSEMIDE 20 MG: 10 INJECTION, SOLUTION INTRAMUSCULAR; INTRAVENOUS at 10:25

## 2022-12-27 RX ADMIN — ALBUTEROL SULFATE 2.5 MG: 2.5 SOLUTION RESPIRATORY (INHALATION) at 08:36

## 2022-12-27 RX ADMIN — METRONIDAZOLE 500 MG: 500 INJECTION, SOLUTION INTRAVENOUS at 10:25

## 2022-12-27 RX ADMIN — BUDESONIDE 500 MCG: 0.5 INHALANT RESPIRATORY (INHALATION) at 20:27

## 2022-12-27 RX ADMIN — ALBUTEROL SULFATE 2.5 MG: 2.5 SOLUTION RESPIRATORY (INHALATION) at 14:32

## 2022-12-27 RX ADMIN — METHYLPREDNISOLONE SODIUM SUCCINATE 40 MG: 40 INJECTION, POWDER, FOR SOLUTION INTRAMUSCULAR; INTRAVENOUS at 01:07

## 2022-12-27 RX ADMIN — APIXABAN 5 MG: 5 TABLET, FILM COATED ORAL at 19:15

## 2022-12-27 RX ADMIN — PREDNISOLONE ACETATE 1 DROP: 10 SUSPENSION/ DROPS OPHTHALMIC at 18:00

## 2022-12-27 RX ADMIN — METHYLPREDNISOLONE SODIUM SUCCINATE 40 MG: 40 INJECTION, POWDER, FOR SOLUTION INTRAMUSCULAR; INTRAVENOUS at 23:50

## 2022-12-27 RX ADMIN — AZITHROMYCIN MONOHYDRATE 500 MG: 500 INJECTION, POWDER, LYOPHILIZED, FOR SOLUTION INTRAVENOUS at 19:15

## 2022-12-27 RX ADMIN — CARVEDILOL 12.5 MG: 12.5 TABLET, FILM COATED ORAL at 19:15

## 2022-12-27 RX ADMIN — MELATONIN 3 MG: at 23:50

## 2022-12-27 RX ADMIN — PREDNISOLONE ACETATE 1 DROP: 10 SUSPENSION/ DROPS OPHTHALMIC at 23:52

## 2022-12-27 RX ADMIN — METHYLPREDNISOLONE SODIUM SUCCINATE 40 MG: 40 INJECTION, POWDER, FOR SOLUTION INTRAMUSCULAR; INTRAVENOUS at 13:35

## 2022-12-27 RX ADMIN — SODIUM CHLORIDE 1 G: 900 INJECTION INTRAVENOUS at 20:56

## 2022-12-27 NOTE — PROGRESS NOTES
Hospitalist Progress Note    NAME: Rosalie Berrios   :  1940   MRN:  [id number]       Assessment / Plan:  Acute on chronic hypoxic respiratory failure  Community-acquired pneumonia  Acute COPD exacerbation  Coronavirus OC43 infection, POA  Worrisome Left upper lobe nodule  She is on 4 L at baseline but currently requiring 8 L high flow nasal cannula and saturating at about 94%  Start ceftriaxone and Zithromax. Start Solu-Medrol. Start DuoNeb. Add Pulmicort   Viral resp panel showed Coronavirus OC43  Consult speech therapy consult  Procal neg  Chest x-ray shows left basilar consolidation  Dry CT chest showed New worrisome 15 mm left upper lobe nodule,  Dependent airspace disease in the left lower lobe may represent atelectasis  or infection, Severe bullous emphysema. F/up with Pulm as outpt for Bx, does not look like a good candidate for Bx but can have PET scan  If not improving then call Pulm consult     Elevated BNP, rule out diastolic congestive heart failure  History of TSaccatsobo cardiomyopathy  -No previous history of congestive heart failure proBNP is elevated at 1250  -We will check 2D echocardiogram  -Strict I's and O's, daily weights and low-salt diet  -Start Lasix 20 mg IV daily    Atrial fibrillation  Hypertension  Dyslipidemia  -Continue home Coreg, Eliquis, Lipitor      Code Status: Full code  Surrogate Decision Maker:  DVT Prophylaxis: Eliquis  GI Prophylaxis: not indicated  Baseline: From home, on 4 L at all times  Recommended Disposition: Home w/Family  Anticipated Discharge Date:  48 hours   Barriers: clinical improvement, get back to baseline 4 L/M, currently 6-8 L/M       Subjective:     Discussed with RN events overnight.    Cough  Sob  On 8 L/M  No CP  No leg swelling     Review of Systems:  Symptom Y/N Comments  Symptom Y/N Comments   Fever/Chills    Chest Pain     Poor Appetite Edema     Cough    Abdominal Pain     Sputum    Joint Pain     SOB/BURKETT    Pruritis/Rash     Nausea/vomit    Tolerating PT/OT     Diarrhea    Tolerating Diet     Constipation    Other       PO intake: No data found. Wt Readings from Last 10 Encounters:   12/25/22 65.8 kg (145 lb)   09/16/21 63.3 kg (139 lb 9.6 oz)   08/03/21 63 kg (138 lb 12.8 oz)   03/11/21 63.8 kg (140 lb 11.2 oz)   03/02/21 64.7 kg (142 lb 9.6 oz)   08/27/20 63.5 kg (140 lb 1.6 oz)   08/25/20 63.2 kg (139 lb 6.4 oz)   03/03/20 64.3 kg (141 lb 12.8 oz)   02/14/20 64.4 kg (142 lb)   12/20/19 65.3 kg (144 lb)       Objective:     VITALS:   Last 24hrs VS reviewed since prior progress note. Most recent are:  Patient Vitals for the past 24 hrs:   Temp Pulse Resp BP SpO2   12/27/22 1532 98.2 °F (36.8 °C) 74 19 (!) 153/72 92 %   12/27/22 1427 -- -- -- -- 95 %   12/27/22 0901 98.4 °F (36.9 °C) 78 19 (!) 163/78 97 %   12/27/22 0839 -- -- -- -- 97 %   12/27/22 0700 98.4 °F (36.9 °C) 75 19 (!) 159/74 95 %   12/27/22 0416 -- -- -- -- 94 %   12/27/22 0415 98.1 °F (36.7 °C) 75 23 (!) 154/78 92 %   12/27/22 0304 -- 76 17 (!) 165/84 97 %   12/27/22 0108 98.8 °F (37.1 °C) 77 24 (!) 144/70 95 %   12/26/22 2034 97.7 °F (36.5 °C) -- -- -- --   12/26/22 2021 -- -- -- -- 94 %   12/26/22 2000 97.7 °F (36.5 °C) 75 24 131/83 92 %   12/26/22 1850 -- -- -- -- 94 %   12/26/22 1812 -- -- -- -- 94 %       No intake or output data in the 24 hours ending 12/27/22 1805       I had a face to face encounter, and independently examined this patient as outlined below:    PHYSICAL EXAM:  General:    No distress     HEENT: Atraumatic, anicteric sclerae, pink conjunctivae, MMM  Neck:  Supple, symmetrical  Lungs:   CTA with limited air entry b/l. No Wheezing/Rhonchi. No rales. No tenderness. No Accessory muscle use. CVS:   Regular rhythm. No murmur. No JVD   GI/:   Soft. NT. ND. BS normal  Extremities: No edema. No cyanosis. No clubbing. Skin:     Not pale. Not Jaundiced.  No rashes   Psych:  Good insight. Not depressed. Not anxious or agitated. Neurologic: Alert and oriented X 4. EOMs intact. No facial asymmetry. No slurred speech. Symmetrical strength, Sensation grossly intact. Labs     I reviewed today's most current labs and imaging studies. Pertinent labs include:  Recent Labs     12/27/22 0418 12/26/22 0414 12/25/22  1617   WBC 11.6* 12.3* 10.5   HGB 11.1* 11.1* 12.7   HCT 32.9* 33.1* 38.1   * 153 176       Recent Labs     12/27/22 0418 12/26/22 0414 12/25/22  1617   * 130* 130*   K 3.8 4.7 3.9   CL 96* 100 96*   CO2 31 28 32   * 127* 123*   BUN 13 9 10   CREA 0.47* 0.48* 0.62   CA 9.1 8.6 9.4   ALB 2.5*  --  3.3*   TBILI 0.3  --  0.4   ALT 29  --  43       XR CHEST PA LAT    Result Date: 12/25/2022  Patchy consolidation in the left lung base is concerning for pneumonia versus aspiration. CT CHEST WO CONT    Result Date: 12/26/2022  1. New worrisome 15 mm left upper lobe nodule. 2.  Dependent airspace disease in the left lower lobe may represent atelectasis or infection. 3.  Severe bullous emphysema. 23X     CT CHEST WO CONT    Result Date: 12/26/2022  1. New worrisome 15 mm left upper lobe nodule. 2.  Dependent airspace disease in the left lower lobe may represent atelectasis or infection. 3.  Severe bullous emphysema. 23X    10/22/19    ECHO ADULT COMPLETE 10/25/2019 10/25/2019    Interpretation Summary  · Left Ventricle: Normal cavity size, wall thickness and systolic function (ejection fraction normal). Estimated left ventricular ejection fraction is 61 - 65%. No regional wall motion abnormality noted. Mild (grade 1) left ventricular diastolic dysfunction. · Interatrial Septum: Color flow Doppler was used. · Tricuspid Valve: Mild tricuspid valve regurgitation is present. · Pulmonary Artery: Mild pulmonary hypertension.     Signed by: Gael Ryan MD on 10/25/2019 10:57 AM     All Micro Results       Procedure Component Value Units Date/Time CULTURE, BLOOD, PAIRED [[id number]] Collected: 12/25/22 1742    Order Status: Completed Specimen: Blood Updated: 12/27/22 0751     Special Requests: NO SPECIAL REQUESTS        Culture result: NO GROWTH 2 DAYS       RESPIRATORY VIRUS PANEL W/COVID-19, PCR [[id number]]  (Abnormal) Collected: 12/26/22 1840    Order Status: Completed Specimen: Nasopharyngeal Updated: 12/26/22 2335     Adenovirus Not detected        Coronavirus 229E Not detected        Coronavirus HKU1 Not detected        Coronavirus CVNL63 Not detected        Coronavirus OC43 Detected        SARS-CoV-2, PCR Not detected        Metapneumovirus Not detected        Rhinovirus and Enterovirus Not detected        Influenza A Not detected        Influenza B Not detected        Parainfluenza 1 Not detected        Parainfluenza 2 Not detected        Parainfluenza 3 Not detected        Parainfluenza virus 4 Not detected        RSV by PCR Not detected        B. parapertussis, PCR Not detected        Bordetella pertussis - PCR Not detected        Chlamydophila pneumoniae DNA, QL, PCR Not detected        Mycoplasma pneumoniae DNA, QL, PCR Not detected       LEGIONELLA PNEUMOPHILA AG, URINE [[id number]] Collected: 12/26/22 1840    Order Status: Sent Specimen: Urine Updated: 12/26/22 1902    S. Denilson Kitten, UR/CSF [[id number]] Collected: 12/26/22 1840    Order Status: Sent Updated: 12/26/22 1902    CULTURE, RESPIRATORY/SPUTUM/BRONCH Arch Pickles STAIN [[id number]]     Order Status: Sent Specimen: Sputum     COVID-19 WITH INFLUENZA A/B [[id number]] Collected: 12/25/22 1845    Order Status: Completed Specimen: Nasopharyngeal Updated: 12/25/22 2321     SARS-CoV-2 by PCR Not detected        Comment: Not Detected results do not preclude SARS-CoV-2 infection and should not be used as the sole basis for patient management decisions. Results must be combined with clinical observations, patient history, and epidemiological information.         Influenza A by PCR Not detected Influenza B by PCR Not detected        Comment: Testing was performed using alexx Linnette SARS-CoV-2 and Influenza A/B nucleic acid assay. This test is a multiplex Real-Time Reverse Transcriptase Polymerase Chain Reaction (RT-PCR) based in vitro diagnostic test intended for the qualitative detection of nucleic acids from SARS-CoV-2, Influenza A, and Influenza B in nasopharyngeal and nasal swab specimens for use under the FDA's Emergency Use Authorization (EUA) only. Fact sheet for Patients: FindDrives.pl  Fact sheet for Healthcare Providers: FindDrives.pl         COVID-19 RAPID TEST [[id number]] Collected: 12/25/22 1714    Order Status: Completed Specimen: Nasopharyngeal Updated: 12/25/22 1812     Specimen source Nasopharyngeal        COVID-19 rapid test Not detected        Comment: Rapid Abbott ID Now       Rapid NAAT:  The specimen is NEGATIVE for SARS-CoV-2, the novel coronavirus associated with COVID-19. Negative results should be treated as presumptive and, if inconsistent with clinical signs and symptoms or necessary for patient management, should be tested with an alternative molecular assay. Negative results do not preclude SARS-CoV-2 infection and should not be used as the sole basis for patient management decisions. This test has been authorized by the FDA under an Emergency Use Authorization (EUA) for use by authorized laboratories.    Fact sheet for Healthcare Providers:  http://www.nick.joesph/  Fact sheet for Patients: http://www.dick-donita.joesph/       Methodology: Isothermal Nucleic Acid Amplification                   Current Medications:     Current Facility-Administered Medications:     balsam peru-castor oiL (VENELEX) ointment, , Topical, BID, Alisson Banda MD    prednisoLONE acetate (PRED FORTE) 1 % ophthalmic suspension 1 Drop, 1 Drop, Both Eyes, QID, Alisson Banda MD, 1 Drop at 12/27/22 1337    guaiFENesin ER (MUCINEX) tablet 600 mg, 600 mg, Oral, Q12H, Carmina Banda MD, 600 mg at 12/27/22 1026    budesonide (PULMICORT) 500 mcg/2 ml nebulizer suspension, 500 mcg, Nebulization, BID RT, Carmina Banda MD, 500 mcg at 12/27/22 0836    apixaban (ELIQUIS) tablet 5 mg, 5 mg, Oral, BID, Ruben Howe MD, 5 mg at 12/27/22 1026    aspirin delayed-release tablet 81 mg, 81 mg, Oral, DAILY, Arvind Langston MD, 81 mg at 12/26/22 0943    atorvastatin (LIPITOR) tablet 20 mg, 20 mg, Oral, DAILY, Ruben Miller MD    carvediloL (COREG) tablet 12.5 mg, 12.5 mg, Oral, BID WITH MEALS, Marcelino Howe MD, 12.5 mg at 12/27/22 1026    sodium chloride (NS) flush 5-40 mL, 5-40 mL, IntraVENous, Q8H, Marcelino Miller MD, 10 mL at 12/27/22 1342    sodium chloride (NS) flush 5-40 mL, 5-40 mL, IntraVENous, PRN, Ruben Howe MD    acetaminophen (TYLENOL) tablet 650 mg, 650 mg, Oral, Q6H PRN **OR** acetaminophen (TYLENOL) suppository 650 mg, 650 mg, Rectal, Q6H PRN, Ruben Howe MD    polyethylene glycol (MIRALAX) packet 17 g, 17 g, Oral, DAILY PRN, Marcelino Miller MD    ondansetron (ZOFRAN ODT) tablet 4 mg, 4 mg, Oral, Q8H PRN **OR** ondansetron (ZOFRAN) injection 4 mg, 4 mg, IntraVENous, Q6H PRN, Ruben Howe MD    furosemide (LASIX) injection 20 mg, 20 mg, IntraVENous, DAILY, Marcelino Miller MD, 20 mg at 12/27/22 1025    azithromycin (ZITHROMAX) 500 mg in 0.9% sodium chloride 250 mL ([id number]), 500 mg, IntraVENous, Q24H, Marcelino Miller MD, Last Rate: 250 mL/hr at 12/26/22 1745, 500 mg at 12/26/22 1745    cefTRIAXone (ROCEPHIN) 1 g in 0.9% sodium chloride (MBP/ADV) 50 mL MBP, 1 g, IntraVENous, Q24H, Marcelino Miller MD, Last Rate: 100 mL/hr at 12/26/22 1854, 1 g at 12/26/22 1854    methylPREDNISolone (PF) (SOLU-MEDROL) injection 40 mg, 40 mg, IntraVENous, Q6H, Marcelino Miller MD, 40 mg at 12/27/22 1335    albuterol (PROVENTIL VENTOLIN) nebulizer solution 2.5 mg, 2.5 mg, Nebulization, Q6HWA RT, Maria Arriola MD, 2.5 mg at 12/27/22 1432    metroNIDAZOLE (FLAGYL) IVPB premix 500 mg, 500 mg, IntraVENous, Q8H, Marcelino Miller MD, Last Rate: 100 mL/hr at 12/27/22 1025, 500 mg at 12/27/22 1025     Procedures: see electronic medical records for all procedures/Xrays and details which were not copied into this note but were reviewed prior to creation of Plan. Reviewed most current lab test results and cultures  YES  Reviewed most current radiology test results   YES  Review and summation of old records today    NO  Reviewed patient's current orders and MAR    YES  PMH/ reviewed - no change compared to H&P  ________________________________________________________________________  Care Plan discussed with:    Comments   Patient x    Family  x    RN x    Care Manager     Consultant                        Multidiciplinary team rounds were held today with , nursing, pharmacist and clinical coordinator. Patient's plan of care was discussed; medications were reviewed and discharge planning was addressed.      ________________________________________________________________________  Total NON critical care TIME:  40   Minutes    Total CRITICAL CARE TIME Spent:   Minutes non procedure based      Comments   >50% of visit spent in counseling and coordination of care x     This includes time during multidisciplinary rounds if indicated above   ________________________________________________________________________  Mickie Doss MD

## 2022-12-27 NOTE — PROGRESS NOTES
Spiritual Care Partner Volunteer visited patient at [address] in [facility] MED TELE on 12/27/2022   Documented by:  Luis Miguel martins : [phone]  [phone])

## 2022-12-27 NOTE — ED NOTES
Verbal shift change report given to Jamin Hein RN (oncoming nurse) by Damaris Muro RN (offgoing nurse). Report included the following information SBAR, Kardex, ED Summary, MAR, Recent Results, and Med Rec Status.

## 2022-12-27 NOTE — ED NOTES
Patient is being transferred to [address], Room # 2184. Report given to HERNANDO Vazquez on Nataliia Godfrey for routine progression of care. Report consisted of the following information SBAR, Kardex, ED Summary, and MAR. Patient transferred to receiving unit by: Transport (RN or tech name). Outstanding consults needed: No     Next labs due: No     The following personal items will be sent with the patient during transfer to the floor:     All valuables:    Cardiac monitoring ordered: Yes    The following CURRENT information was reported to the receiving RN:    Code status: Full Code at time of transfer    Last set of vital signs:  Vital Signs  Level of Consciousness: Alert (0) (12/27/22 0415)  Temp: 98.1 °F (36.7 °C) (12/27/22 0415)  Temp Source: Oral (12/27/22 0415)  Pulse (Heart Rate): 75 (12/27/22 0700)  Heart Rate Source: Monitor (12/27/22 0415)  Cardiac Rhythm: Atrial Paced (12/27/22 0420)  Resp Rate: 19 (12/27/22 0700)  BP: (!) 159/74 (12/27/22 0700)  MAP (Monitor): 98 (12/27/22 0700)  MAP (Calculated): 102 (12/27/22 0700)  BP 1 Location: Right upper arm (12/27/22 0108)  BP Patient Position: Semi fowlers (12/27/22 0108)  MEWS Score: 2 (12/27/22 0415)         Oxygen Therapy  O2 Sat (%): 95 % (12/27/22 0700)  Pulse via Oximetry: 74 beats per minute (12/27/22 0700)  O2 Device: Nasal cannula (12/27/22 0416)  O2 Flow Rate (L/min): 6 l/min (12/27/22 0415)  FIO2 (%): 40 % (12/26/22 2021)      Last pain assessment:  Pain 1  Pain Scale 1: Visual  Pain Intensity 1: 0  Patient Stated Pain Goal: 0      Wounds: No     Urinary catheter: voiding and external catheter  Is there a oneal order: No     LDAs:       Peripheral IV 12/25/22 Left Antecubital (Active)   Site Assessment Clean, dry, & intact 12/26/22 0229   Phlebitis Assessment 0 12/26/22 0229   Infiltration Assessment 0 12/26/22 0229   Dressing Status Clean, dry, & intact 12/26/22 0229   Dressing Type Transparent 12/26/22 0229   Hub Color/Line Status Pink 12/26/22 Amy for questions and clarification was provided.     Reva Denver, RN

## 2022-12-27 NOTE — PROGRESS NOTES
Speech pathology  Orders received from yesterday evenin and note SLP order was a duplicate. SLP evaluated patient yesterday morning and noted functional oropharyngeal swallow based on clinical exam. No follow up was recommended. Will complete orders.  Zora Cee M.S. CCC-SLP

## 2022-12-27 NOTE — ED NOTES
RN consulted respiratory, pt is now on 5L nasal cannula and at 95%. RN attempted to call nursing supervisor.

## 2022-12-28 LAB
ANION GAP SERPL CALC-SCNC: 3 MMOL/L (ref 5–15)
BUN SERPL-MCNC: 18 MG/DL (ref 6–20)
BUN/CREAT SERPL: 32 (ref 12–20)
CALCIUM SERPL-MCNC: 9.4 MG/DL (ref 8.5–10.1)
CHLORIDE SERPL-SCNC: 97 MMOL/L (ref 97–108)
CO2 SERPL-SCNC: 33 MMOL/L (ref 21–32)
CREAT SERPL-MCNC: 0.57 MG/DL (ref 0.55–1.02)
FLUID CULTURE, SPNG2: NORMAL
GLUCOSE SERPL-MCNC: 145 MG/DL (ref 65–100)
L PNEUMO1 AG UR QL IA: NEGATIVE
ORGANISM ID, SPNG3: NORMAL
PLEASE NOTE, SPNG4: NORMAL
POTASSIUM SERPL-SCNC: 4.1 MMOL/L (ref 3.5–5.1)
S PNEUM AG SPEC QL LA: NEGATIVE
SODIUM SERPL-SCNC: 133 MMOL/L (ref 136–145)
SPECIMEN SOURCE: NORMAL
SPECIMEN SOURCE: NORMAL
SPECIMEN, SPNG1: NORMAL

## 2022-12-28 PROCEDURE — 74011000258 HC RX REV CODE- 258: Performed by: GENERAL ACUTE CARE HOSPITAL

## 2022-12-28 PROCEDURE — 74011250637 HC RX REV CODE- 250/637: Performed by: GENERAL ACUTE CARE HOSPITAL

## 2022-12-28 PROCEDURE — 80048 BASIC METABOLIC PNL TOTAL CA: CPT

## 2022-12-28 PROCEDURE — 65270000029 HC RM PRIVATE

## 2022-12-28 PROCEDURE — 74011250636 HC RX REV CODE- 250/636: Performed by: GENERAL ACUTE CARE HOSPITAL

## 2022-12-28 PROCEDURE — 94761 N-INVAS EAR/PLS OXIMETRY MLT: CPT

## 2022-12-28 PROCEDURE — 94640 AIRWAY INHALATION TREATMENT: CPT

## 2022-12-28 PROCEDURE — 77010033678 HC OXYGEN DAILY

## 2022-12-28 PROCEDURE — 74011000250 HC RX REV CODE- 250: Performed by: GENERAL ACUTE CARE HOSPITAL

## 2022-12-28 PROCEDURE — 74011000250 HC RX REV CODE- 250: Performed by: INTERNAL MEDICINE

## 2022-12-28 PROCEDURE — 36415 COLL VENOUS BLD VENIPUNCTURE: CPT

## 2022-12-28 PROCEDURE — 74011250637 HC RX REV CODE- 250/637: Performed by: INTERNAL MEDICINE

## 2022-12-28 PROCEDURE — 74011250636 HC RX REV CODE- 250/636: Performed by: INTERNAL MEDICINE

## 2022-12-28 RX ORDER — IPRATROPIUM BROMIDE AND ALBUTEROL SULFATE 2.5; .5 MG/3ML; MG/3ML
3 SOLUTION RESPIRATORY (INHALATION)
Status: DISCONTINUED | OUTPATIENT
Start: 2022-12-28 | End: 2022-12-29 | Stop reason: HOSPADM

## 2022-12-28 RX ORDER — ALBUTEROL SULFATE 0.83 MG/ML
2.5 SOLUTION RESPIRATORY (INHALATION)
Status: DISCONTINUED | OUTPATIENT
Start: 2022-12-28 | End: 2022-12-29 | Stop reason: HOSPADM

## 2022-12-28 RX ADMIN — METHYLPREDNISOLONE SODIUM SUCCINATE 40 MG: 40 INJECTION, POWDER, FOR SOLUTION INTRAMUSCULAR; INTRAVENOUS at 18:57

## 2022-12-28 RX ADMIN — CARVEDILOL 12.5 MG: 12.5 TABLET, FILM COATED ORAL at 11:11

## 2022-12-28 RX ADMIN — CARVEDILOL 12.5 MG: 12.5 TABLET, FILM COATED ORAL at 17:43

## 2022-12-28 RX ADMIN — POLYETHYLENE GLYCOL 3350 17 G: 17 POWDER, FOR SOLUTION ORAL at 17:44

## 2022-12-28 RX ADMIN — CASTOR OIL AND BALSAM, PERU: 788; 87 OINTMENT TOPICAL at 11:13

## 2022-12-28 RX ADMIN — SODIUM CHLORIDE, PRESERVATIVE FREE 10 ML: 5 INJECTION INTRAVENOUS at 06:53

## 2022-12-28 RX ADMIN — GUAIFENESIN 600 MG: 600 TABLET, EXTENDED RELEASE ORAL at 20:10

## 2022-12-28 RX ADMIN — ALBUTEROL SULFATE 2.5 MG: 2.5 SOLUTION RESPIRATORY (INHALATION) at 13:55

## 2022-12-28 RX ADMIN — ATORVASTATIN CALCIUM 20 MG: 20 TABLET, FILM COATED ORAL at 20:20

## 2022-12-28 RX ADMIN — SODIUM CHLORIDE, PRESERVATIVE FREE 10 ML: 5 INJECTION INTRAVENOUS at 17:49

## 2022-12-28 RX ADMIN — APIXABAN 5 MG: 5 TABLET, FILM COATED ORAL at 11:11

## 2022-12-28 RX ADMIN — BUDESONIDE 500 MCG: 0.5 INHALANT RESPIRATORY (INHALATION) at 09:30

## 2022-12-28 RX ADMIN — SODIUM CHLORIDE, PRESERVATIVE FREE 10 ML: 5 INJECTION INTRAVENOUS at 20:20

## 2022-12-28 RX ADMIN — AZITHROMYCIN MONOHYDRATE 500 MG: 500 INJECTION, POWDER, LYOPHILIZED, FOR SOLUTION INTRAVENOUS at 17:43

## 2022-12-28 RX ADMIN — APIXABAN 5 MG: 5 TABLET, FILM COATED ORAL at 17:43

## 2022-12-28 RX ADMIN — IPRATROPIUM BROMIDE AND ALBUTEROL SULFATE 3 ML: 2.5; .5 SOLUTION RESPIRATORY (INHALATION) at 22:01

## 2022-12-28 RX ADMIN — FUROSEMIDE 20 MG: 10 INJECTION, SOLUTION INTRAMUSCULAR; INTRAVENOUS at 11:11

## 2022-12-28 RX ADMIN — METHYLPREDNISOLONE SODIUM SUCCINATE 40 MG: 40 INJECTION, POWDER, FOR SOLUTION INTRAMUSCULAR; INTRAVENOUS at 06:54

## 2022-12-28 RX ADMIN — SODIUM CHLORIDE 1 G: 900 INJECTION INTRAVENOUS at 20:10

## 2022-12-28 RX ADMIN — METHYLPREDNISOLONE SODIUM SUCCINATE 40 MG: 40 INJECTION, POWDER, FOR SOLUTION INTRAMUSCULAR; INTRAVENOUS at 11:12

## 2022-12-28 RX ADMIN — BUDESONIDE 500 MCG: 0.5 INHALANT RESPIRATORY (INHALATION) at 22:01

## 2022-12-28 RX ADMIN — GUAIFENESIN 600 MG: 600 TABLET, EXTENDED RELEASE ORAL at 11:11

## 2022-12-28 RX ADMIN — ALBUTEROL SULFATE 2.5 MG: 2.5 SOLUTION RESPIRATORY (INHALATION) at 09:30

## 2022-12-28 NOTE — PROGRESS NOTES
Spiritual Care Assessment/Progress Note  [facility]      NAME: Pierre Akhtar      MRN: [id number]  AGE: [de-identified] y.o. SEX: female  Rastafarian Affiliation: No Judaism   Language: English     12/28/2022     Total Time (in minutes): 17     Spiritual Assessment begun in [facility] 2 MED TELE through conversation with:         []Patient        [] Family    [] Friend(s)        Reason for Consult: Request by staff     Spiritual beliefs: (Please include comment if needed)     [] Identifies with a farida tradition:         [] Supported by a farida community:            [] Claims no spiritual orientation:           [] Seeking spiritual identity:                [] Adheres to an individual form of spirituality:           [x] Not able to assess:                           Identified resources for coping:      [] Prayer                               [] Music                  [] Guided Imagery     [] Family/friends                 [] Pet visits     [] Devotional reading                         [x] Unknown     [] Other:                                                Interventions offered during this visit: (See comments for more details)    Patient Interventions: Initial visit           Plan of Care:     [x] Support spiritual and/or cultural needs    [] Support AMD and/or advance care planning process      [] Support grieving process   [] Coordinate Rites and/or Rituals    [] Coordination with community clergy   [] No spiritual needs identified at this time   [] Detailed Plan of Care below (See Comments)  [] Make referral to Music Therapy  [] Make referral to Pet Therapy     [] Make referral to Addiction services  [] Make referral to [facility]  [] Make referral to Spiritual Care Partner  [] No future visits requested        [x] Contact Spiritual Care for further referrals       Comments: Received spiritual care order for patient indicating patient was code status to be DNR.   As this  approached room, her physician exited room. Explained to him chaplains received the request for DNR; he indicated he would call her and have that discussion with her.  did not visit as patient is on droplet precautions; unable to assess for spiritual needs or concerns at this time.        MARY Maddox, [facility], Staff [address] [place] [address] Avenue    [address] [place] [address] Road Paging Service  [phone])

## 2022-12-28 NOTE — PROGRESS NOTES
Problem: Pressure Injury - Risk of  Goal: *Prevention of pressure injury  Description: Document Garth Scale and appropriate interventions in the flowsheet. Outcome: Not Met  Note: Pressure Injury Interventions: Activity Interventions: PT/OT evaluation    Mobility Interventions: Float heels    Nutrition Interventions: Document food/fluid/supplement intake                     Problem: Risk for Spread of Infection  Goal: Prevent transmission of infectious organism to others  Description: Prevent the transmission of infectious organisms to other patients, staff members, and visitors.   Outcome: Not Met [facility]    Progress Note    Nadeem Ramirez Patient Status:  Inpatient    1941 MRN [id number]   Location [facility] 3W/SW Attending Jeri Rowan MD   [address] Day # 9 PCP Teja Winter.  MD Blanca       Subjective:   Jes Houston sodium  100 mg Oral BID   • MethylPREDNISolone Sodium Succ  125 mg Intravenous Once   • ferrous sulfate  325 mg Oral Daily with breakfast   • finasteride  5 mg Oral Daily   • Hydrocortisone  1 Application Rectal BID   • Pantoprazole Sodium  40 mg Oral QAM IV Solu-Medrol   - Rheumatology following      HTN  -improved         Plan  Ideally close F/u in chf clinic regularly beginning with 1 week. Awaiting rehab. Will sign off. Kole Armas MD  [facility].   Interventional Cardi

## 2022-12-28 NOTE — PROGRESS NOTES
Comprehensive Nutrition Assessment    Type and Reason for Visit: Initial, Positive nutrition screen    Nutrition Recommendations/Plan:   Continue cardiac diet  Add ensure plus BID      Nutrition Assessment:    Patient medically noted for pneumonia, COPD exacerbation, and acute on chronic respiratory failure. PMH HTN. Patient reports a fair appetite normally; doesn't eat a lot at baseline but current appetite is worse. No significant weight changes recently. She drinks boost at home and is agreeable to ensure plus while inpatient. Provided patient with a menu to help with meal selections/preferences. Encouraged intake of meals/ONS. Malnutrition Assessment:  Malnutrition Status:  No malnutrition (12/28/22 1343)      Nutrition Related Findings:    Na 133, [phone]    BM 12/25   Atorvastatin, Coreg, Lasix, Solu-medrol   Wound Type: None    Current Nutrition Intake & Therapies:  Average Meal Intake: 26-50%  Average Supplement Intake: None ordered  ADULT DIET Regular; Low Fat/Low Chol/High Fiber/KEARA  DIET ONE TIME MESSAGE    Anthropometric Measures:  Height: 5' 11\" (180.3 cm)  Ideal Body Weight (IBW): 155 lbs (70 kg)     Current Body Wt:  65.8 kg (145 lb 1 oz), 93.6 % IBW. Current BMI (kg/m2): 20.2  Usual Body Weight: 67.1 kg (148 lb)  % Weight Change (Calculated): -2                    BMI Category: Normal weight (BMI 18.5-24. 9)    Estimated Daily Nutrient Needs:  Energy Requirements Based On: Formula  Weight Used for Energy Requirements: Current  Energy (kcal/day): 1591 kcals (BMR x 1. 3AF)  Weight Used for Protein Requirements: Current  Protein (g/day): 66-79g (1.0-1.2 g/kg bw)  Method Used for Fluid Requirements: 1 ml/kcal  Fluid (ml/day): 1600 mL    Nutrition Diagnosis:   Inadequate protein-energy intake related to impaired respiratory function (decreased appetite) as evidenced by intake 26-50%    Nutrition Interventions:   Food and/or Nutrient Delivery: Continue current diet, Start oral nutrition supplement  Nutrition Education/Counseling: No recommendations at this time  Coordination of Nutrition Care: Continue to monitor while inpatient       Goals:     Goals: PO intake 50% or greater, by next RD assessment       Nutrition Monitoring and Evaluation:   Behavioral-Environmental Outcomes: None identified  Food/Nutrient Intake Outcomes: Food and nutrient intake, Supplement intake  Physical Signs/Symptoms Outcomes: Biochemical data, Weight, Hemodynamic status    Discharge Planning:    Continue current diet, Continue oral nutrition supplement    Erinn Maldonado RD  Contact: ext [phone]

## 2022-12-28 NOTE — PROGRESS NOTES
Hospitalist Progress Note    NAME: Jorge Luis Kate   :  1940   MRN:  [id number]       Assessment / Plan:  Acute on chronic hypoxic respiratory failure  Community-acquired pneumonia  Possible Acute COPD exacerbation, POA  Coronavirus OC43 infection, POA  Worrisome Left upper lobe nodule  She is on 4 L at baseline but currently requiring 8 L high flow nasal cannula and saturating at about 94%  Procal neg  Chest x-ray shows left basilar consolidation  CT showed: new worrisome 15 mm left upper lobe nodule, Dependent airspace disease in the left lower lobe may represent atelectasis or infection, severe bullous emphysema. Viral resp panel showed Coronavirus OC43  Consult speech therapy consulted, cont regular diet/thins  F/up with Pulm as outpt for Bx, does not look like a good candidate for Bx but can have PET scan  Start ceftriaxone and Zithromax X5 days  Taper down Solu-Medrol. Start DuoNeb and Pulmicort   On 4 L/M but does not feel at baseline, bed laying in bed since admission, awaiting PT OT    Elevated BNP, rule out diastolic congestive heart failure  History of TSaccatsobo cardiomyopathy  -No previous history of congestive heart failure proBNP is elevated at 1250  -Strict I's and O's, daily weights and low-salt diet  -hold Lasix, looks euvolemic   -We will check 2D echocardiogram    Atrial fibrillation  Hypertension  Dyslipidemia  -Continue home Coreg, Eliquis, Lipitor      Code Status: Full code  Surrogate Decision Maker:  DVT Prophylaxis: Eliquis  GI Prophylaxis: not indicated  Baseline: From home, on 4 L at all times  Recommended Disposition: Home w/Family  Anticipated Discharge Date:  24 hours   Barriers: clinical improvement, pt lives alone, needs PT OT prior to DC       Subjective:     Discussed with RN events overnight.    Cough  Sob  On 8 L/M  No CP  No leg swelling     Review of Systems:  Symptom Y/N Comments  Symptom Y/N Comments   Fever/Chills    Chest Pain     Poor Appetite    Edema     Cough    Abdominal Pain     Sputum    Joint Pain     SOB/BURKETT    Pruritis/Rash     Nausea/vomit    Tolerating PT/OT     Diarrhea    Tolerating Diet     Constipation    Other       PO intake: No data found. Wt Readings from Last 10 Encounters:   12/25/22 65.8 kg (145 lb)   09/16/21 63.3 kg (139 lb 9.6 oz)   08/03/21 63 kg (138 lb 12.8 oz)   03/11/21 63.8 kg (140 lb 11.2 oz)   03/02/21 64.7 kg (142 lb 9.6 oz)   08/27/20 63.5 kg (140 lb 1.6 oz)   08/25/20 63.2 kg (139 lb 6.4 oz)   03/03/20 64.3 kg (141 lb 12.8 oz)   02/14/20 64.4 kg (142 lb)   12/20/19 65.3 kg (144 lb)       Objective:     VITALS:   Last 24hrs VS reviewed since prior progress note. Most recent are:  Patient Vitals for the past 24 hrs:   Temp Pulse Resp BP SpO2   12/28/22 1517 99.7 °F (37.6 °C) 73 16 (!) 160/78 93 %   12/28/22 0930 -- -- -- -- 96 %   12/28/22 0851 98.2 °F (36.8 °C) 78 20 (!) 157/77 96 %   12/28/22 0332 98.1 °F (36.7 °C) 80 18 (!) 148/80 94 %   12/27/22 2332 98.4 °F (36.9 °C) 75 18 (!) 141/72 96 %   12/27/22 2027 -- -- -- -- 93 %   12/27/22 1920 -- 82 -- 135/83 92 %   12/27/22 1915 -- 81 -- -- --         Intake/Output Summary (Last 24 hours) at 12/28/2022 1722  Last data filed at 12/28/2022 1114  Gross per 24 hour   Intake --   Output 825 ml   Net -825 ml          I had a face to face encounter, and independently examined this patient as outlined below:    PHYSICAL EXAM:  General:    No distress     HEENT: Atraumatic, anicteric sclerae, pink conjunctivae, MMM  Neck:  Supple, symmetrical  Lungs:   CTA with limited air entry b/l. No Wheezing/Rhonchi. No rales. No tenderness. No Accessory muscle use. CVS:   Regular rhythm. No murmur. No JVD   GI/:   Soft. NT. ND. BS normal  Extremities: No edema. No cyanosis. No clubbing. Skin:     Not pale. Not Jaundiced. No rashes   Psych:  Good insight. Not depressed.  Not anxious or agitated. Neurologic: Alert and oriented X 4. EOMs intact. No facial asymmetry. No slurred speech. Symmetrical strength, Sensation grossly intact. Labs     I reviewed today's most current labs and imaging studies. Pertinent labs include:  Recent Labs     12/27/22 0418 12/26/22 0414   WBC 11.6* 12.3*   HGB 11.1* 11.1*   HCT 32.9* 33.1*   * 153       Recent Labs     12/28/22  0700 12/27/22 0418 12/26/22 0414   * 134* 130*   K 4.1 3.8 4.7   CL 97 96* 100   CO2 33* 31 28   * 155* 127*   BUN 18 13 9   CREA 0.57 0.47* 0.48*   CA 9.4 9.1 8.6   ALB  --  2.5*  --    TBILI  --  0.3  --    ALT  --  29  --        XR CHEST PA LAT    Result Date: 12/25/2022  Patchy consolidation in the left lung base is concerning for pneumonia versus aspiration. CT CHEST WO CONT    Result Date: 12/26/2022  1. New worrisome 15 mm left upper lobe nodule. 2.  Dependent airspace disease in the left lower lobe may represent atelectasis or infection. 3.  Severe bullous emphysema. 23X     No results found. 10/22/19    ECHO ADULT COMPLETE 10/25/2019 10/25/2019    Interpretation Summary  · Left Ventricle: Normal cavity size, wall thickness and systolic function (ejection fraction normal). Estimated left ventricular ejection fraction is 61 - 65%. No regional wall motion abnormality noted. Mild (grade 1) left ventricular diastolic dysfunction. · Interatrial Septum: Color flow Doppler was used. · Tricuspid Valve: Mild tricuspid valve regurgitation is present. · Pulmonary Artery: Mild pulmonary hypertension.     Signed by: Talia Yuan MD on 10/25/2019 10:57 AM     All Micro Results       Procedure Component Value Units Date/Time    LEGIONELLA PNEUMOPHILA AG, URINE [[id number]] Collected: 12/26/22 1840    Order Status: Completed Specimen: Urine Updated: 12/28/22 1420     Source URINE        L pneumophila S1 Ag, urine Negative        Comment: (NOTE)  Presumptive negative for L. pneumophila serogroup 1 antigen in urine,  suggesting no recent or current infection. Legionnaires' disease  cannot be ruled out since other serogroups and species may also  cause disease. Performed At: [facility] & [address] [id number]  Rosan Bence MD BM:[id number]         CULTURE, BLOOD, PAIRED [[id number]] Collected: 12/25/22 1742    Order Status: Completed Specimen: Blood Updated: 12/28/22 0732     Special Requests: NO SPECIAL REQUESTS        Culture result: NO GROWTH 3 DAYS       RESPIRATORY VIRUS PANEL W/COVID-19, PCR [[id number]]  (Abnormal) Collected: 12/26/22 1840    Order Status: Completed Specimen: Nasopharyngeal Updated: 12/26/22 2335     Adenovirus Not detected        Coronavirus 229E Not detected        Coronavirus HKU1 Not detected        Coronavirus CVNL63 Not detected        Coronavirus OC43 Detected        SARS-CoV-2, PCR Not detected        Metapneumovirus Not detected        Rhinovirus and Enterovirus Not detected        Influenza A Not detected        Influenza B Not detected        Parainfluenza 1 Not detected        Parainfluenza 2 Not detected        Parainfluenza 3 Not detected        Parainfluenza virus 4 Not detected        RSV by PCR Not detected        B. parapertussis, PCR Not detected        Bordetella pertussis - PCR Not detected        Chlamydophila pneumoniae DNA, QL, PCR Not detected        Mycoplasma pneumoniae DNA, QL, PCR Not detected       S. Zechariah Tilley, UR/CSF [[id number]] Collected: 12/26/22 1840    Order Status: Sent Updated: 12/26/22 1902    CULTURE, RESPIRATORY/SPUTUM/BRONCH Adela Kenisha STAIN [[id number]]     Order Status: Sent Specimen: Sputum     COVID-19 WITH INFLUENZA A/B [[id number]] Collected: 12/25/22 1845    Order Status: Completed Specimen: Nasopharyngeal Updated: 12/25/22 2321     SARS-CoV-2 by PCR Not detected        Comment: Not Detected results do not preclude SARS-CoV-2 infection and should not be used as the sole basis for patient management decisions. Results must be combined with clinical observations, patient history, and epidemiological information. Influenza A by PCR Not detected        Influenza B by PCR Not detected        Comment: Testing was performed using alexx Linnette SARS-CoV-2 and Influenza A/B nucleic acid assay. This test is a multiplex Real-Time Reverse Transcriptase Polymerase Chain Reaction (RT-PCR) based in vitro diagnostic test intended for the qualitative detection of nucleic acids from SARS-CoV-2, Influenza A, and Influenza B in nasopharyngeal and nasal swab specimens for use under the FDA's Emergency Use Authorization (EUA) only. Fact sheet for Patients: FindDrives.pl  Fact sheet for Healthcare Providers: FindDrives.pl         COVID-19 RAPID TEST [[id number]] Collected: 12/25/22 1715    Order Status: Completed Specimen: Nasopharyngeal Updated: 12/25/22 1812     Specimen source Nasopharyngeal        COVID-19 rapid test Not detected        Comment: Rapid Abbott ID Now       Rapid NAAT:  The specimen is NEGATIVE for SARS-CoV-2, the novel coronavirus associated with COVID-19. Negative results should be treated as presumptive and, if inconsistent with clinical signs and symptoms or necessary for patient management, should be tested with an alternative molecular assay. Negative results do not preclude SARS-CoV-2 infection and should not be used as the sole basis for patient management decisions. This test has been authorized by the FDA under an Emergency Use Authorization (EUA) for use by authorized laboratories.    Fact sheet for Healthcare Providers:  http://www.dick-donita.joesph/  Fact sheet for Patients: http://www.jennings-duckworth.biz/       Methodology: Isothermal Nucleic Acid Amplification                   Current Medications:     Current Facility-Administered Medications:     balsam peru-castor oiL (VENELEX) ointment, , Topical, BID, Carmina Banda, MD, Given at 12/28/22 1113    melatonin tablet 3 mg, 3 mg, Oral, QHS PRN, Sabine Coleman PA-C, 3 mg at 12/27/22 2350    prednisoLONE acetate (PRED FORTE) 1 % ophthalmic suspension 1 Drop, 1 Drop, Both Eyes, QID, Snow Banda MD, 1 Drop at 12/27/22 2352    guaiFENesin ER (MUCINEX) tablet 600 mg, 600 mg, Oral, Q12H, Carmina Banda MD, 600 mg at 12/28/22 1111    budesonide (PULMICORT) 500 mcg/2 ml nebulizer suspension, 500 mcg, Nebulization, BID RT, Carmina Banda MD, 500 mcg at 12/28/22 0930    apixaban (ELIQUIS) tablet 5 mg, 5 mg, Oral, BID, David Trinh MD, 5 mg at 12/28/22 1111    aspirin delayed-release tablet 81 mg, 81 mg, Oral, DAILY, David Trinh MD, 81 mg at 12/26/22 0943    atorvastatin (LIPITOR) tablet 20 mg, 20 mg, Oral, DAILY, Marcelino Peguero MD, 20 mg at 12/27/22 2350    carvediloL (COREG) tablet 12.5 mg, 12.5 mg, Oral, BID WITH MEALS, Marcelino Peguero MD, 12.5 mg at 12/28/22 1111    sodium chloride (NS) flush 5-40 mL, 5-40 mL, IntraVENous, Q8H, Marcelino Miller MD, 10 mL at 12/28/22 0653    sodium chloride (NS) flush 5-40 mL, 5-40 mL, IntraVENous, PRN, Karen Peguero MD    acetaminophen (TYLENOL) tablet 650 mg, 650 mg, Oral, Q6H PRN **OR** acetaminophen (TYLENOL) suppository 650 mg, 650 mg, Rectal, Q6H PRN, Karen Peguero MD    polyethylene glycol (MIRALAX) packet 17 g, 17 g, Oral, DAILY PRN, Marcelino Miller MD    ondansetron (ZOFRAN ODT) tablet 4 mg, 4 mg, Oral, Q8H PRN **OR** ondansetron (ZOFRAN) injection 4 mg, 4 mg, IntraVENous, Q6H PRN, Marcelino Miller MD    furosemide (LASIX) injection 20 mg, 20 mg, IntraVENous, DAILY, Marcelino Miller MD, 20 mg at 12/28/22 1111    azithromycin (ZITHROMAX) 500 mg in 0.9% sodium chloride 250 mL (Keev8Vaq), 500 mg, IntraVENous, Q24H, Marcelino Miller MD, Last Rate: 250 mL/hr at 12/27/22 1915, 500 mg at 12/27/22 1915    cefTRIAXone (ROCEPHIN) 1 g in 0.9% sodium chloride (MBP/ADV) 50 mL MBP, 1 g, IntraVENous, Q24H, Karen Miller MD, Last Rate: 100 mL/hr at 12/27/22 2056, 1 g at 12/27/22 2056    methylPREDNISolone (PF) (SOLU-MEDROL) injection 40 mg, 40 mg, IntraVENous, Q6H, Marcelino Miller MD, 40 mg at 12/28/22 1112    albuterol (PROVENTIL VENTOLIN) nebulizer solution 2.5 mg, 2.5 mg, Nebulization, Q6HWA RT, Marcelino Miller MD, 2.5 mg at 12/28/22 1355     Procedures: see electronic medical records for all procedures/Xrays and details which were not copied into this note but were reviewed prior to creation of Plan. Reviewed most current lab test results and cultures  YES  Reviewed most current radiology test results   YES  Review and summation of old records today    NO  Reviewed patient's current orders and MAR    YES  PMH/SH reviewed - no change compared to H&P  ________________________________________________________________________  Care Plan discussed with:    Comments   Patient x    Family      RN x    Care Manager     Consultant                        Multidiciplinary team rounds were held today with , nursing, pharmacist and clinical coordinator. Patient's plan of care was discussed; medications were reviewed and discharge planning was addressed.      ________________________________________________________________________  Total NON critical care TIME:  30   Minutes    Total CRITICAL CARE TIME Spent:   Minutes non procedure based      Comments   >50% of visit spent in counseling and coordination of care x     This includes time during multidisciplinary rounds if indicated above   ________________________________________________________________________  Floyce Hedge, MD

## 2022-12-29 ENCOUNTER — APPOINTMENT (OUTPATIENT)
Dept: NON INVASIVE DIAGNOSTICS | Age: 82
DRG: 177 | End: 2022-12-29
Attending: INTERNAL MEDICINE
Payer: MEDICARE

## 2022-12-29 VITALS
SYSTOLIC BLOOD PRESSURE: 164 MMHG | TEMPERATURE: 98.2 F | DIASTOLIC BLOOD PRESSURE: 81 MMHG | HEIGHT: 71 IN | RESPIRATION RATE: 18 BRPM | WEIGHT: 145 LBS | BODY MASS INDEX: 20.3 KG/M2 | OXYGEN SATURATION: 98 % | HEART RATE: 78 BPM

## 2022-12-29 LAB
M PNEUMO IGG SER IA-ACNC: 287 U/ML (ref 0–99)
M PNEUMO IGM SER IA-ACNC: <770 U/ML (ref 0–769)

## 2022-12-29 PROCEDURE — 97530 THERAPEUTIC ACTIVITIES: CPT

## 2022-12-29 PROCEDURE — 74011250637 HC RX REV CODE- 250/637: Performed by: INTERNAL MEDICINE

## 2022-12-29 PROCEDURE — 94761 N-INVAS EAR/PLS OXIMETRY MLT: CPT

## 2022-12-29 PROCEDURE — 74011250636 HC RX REV CODE- 250/636: Performed by: GENERAL ACUTE CARE HOSPITAL

## 2022-12-29 PROCEDURE — 74011250637 HC RX REV CODE- 250/637: Performed by: GENERAL ACUTE CARE HOSPITAL

## 2022-12-29 PROCEDURE — 74011000250 HC RX REV CODE- 250: Performed by: GENERAL ACUTE CARE HOSPITAL

## 2022-12-29 PROCEDURE — 94640 AIRWAY INHALATION TREATMENT: CPT

## 2022-12-29 PROCEDURE — 97116 GAIT TRAINING THERAPY: CPT

## 2022-12-29 PROCEDURE — 97535 SELF CARE MNGMENT TRAINING: CPT

## 2022-12-29 PROCEDURE — 74011000250 HC RX REV CODE- 250: Performed by: INTERNAL MEDICINE

## 2022-12-29 PROCEDURE — 77010033678 HC OXYGEN DAILY

## 2022-12-29 PROCEDURE — 97162 PT EVAL MOD COMPLEX 30 MIN: CPT

## 2022-12-29 PROCEDURE — 97165 OT EVAL LOW COMPLEX 30 MIN: CPT

## 2022-12-29 RX ORDER — ASPIRIN 81 MG/1
81 TABLET ORAL DAILY
Qty: 30 TABLET | Refills: 0 | Status: SHIPPED | OUTPATIENT
Start: 2022-12-30

## 2022-12-29 RX ORDER — ACETAMINOPHEN 325 MG/1
650 TABLET ORAL
Qty: 60 TABLET | Refills: 0 | Status: SHIPPED | OUTPATIENT
Start: 2022-12-29

## 2022-12-29 RX ORDER — METHYLPREDNISOLONE 4 MG/1
TABLET ORAL
Qty: 1 DOSE PACK | Refills: 0 | Status: SHIPPED | OUTPATIENT
Start: 2022-12-29

## 2022-12-29 RX ORDER — BALSAM PERU/CASTOR OIL
OINTMENT (GRAM) TOPICAL 2 TIMES DAILY
Qty: 28.35 G | Refills: 0 | Status: SHIPPED | OUTPATIENT
Start: 2022-12-29

## 2022-12-29 RX ORDER — AMOXICILLIN AND CLAVULANATE POTASSIUM 875; 125 MG/1; MG/1
1 TABLET, FILM COATED ORAL 2 TIMES DAILY
Qty: 14 TABLET | Refills: 0 | Status: SHIPPED | OUTPATIENT
Start: 2022-12-29

## 2022-12-29 RX ORDER — BUDESONIDE 0.5 MG/2ML
500 INHALANT ORAL 2 TIMES DAILY
Qty: 200 ML | Refills: 0 | Status: SHIPPED | OUTPATIENT
Start: 2022-12-29

## 2022-12-29 RX ADMIN — IPRATROPIUM BROMIDE AND ALBUTEROL SULFATE 3 ML: 2.5; .5 SOLUTION RESPIRATORY (INHALATION) at 08:11

## 2022-12-29 RX ADMIN — METHYLPREDNISOLONE SODIUM SUCCINATE 40 MG: 40 INJECTION, POWDER, FOR SOLUTION INTRAMUSCULAR; INTRAVENOUS at 12:28

## 2022-12-29 RX ADMIN — GUAIFENESIN 600 MG: 600 TABLET, EXTENDED RELEASE ORAL at 08:40

## 2022-12-29 RX ADMIN — IPRATROPIUM BROMIDE AND ALBUTEROL SULFATE 3 ML: 2.5; .5 SOLUTION RESPIRATORY (INHALATION) at 12:49

## 2022-12-29 RX ADMIN — APIXABAN 5 MG: 5 TABLET, FILM COATED ORAL at 08:40

## 2022-12-29 RX ADMIN — CARVEDILOL 12.5 MG: 12.5 TABLET, FILM COATED ORAL at 08:40

## 2022-12-29 RX ADMIN — SODIUM CHLORIDE, PRESERVATIVE FREE 10 ML: 5 INJECTION INTRAVENOUS at 04:40

## 2022-12-29 RX ADMIN — METHYLPREDNISOLONE SODIUM SUCCINATE 40 MG: 40 INJECTION, POWDER, FOR SOLUTION INTRAMUSCULAR; INTRAVENOUS at 04:40

## 2022-12-29 RX ADMIN — BUDESONIDE 500 MCG: 0.5 INHALANT RESPIRATORY (INHALATION) at 08:11

## 2022-12-29 NOTE — PROGRESS NOTES
[id number] - PCP hospital follow-up transitional care appointment has been scheduled with Halle Boo on 1/12/23 at 1330. Pending patient discharge. Samm Loomis Care Management Assistant     Attempted to schedule PCP hospital follow up appointment. Unable to reach anyone, left voicemail. Pending patient discharge.  Samm Loomis Care Management Assistant

## 2022-12-29 NOTE — PROGRESS NOTES
Problem: Mobility Impaired (Adult and Pediatric)  Goal: *Acute Goals and Plan of Care (Insert Text)  Description: FUNCTIONAL STATUS PRIOR TO ADMISSION: Patient was modified independent using a rollator for functional mobility. HOME SUPPORT PRIOR TO ADMISSION: The patient lived alone with her son close by to provide assistance. Her son cannot provide 24/7 assistance. Physical Therapy Goals  Initiated 12/29/2022  1. Patient will move from supine to sit and sit to supine , scoot up and down, and roll side to side in bed with independence within 7 day(s). 2.  Patient will transfer from bed to chair and chair to bed with modified independence using the least restrictive device within 7 day(s). 3.  Patient will perform sit to stand with modified independence within 7 day(s). 4.  Patient will ambulate with modified independence for 100 feet with the least restrictive device within 7 day(s). Outcome: Not Met   PHYSICAL THERAPY EVALUATION  Patient: Lucas Sargent (89 y.o. female)  Date: 12/29/2022  Primary Diagnosis: CAP (community acquired pneumonia) [J18.9]       Precautions:   Bed Alarm, Contact (respiratory)    ASSESSMENT  Based on the objective data described below, the patient presents with decreased strength, decreased functional mobility, impaired balance, unsteady gait, difficulty standing, and mild confusion following admission for pneumonia. Patient is functioning below her baseline. She has been admitted to hospital for 4 days and reports has not been out of the bed since admission. Patient required CGA for bed mobility. Patient required min A x 2 for sit <> stand with RW. She was able to stand with CGA from elevated surface. Patient ambulated in room with CGA and RW, requiring verbal cues for RW management. O2 sats remained >90% on 4LPM which is patient's baseline. Patient also appears slightly below her cognitive baseline, although oriented x 4.  Per OT, patient with difficulty getting up from the chair, requiring max A x 2 to stand from the low surface. Patient will likely need SNF rehab at discharge as she lives alone and son cannot provide 24/7 physical assistance. Current Level of Function Impacting Discharge (mobility/balance): min-max A for transfers with RW    Functional Outcome Measure: The patient scored 40/100 on the Barthel outcome measure which is indicative of partially dependent for ADLS. Other factors to consider for discharge: fall risk, lives alone, confused, needs physical assistance     Patient will benefit from skilled therapy intervention to address the above noted impairments. PLAN :  Recommendations and Planned Interventions: bed mobility training, transfer training, gait training, therapeutic exercises, patient and family training/education, and therapeutic activities      Frequency/Duration: Patient will be followed by physical therapy:  4 times a week to address goals. Recommendation for discharge: (in order for the patient to meet his/her long term goals)  Therapy up to 5 days/week in SNF setting    This discharge recommendation:  A follow-up discussion with the attending provider and/or case management is planned    IF patient discharges home will need the following DME: bedside commode and gait belt         SUBJECTIVE:   Patient stated I think you will need to lift me. 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 [employer]  11/30/2017         HX HERNIA REPAIR  11/29/2015    Incarcerated left femoral hernia repair,. HX PACEMAKER Left 2014    CO ABDOMEN SURGERY PROC UNLISTED      inguinal hernia repair right       Personal factors and/or comorbidities impacting plan of care: fall risk, lives alone, flu    Home Situation  Home Environment: Private residence  # Steps to Enter: 0  One/Two Story Residence: One story  Living Alone: Yes  Support Systems: Child(luis carlos)  Patient Expects to be Discharged to[de-identified] Home  Current DME Used/Available at Home: Homero Lento, rollator, Wheelchair, Shower chair, Oxygen, portable    EXAMINATION/PRESENTATION/DECISION MAKING:   Critical Behavior:  Neurologic State: Alert  Orientation Level: Oriented X4        Hearing: Auditory  Auditory Impairment: None  Skin:  intact  Edema: none  Range Of Motion:  AROM: Generally decreased, functional                       Strength:    Strength: Generally decreased, functional                    Tone & Sensation:   Tone: Normal              Sensation: Intact               Coordination:  Coordination: Within functional limits  Vision:      Functional Mobility:  Bed Mobility:  Rolling: Stand-by assistance  Supine to Sit: Stand-by assistance     Scooting: Stand-by assistance;Contact guard assistance  Transfers:  Sit to Stand: Minimum assistance;Assist x2 (max A from lower surface)  Stand to Sit: Contact guard assistance;Assist x1;Additional time        Bed to Chair: Minimum assistance;Assist x1;Additional time              Balance:   Sitting: Intact; Without support  Standing: Impaired; With support  Standing - Static: Constant support; Fair  Standing - Dynamic : Fair;Constant support  Ambulation/Gait Training:  Distance (ft): 30 Feet (ft)  Assistive Device: Gait belt;Walker, rolling  Ambulation - Level of Assistance: Contact guard assistance     Gait Description (WDL): Exceptions to WDL  Gait Abnormalities: Decreased step clearance;Shuffling gait        Base of Support: Narrowed     Speed/Anne: Pace decreased (<100 feet/min)  Step Length: Right shortened;Left shortened                  Functional Measure:  Barthel Index:    Bathin  Bladder: 5  Bowels: 10  Groomin  Dressin  Feeding: 10  Mobility: 0  Stairs: 0  Toilet Use: 5  Transfer (Bed to Chair and Back): 5  Total: 40/100       The Barthel ADL Index: Guidelines  1. The index should be used as a record of what a patient does, not as a record of what a patient could do. 2. The main aim is to establish degree of independence from any help, physical or verbal, however minor and for whatever reason. 3. The need for supervision renders the patient not independent. 4. A patient's performance should be established using the best available evidence. Asking the patient, friends/relatives and nurses are the usual sources, but direct observation and common sense are also important. However direct testing is not needed. 5. Usually the patient's performance over the preceding 24-48 hours is important, but occasionally longer periods will be relevant. 6. Middle categories imply that the patient supplies over 50 per cent of the effort. 7. Use of aids to be independent is allowed. Score Interpretation (from [address] [facility])    Independent   60-79 Minimally independent   40-59 Partially dependent   20-39 Very dependent   <20 Totally dependent     -Izabela Omer., Barthel, D.W. (1965). Functional evaluation: the Barthel Index. [facility] ([address] (1997).  The Barthel activities of daily living index: self-reporting versus actual performance in the old (> or = 75 years). Journal of [facility] 45(5), 14 [facility], [place], Gabriel Fernando.Jose. (1999). Measuring the change in disability after inpatient rehabilitation; comparison of the responsiveness of the Barthel Index and Functional [place] Measure. Journal of Neurology, Neurosurgery, and Psychiatry, 66(4), 257-903. CHUY Mireles, [place] Khalil, & Riya Robbins M.A. (2004) Assessment of post-stroke quality of life in cost-effectiveness studies: The usefulness of the Barthel Index and the EuroQoL-5D. Quality of Life Research, 15, 606-20            Physical Therapy Evaluation Charge Determination   History Examination Presentation Decision-Making   MEDIUM  Complexity : 1-2 comorbidities / personal factors will impact the outcome/ POC  MEDIUM Complexity : 3 Standardized tests and measures addressing body structure, function, activity limitation and / or participation in recreation  MEDIUM Complexity : Evolving with changing characteristics  Other outcome measures Barthel  MEDIUM      Based on the above components, the patient evaluation is determined to be of the following complexity level: MEDIUM    Activity Tolerance:   Fair and requires rest breaks    After treatment patient left in no apparent distress:   Sitting in chair and Call bell within reach with OT present    COMMUNICATION/EDUCATION:   The patients plan of care was discussed with: Occupational therapist, Registered nurse, and Case management. Fall prevention education was provided and the patient/caregiver indicated understanding., Patient/family have participated as able in goal setting and plan of care. , and Patient/family agree to work toward stated goals and plan of care.     Thank you for this referral.  Young Barnett, PT, DPT   Time Calculation: 24 mins

## 2022-12-29 NOTE — PROGRESS NOTES
ADULT PROTOCOL: JET AEROSOL ASSESSMENT    Patient  Marvin Girard     [de-identified] y.o.   female     2022  10:12 AM    Breath Sounds Pre Procedure: Right Breath Sounds: Diminished                               Left Breath Sounds: Diminished    Breath Sounds Post Procedure: Right Breath Sounds: Scattered wheezing                                 Left Breath Sounds: Scattered wheezing    Breathing pattern: Pre procedure Breathing Pattern: Regular          Post procedure Breathing Pattern: Regular    Heart Rate: Pre procedure Pulse: 78           Post procedure Pulse: 77    Resp Rate: Pre procedure Respirations: 20           Post procedure Respirations: 20      Cough: Pre procedure Cough: Non-productive, Congested               Post procedure        Oxygen: O2 Device: Nasal cannula   Flow rate (L/min) 4     Changed: NO    SpO2: Pre procedure SpO2: 92 %   with oxygen              Post procedure SpO2: 93 %  with oxygen    Nebulizer Therapy: Current medications Aerosolized Medications: DuoNeb, Pulmicort      Changed: NO    Smoking History:   Tobacco Use    Smoking status: Former       Packs/day: 1.00       Years: 50.00       Pack years: 50.00       Types: Cigarettes       Quit date: 2014       Years since quittin.3    Smokeless tobacco: Never    Tobacco comments:       lives with a smoker       Problem List:   Patient Active Problem List   Diagnosis Code    Hypertension I10    Tobacco abuse Z72.0    Takotsubo cardiomyopathy I51.81    Pacemaker Z95.0    SBO (small bowel obstruction) ([facility] K56.609    Femoral hernia with obstruction K41.30    Occult blood in stools R19.5    Early satiety R68.81    PAF (paroxysmal atrial fibrillation) (HCC) I48.0    Atrial flutter (HCC) I48.92    S/P ablation of atrial fibrillation Z98.890, Z86.79    S/P ablation of atrial flutter Z98.890, Z86.79    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 92XA    Leg edema R60.0    CAP (community acquired pneumonia) J18.9       Respiratory Therapist: Amber Nelson, RT

## 2022-12-29 NOTE — PROGRESS NOTES
Problem: Self Care Deficits Care Plan (Adult)  Goal: *Acute Goals and Plan of Care (Insert Text)  Description: FUNCTIONAL STATUS PRIOR TO ADMISSION: Pt reports she lives alone and is Mod Independent at [facility]. HOME SUPPORT: The patient lived alone with local son able to provide PRN assistance. Son cannot provide 24/7 Supervision. Occupational Therapy Goals  Initiated 12/29/2022  1. Patient will perform Rollator grooming with supervision/set-up within 7 day(s). 2.  Patient will perform EOB/Rollator lower body dressing with supervision/set-up within 7 day(s). 3.  Patient will perform EOB bathing with supervision/set-up within 7 day(s). 4.  Patient will perform Rollator toilet transfers with supervision/set-up within 7 day(s). 5.  Patient will perform all aspects of Rollator toileting with supervision/set-up within 7 day(s). Outcome: Not Met    OCCUPATIONAL THERAPY EVALUATION  Patient: Rosalie Berrios (81 y.o. female)  Date: 12/29/2022  Primary Diagnosis: CAP (community acquired pneumonia) [J18.9]       Precautions:  Bed Alarm, Contact (respiratory)    ASSESSMENT  Based on the objective data described below, the patient presents with decreased mobility/balance, strength/endurance, full body reaching, activity tolerance, high fear of falling and intermittent confusion regarding higher level cognition, all of which limit pt's ability to safely complete self-care routine at level congruent with PLOF. Currently, pt is Independent with self-feeding, S/U with seated grooming and UB dressing and Mod A for bathing/LE dressing/toileting. Pt noted with high fear of falling and difficulty fully standing, reporting she had bad GLF 3 years ago which continues to limit her confidence with dynamic activity. Given pt lives alone and does not have ability for family to provide 24/7 Supervision, recommend SNF at discharge. Acute OT to follow during acute hospitalization.     Current Level of Function Impacting Discharge (ADLs/self-care): Independent with self-feeding, S/U with seated grooming and UB dressing and Mod A for bathing/LE dressing/toileting    Functional Outcome Measure: The patient scored 40/100 on the Barthel Index outcome measure. Other factors to consider for discharge: lives alone, Mod A      Patient will benefit from skilled therapy intervention to address the above noted impairments. PLAN :  Recommendations and Planned Interventions: self care training, functional mobility training, therapeutic exercise, balance training, therapeutic activities, endurance activities, and patient education    Frequency/Duration: Patient will be followed by occupational therapy 4 times a week to address goals. Recommendation for discharge: (in order for the patient to meet his/her long term goals)  Therapy up to 5 days/week in SNF setting    This discharge recommendation:  Has been made in collaboration with the attending provider and/or case management    IF patient discharges home will need the following DME: TBD       SUBJECTIVE:   Patient stated I'm really scared of falling again.     OBJECTIVE DATA SUMMARY:   HISTORY:   Past Medical History:   Diagnosis Date    Atrial fibrillation ([address]     Complete heart block ([address] 9/6/2014    COPD     BY CHEST XRAY    Early satiety 1/28/2016    Hypertension     ICD (implantable cardioverter-defibrillator) in place     Long term current use of anticoagulant therapy     Myocardial infarction 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    Occult blood in stools 1/28/2016    Pacemaker     S/P ablation of atrial fibrillation 3/23/2017    S/P ablation of atrial flutter 3/23/2017     Past Surgical History:   Procedure Laterality Date    COLONOSCOPY N/A 5/11/2017    COLONOSCOPY performed by Eboni Sharma MD at [facility] ENDOSCOPY    COLONOSCOPY N/A 11/30/2017    COLONOSCOPY performed by Marissa Kemp Sharonda Resendiz MD at [facility] ENDOSCOPY    COLONOSCOPY Left 7/9/2019    COLONOSCOPY performed by Andres Norris MD at [address]  11/30/2017         HX HERNIA REPAIR  11/29/2015    Incarcerated left femoral hernia repair,. HX PACEMAKER Left 2014    OK ABDOMEN SURGERY PROC UNLISTED      inguinal hernia repair right       Expanded or extensive additional review of patient history:     Home Situation  Home Environment: Private residence  # Steps to Enter: 0  One/Two Story Residence: One story  Living Alone: Yes  Support Systems: Other Family Member(s)  Patient Expects to be Discharged to[de-identified] Home with home health  Current DME Used/Available at Home: Olga Gambles, rollator, Wheelchair, Shower chair, Oxygen, portable  Tub or Shower Type: Tub/Shower combination    Hand dominance: Right    EXAMINATION OF PERFORMANCE DEFICITS:  Cognitive/Behavioral Status:  Neurologic State: Alert  Orientation Level: Oriented X4 (intermittent confusion)  Cognition: Follows commands  Perception: Cues to maintain midline in standing  Perseveration: No perseveration noted  Safety/Judgement: Awareness of environment    Hearing: Auditory  Auditory Impairment: None    Range of Motion:  AROM: Generally decreased, functional                         Strength:  Strength: Generally decreased, functional                Coordination:  Coordination: Within functional limits  Fine Motor Skills-Upper: Left Intact; Right Intact    Gross Motor Skills-Upper: Left Intact; Right Intact    Tone & Sensation:  Tone: Normal  Sensation: Intact                      Balance:  Sitting: Intact; Without support  Standing: Impaired; With support  Standing - Static: Constant support; Fair  Standing - Dynamic : Fair;Constant support    Functional Mobility and Transfers for ADLs:  Bed Mobility:  Rolling: Stand-by assistance  Supine to Sit: Stand-by assistance  Scooting: Stand-by assistance;Contact guard assistance    Transfers:  Sit to Stand: Minimum assistance;Assist x2 (max A from lower surface)  Stand to Sit: Contact guard assistance;Assist x1;Additional time  Bed to Chair: Minimum assistance;Assist x1;Additional time    ADL Assessment:  Feeding: Independent    Oral Facial Hygiene/Grooming: Setup (EOB)    Bathing: Moderate assistance      Upper Body Dressing: Setup    Lower Body Dressing: Moderate assistance    Toileting: Moderate assistance    ADL Intervention and task modifications:  Patient instructed and indicated understanding the benefits of maintaining activity tolerance, functional mobility, and independence with self care tasks during acute stay  to ensure safe return home and to baseline. Encouraged patient to increase frequency and duration OOB, be out of bed for all meals, perform daily ADLs (as approved by RN/MD regarding bathing etc), and performing functional mobility to/from bathroom. Pt educated on safe transfer techniques, with specific emphasis on proper hand placement to push up from seated surface rather than attempt to pull self up, fully positioning self in-front of desired seated location, feeling chair on back of legs and reaching back with 1-2 UE to slowly lower self to seated position. Cognitive Retraining  Safety/Judgement: Awareness of environment    Functional Measure:    Barthel Index:  Bathin  Bladder: 5  Bowels: 10  Groomin  Dressin  Feeding: 10  Mobility: 0  Stairs: 0  Toilet Use: 5  Transfer (Bed to Chair and Back): 5  Total: 40/100      The Barthel ADL Index: Guidelines  1. The index should be used as a record of what a patient does, not as a record of what a patient could do. 2. The main aim is to establish degree of independence from any help, physical or verbal, however minor and for whatever reason. 3. The need for supervision renders the patient not independent. 4. A patient's performance should be established using the best available evidence.  Asking the patient, friends/relatives and nurses are the usual sources, but direct observation and common sense are also important. However direct testing is not needed. 5. Usually the patient's performance over the preceding 24-48 hours is important, but occasionally longer periods will be relevant. 6. Middle categories imply that the patient supplies over 50 per cent of the effort. 7. Use of aids to be independent is allowed. Score Interpretation (from [address])    Independent   60-79 Minimally independent   40-59 Partially dependent   20-39 Very dependent   <20 Totally dependent     -Izabela Omer., Barthel, D.W. (1965). Functional evaluation: the Barthel Index. [address] ([address] (1997). The Barthel activities of daily living index: self-reporting versus actual performance in the old (> or = 75 years). Journal of [address] [place] [address] 45(7), [address] [facility], HERB, Pedro Mathis., Andie Harvey. (1999). Measuring the change in disability after inpatient rehabilitation; comparison of the responsiveness of the Barthel Index and Functional [place] Measure. Journal of Neurology, Neurosurgery, and Psychiatry, 66(4), 234-718. Ev De Jesus, N.J.A, [place] Khalil, & Idalia Klein MRyleyA. (2004) Assessment of post-stroke quality of life in cost-effectiveness studies: The usefulness of the Barthel Index and the EuroQoL-5D.  Quality of Life Research, 15, 247-01        Occupational Therapy Evaluation Charge Determination   History Examination Decision-Making   LOW Complexity : Brief history review  HIGH Complexity : 5 or more performance deficits relating to physical, cognitive , or psychosocial skils that result in activity limitations and / or participation restrictions LOW Complexity : No comorbidities that affect functional and no verbal or physical assistance needed to complete eval tasks       Based on the above components, the patient evaluation is determined to be of the following complexity level: LOW   Pain Rating:  No c/o pain    Activity Tolerance:   Fair and requires frequent rest breaks    After treatment patient left in no apparent distress:    Supine in bed, Call bell within reach, Bed / chair alarm activated, and PCT present    COMMUNICATION/EDUCATION:   The patients plan of care was discussed with: Physical therapist, Registered nurse, and Case management. Home safety education was provided and the patient/caregiver indicated understanding., Patient/family have participated as able in goal setting and plan of care. , and Patient/family agree to work toward stated goals and plan of care. This patients plan of care is appropriate for delegation to [facility].     Thank you for this referral.  Manuel Hernandez, OT  Time Calculation: 38 mins

## 2022-12-29 NOTE — CONSULTS
Palliative Medicine Consult  [place]: [phone] ([id number])    Patient Name: Waylon Rahman  YOB: 1940    Date of Initial Consult: 12/29/22  Reason for Consult: End Stage Disease  Requesting Provider: Yasmin Brewster MD   Primary Care Physician: Mona Gomez MD      [address] [place] [address] Rupert was consulted for goals of care. Patient is being discharged, has AMD.  Will cancel consult. please re-consult if Palliative Medicine can be of further assistance. Thank you for including Palliative Medicine in this patient's care.    REINA Kelsey

## 2022-12-29 NOTE — PROGRESS NOTES
Transition of Care Plan: Home with At [address]     RUR: 12% (low)  Disposition: Home with At [address] Patient declining SNF at this time. If SNF or IPR: Date FOC offered: N/A  Date FOC received: N/A  Date authorization started with reference number: N/A  Date authorization received and expires: N/A  Accepting facility: N/A  Follow up appointments: PCP/specialists if needed. DME needed: None. Transportation at Discharge: DIL here for transport. Keys or means to access home:  Patient has access. IM Medicare Letter: 2nd IM received 12/29/2022. Is patient a  and connected with the [place]? N/A            If yes, was [employer] transfer form completed and VA notified? N/A  Caregiver [facility] - [phone]. Discharge Caregiver contacted prior to discharge? Patient to contact. Care Conference needed?:  No.                 Reason for Admission:  CAP          RUR Score:  12% (low)                   Plan for utilizing home health:  Patient previously used [facility] years ago, agreeable to [facility] at this time. PCP: First and Last name:  Alannah Darnell MD     Name of Practice: [facility]   Are you a current patient: Yes/No: Yes   Approximate date of last visit: 6 months ago. Can you participate in a virtual visit with your PCP: No.                    Current Advanced Directive/Advance Care Plan: DNR. Advance Care Planning     General Advance Care Planning (ACP) Conversation    Date of Conversation: 12/29/2022  Conducted with: Patient with Decision Making Capacity    Healthcare Decision Maker:     Primary Decision Maker: Rachna Mack - Child - [phone]    Secondary Decision Maker: Abena Bains - Child - [phone]  Click here to complete [address] including selection of the Healthcare Decision Maker Relationship (ie \"Primary\")    Today we documented Decision Maker(s) consistent with ACP documents on file. Content/Action Overview:    Has ACP document(s) on file - reflects the patient's care preferences  Reviewed DNR/DNI and patient would like to be a DNR. Length of Voluntary ACP Conversation in minutes:  <16 minutes (Non-Billable)    Hermelinda Taylor RN        Healthcare Decision Maker:   Click here to complete [address] including selection of the Healthcare Decision Maker Relationship (ie \"Primary\")             Primary Decision MakerMbonita Cat Child - [phone]    Secondary Decision Maker: Chago Mezaelsen Child - [phone]                  Transition of Care Plan:           CM met with patient at bedside to introduce self/role and verify demographic, contact, insurance and PCP information. Patient is independent with ADLs/IADLs at home and has a wheelchair, rollator and 4L O2 at baseline ([address]). She has family nearby to assistance with transportation. She is refusing rehab/SNF at this time and would like to go home. She is agreeable to [facility], referral sent this morning. Patient's DIL is present at bedside to provide transport. CM met with patient at bedside who is agreeable to CM sending referral to At [facility]. Referral sent via [address] [place] [address],Ground Floor. At [facility] is able to accept patient. Nurse contacted AYAD LION confirmed with nurse that she can let patient know that At [facility] has accepted and patient can be discharged. Patricia with [facility] notified to let the team know that [facility] referral can be cancelled. Care Management Interventions  PCP Verified by CM:  Yes  Last Visit to PCP: 06/30/22  Palliative Care Criteria Met (RRAT>21 & CHF Dx)?: No  Mode of Transport at Discharge: Self  Transition of Care Consult (CM Consult): [place]: No  Discharge Durable Medical Equipment: No  Physical Therapy Consult: Yes  Occupational Therapy Consult: Yes  Speech Therapy Consult: No  Support Systems: Other Family Member(s)  Confirm Follow Up Transport: Family  The Patient and/or Patient Representative was Provided with a Choice of Provider and Agrees with the Discharge Plan?: Yes  Name of the Patient Representative Who was Provided with a Choice of Provider and Agrees with the Discharge Plan: uYdith Yeboahdione (patient)  Freedom of Choice List was Provided with Basic Dialogue that Supports the Patient's Individualized Plan of Care/Goals, Treatment Preferences and Shares the Quality Data Associated with the Providers?: Yes  [place] Resource Information Provided?: No  Discharge Location  Patient Expects to be Discharged to[de-identified] Home with home health      GISELLE Zamora, RN    Care Management  [phone]

## 2022-12-29 NOTE — PROGRESS NOTES
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 Acute respiratory failure with hypoxia ([facility] Yes    Community acquired pneumonia, unspecified laterality      Allergies: Patient has no known allergies. Admission date: 12/25/2022     Discharge Medications:   Current Discharge Medication List        START taking these medications    Details   acetaminophen (TYLENOL) 325 mg tablet Take 2 Tablets by mouth every six (6) hours as needed for Fever or Pain. Qty: 60 Tablet, Refills: 0  Start date: 12/29/2022      balsam peru-castor oiL (VENELEX) ointment Apply  to affected area two (2) times a day. Qty: 28.35 g, Refills: 0  Start date: 12/29/2022      budesonide (PULMICORT) 0.5 mg/2 mL nbsp 2 mL by Nebulization route two (2) times a day. Qty: 200 mL, Refills: 0  Start date: 12/29/2022      methylPREDNISolone (MEDROL DOSEPACK) 4 mg tablet Use as directed  Qty: 1 Dose Pack, Refills: 0  Start date: 12/29/2022      amoxicillin-clavulanate (Augmentin) 875-125 mg per tablet Take 1 Tablet by mouth two (2) times a day. Qty: 14 Tablet, Refills: 0  Start date: 12/29/2022           CONTINUE these medications which have CHANGED    Details   aspirin delayed-release 81 mg tablet Take 1 Tablet by mouth daily.   Qty: 30 Tablet, Refills: 0  Start date: 12/30/2022 CONTINUE these medications which have NOT CHANGED    Details   prednisoLONE acetate (PRED FORTE) 1 % ophthalmic suspension Administer 1 Drop to both eyes four (4) times daily. apixaban (Eliquis) 5 mg tablet TAKE 1 TABLET BY MOUTH TWICE A DAY  Qty: 180 Tablet, Refills: 3      carvediloL (COREG) 12.5 mg tablet TAKE 1 TABLET BY MOUTH TWICE A DAY WITH MEALS  Qty: 180 Tablet, Refills: 3      Dulera 200-5 mcg/actuation HFA inhaler TAKE 2 PUFFS BY MOUTH TWICE A DAY      OXYGEN-AIR DELIVERY SYSTEMS Take 3 L/min by inhalation as needed. albuterol (PROVENTIL HFA, VENTOLIN HFA, PROAIR HFA) 90 mcg/actuation inhaler Take 2 Puffs by inhalation every four (4) hours as needed for Wheezing. tiotropium bromide (SPIRIVA RESPIMAT) 2.5 mcg/actuation inhaler Take 2 Puffs by inhalation daily. atorvastatin (LIPITOR) 20 mg tablet TAKE 1 TABLET BY MOUTH EVERY DAY  Qty: 90 Tab, Refills: 1      multivitamin (ONE A DAY) tablet Take 1 Tab by mouth daily. The patient's chart, MAR and AVS were reviewed by Luis Miguel Jamison [facility].     Discharging Provider: Rama Silva     Thank you,     Luis Miguel Jamison, [facility]

## 2022-12-29 NOTE — PROGRESS NOTES
Physician Progress Note      PATIENT:               Jeff PURI #:                  [id number]  :                       1940  ADMIT DATE:       2022 4:28 PM  DISCH DATE:  RESPONDING  PROVIDER #:        David Bronson MD          QUERY TEXT:    Patient admitted with SOB, noted to have paroxysmal atrial fibrillation and is maintained on Eliquis PTA and during this admission. Please document in progress notes and discharge summary if you are evaluating and/or treating any of the following: The medical record reflects the following:  Risk Factors:  [de-identified]year old white female with paroxysmal Afib, HTN, Dyslipidemia, and COPD on 4L at baseline  Clinical Indicators: paroxysmal atrial fibrillation  Treatment: Eliquis 5 mg po bid    Thank you,  Mychal White RN, CDI  Options provided:  -- Secondary hypercoagulable state in a patient with atrial fibrillation  -- Other - I will add my own diagnosis  -- Disagree - Not applicable / Not valid  -- Disagree - Clinically unable to determine / Unknown  -- Refer to Clinical Documentation Reviewer    PROVIDER RESPONSE TEXT:    This patient has secondary hypercoagulable state in a patient with atrial fibrillation.     Query created by: Greg Mitchell on 2022 1:59 PM      Electronically signed by:  David Bronson MD 2022 7:14 AM

## 2022-12-29 NOTE — PROGRESS NOTES
Occupational Therapy    Orders received, chart reviewed and patient evaluated by occupational therapy. Pending progression with skilled acute occupational therapy, recommend:  Therapy up to 5 days/week in SNF setting     Recommend with nursing ADLs with supervision/setup, OOB to chair 3x/day and toileting via beside commode Moderate assist and walker and elevated EOB. Thank you for completing as able in order to maintain patient strength, endurance and independence. Full evaluation to follow.      Thank you,  Tank Buchanan OT

## 2022-12-29 NOTE — PROGRESS NOTES
Discharge Note:   Discharge orders received. Pt discharged in care of self. AVS reviewed  with patient who verbalized understanding .   Pt escorted via wheel chair , Oxygen 4L/min  via nasal cannula, trrnsported by friend

## 2022-12-29 NOTE — DISCHARGE SUMMARY
Hospitalist Discharge Summary     Patient ID:  Mirtha Altman  [id number]  73 y.o.  1940 12/25/2022    PCP on record: Ora Graham MD    Admit date: 12/25/2022  Discharge date and time: 12/29/2022    DISCHARGE DIAGNOSIS:    Acute on chronic respiratory failure with hypoxia secondary to community-acquired pneumonia/COPD exacerbation/history of tobacco use/atrial fibrillation/hypertension/dyslipidemia    CONSULTATIONS:  IP CONSULT TO HOSPITALIST  IP CONSULT TO PALLIATIVE CARE - PROVIDER    Excerpted HPI from H&P of Gabi Donald MD:  Aroldo Louie is a [de-identified] y.o.  female who presents with past medical history of COPD, hypertension, dyslipidemia is coming the hospital chief complaint of shortness of breath. Patient reports that she has baseline COPD and is on oxygen at 4 L at all times. She reports increased shortness of breath along with cough and small amount of whitish phlegm. Reports generalized weakness as well. Does not report any chest pain. Does not report any orthopnea or PND. Does not report any abdominal pain, nausea or vomiting. Does report nausea along with poor oral intake in the last few days. On arrival to ED, noted to have fever of 100.2 and blood pressure was borderline low on arrival at 90/57. On labs CBC is normal.  BMP shows sodium of 130, creatinine 0.62. LFTs are normal.  Troponin is 12. Procalcitonin 0.05.  proBNP is 1256. Chest x-ray shows patchy consolidation in the left lung base. We were asked to admit for work up and evaluation of the above problems. ______________________________________________________________________  DISCHARGE SUMMARY/HOSPITAL COURSE:  for full details see H&P, daily progress notes, labs, consult notes.      Patient was subsequently admitted to [facility] for further evaluation as well as management, patient made on continuous telemetry monitoring, patient had acute on chronic respiratory failure with hypoxia, started on IV steroids, started on IV antibiotics, overall patient's clinical status improved, patient came back to baseline oxygen requirement, patient was evaluated physical therapy as well as Occupational Therapy following which patient was deemed stable for discharge on oral steroids as well as oral antibiotics with close outpatient follow-up with primary care physician as well as pulmonology, plan was explained to the patient in details agreeable to current plan.        _______________________________________________________________________  Patient seen and examined by me on discharge day. Pertinent Findings:  Gen:    Not in distress  Chest: Decreased air entry bilaterally in bilateral lower lung zones  CVS:   Regular rhythm, s1/s2 no m/r/g  No edema  Abd:  Soft, not distended, not tender  Neuro:  Alert, Oriented x 4  _______________________________________________________________________  DISCHARGE MEDICATIONS:   Current Discharge Medication List        START taking these medications    Details   acetaminophen (TYLENOL) 325 mg tablet Take 2 Tablets by mouth every six (6) hours as needed for Fever or Pain. Qty: 60 Tablet, Refills: 0  Start date: 12/29/2022      balsam peru-castor oiL (VENELEX) ointment Apply  to affected area two (2) times a day. Qty: 28.35 g, Refills: 0  Start date: 12/29/2022      budesonide (PULMICORT) 0.5 mg/2 mL nbsp 2 mL by Nebulization route two (2) times a day. Qty: 200 mL, Refills: 0  Start date: 12/29/2022      methylPREDNISolone (MEDROL DOSEPACK) 4 mg tablet Use as directed  Qty: 1 Dose Pack, Refills: 0  Start date: 12/29/2022      amoxicillin-clavulanate (Augmentin) 875-125 mg per tablet Take 1 Tablet by mouth two (2) times a day. Qty: 14 Tablet, Refills: 0  Start date: 12/29/2022           CONTINUE these medications which have CHANGED    Details   aspirin delayed-release 81 mg tablet Take 1 Tablet by mouth daily.   Qty: 30 Tablet, Refills: 0  Start date: 12/30/2022 CONTINUE these medications which have NOT CHANGED    Details   prednisoLONE acetate (PRED FORTE) 1 % ophthalmic suspension Administer 1 Drop to both eyes four (4) times daily. apixaban (Eliquis) 5 mg tablet TAKE 1 TABLET BY MOUTH TWICE A DAY  Qty: 180 Tablet, Refills: 3      carvediloL (COREG) 12.5 mg tablet TAKE 1 TABLET BY MOUTH TWICE A DAY WITH MEALS  Qty: 180 Tablet, Refills: 3      Dulera 200-5 mcg/actuation HFA inhaler TAKE 2 PUFFS BY MOUTH TWICE A DAY      OXYGEN-AIR DELIVERY SYSTEMS Take 3 L/min by inhalation as needed. albuterol (PROVENTIL HFA, VENTOLIN HFA, PROAIR HFA) 90 mcg/actuation inhaler Take 2 Puffs by inhalation every four (4) hours as needed for Wheezing. tiotropium bromide (SPIRIVA RESPIMAT) 2.5 mcg/actuation inhaler Take 2 Puffs by inhalation daily. atorvastatin (LIPITOR) 20 mg tablet TAKE 1 TABLET BY MOUTH EVERY DAY  Qty: 90 Tab, Refills: 1      multivitamin (ONE A DAY) tablet Take 1 Tab by mouth daily. Patient Follow Up Instructions: Activity: PT/OT Eval and Treat  Diet: Cardiac Diet  Wound Care: As directed    Follow-up with PCP/Pulmonology in 2 weeks.   Follow-up tests/labs As per above physicians  Follow-up Information       Follow up With Specialties Details Why Contact Info    Lakhwinder Estevez, [address] [place] [address] [phone]      Lakhwinder Estevez MD Family Medicine Follow up in 2 week(s)  [address]  [phone]      Colton Dee MD Pulmonary Disease Follow up in 2 week(s)  [address]  [facility]  Dom Ambrose [address] [place] [address]  [phone]            ________________________________________________________________    Risk of deterioration: Low    Condition at Discharge:  Stable  __________________________________________________________________    Disposition  Home with home health    ____________________________________________________________________    Code Status: Full Code  ___________________________________________________________________      Total time in minutes spent coordinating this discharge (includes going over instructions, follow-up, prescriptions, and preparing report for sign off to her PCP) :  45 minutes    Signed:   Bari Bee MD

## 2022-12-31 LAB
BACTERIA SPEC CULT: NORMAL
SERVICE CMNT-IMP: NORMAL

## 2023-01-09 NOTE — PROGRESS NOTES
Chief Complaint   Patient presents with    Pacemaker Check     annual follow up     Shortness of Breath     has COPD     Ankle swelling     magnus ankles and feet      1. Have you been to the ER, urgent care clinic since your last visit? Hospitalized since your last visit? No     2. Have you seen or consulted any other health care providers outside of the [address] [place] [address] since your last visit? Include any pap smears or colon screening.   No Previous Accession (Optional): [id number] Previous Accession (Optional): [id number]

## 2023-01-15 NOTE — PROGRESS NOTES
Physician Progress Note      PATIENT:               Krystal Hernandez  CSN #:                  [id number]  :                       1940  ADMIT DATE:       2022 4:28 PM  [address] DATE:        2022 2:10 PM  RESPONDING  PROVIDER #:        Es Godinez MD          QUERY TEXT:    Patient admitted with SOB and cough. Noted documentation of CAP with possibility of aspiration pneumonia in H&P, and CAP on DS. Please clarify in progress notes and discharge summary if you are evaluating and /or treating any of the following:    Note: CAP and HCAP indicate where the pneumonia was acquired, not a specific type. The medical record reflects the following:    Risk Factors: [de-identified]year-old who has baseline COPD and is on oxygen at 4 L at all times. \"She reports increased shortness of breath along with cough and small amount of whitish phlegm\" noted in H&P    Clinical Indicators:  --CXR: Patchy consolidation in the left lung base is concerning for pneumonia versus aspiration. --Speech: Based on the objective data described below, the patient presents with functional oropharyngeal swallow based on clinical examination. No acute risk factors for silent aspiration at this juncture. Treatment: CXR, Speech eval, Flagyl    Thank you,  Pablito Garcia RN, CDI  Options provided:  -- aspiration pneumonia confirmed and community-acquired pneumonia ruled out  -- community-acquired pneumonia confirmed and aspiration pneumonia ruled out  -- Other - I will add my own diagnosis  -- Disagree - Not applicable / Not valid  -- Disagree - Clinically unable to determine / Unknown  -- Refer to Clinical Documentation Reviewer    PROVIDER RESPONSE TEXT:    After study, aspiration pneumonia confirmed and community-acquired pneumonia ruled out.     Query created by: Terry Main on 2023 12:29 PM      Electronically signed by:  Es Godinez MD 1/15/2023 12:04 PM

## 2023-01-27 ENCOUNTER — TRANSCRIBE ORDER (OUTPATIENT)
Dept: SCHEDULING | Age: 83
End: 2023-01-27

## 2023-01-27 DIAGNOSIS — R93.89 ABNORMAL COMPUTERIZED AXIAL TOMOGRAPHY OF CHEST: Primary | ICD-10-CM

## 2023-02-10 ENCOUNTER — TRANSCRIBE ORDER (OUTPATIENT)
Dept: SCHEDULING | Age: 83
End: 2023-02-10

## 2023-02-10 DIAGNOSIS — R93.89 ABNORMAL CT OF THE CHEST: ICD-10-CM

## 2023-02-10 DIAGNOSIS — R93.89 ABNORMAL CT SCAN: Primary | ICD-10-CM

## 2023-02-13 NOTE — PROGRESS NOTES
Physician Progress Note      PATIENT:               Vicki Rider  CSN #:                  [id number]  :                       1940  ADMIT DATE:       2022 4:28 PM  [address] [place] DATE:        2022 2:10 PM  RESPONDING  PROVIDER #:        Brice Brandon MD          QUERY TEXT:    Patient admitted from  -  with acute COPD exacerbation. H&P noted given the possibility of aspiration PNA was started on Flagyl, however Flagyl d/c after 2 days and then Abx was changed to Rocephin. SLP evaluation documented No acute risk factors for silent aspiration. In order to support the diagnosis of Aspiration PNA, please include additional clinical indicators in your documentation. Or please document if the diagnosis of Aspiration PNA has been ruled out after further study. The medical record reflects the following:    Risk Factors: has baseline COPD on 4L O2 at all times, with \"Worrisome Left upper lobe nodule\" ( Dr Venus payne), Viral resp panel showed Coronavirus OC43    Clinical Indicators:  fever of 100.2 in ED  H&P:  She reports increased shortness of breath along with cough and small amount of whitish phlegm  --CXR : patchy consolidation Lt lung base concerning for PNA vs Aspiration. --ChCT : new worrisome IAN nodule, dependent airspace dz in LLL may be atelectasis or infection, severe emphysema  --SLP evaluation:  the patient presents with functional oropharyngeal swallow based on clinical examination. No acute risk factors for silent aspiration at this juncture. Treatment: Zithromax 500 mg IV  - , Flagyl IV from  - . Rocephin IV  - discharge. Albuterol inhalers q6 hr while awake. D/C home w/ Augmentin x7d    Thank you,  George Carter RN, CDI  Options provided:  -- Aspiration PNA POA present as evidenced by, Please document evidence.   -- Aspiration PNA ruled out, community-acquired pneumonia confirmed POA  -- Pneumonia ruled out  -- Other - I will add my own diagnosis  -- Disagree - Not applicable / Not valid  -- Disagree - Clinically unable to determine / Unknown  -- Refer to Clinical Documentation Reviewer    PROVIDER RESPONSE TEXT:    Aspiration PNA was ruled out, and community-acquired pneumonia POA confirmed.     Query created by: Vaishnavi Cardoza on 2/13/2023 2:32 PM      Electronically signed by:  Devin Fuller MD 2/13/2023 2:37 PM

## 2023-02-16 ENCOUNTER — TRANSCRIBE ORDER (OUTPATIENT)
Dept: SCHEDULING | Age: 83
End: 2023-02-16

## 2023-02-16 DIAGNOSIS — R93.89 ABNORMAL COMPUTERIZED AXIAL TOMOGRAPHY OF CHEST: Primary | ICD-10-CM

## 2023-03-09 ENCOUNTER — HOSPITAL ENCOUNTER (OUTPATIENT)
Dept: PET IMAGING | Age: 83
End: 2023-03-09
Attending: INTERNAL MEDICINE
Payer: MEDICARE

## 2023-03-09 ENCOUNTER — HOSPITAL ENCOUNTER (OUTPATIENT)
Dept: PET IMAGING | Age: 83
Discharge: HOME | End: 2023-03-09
Attending: INTERNAL MEDICINE
Payer: MEDICARE

## 2023-03-09 VITALS — BODY MASS INDEX: 20.22 KG/M2 | WEIGHT: 145 LBS

## 2023-03-09 DIAGNOSIS — R93.89 ABNORMAL COMPUTERIZED AXIAL TOMOGRAPHY OF CHEST: ICD-10-CM

## 2023-03-09 LAB
GLUCOSE BLD STRIP.AUTO-MCNC: 87 MG/DL (ref 65–117)
SERVICE CMNT-IMP: NORMAL

## 2023-03-09 PROCEDURE — A9552 F18 FDG: HCPCS

## 2023-03-09 RX ORDER — FLUDEOXYGLUCOSE F-18 200 MCI/ML
10 INJECTION INTRAVENOUS ONCE
Status: COMPLETED | OUTPATIENT
Start: 2023-03-09 | End: 2023-03-09

## 2023-03-09 RX ADMIN — FLUDEOXYGLUCOSE F-18 10 MILLICURIE: 200 INJECTION INTRAVENOUS at 13:52

## 2023-03-31 ENCOUNTER — TRANSCRIBE ORDER (OUTPATIENT)
Dept: SCHEDULING | Age: 83
End: 2023-03-31

## 2023-03-31 ENCOUNTER — OFFICE VISIT (OUTPATIENT)
Dept: ONCOLOGY | Age: 83
End: 2023-03-31

## 2023-03-31 ENCOUNTER — DOCUMENTATION ONLY (OUTPATIENT)
Dept: ONCOLOGY | Age: 83
End: 2023-03-31

## 2023-03-31 VITALS
HEART RATE: 78 BPM | WEIGHT: 133.8 LBS | DIASTOLIC BLOOD PRESSURE: 76 MMHG | OXYGEN SATURATION: 96 % | TEMPERATURE: 97.7 F | HEIGHT: 71 IN | BODY MASS INDEX: 18.73 KG/M2 | SYSTOLIC BLOOD PRESSURE: 134 MMHG

## 2023-03-31 DIAGNOSIS — R93.2 ABNORMAL PET SCAN, LIVER: ICD-10-CM

## 2023-03-31 DIAGNOSIS — C34.90 NON-SMALL CELL LUNG CANCER METASTATIC TO LIVER (HCC): Primary | ICD-10-CM

## 2023-03-31 DIAGNOSIS — R93.2 NONVISUALIZATION OF GALLBLADDER: Primary | ICD-10-CM

## 2023-03-31 DIAGNOSIS — C78.7 NON-SMALL CELL LUNG CANCER METASTATIC TO LIVER (HCC): Primary | ICD-10-CM

## 2023-03-31 RX ORDER — FLUTICASONE FUROATE, UMECLIDINIUM BROMIDE AND VILANTEROL TRIFENATATE 100; 62.5; 25 UG/1; UG/1; UG/1
POWDER RESPIRATORY (INHALATION)
COMMUNITY
Start: 2023-03-17

## 2023-03-31 NOTE — PROGRESS NOTES
Suad Bailey is a [de-identified] y.o. female here for new patient appt for lung mass. Referred by [facility]. Pt here with son, Leilani Orourke. Pt does 4 L of oxygen. 1. Have you been to the ER, urgent care clinic since your last visit? Hospitalized since your last visit? New Pt    2. Have you seen or consulted any other health care providers outside of the [address] [place] [address] since your last visit? Include any pap smears or colon screening.  New Pt

## 2023-03-31 NOTE — LETTER
3/31/2023    Patient: Vivian Medina   YOB: 1940   Date of Visit: 3/31/2023     Nabila Malone MD  [address]  [address] [address]  Via In [address] [facility], [address] [place] [address] Right [address] [place] [address]  [facility]  [address]  [address] [address]  Via Fax: [phone]    Dear MD Jamison Denney MD,      Thank you for referring Ms. Konstantin King to [address] [place] [address] for evaluation. My notes for this consultation are attached. If you have questions, please do not hesitate to call me. I look forward to following your patient along with you.       Sincerely,    Samia Pretty MD

## 2023-04-05 NOTE — PROGRESS NOTES
Aleshia completed with pt. Pt informed nurse that she stopped her eliquis 3 days ago and does not take aspirin any longer. Pt also informed nurse she is on oxygen at 4L/NC all the time and she informed she would be bringing her own oxygen tank with her.

## 2023-04-06 ENCOUNTER — HOSPITAL ENCOUNTER (OUTPATIENT)
Dept: ULTRASOUND IMAGING | Age: 83
End: 2023-04-06
Attending: INTERNAL MEDICINE
Payer: MEDICARE

## 2023-04-06 PROCEDURE — 47000 NEEDLE BIOPSY OF LIVER PERQ: CPT

## 2023-04-06 PROCEDURE — 77030014006 HC SPNG HEMSTAT J&J -A

## 2023-04-06 PROCEDURE — 74011250636 HC RX REV CODE- 250/636: Performed by: STUDENT IN AN ORGANIZED HEALTH CARE EDUCATION/TRAINING PROGRAM

## 2023-04-06 PROCEDURE — 77030014115

## 2023-04-06 PROCEDURE — 77030040395 HC NDL BIOP COAX INTRO MRTM -B

## 2023-04-06 PROCEDURE — 76942 ECHO GUIDE FOR BIOPSY: CPT

## 2023-04-06 PROCEDURE — 74011000250 HC RX REV CODE- 250: Performed by: INTERNAL MEDICINE

## 2023-04-06 PROCEDURE — 99152 MOD SED SAME PHYS/QHP 5/>YRS: CPT

## 2023-04-06 PROCEDURE — 2709999900 HC NON-CHARGEABLE SUPPLY

## 2023-04-06 RX ADMIN — FENTANYL CITRATE 50 MCG: 50 INJECTION INTRAMUSCULAR; INTRAVENOUS at 10:58

## 2023-04-06 RX ADMIN — LIDOCAINE HYDROCHLORIDE 10 ML: 10 INJECTION, SOLUTION EPIDURAL; INFILTRATION; INTRACAUDAL; PERINEURAL at 12:00

## 2023-04-06 RX ADMIN — MIDAZOLAM 2 MG: 1 INJECTION INTRAMUSCULAR; INTRAVENOUS at 10:55

## 2023-04-06 RX ADMIN — MIDAZOLAM 1 MG: 1 INJECTION INTRAMUSCULAR; INTRAVENOUS at 11:11

## 2023-04-06 NOTE — DISCHARGE INSTRUCTIONS
[facility]  Special Procedures/Radiology Department    Radiologist:Dr. Charlie Burns    Date:4/6/2023    Liver Biopsy Discharge Instructions    You may have an aching pain in the biopsy site tonight. Take Tylenol, as directed on the label, for pain or discomfort. Avoid ibuprofen (Advil, Motrin) and aspirin for the next 48 hours as these drugs may cause you to bleed. Resume your previous diet and follow the medication reconciliation form. Rest today. Do not drive or sign any legal documents activity for 24 hours because you received sedation medications. Avoid any strenuous activity for 24 hours. Do not lift anything heavier than a small grocery bag (10 pounds) and avoid twisting for the next 5 days. If you experience severe sweating, severe abdominal pain, dizziness or faintness, go to the nearest Emergency Room immediately. Pain under the left collar bone is normal.    Watch for signs of infection at biopsy site:  redness, pain, drainage, fever chills. If this occurs, call you doctor. Contact your physician after 5 business days for test results. If you have any questions or concerns, please call [phone] and ask to speak to the nurse on-call.

## 2023-04-06 NOTE — PROGRESS NOTES
Procedure reviewed with patient by Dr. Ga Snyder. Opportunity to verbalize questions and concerns. Consent obtained.

## 2023-04-23 DIAGNOSIS — R93.2 NONVISUALIZATION OF GALLBLADDER: Primary | ICD-10-CM

## 2023-04-25 ENCOUNTER — TELEPHONE (OUTPATIENT)
Dept: ONCOLOGY | Age: 83
End: 2023-04-25

## 2023-04-25 NOTE — TELEPHONE ENCOUNTER
Pt  called my line and asked for Catalino Welsh directly needing to speak to you about her appointment.  She has one on for Wed and has had a death in the family and wants to speak to you about changing it and to reach out to her at [phone]

## 2023-05-01 ENCOUNTER — OFFICE VISIT (OUTPATIENT)
Dept: ONCOLOGY | Age: 83
End: 2023-05-01
Payer: MEDICARE

## 2023-05-01 ENCOUNTER — DOCUMENTATION ONLY (OUTPATIENT)
Dept: ONCOLOGY | Age: 83
End: 2023-05-01

## 2023-05-01 VITALS
HEART RATE: 84 BPM | BODY MASS INDEX: 18.68 KG/M2 | HEIGHT: 71 IN | DIASTOLIC BLOOD PRESSURE: 95 MMHG | OXYGEN SATURATION: 92 % | WEIGHT: 133.4 LBS | SYSTOLIC BLOOD PRESSURE: 177 MMHG | TEMPERATURE: 97.7 F

## 2023-05-01 DIAGNOSIS — C34.90 NSCLC METASTATIC TO LIVER (HCC): ICD-10-CM

## 2023-05-01 DIAGNOSIS — C34.90 NON-SMALL CELL LUNG CANCER METASTATIC TO LIVER (HCC): Primary | ICD-10-CM

## 2023-05-01 DIAGNOSIS — C78.7 NSCLC METASTATIC TO LIVER (HCC): ICD-10-CM

## 2023-05-01 DIAGNOSIS — C78.7 NON-SMALL CELL LUNG CANCER METASTATIC TO LIVER (HCC): Primary | ICD-10-CM

## 2023-05-01 PROCEDURE — G8400 PT W/DXA NO RESULTS DOC: HCPCS | Performed by: INTERNAL MEDICINE

## 2023-05-01 PROCEDURE — 1101F PT FALLS ASSESS-DOCD LE1/YR: CPT | Performed by: INTERNAL MEDICINE

## 2023-05-01 PROCEDURE — 99215 OFFICE O/P EST HI 40 MIN: CPT | Performed by: INTERNAL MEDICINE

## 2023-05-01 PROCEDURE — 1123F ACP DISCUSS/DSCN MKR DOCD: CPT | Performed by: INTERNAL MEDICINE

## 2023-05-01 PROCEDURE — G0463 HOSPITAL OUTPT CLINIC VISIT: HCPCS | Performed by: INTERNAL MEDICINE

## 2023-05-01 PROCEDURE — 3077F SYST BP >= 140 MM HG: CPT | Performed by: INTERNAL MEDICINE

## 2023-05-01 PROCEDURE — G8420 CALC BMI NORM PARAMETERS: HCPCS | Performed by: INTERNAL MEDICINE

## 2023-05-01 PROCEDURE — G8427 DOCREV CUR MEDS BY ELIG CLIN: HCPCS | Performed by: INTERNAL MEDICINE

## 2023-05-01 PROCEDURE — 1090F PRES/ABSN URINE INCON ASSESS: CPT | Performed by: INTERNAL MEDICINE

## 2023-05-01 PROCEDURE — G8432 DEP SCR NOT DOC, RNG: HCPCS | Performed by: INTERNAL MEDICINE

## 2023-05-01 PROCEDURE — 3080F DIAST BP >= 90 MM HG: CPT | Performed by: INTERNAL MEDICINE

## 2023-05-01 PROCEDURE — G8536 NO DOC ELDER MAL SCRN: HCPCS | Performed by: INTERNAL MEDICINE

## 2023-05-01 NOTE — PROGRESS NOTES
[address] [facility] [address], [address] [facility], [address]  [phone]    Oncology Note        Patient: Alisson Godinez MRN: [id number]  SSN: [id number]    YOB: 1940  Age: [de-identified] y.o. Sex: female      Diagnosis     1. Adenocarcinoma of the lung with metastasis to the liver     PD-L1 0%  TMB high  NGS: PALB2 mutation present    Subjective:      Alisson Godinez is a [de-identified] y.o. female who I am seeing for advanced NSCLC. She suffers with severe COPD and in on ambulatory O2 by nasal canula. In imaging studies she was noted to have an IAN lung nodule and enlarged mediastinal LNs and in the recent PET there is an abnormal area in the liver. She denies pain or headache. A biopsy of the liver established a diagnosis a adenocarcinoma of the lung. She comes with her daughter to this office visit.          Review of Systems:    Constitutional: negative  Eyes: negative  Ears, Nose, Mouth, Throat, and Face: negative  Respiratory: positive for dyspnea on exertion  Cardiovascular: negative  Gastrointestinal: negative  Genitourinary:negative  Integument/Breast: negative  Hematologic/Lymphatic: negative  Musculoskeletal:negative  Neurological: negative        Past Medical History:   Diagnosis Date    Atrial fibrillation ([address]     Complete heart block ([address] 9/6/2014    COPD     BY CHEST XRAY    Early satiety 1/28/2016    Hypertension     ICD (implantable cardioverter-defibrillator) in place     Long term current use of anticoagulant therapy     Myocardial infarction ([address] 2014    Nausea & vomiting     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    Occult blood in stools 1/28/2016    Pacemaker     S/P ablation of atrial fibrillation 3/23/2017    S/P ablation of atrial flutter 3/23/2017     Past Surgical History:   Procedure Laterality Date    COLONOSCOPY N/A 5/11/2017    COLONOSCOPY performed by Tolu Grullon MD at [facility] ENDOSCOPY    COLONOSCOPY N/A 2017    COLONOSCOPY performed by Tolu Grullon MD at [facility] ENDOSCOPY    COLONOSCOPY Left 2019    COLONOSCOPY performed by Miller Leung MD at [address]  2017         HX HERNIA REPAIR  2015    Incarcerated left femoral hernia repair,. HX PACEMAKER Left     AL UNLISTED PROCEDURE ABDOMEN PERITONEUM & OMENTUM      inguinal hernia repair right      Family History   Problem Relation Age of Onset    Heart Disease Mother     Cancer Father         BLADDER    Other Sister         RA    Other Brother         RA    Alcohol abuse Brother      Social History     Tobacco Use    Smoking status: Every Day     Packs/day: 0.50     Years: 50.00     Pack years: 25.00     Types: Cigarettes     Last attempt to quit: 2014     Years since quittin.6    Smokeless tobacco: Never    Tobacco comments:     lives with a smoker     Pt did quit smoking in  but restarted and smokes about 1/2 PPD. Substance Use Topics    Alcohol use: No      Prior to Admission medications    Medication Sig Start Date End Date Taking? Authorizing Provider   Andreina Ellipta 100-62.5-25 mcg inhaler TAKE 1 PUFF BY MOUTH EVERY DAY 3/17/23  Yes Provider, Historical   aspirin delayed-release 81 mg tablet Take 1 Tablet by mouth daily. 22  Yes Heavenly Carter MD   acetaminophen (TYLENOL) 325 mg tablet Take 2 Tablets by mouth every six (6) hours as needed for Fever or Pain. 22  Yes Eilleen Repress, MD   balsam peru-castor oiL (VENELEX) ointment Apply  to affected area two (2) times a day. 22  Yes Heavenly Carter MD   budesonide (PULMICORT) 0.5 mg/2 mL nbsp 2 mL by Nebulization route two (2) times a day.  22  Yes Heavenly Carter MD   methylPREDNISolone ([facility]) 4 mg tablet Use as directed 22  Yes Heavenly Carter MD   prednisoLONE acetate (PRED FORTE) 1 % ophthalmic suspension Administer 1 Drop to both eyes four (4) times daily. Yes Jessy, MD Theodora   apixaban (Eliquis) 5 mg tablet TAKE 1 TABLET BY MOUTH TWICE A DAY 9/27/21  Yes Gisselle Kelley NP   carvediloL (COREG) 12.5 mg tablet TAKE 1 TABLET BY MOUTH TWICE A DAY WITH MEALS 8/30/21  Yes Gisselle Kelley NP   Dallin Imus 200-5 mcg/actuation HFA inhaler  3/4/21  Yes Provider, Historical   OXYGEN-AIR DELIVERY SYSTEMS Take 3 L/min by inhalation as needed. 12/20/19  Yes Provider, Historical   albuterol (PROVENTIL HFA, VENTOLIN HFA, PROAIR HFA) 90 mcg/actuation inhaler Take 2 Puffs by inhalation every four (4) hours as needed for Wheezing. Yes Provider, Historical   tiotropium bromide (SPIRIVA RESPIMAT) 2.5 mcg/actuation inhaler Take 2 Puffs by inhalation daily. Yes Provider, Historical   atorvastatin (LIPITOR) 20 mg tablet TAKE 1 TABLET BY MOUTH EVERY DAY 2/20/17  Yes Milad Casas NP   multivitamin (ONE A DAY) tablet Take 1 Tablet by mouth daily. Yes Provider, Historical   amoxicillin-clavulanate (Augmentin) 875-125 mg per tablet Take 1 Tablet by mouth two (2) times a day. Patient not taking: Reported on 5/1/2023 12/29/22   Lalo Melara MD              No Known Allergies        Objective:     Vitals:    05/01/23 1500   BP: (!) 177/95   Pulse: 84   Temp: 97.7 °F (36.5 °C)   TempSrc: Temporal   SpO2: 92%   Weight: 133 lb 6.4 oz (60.5 kg)   Height: 5' 11\" (1.803 m)        Physical Exam:  GENERAL: alert, cooperative, cachectic  EYE: conjunctivae/corneas clear. PERRL, EOM's intact  LYMPHATIC: Cervical, supraclavicular, and axillary nodes normal.   THROAT & NECK: normal and no erythema or exudates noted. LUNG: decreased air entry b/l, nasal O2 on  HEART: regular rate and rhythm, S1, S2 normal  ABDOMEN: soft, non-tender.  Bowel sounds normal. No masses,  no organomegaly  EXTREMITIES:  extremities normal, atraumatic, no cyanosis or edema  SKIN: no rash or abnormalities  NEUROLOGIC: AOx3. Gait normal. Reflexes and motor strength normal and symmetric. Cranial nerves 2-12 and sensation grossly intact. Physical exam and ROS has been modified from a prior visit to make it relevant and current        PET Results (most recent):  Results from [place] encounter on 03/09/23    PET/CT TUMOR IMAGE SKULL THIGH W (INI)    Narrative  PET/CT SCAN    PROCEDURE: Following IV injection of 10.1 mCi 18 Fluoro 2 deoxyglucose (FDG) and  a standard uptake delay, PET imaging is performed from head to thigh and axial,  sagittal and coronal images were acquired. Unenhanced CT is obtained for  anatomic localization, and attenuation correction of the PET scan. Patient  preprocedure blood glucose level: 87 mg/dL. CORRELATIVE IMAGING STUDIES: CT chest 12/26/2022. COMPARISON PET:    HISTORY: The study is requested for initial treatment strategy. Left upper lobe  nodule. FINDINGS:    HEAD/NECK: No apparent foci of abnormal hypermetabolism. Cerebral evaluation is  limited by normal intense activity. CHEST: 1.7 x 0.9 cm left upper lobe nodule demonstrates increased metabolic  activity of 8.1. There is focal increased metabolic activity in the superior left hilum with SUV  of 6.9. There are changes of emphysema. Band of increased density left base demonstrates  no increased metabolic activity may represent atelectasis/scarring. There is a 4  mm nodule in the right lower lobe peripherally which demonstrates no increased  metabolic activity. .  There is a right paraspinal low-density in the inferior chest which demonstrates  no increased metabolic activity may represent a meningocele. ABDOMEN/PELVIS: There is focal increased metabolic activity in the liver in the  right lobe near the gallbladder most likely segment 5 with SUV of 7.9 may  represent a metastatic lesion. There are extensive atherosclerotic changes.     SKELETON: There is focal increased metabolic activity in the right seventh rib  laterally. .    Impression  1. There is increased metabolic activity in the left upper lobe nodule  suspicious for primary neoplasm. There is increased metabolic activity in left  hilar lymph node suspicious for metastatic disease. 2. There is increased metabolic activity in the right seventh rib laterally and  there is focal increased metabolic activity in the right lobe of the liver  suspicious for metastatic disease. . 23X          Assessment:     1. Adenocarcinoma of the lung with metastasis to the liver     PD-L1 0%  TMB high  NGS: PALB2 mutation present    ECOG PS 2  Prognosis: Guarded  Intent of Treatment - palliative    She is too weak to receive systemic chemotherapy. I gave her the option of doing noting and best supportive care. She chose to receive Pembrolizumab. Due to high TMB, I recommend Pembrolizumab every 6 weeks. I counseled the patient regarding the immunotherapy. Discussions included side-effect, toxicity, benefit and risks of immunotherapy. She understood the expected side-effect which includes fatigue and various immune related side effects such as colitis, pneumonitis, hypophysitis, arthritis among other things. After weighing the benefit and risks, she agreed to proceed with immunotherapy.          Plan:       Keytruda start every 6 weeks  Labs  Return at time of starting treatment      Signed By: Kendra Seymour MD     May 1, 2023

## 2023-05-02 NOTE — PROGRESS NOTES
Pharmacy Note- Immunotherapy Education    Ruby Kumar is a  [de-identified] y. o.female  diagnosed with metastatic non-small cell lung cancer here today for immunotherapy counseling and consent. Ms. Jenny Langston is being treated with pembrolizumab every 6 weeks. Ms. Jenny Langston was provided information regarding the risks of immune-mediated adverse reactions secondary to pembrolizumab that may require interruption/delay of treatment and possible use of corticosteroids. These reactions include, but are not limited to: colitis (diarrhea or severe abdominal pain); hepatitis (jaundice, severe nausea, or vomiting, easy bruising, and/or bleeding); hypophysitis (persistent or unusual headache, extreme weakness, dizziness/fainting, and/or vision changes); dermatitis (skin rash, mouth sores, skin blisters, and/or skin peeling); ocular toxicity (uveitis, iritis, and/or episcleritis); neuropathy (motor or sensory); hyper/hypothyroidism. Patient given ways to manage these side effects and when to contact office. [employer] Handout of medications provided to patient. Ms. Jenny Langston verbalized understanding of the information presented and all of the patient's questions were answered.     Adan Abdalla PharmD, [address] Only    Program: Medical Group  CPA in place: Yes  Time Spent (min): 30

## 2023-05-17 ENCOUNTER — OFFICE VISIT (OUTPATIENT)
Age: 83
End: 2023-05-17
Payer: MEDICARE

## 2023-05-17 ENCOUNTER — HOSPITAL ENCOUNTER (OUTPATIENT)
Facility: HOSPITAL | Age: 83
Setting detail: INFUSION SERIES
End: 2023-05-17
Payer: MEDICARE

## 2023-05-17 VITALS
SYSTOLIC BLOOD PRESSURE: 129 MMHG | RESPIRATION RATE: 18 BRPM | TEMPERATURE: 97.6 F | OXYGEN SATURATION: 100 % | WEIGHT: 134.48 LBS | BODY MASS INDEX: 18.49 KG/M2 | HEART RATE: 75 BPM | DIASTOLIC BLOOD PRESSURE: 71 MMHG

## 2023-05-17 VITALS
WEIGHT: 134.5 LBS | OXYGEN SATURATION: 100 % | DIASTOLIC BLOOD PRESSURE: 84 MMHG | TEMPERATURE: 97.6 F | HEART RATE: 77 BPM | HEIGHT: 72 IN | BODY MASS INDEX: 18.22 KG/M2 | SYSTOLIC BLOOD PRESSURE: 146 MMHG | RESPIRATION RATE: 16 BRPM

## 2023-05-17 DIAGNOSIS — G89.3 CANCER ASSOCIATED PAIN: ICD-10-CM

## 2023-05-17 DIAGNOSIS — C34.90 NON-SMALL CELL LUNG CANCER METASTATIC TO LIVER (HCC): Primary | ICD-10-CM

## 2023-05-17 DIAGNOSIS — C78.7 NSCLC METASTATIC TO LIVER (HCC): Primary | ICD-10-CM

## 2023-05-17 DIAGNOSIS — C34.90 NSCLC METASTATIC TO LIVER (HCC): Primary | ICD-10-CM

## 2023-05-17 DIAGNOSIS — C78.7 NON-SMALL CELL LUNG CANCER METASTATIC TO LIVER (HCC): Primary | ICD-10-CM

## 2023-05-17 DIAGNOSIS — Z29.8 ENCOUNTER FOR IMMUNOTHERAPY: ICD-10-CM

## 2023-05-17 LAB
-: ABNORMAL
ALBUMIN SERPL-MCNC: 3.3 G/DL (ref 3.5–5)
ALBUMIN/GLOB SERPL: 0.8 (ref 1.1–2.2)
ALP SERPL-CCNC: 139 U/L (ref 45–117)
ALT SERPL-CCNC: 55 U/L (ref 12–78)
ANION GAP SERPL CALC-SCNC: 1 MMOL/L (ref 5–15)
AST SERPL-CCNC: 56 U/L (ref 15–37)
BASOPHILS # BLD: 0 K/UL (ref 0–0.1)
BASOPHILS NFR BLD: 0 % (ref 0–1)
BILIRUB SERPL-MCNC: 0.5 MG/DL (ref 0.2–1)
BUN SERPL-MCNC: 10 MG/DL (ref 6–20)
BUN/CREAT SERPL: 18 (ref 12–20)
CALCIUM SERPL-MCNC: 9.7 MG/DL (ref 8.5–10.1)
CHLORIDE SERPL-SCNC: 104 MMOL/L (ref 97–108)
CO2 SERPL-SCNC: 32 MMOL/L (ref 21–32)
CORTIS SERPL-MCNC: 9.4 UG/DL
CREAT SERPL-MCNC: 0.55 MG/DL (ref 0.55–1.02)
DIFFERENTIAL METHOD BLD: ABNORMAL
EOSINOPHIL # BLD: 0.2 K/UL (ref 0–0.4)
EOSINOPHIL NFR BLD: 3 % (ref 0–7)
ERYTHROCYTE [DISTWIDTH] IN BLOOD BY AUTOMATED COUNT: 14.8 % (ref 11.5–14.5)
GLOBULIN SER CALC-MCNC: 4.1 G/DL (ref 2–4)
GLUCOSE SERPL-MCNC: 82 MG/DL (ref 65–100)
HAV IGM SER QL: NONREACTIVE
HBV CORE IGM SER QL: NONREACTIVE
HBV SURFACE AG SER QL: <0.1 INDEX
HBV SURFACE AG SER QL: NEGATIVE
HCT VFR BLD AUTO: 39 % (ref 35–47)
HCV AB SER IA-ACNC: 1.69 INDEX
HCV AB SERPL QL IA: REACTIVE
HGB BLD-MCNC: 12.3 G/DL (ref 11.5–16)
IMM GRANULOCYTES # BLD AUTO: 0 K/UL (ref 0–0.04)
IMM GRANULOCYTES NFR BLD AUTO: 0 % (ref 0–0.5)
LYMPHOCYTES # BLD: 1.2 K/UL (ref 0.8–3.5)
LYMPHOCYTES NFR BLD: 26 % (ref 12–49)
MCH RBC QN AUTO: 30.1 PG (ref 26–34)
MCHC RBC AUTO-ENTMCNC: 31.5 G/DL (ref 30–36.5)
MCV RBC AUTO: 95.6 FL (ref 80–99)
MONOCYTES # BLD: 0.7 K/UL (ref 0–1)
MONOCYTES NFR BLD: 14 % (ref 5–13)
NEUTS SEG # BLD: 2.7 K/UL (ref 1.8–8)
NEUTS SEG NFR BLD: 57 % (ref 32–75)
NRBC # BLD: 0 K/UL (ref 0–0.01)
NRBC BLD-RTO: 0 PER 100 WBC
PLATELET # BLD AUTO: 238 K/UL (ref 150–400)
PMV BLD AUTO: 10.9 FL (ref 8.9–12.9)
POTASSIUM SERPL-SCNC: 3.9 MMOL/L (ref 3.5–5.1)
PROT SERPL-MCNC: 7.4 G/DL (ref 6.4–8.2)
RBC # BLD AUTO: 4.08 M/UL (ref 3.8–5.2)
SODIUM SERPL-SCNC: 137 MMOL/L (ref 136–145)
TSH SERPL DL<=0.05 MIU/L-ACNC: 1.15 UIU/ML (ref 0.36–3.74)
WBC # BLD AUTO: 4.8 K/UL (ref 3.6–11)

## 2023-05-17 PROCEDURE — G8427 DOCREV CUR MEDS BY ELIG CLIN: HCPCS | Performed by: INTERNAL MEDICINE

## 2023-05-17 PROCEDURE — 99215 OFFICE O/P EST HI 40 MIN: CPT | Performed by: INTERNAL MEDICINE

## 2023-05-17 PROCEDURE — 82533 TOTAL CORTISOL: CPT

## 2023-05-17 PROCEDURE — 1090F PRES/ABSN URINE INCON ASSESS: CPT | Performed by: INTERNAL MEDICINE

## 2023-05-17 PROCEDURE — 1123F ACP DISCUSS/DSCN MKR DOCD: CPT | Performed by: INTERNAL MEDICINE

## 2023-05-17 PROCEDURE — 6360000002 HC RX W HCPCS: Performed by: INTERNAL MEDICINE

## 2023-05-17 PROCEDURE — G8400 PT W/DXA NO RESULTS DOC: HCPCS | Performed by: INTERNAL MEDICINE

## 2023-05-17 PROCEDURE — 3074F SYST BP LT 130 MM HG: CPT | Performed by: INTERNAL MEDICINE

## 2023-05-17 PROCEDURE — 2580000003 HC RX 258: Performed by: INTERNAL MEDICINE

## 2023-05-17 PROCEDURE — 80074 ACUTE HEPATITIS PANEL: CPT

## 2023-05-17 PROCEDURE — 84443 ASSAY THYROID STIM HORMONE: CPT

## 2023-05-17 PROCEDURE — 80053 COMPREHEN METABOLIC PANEL: CPT

## 2023-05-17 PROCEDURE — 36415 COLL VENOUS BLD VENIPUNCTURE: CPT

## 2023-05-17 PROCEDURE — 3078F DIAST BP <80 MM HG: CPT | Performed by: INTERNAL MEDICINE

## 2023-05-17 PROCEDURE — 4004F PT TOBACCO SCREEN RCVD TLK: CPT | Performed by: INTERNAL MEDICINE

## 2023-05-17 PROCEDURE — 96413 CHEMO IV INFUSION 1 HR: CPT

## 2023-05-17 PROCEDURE — G8419 CALC BMI OUT NRM PARAM NOF/U: HCPCS | Performed by: INTERNAL MEDICINE

## 2023-05-17 PROCEDURE — 85025 COMPLETE CBC W/AUTO DIFF WBC: CPT

## 2023-05-17 RX ORDER — SODIUM CHLORIDE 9 MG/ML
5-250 INJECTION, SOLUTION INTRAVENOUS PRN
Status: DISCONTINUED | OUTPATIENT
Start: 2023-05-17 | End: 2023-05-18 | Stop reason: HOSPADM

## 2023-05-17 RX ORDER — HEPARIN SODIUM (PORCINE) LOCK FLUSH IV SOLN 100 UNIT/ML 100 UNIT/ML
500 SOLUTION INTRAVENOUS PRN
Status: CANCELLED | OUTPATIENT
Start: 2023-05-17

## 2023-05-17 RX ORDER — EPINEPHRINE 1 MG/ML
0.3 INJECTION, SOLUTION, CONCENTRATE INTRAVENOUS PRN
Status: CANCELLED | OUTPATIENT
Start: 2023-05-17

## 2023-05-17 RX ORDER — OXYCODONE HYDROCHLORIDE 5 MG/1
5 TABLET ORAL EVERY 6 HOURS PRN
Qty: 60 TABLET | Refills: 0 | Status: SHIPPED | OUTPATIENT
Start: 2023-05-17 | End: 2023-06-16

## 2023-05-17 RX ORDER — ALBUTEROL SULFATE 90 UG/1
4 AEROSOL, METERED RESPIRATORY (INHALATION) PRN
Status: CANCELLED | OUTPATIENT
Start: 2023-05-17

## 2023-05-17 RX ORDER — ONDANSETRON 2 MG/ML
8 INJECTION INTRAMUSCULAR; INTRAVENOUS
Status: CANCELLED | OUTPATIENT
Start: 2023-05-17

## 2023-05-17 RX ORDER — MEPERIDINE HYDROCHLORIDE 50 MG/ML
12.5 INJECTION INTRAMUSCULAR; INTRAVENOUS; SUBCUTANEOUS PRN
Status: CANCELLED | OUTPATIENT
Start: 2023-05-17

## 2023-05-17 RX ORDER — SODIUM CHLORIDE 9 MG/ML
INJECTION, SOLUTION INTRAVENOUS CONTINUOUS
Status: CANCELLED | OUTPATIENT
Start: 2023-05-17

## 2023-05-17 RX ORDER — SODIUM CHLORIDE 0.9 % (FLUSH) 0.9 %
5-40 SYRINGE (ML) INJECTION PRN
Status: CANCELLED | OUTPATIENT
Start: 2023-05-17

## 2023-05-17 RX ORDER — FAMOTIDINE 10 MG/ML
20 INJECTION, SOLUTION INTRAVENOUS
Status: CANCELLED | OUTPATIENT
Start: 2023-05-17

## 2023-05-17 RX ORDER — SODIUM CHLORIDE 9 MG/ML
5-250 INJECTION, SOLUTION INTRAVENOUS PRN
Status: CANCELLED | OUTPATIENT
Start: 2023-05-17

## 2023-05-17 RX ORDER — SODIUM CHLORIDE 0.9 % (FLUSH) 0.9 %
5-40 SYRINGE (ML) INJECTION PRN
Status: DISCONTINUED | OUTPATIENT
Start: 2023-05-17 | End: 2023-05-18 | Stop reason: HOSPADM

## 2023-05-17 RX ORDER — ACETAMINOPHEN 325 MG/1
650 TABLET ORAL
Status: CANCELLED | OUTPATIENT
Start: 2023-05-17

## 2023-05-17 RX ORDER — DIPHENHYDRAMINE HYDROCHLORIDE 50 MG/ML
50 INJECTION INTRAMUSCULAR; INTRAVENOUS
Status: CANCELLED | OUTPATIENT
Start: 2023-05-17

## 2023-05-17 RX ORDER — HEPARIN SODIUM (PORCINE) LOCK FLUSH IV SOLN 100 UNIT/ML 100 UNIT/ML
500 SOLUTION INTRAVENOUS PRN
Status: DISCONTINUED | OUTPATIENT
Start: 2023-05-17 | End: 2023-05-18 | Stop reason: HOSPADM

## 2023-05-17 RX ORDER — FLUTICASONE FUROATE, UMECLIDINIUM BROMIDE AND VILANTEROL TRIFENATATE 100; 62.5; 25 UG/1; UG/1; UG/1
1 POWDER RESPIRATORY (INHALATION) DAILY
Status: ON HOLD | COMMUNITY

## 2023-05-17 RX ADMIN — SODIUM CHLORIDE 400 MG: 9 INJECTION, SOLUTION INTRAVENOUS at 13:10

## 2023-05-17 RX ADMIN — SODIUM CHLORIDE 25 ML/HR: 9 INJECTION, SOLUTION INTRAVENOUS at 13:10

## 2023-05-17 NOTE — PROGRESS NOTES
[facility] Visit Note    Pt arrived to [facility] ambulatory in no acute distress for C1 Keytruda. Assessment unremarkable except dyspnea with exertion, fatigue, and weakness. IV established in left AC without issue and positive blood return noted. Patient to MD office for appt.      Labs obtained - CBC w/ diff, CMP, TSH, Acute Hep Panel  Recent Results (from the past 12 hour(s))   CBC with Auto Differential    Collection Time: 05/17/23 11:19 AM   Result Value Ref Range    WBC 4.8 3.6 - 11.0 K/uL    RBC 4.08 3.80 - 5.20 M/uL    Hemoglobin 12.3 11.5 - 16.0 g/dL    Hematocrit 39.0 35.0 - 47.0 %    MCV 95.6 80.0 - 99.0 FL    MCH 30.1 26.0 - 34.0 PG    MCHC 31.5 30.0 - 36.5 g/dL    RDW 14.8 (H) 11.5 - 14.5 %    Platelets 475 938 - 444 K/uL    MPV 10.9 8.9 - 12.9 FL    Nucleated RBCs 0.0 0  WBC    nRBC 0.00 0.00 - 0.01 K/uL    Neutrophils % 57 32 - 75 %    Lymphocytes % 26 12 - 49 %    Monocytes % 14 (H) 5 - 13 %    Eosinophils % 3 0 - 7 %    Basophils % 0 0 - 1 %    Immature Granulocytes 0 0.0 - 0.5 %    Neutrophils Absolute 2.7 1.8 - 8.0 K/UL    Lymphocytes Absolute 1.2 0.8 - 3.5 K/UL    Monocytes Absolute 0.7 0.0 - 1.0 K/UL    Eosinophils Absolute 0.2 0.0 - 0.4 K/UL    Basophils Absolute 0.0 0.0 - 0.1 K/UL    Absolute Immature Granulocyte 0.0 0.00 - 0.04 K/UL    Differential Type AUTOMATED     Comprehensive Metabolic Panel    Collection Time: 05/17/23 11:19 AM   Result Value Ref Range    Sodium 137 136 - 145 mmol/L    Potassium 3.9 3.5 - 5.1 mmol/L    Chloride 104 97 - 108 mmol/L    CO2 32 21 - 32 mmol/L    Anion Gap 1 (L) 5 - 15 mmol/L    Glucose 82 65 - 100 mg/dL    BUN 10 6 - 20 MG/DL    Creatinine 0.55 0.55 - 1.02 MG/DL    Bun/Cre Ratio 18 12 - 20      Est, Glom Filt Rate >60 >60 ml/min/1.73m2    Calcium 9.7 8.5 - 10.1 MG/DL    Total Bilirubin 0.5 0.2 - 1.0 MG/DL    ALT 55 12 - 78 U/L    AST 56 (H) 15 - 37 U/L    Alk Phosphatase 139 (H) 45 - 117 U/L    Total Protein 7.4 6.4 - 8.2

## 2023-05-17 NOTE — PROGRESS NOTES
Linwood Para is a [de-identified] y.o. female here for follow up for NSCLC. Pt starting Pembrolizumab today. Weight is stable. Pt doing well. No concerns brought up. 1. Have you been to the ER, urgent care clinic since your last visit? Hospitalized since your last visit? no    2. Have you seen or consulted any other health care providers outside of the [address] [place] [address] since your last visit? Include any pap smears or colon screening.   no
no increased metabolic activity may represent a meningocele. ABDOMEN/PELVIS: There is focal increased metabolic activity in the liver in the   right lobe near the gallbladder most likely segment 5 with SUV of 7.9 may   represent a metastatic lesion. There are extensive atherosclerotic changes. SKELETON: There is focal increased metabolic activity in the right seventh rib   laterally. .      Impression   1. There is increased metabolic activity in the left upper lobe nodule   suspicious for primary neoplasm. There is increased metabolic activity in left   hilar lymph node suspicious for metastatic disease. 2. There is increased metabolic activity in the right seventh rib laterally and   there is focal increased metabolic activity in the right lobe of the liver   suspicious for metastatic disease. Roz Villasenor 23X               Assessment:        1. Adenocarcinoma of the lung with metastasis to the liver       PD-L1 0%   TMB high   NGS: PALB2 mutation present      ECOG PS 2   Prognosis: Guarded   Intent of Treatment - palliative      She is too weak to receive systemic chemotherapy. I gave her the option of doing noting and best supportive care. She chose to receive Pembrolizumab. Due to high TMB, I recommend Pembrolizumab every 6 weeks. Palliative therapy   Pembrolizumab cycle 1 day 1     Blood counts are acceptable. Results reviewed with the patient. I educated her about the side effects of the treatment and ways to manage it. She vocalized understanding. Plan:       Continue systemic therapy - Pembrolizumab   Return in 6 weeks. Signed by: Delmar Anaya NP      Attestation of attending physician. I personally saw and evaluated the patient and performed the key components of medical decision making with Delmar Anaya NP. I reviewed and verified the above documentation and modified it as needed. Ms. Ericka Epstein is a women with a diagnosis of mNSCLC. She is receiving palliative immunotherapy.

## 2023-06-20 RX ORDER — EPINEPHRINE 1 MG/ML
0.3 INJECTION, SOLUTION, CONCENTRATE INTRAVENOUS PRN
Status: CANCELLED | OUTPATIENT
Start: 2023-06-28

## 2023-06-20 RX ORDER — ONDANSETRON 2 MG/ML
8 INJECTION INTRAMUSCULAR; INTRAVENOUS
Status: CANCELLED | OUTPATIENT
Start: 2023-06-28

## 2023-06-20 RX ORDER — MEPERIDINE HYDROCHLORIDE 25 MG/ML
12.5 INJECTION INTRAMUSCULAR; INTRAVENOUS; SUBCUTANEOUS PRN
Status: CANCELLED | OUTPATIENT
Start: 2023-06-28

## 2023-06-20 RX ORDER — DIPHENHYDRAMINE HYDROCHLORIDE 50 MG/ML
50 INJECTION INTRAMUSCULAR; INTRAVENOUS
Status: CANCELLED | OUTPATIENT
Start: 2023-06-28

## 2023-06-20 RX ORDER — ALBUTEROL SULFATE 90 UG/1
4 AEROSOL, METERED RESPIRATORY (INHALATION) PRN
Status: CANCELLED | OUTPATIENT
Start: 2023-06-28

## 2023-06-20 RX ORDER — SODIUM CHLORIDE 9 MG/ML
5-250 INJECTION, SOLUTION INTRAVENOUS PRN
Status: CANCELLED | OUTPATIENT
Start: 2023-06-28

## 2023-06-20 RX ORDER — SODIUM CHLORIDE 9 MG/ML
INJECTION, SOLUTION INTRAVENOUS CONTINUOUS
Status: CANCELLED | OUTPATIENT
Start: 2023-06-28

## 2023-06-20 RX ORDER — ACETAMINOPHEN 325 MG/1
650 TABLET ORAL
Status: CANCELLED | OUTPATIENT
Start: 2023-06-28

## 2023-06-20 RX ORDER — HEPARIN 100 UNIT/ML
500 SYRINGE INTRAVENOUS PRN
Status: CANCELLED | OUTPATIENT
Start: 2023-06-28

## 2023-06-28 ENCOUNTER — OFFICE VISIT (OUTPATIENT)
Age: 83
End: 2023-06-28
Payer: MEDICARE

## 2023-06-28 ENCOUNTER — HOSPITAL ENCOUNTER (OUTPATIENT)
Facility: HOSPITAL | Age: 83
Setting detail: INFUSION SERIES
End: 2023-06-28
Payer: MEDICARE

## 2023-06-28 VITALS
HEART RATE: 100 BPM | HEIGHT: 72 IN | DIASTOLIC BLOOD PRESSURE: 64 MMHG | RESPIRATION RATE: 18 BRPM | OXYGEN SATURATION: 100 % | SYSTOLIC BLOOD PRESSURE: 108 MMHG | WEIGHT: 136.47 LBS | TEMPERATURE: 97.9 F | BODY MASS INDEX: 18.48 KG/M2

## 2023-06-28 VITALS
HEIGHT: 72 IN | HEART RATE: 100 BPM | WEIGHT: 136.4 LBS | DIASTOLIC BLOOD PRESSURE: 64 MMHG | RESPIRATION RATE: 18 BRPM | TEMPERATURE: 97.9 F | BODY MASS INDEX: 18.47 KG/M2 | SYSTOLIC BLOOD PRESSURE: 108 MMHG | OXYGEN SATURATION: 100 %

## 2023-06-28 DIAGNOSIS — C34.90 NSCLC METASTATIC TO LIVER (HCC): Primary | ICD-10-CM

## 2023-06-28 DIAGNOSIS — C78.7 NSCLC METASTATIC TO LIVER (HCC): Primary | ICD-10-CM

## 2023-06-28 DIAGNOSIS — C34.90 NON-SMALL CELL LUNG CANCER METASTATIC TO LIVER (HCC): Primary | ICD-10-CM

## 2023-06-28 DIAGNOSIS — R74.8 ELEVATED LIVER ENZYMES: ICD-10-CM

## 2023-06-28 DIAGNOSIS — C78.7 NON-SMALL CELL LUNG CANCER METASTATIC TO LIVER (HCC): Primary | ICD-10-CM

## 2023-06-28 LAB
ALBUMIN SERPL-MCNC: 2.8 G/DL (ref 3.5–5)
ALBUMIN/GLOB SERPL: 0.6 (ref 1.1–2.2)
ALP SERPL-CCNC: 170 U/L (ref 45–117)
ALT SERPL-CCNC: 296 U/L (ref 12–78)
ANION GAP SERPL CALC-SCNC: 3 MMOL/L (ref 5–15)
AST SERPL-CCNC: 325 U/L (ref 15–37)
BASOPHILS # BLD: 0.1 K/UL (ref 0–0.1)
BASOPHILS NFR BLD: 3 % (ref 0–1)
BILIRUB SERPL-MCNC: 0.6 MG/DL (ref 0.2–1)
BUN SERPL-MCNC: 8 MG/DL (ref 6–20)
BUN/CREAT SERPL: 16 (ref 12–20)
CALCIUM SERPL-MCNC: 9.5 MG/DL (ref 8.5–10.1)
CHLORIDE SERPL-SCNC: 99 MMOL/L (ref 97–108)
CO2 SERPL-SCNC: 30 MMOL/L (ref 21–32)
CREAT SERPL-MCNC: 0.49 MG/DL (ref 0.55–1.02)
DIFFERENTIAL METHOD BLD: ABNORMAL
EOSINOPHIL # BLD: 0.2 K/UL (ref 0–0.4)
EOSINOPHIL NFR BLD: 4 % (ref 0–7)
ERYTHROCYTE [DISTWIDTH] IN BLOOD BY AUTOMATED COUNT: 15.4 % (ref 11.5–14.5)
GLOBULIN SER CALC-MCNC: 4.4 G/DL (ref 2–4)
GLUCOSE SERPL-MCNC: 89 MG/DL (ref 65–100)
HCT VFR BLD AUTO: 37.2 % (ref 35–47)
HGB BLD-MCNC: 12.2 G/DL (ref 11.5–16)
IMM GRANULOCYTES # BLD AUTO: 0 K/UL (ref 0–0.04)
IMM GRANULOCYTES NFR BLD AUTO: 0 % (ref 0–0.5)
LYMPHOCYTES # BLD: 0.8 K/UL (ref 0.8–3.5)
LYMPHOCYTES NFR BLD: 16 % (ref 12–49)
MCH RBC QN AUTO: 30.3 PG (ref 26–34)
MCHC RBC AUTO-ENTMCNC: 32.8 G/DL (ref 30–36.5)
MCV RBC AUTO: 92.5 FL (ref 80–99)
MONOCYTES # BLD: 0.8 K/UL (ref 0–1)
MONOCYTES NFR BLD: 16 % (ref 5–13)
NEUTS BAND NFR BLD MANUAL: 1 %
NEUTS SEG # BLD: 2.9 K/UL (ref 1.8–8)
NEUTS SEG NFR BLD: 60 % (ref 32–75)
NRBC # BLD: 0 K/UL (ref 0–0.01)
NRBC BLD-RTO: 0 PER 100 WBC
PLATELET # BLD AUTO: 199 K/UL (ref 150–400)
PMV BLD AUTO: 10.5 FL (ref 8.9–12.9)
POTASSIUM SERPL-SCNC: 3.9 MMOL/L (ref 3.5–5.1)
PROT SERPL-MCNC: 7.2 G/DL (ref 6.4–8.2)
RBC # BLD AUTO: 4.02 M/UL (ref 3.8–5.2)
RBC MORPH BLD: ABNORMAL
SODIUM SERPL-SCNC: 132 MMOL/L (ref 136–145)
TSH SERPL DL<=0.05 MIU/L-ACNC: 1.41 UIU/ML (ref 0.36–3.74)
WBC # BLD AUTO: 4.8 K/UL (ref 3.6–11)

## 2023-06-28 PROCEDURE — G8427 DOCREV CUR MEDS BY ELIG CLIN: HCPCS | Performed by: INTERNAL MEDICINE

## 2023-06-28 PROCEDURE — G8420 CALC BMI NORM PARAMETERS: HCPCS | Performed by: INTERNAL MEDICINE

## 2023-06-28 PROCEDURE — 99215 OFFICE O/P EST HI 40 MIN: CPT | Performed by: INTERNAL MEDICINE

## 2023-06-28 PROCEDURE — 2580000003 HC RX 258: Performed by: INTERNAL MEDICINE

## 2023-06-28 PROCEDURE — 3078F DIAST BP <80 MM HG: CPT | Performed by: INTERNAL MEDICINE

## 2023-06-28 PROCEDURE — G8400 PT W/DXA NO RESULTS DOC: HCPCS | Performed by: INTERNAL MEDICINE

## 2023-06-28 PROCEDURE — 4004F PT TOBACCO SCREEN RCVD TLK: CPT | Performed by: INTERNAL MEDICINE

## 2023-06-28 PROCEDURE — 84443 ASSAY THYROID STIM HORMONE: CPT

## 2023-06-28 PROCEDURE — 36415 COLL VENOUS BLD VENIPUNCTURE: CPT

## 2023-06-28 PROCEDURE — 80053 COMPREHEN METABOLIC PANEL: CPT

## 2023-06-28 PROCEDURE — 85025 COMPLETE CBC W/AUTO DIFF WBC: CPT

## 2023-06-28 PROCEDURE — 3074F SYST BP LT 130 MM HG: CPT | Performed by: INTERNAL MEDICINE

## 2023-06-28 PROCEDURE — 1123F ACP DISCUSS/DSCN MKR DOCD: CPT | Performed by: INTERNAL MEDICINE

## 2023-06-28 PROCEDURE — 1090F PRES/ABSN URINE INCON ASSESS: CPT | Performed by: INTERNAL MEDICINE

## 2023-06-28 RX ORDER — PROCHLORPERAZINE MALEATE 10 MG
5 TABLET ORAL EVERY 6 HOURS PRN
Qty: 120 TABLET | Refills: 3 | Status: SHIPPED | OUTPATIENT
Start: 2023-06-28

## 2023-06-28 RX ORDER — SODIUM CHLORIDE 0.9 % (FLUSH) 0.9 %
5-40 SYRINGE (ML) INJECTION PRN
Status: DISCONTINUED | OUTPATIENT
Start: 2023-06-28 | End: 2023-06-29 | Stop reason: HOSPADM

## 2023-06-28 RX ADMIN — SODIUM CHLORIDE, PRESERVATIVE FREE 10 ML: 5 INJECTION INTRAVENOUS at 13:52

## 2023-07-05 DIAGNOSIS — C78.7 NON-SMALL CELL LUNG CANCER METASTATIC TO LIVER (HCC): ICD-10-CM

## 2023-07-05 DIAGNOSIS — R74.8 ELEVATED LIVER ENZYMES: ICD-10-CM

## 2023-07-05 DIAGNOSIS — C34.90 NON-SMALL CELL LUNG CANCER METASTATIC TO LIVER (HCC): ICD-10-CM

## 2023-07-06 LAB
ALBUMIN SERPL-MCNC: 2.8 G/DL (ref 3.5–5)
ALBUMIN/GLOB SERPL: 0.7 (ref 1.1–2.2)
ALP SERPL-CCNC: 154 U/L (ref 45–117)
ALT SERPL-CCNC: 268 U/L (ref 12–78)
ANION GAP SERPL CALC-SCNC: 2 MMOL/L (ref 5–15)
AST SERPL-CCNC: 342 U/L (ref 15–37)
BILIRUB SERPL-MCNC: 0.5 MG/DL (ref 0.2–1)
BUN SERPL-MCNC: 9 MG/DL (ref 6–20)
BUN/CREAT SERPL: 19 (ref 12–20)
CALCIUM SERPL-MCNC: 9.6 MG/DL (ref 8.5–10.1)
CHLORIDE SERPL-SCNC: 99 MMOL/L (ref 97–108)
CO2 SERPL-SCNC: 31 MMOL/L (ref 21–32)
CREAT SERPL-MCNC: 0.48 MG/DL (ref 0.55–1.02)
GLOBULIN SER CALC-MCNC: 4.1 G/DL (ref 2–4)
GLUCOSE SERPL-MCNC: 105 MG/DL (ref 65–100)
POTASSIUM SERPL-SCNC: 4 MMOL/L (ref 3.5–5.1)
PROT SERPL-MCNC: 6.9 G/DL (ref 6.4–8.2)
SODIUM SERPL-SCNC: 132 MMOL/L (ref 136–145)

## 2023-07-10 ENCOUNTER — TELEPHONE (OUTPATIENT)
Age: 83
End: 2023-07-10

## 2023-07-10 NOTE — TELEPHONE ENCOUNTER
Called pt to ensure she is going to get labs done again this week. No answer left VM asking for a call back.

## 2023-07-12 DIAGNOSIS — C78.7 NON-SMALL CELL LUNG CANCER METASTATIC TO LIVER (HCC): ICD-10-CM

## 2023-07-12 DIAGNOSIS — C78.7 NSCLC METASTATIC TO LIVER (HCC): ICD-10-CM

## 2023-07-12 DIAGNOSIS — R74.8 ELEVATED LIVER ENZYMES: ICD-10-CM

## 2023-07-12 DIAGNOSIS — C34.90 NSCLC METASTATIC TO LIVER (HCC): ICD-10-CM

## 2023-07-12 DIAGNOSIS — C34.90 NON-SMALL CELL LUNG CANCER METASTATIC TO LIVER (HCC): ICD-10-CM

## 2023-07-13 LAB
ALBUMIN SERPL-MCNC: 2.9 G/DL (ref 3.5–5)
ALBUMIN/GLOB SERPL: 0.7 (ref 1.1–2.2)
ALP SERPL-CCNC: 158 U/L (ref 45–117)
ALT SERPL-CCNC: 241 U/L (ref 12–78)
ANION GAP SERPL CALC-SCNC: 6 MMOL/L (ref 5–15)
AST SERPL-CCNC: 312 U/L (ref 15–37)
BILIRUB SERPL-MCNC: 0.5 MG/DL (ref 0.2–1)
BUN SERPL-MCNC: 8 MG/DL (ref 6–20)
BUN/CREAT SERPL: 15 (ref 12–20)
CALCIUM SERPL-MCNC: 9.6 MG/DL (ref 8.5–10.1)
CHLORIDE SERPL-SCNC: 97 MMOL/L (ref 97–108)
CO2 SERPL-SCNC: 29 MMOL/L (ref 21–32)
CREAT SERPL-MCNC: 0.52 MG/DL (ref 0.55–1.02)
GLOBULIN SER CALC-MCNC: 4.4 G/DL (ref 2–4)
GLUCOSE SERPL-MCNC: 94 MG/DL (ref 65–100)
POTASSIUM SERPL-SCNC: 4.4 MMOL/L (ref 3.5–5.1)
PROT SERPL-MCNC: 7.3 G/DL (ref 6.4–8.2)
SODIUM SERPL-SCNC: 132 MMOL/L (ref 136–145)

## 2023-07-20 ENCOUNTER — TELEPHONE (OUTPATIENT)
Age: 83
End: 2023-07-20

## 2023-07-20 DIAGNOSIS — R74.8 ELEVATED LIVER ENZYMES: ICD-10-CM

## 2023-07-20 DIAGNOSIS — C78.7 NON-SMALL CELL LUNG CANCER METASTATIC TO LIVER (HCC): Primary | ICD-10-CM

## 2023-07-20 DIAGNOSIS — C34.90 NON-SMALL CELL LUNG CANCER METASTATIC TO LIVER (HCC): Primary | ICD-10-CM

## 2023-07-20 DIAGNOSIS — C78.7 NON-SMALL CELL LUNG CANCER METASTATIC TO LIVER (HCC): ICD-10-CM

## 2023-07-20 DIAGNOSIS — C34.90 NON-SMALL CELL LUNG CANCER METASTATIC TO LIVER (HCC): ICD-10-CM

## 2023-07-20 NOTE — TELEPHONE ENCOUNTER
Called patient to schedule appt. N/A - patient has been tentatively scheduled and left a message to confirm or reschedule.

## 2023-07-21 LAB
ALBUMIN SERPL-MCNC: 3 G/DL (ref 3.5–5)
ALBUMIN/GLOB SERPL: 0.7 (ref 1.1–2.2)
ALP SERPL-CCNC: 143 U/L (ref 45–117)
ALT SERPL-CCNC: 222 U/L (ref 12–78)
ANION GAP SERPL CALC-SCNC: 5 MMOL/L (ref 5–15)
AST SERPL-CCNC: 315 U/L (ref 15–37)
BILIRUB SERPL-MCNC: 0.6 MG/DL (ref 0.2–1)
BUN SERPL-MCNC: 11 MG/DL (ref 6–20)
BUN/CREAT SERPL: 20 (ref 12–20)
CALCIUM SERPL-MCNC: 9.6 MG/DL (ref 8.5–10.1)
CHLORIDE SERPL-SCNC: 99 MMOL/L (ref 97–108)
CO2 SERPL-SCNC: 25 MMOL/L (ref 21–32)
CREAT SERPL-MCNC: 0.56 MG/DL (ref 0.55–1.02)
GLOBULIN SER CALC-MCNC: 4.1 G/DL (ref 2–4)
GLUCOSE SERPL-MCNC: 100 MG/DL (ref 65–100)
POTASSIUM SERPL-SCNC: 4.7 MMOL/L (ref 3.5–5.1)
PROT SERPL-MCNC: 7.1 G/DL (ref 6.4–8.2)
SODIUM SERPL-SCNC: 129 MMOL/L (ref 136–145)

## 2023-07-26 DIAGNOSIS — C78.7 NON-SMALL CELL LUNG CANCER METASTATIC TO LIVER (HCC): Primary | ICD-10-CM

## 2023-07-26 DIAGNOSIS — C34.90 NON-SMALL CELL LUNG CANCER METASTATIC TO LIVER (HCC): Primary | ICD-10-CM

## 2023-07-26 DIAGNOSIS — R74.8 ELEVATED LIVER ENZYMES: ICD-10-CM

## 2023-07-26 NOTE — PROGRESS NOTES
MAGGIE FROM DR Jennifer Santiago CMP     Pt liver enzymes still elevated. Will enter two more sets of labs.

## 2023-07-29 ENCOUNTER — HOSPITAL ENCOUNTER (EMERGENCY)
Facility: HOSPITAL | Age: 83
Discharge: HOME | End: 2023-07-29
Attending: EMERGENCY MEDICINE
Payer: MEDICARE

## 2023-07-29 VITALS
DIASTOLIC BLOOD PRESSURE: 51 MMHG | BODY MASS INDEX: 18.56 KG/M2 | TEMPERATURE: 98.2 F | OXYGEN SATURATION: 98 % | RESPIRATION RATE: 18 BRPM | HEART RATE: 76 BPM | SYSTOLIC BLOOD PRESSURE: 113 MMHG | WEIGHT: 134.92 LBS

## 2023-07-29 DIAGNOSIS — N30.00 ACUTE CYSTITIS WITHOUT HEMATURIA: Primary | ICD-10-CM

## 2023-07-29 LAB
APPEARANCE UR: ABNORMAL
BACTERIA URNS QL MICRO: NEGATIVE /HPF
BILIRUB UR QL: NEGATIVE
COLOR UR: ABNORMAL
EPITH CASTS URNS QL MICRO: ABNORMAL /LPF
GLUCOSE UR STRIP.AUTO-MCNC: NEGATIVE MG/DL
HGB UR QL STRIP: ABNORMAL
KETONES UR QL STRIP.AUTO: NEGATIVE MG/DL
LEUKOCYTE ESTERASE UR QL STRIP.AUTO: ABNORMAL
NITRITE UR QL STRIP.AUTO: NEGATIVE
PH UR STRIP: 6.5 (ref 5–8)
PROT UR STRIP-MCNC: 100 MG/DL
RBC #/AREA URNS HPF: ABNORMAL /HPF (ref 0–5)
SP GR UR REFRACTOMETRY: 1.01
URINE CULTURE IF INDICATED: ABNORMAL
UROBILINOGEN UR QL STRIP.AUTO: 1 EU/DL (ref 0.2–1)
WBC URNS QL MICRO: >100 /HPF (ref 0–4)

## 2023-07-29 PROCEDURE — 6370000000 HC RX 637 (ALT 250 FOR IP): Performed by: EMERGENCY MEDICINE

## 2023-07-29 PROCEDURE — 87077 CULTURE AEROBIC IDENTIFY: CPT

## 2023-07-29 PROCEDURE — 87186 SC STD MICRODIL/AGAR DIL: CPT

## 2023-07-29 PROCEDURE — 99284 EMERGENCY DEPT VISIT MOD MDM: CPT

## 2023-07-29 PROCEDURE — 81001 URINALYSIS AUTO W/SCOPE: CPT

## 2023-07-29 PROCEDURE — 87086 URINE CULTURE/COLONY COUNT: CPT

## 2023-07-29 RX ORDER — CEPHALEXIN 250 MG/1
500 CAPSULE ORAL
Status: COMPLETED | OUTPATIENT
Start: 2023-07-29 | End: 2023-07-29

## 2023-07-29 RX ORDER — PHENAZOPYRIDINE HYDROCHLORIDE 100 MG/1
100 TABLET, FILM COATED ORAL 3 TIMES DAILY PRN
Qty: 6 TABLET | Refills: 0 | Status: SHIPPED | OUTPATIENT
Start: 2023-07-29 | End: 2023-08-01

## 2023-07-29 RX ORDER — CEPHALEXIN 500 MG/1
500 CAPSULE ORAL 3 TIMES DAILY
Qty: 15 CAPSULE | Refills: 0 | Status: SHIPPED | OUTPATIENT
Start: 2023-07-29 | End: 2023-08-03

## 2023-07-29 RX ORDER — PHENAZOPYRIDINE HYDROCHLORIDE 100 MG/1
100 TABLET, FILM COATED ORAL
Status: COMPLETED | OUTPATIENT
Start: 2023-07-29 | End: 2023-07-29

## 2023-07-29 RX ADMIN — CEPHALEXIN 500 MG: 250 CAPSULE ORAL at 16:59

## 2023-07-29 RX ADMIN — PHENAZOPYRIDINE 100 MG: 100 TABLET ORAL at 16:59

## 2023-07-29 ASSESSMENT — PAIN SCALES - GENERAL: PAINLEVEL_OUTOF10: 3

## 2023-07-30 LAB
BACTERIA SPEC CULT: ABNORMAL
CC UR VC: ABNORMAL
SERVICE CMNT-IMP: ABNORMAL

## 2023-07-31 ENCOUNTER — OFFICE VISIT (OUTPATIENT)
Age: 83
End: 2023-07-31
Payer: MEDICARE

## 2023-07-31 VITALS
HEART RATE: 78 BPM | BODY MASS INDEX: 18.69 KG/M2 | TEMPERATURE: 97.7 F | HEIGHT: 72 IN | SYSTOLIC BLOOD PRESSURE: 148 MMHG | DIASTOLIC BLOOD PRESSURE: 71 MMHG | WEIGHT: 138 LBS | OXYGEN SATURATION: 99 %

## 2023-07-31 DIAGNOSIS — G89.3 CANCER RELATED PAIN: ICD-10-CM

## 2023-07-31 DIAGNOSIS — Z92.89 HISTORY OF IMMUNOTHERAPY: ICD-10-CM

## 2023-07-31 DIAGNOSIS — R74.8 ELEVATED LIVER ENZYMES: ICD-10-CM

## 2023-07-31 DIAGNOSIS — Z79.52 CURRENT USE OF STEROID MEDICATION: ICD-10-CM

## 2023-07-31 DIAGNOSIS — C34.90 NON-SMALL CELL LUNG CANCER METASTATIC TO LIVER (HCC): Primary | ICD-10-CM

## 2023-07-31 DIAGNOSIS — C78.7 NON-SMALL CELL LUNG CANCER METASTATIC TO LIVER (HCC): Primary | ICD-10-CM

## 2023-07-31 LAB
BACTERIA SPEC CULT: ABNORMAL
CC UR VC: ABNORMAL
SERVICE CMNT-IMP: ABNORMAL

## 2023-07-31 PROCEDURE — 4004F PT TOBACCO SCREEN RCVD TLK: CPT | Performed by: INTERNAL MEDICINE

## 2023-07-31 PROCEDURE — 3077F SYST BP >= 140 MM HG: CPT | Performed by: INTERNAL MEDICINE

## 2023-07-31 PROCEDURE — 99214 OFFICE O/P EST MOD 30 MIN: CPT | Performed by: INTERNAL MEDICINE

## 2023-07-31 PROCEDURE — G8400 PT W/DXA NO RESULTS DOC: HCPCS | Performed by: INTERNAL MEDICINE

## 2023-07-31 PROCEDURE — G8420 CALC BMI NORM PARAMETERS: HCPCS | Performed by: INTERNAL MEDICINE

## 2023-07-31 PROCEDURE — G8428 CUR MEDS NOT DOCUMENT: HCPCS | Performed by: INTERNAL MEDICINE

## 2023-07-31 PROCEDURE — 3078F DIAST BP <80 MM HG: CPT | Performed by: INTERNAL MEDICINE

## 2023-07-31 PROCEDURE — 1123F ACP DISCUSS/DSCN MKR DOCD: CPT | Performed by: INTERNAL MEDICINE

## 2023-07-31 PROCEDURE — 1090F PRES/ABSN URINE INCON ASSESS: CPT | Performed by: INTERNAL MEDICINE

## 2023-07-31 RX ORDER — PREDNISONE 10 MG/1
60 TABLET ORAL DAILY
Qty: 180 TABLET | Refills: 0 | Status: SHIPPED | OUTPATIENT
Start: 2023-07-31 | End: 2023-08-30

## 2023-07-31 RX ORDER — OXYCODONE HYDROCHLORIDE 5 MG/1
5 TABLET ORAL EVERY 6 HOURS PRN
Qty: 28 TABLET | Refills: 0 | Status: SHIPPED | OUTPATIENT
Start: 2023-07-31 | End: 2023-08-07

## 2023-07-31 RX ORDER — PANTOPRAZOLE SODIUM 40 MG/1
40 TABLET, DELAYED RELEASE ORAL
Qty: 30 TABLET | Refills: 1 | Status: SHIPPED | OUTPATIENT
Start: 2023-07-31

## 2023-07-31 NOTE — PROGRESS NOTES
Glenroy Jimenez is a 82 y.o. female here for follow up appt for NSCLC. Pt is extremely fatigued. 1. Have you been to the ER, urgent care clinic since your last visit? Hospitalized since your last visit? 7/29/23 UTI - taking Keflex    2. Have you seen or consulted any other health care providers outside of the [address] [place] [address] Avenue since your last visit? Include any pap smears or colon screening.     no
[facility]   at [address] [place], [address] [facility], [address] [place] [address]   [phone]      Oncology Note          Patient: Veronique Martino  MRN: [id number]   SSN: [id number]    YOB: 1940             Diagnosis        1. Adenocarcinoma of the lung with metastasis to the liver       PD-L1 0%   TMB high   NGS: PALB2 mutation present      Treatment:     Pembrolizumab 1 cycle       Subjective:         Veronique Martino is a 82 y.o. female who I am seeing for advanced NSCLC. She suffers with severe COPD and in on ambulatory O2 by nasal canula. In imaging studies she was noted to have an KATHERIN lung nodule and enlarged mediastinal LNs and in the recent PET  there is an abnormal area in the liver. She denies pain or headache. A biopsy of the liver established a diagnosis a adenocarcinoma of the lung. She comes with her daughter to this office visit. Interval History:    She is receiving palliative immunotherapy. She reports itching on her arms. She only took the pain medication once and slept pain free but said she woke up nauseated. Review of Systems:      Constitutional: negative   Eyes: negative   Ears, Nose, Mouth, Throat, and Face: negative   Respiratory: positive for dyspnea on exertion   Cardiovascular: negative   Gastrointestinal: negative   Genitourinary:negative   Integument/Breast: negative   Hematologic/Lymphatic: negative   Musculoskeletal:negative   Neurological: negative    Review of systems was reviewed and updated as needed on 07/31/23.          Past Medical History:   Diagnosis Date    Atrial fibrillation ([address])     Complete heart block ([address]) 9/6/2014    Early satiety 1/28/2016    Hypertension     ICD (implantable cardioverter-defibrillator) in place     Long term current use of anticoagulant therapy     Lung cancer ([address])     Myocardial infarction ([address]) 2014    Nausea & vomiting     Neoplasm of uncertain
Keytruda  Repeat labs weekly until transaminitis improves to Gr 1       2.  Immune related transaminitis    Hold Keytruda and repeat labs weekly         Plan:       Hold systemic therapy - Pembrolizumab   Labs weekly      Signed by: IVET Burnett - NP                     July 31, 2023

## 2023-08-01 NOTE — ED PROVIDER NOTES
[facility] EMERGENCY DEPT  EMERGENCY DEPARTMENT ENCOUNTER       Pt Name: Everitt Lundborg  MRN: [id number]  [facility] 1940  Date of evaluation: 7/29/2023  Provider: Darcy Rodriguez DO   PCP: China Jacques MD  Note Started: 10:17 AM EDT 8/1/23     CHIEF COMPLAINT       Chief Complaint   Patient presents with    Urinary Tract Infection     Pt began with urgency with urination onset today. Concern for uti. Pt can go, it is very little, doesn't feel as though she is emptying her bladder. HISTORY OF PRESENT ILLNESS: 1 or more elements      History From: patient, History limited by: none     Everitt Lundborg is a 82 y.o. female presents to the emergency department for concern of possible urinary tract infection. Patient states that she began today with urinary urgency. She states that she has the urge to go urinate and goes to go to the bathroom but only passes a small amount of urine. She states there has been some burning and discomfort with urination. She denies any fever or chills. She denies chest pain or shortness of breath. Patient does have a history of COPD and is on oxygen chronically at 4 L/min. She denies any nausea, vomiting or diarrhea. Patient states she has had some mild suprapubic discomfort. Please See MDM for Additional Details of the HPI/PMH  Nursing Notes were all reviewed and agreed with or any disagreements were addressed in the HPI. REVIEW OF SYSTEMS        Positives and Pertinent negatives as per HPI.     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

## 2023-08-09 DIAGNOSIS — R74.8 ELEVATED LIVER ENZYMES: ICD-10-CM

## 2023-08-09 DIAGNOSIS — C34.90 NON-SMALL CELL LUNG CANCER METASTATIC TO LIVER (HCC): ICD-10-CM

## 2023-08-09 DIAGNOSIS — Z92.89 HISTORY OF IMMUNOTHERAPY: ICD-10-CM

## 2023-08-09 DIAGNOSIS — C78.7 NON-SMALL CELL LUNG CANCER METASTATIC TO LIVER (HCC): ICD-10-CM

## 2023-08-10 LAB
ALBUMIN SERPL-MCNC: 3.1 G/DL (ref 3.5–5)
ALBUMIN/GLOB SERPL: 0.7 (ref 1.1–2.2)
ALP SERPL-CCNC: 169 U/L (ref 45–117)
ALT SERPL-CCNC: 86 U/L (ref 12–78)
ANION GAP SERPL CALC-SCNC: 6 MMOL/L (ref 5–15)
AST SERPL-CCNC: 38 U/L (ref 15–37)
BILIRUB SERPL-MCNC: 0.4 MG/DL (ref 0.2–1)
BUN SERPL-MCNC: 12 MG/DL (ref 6–20)
BUN/CREAT SERPL: 18 (ref 12–20)
CALCIUM SERPL-MCNC: 9 MG/DL (ref 8.5–10.1)
CHLORIDE SERPL-SCNC: 99 MMOL/L (ref 97–108)
CO2 SERPL-SCNC: 27 MMOL/L (ref 21–32)
COMMENT:: NORMAL
CREAT SERPL-MCNC: 0.65 MG/DL (ref 0.55–1.02)
GLOBULIN SER CALC-MCNC: 4.2 G/DL (ref 2–4)
GLUCOSE SERPL-MCNC: 154 MG/DL (ref 65–100)
POTASSIUM SERPL-SCNC: 4.5 MMOL/L (ref 3.5–5.1)
PROT SERPL-MCNC: 7.3 G/DL (ref 6.4–8.2)
SODIUM SERPL-SCNC: 132 MMOL/L (ref 136–145)
SPECIMEN HOLD: NORMAL
T4 FREE SERPL-MCNC: 1.3 NG/DL (ref 0.8–1.5)
TSH SERPL DL<=0.05 MIU/L-ACNC: 0.93 UIU/ML (ref 0.36–3.74)

## 2023-08-11 DIAGNOSIS — C34.90 NON-SMALL CELL LUNG CANCER METASTATIC TO LIVER (HCC): Primary | ICD-10-CM

## 2023-08-11 DIAGNOSIS — C78.7 NON-SMALL CELL LUNG CANCER METASTATIC TO LIVER (HCC): Primary | ICD-10-CM

## 2023-08-11 NOTE — PROGRESS NOTES
Per . Labs in 4 weeks from last lab draw (8/9)  Continue prednisone taper. No date when to return to see us as of now.

## 2023-08-14 ENCOUNTER — TELEPHONE (OUTPATIENT)
Age: 83
End: 2023-08-14

## 2023-08-23 ENCOUNTER — TELEPHONE (OUTPATIENT)
Age: 83
End: 2023-08-23

## 2023-08-23 ENCOUNTER — PATIENT MESSAGE (OUTPATIENT)
Age: 83
End: 2023-08-23

## 2023-08-23 DIAGNOSIS — Z79.52 CURRENT USE OF STEROID MEDICATION: ICD-10-CM

## 2023-08-23 DIAGNOSIS — R74.8 ELEVATED LIVER ENZYMES: ICD-10-CM

## 2023-08-23 DIAGNOSIS — C78.7 NON-SMALL CELL LUNG CANCER METASTATIC TO LIVER (HCC): ICD-10-CM

## 2023-08-23 DIAGNOSIS — C34.90 NON-SMALL CELL LUNG CANCER METASTATIC TO LIVER (HCC): ICD-10-CM

## 2023-08-23 RX ORDER — PANTOPRAZOLE SODIUM 40 MG/1
40 TABLET, DELAYED RELEASE ORAL
Qty: 30 TABLET | Refills: 1 | Status: SHIPPED | OUTPATIENT
Start: 2023-08-23 | End: 2023-08-24 | Stop reason: SDUPTHER

## 2023-08-23 NOTE — TELEPHONE ENCOUNTER
Pt sent a [employer] asking for a call. Called pt. HIPAA verified by two patient identifiers. She said she has not been able to sleep, last night was the worst night. This has been going on for about a week. Today she noticed weeping of her legs. She has had swelling but it has gotten much worse. No difficulty breathing and no other symptoms reported. She said she has tried every other the counter medication to help her sleep. Case d/w NP. Best to have her come in tomorrow to assess her. Pt has been on 60mg prednisone daily since 7/31/23, She is splitting the pills up throughout the day (2 in morning, 2 in afternoon, and 3 in evening with food), she asked what she should do tonight for sleep and I asked her to hold the 2 tabs tonight.

## 2023-08-24 ENCOUNTER — HOSPITAL ENCOUNTER (OUTPATIENT)
Facility: HOSPITAL | Age: 83
Setting detail: INFUSION SERIES
End: 2023-08-24
Payer: MEDICARE

## 2023-08-24 ENCOUNTER — OFFICE VISIT (OUTPATIENT)
Age: 83
End: 2023-08-24
Payer: MEDICARE

## 2023-08-24 VITALS
WEIGHT: 152 LBS | DIASTOLIC BLOOD PRESSURE: 80 MMHG | SYSTOLIC BLOOD PRESSURE: 128 MMHG | HEIGHT: 72 IN | HEART RATE: 86 BPM | OXYGEN SATURATION: 98 % | TEMPERATURE: 98 F | BODY MASS INDEX: 20.59 KG/M2

## 2023-08-24 VITALS
RESPIRATION RATE: 18 BRPM | HEART RATE: 86 BPM | TEMPERATURE: 98 F | DIASTOLIC BLOOD PRESSURE: 80 MMHG | SYSTOLIC BLOOD PRESSURE: 128 MMHG | OXYGEN SATURATION: 98 %

## 2023-08-24 DIAGNOSIS — C34.90 NSCLC METASTATIC TO LIVER (HCC): Primary | ICD-10-CM

## 2023-08-24 DIAGNOSIS — C78.7 NON-SMALL CELL LUNG CANCER METASTATIC TO LIVER (HCC): ICD-10-CM

## 2023-08-24 DIAGNOSIS — C78.7 NSCLC METASTATIC TO LIVER (HCC): Primary | ICD-10-CM

## 2023-08-24 DIAGNOSIS — I89.0 LYMPHEDEMA: ICD-10-CM

## 2023-08-24 DIAGNOSIS — C34.90 NON-SMALL CELL LUNG CANCER METASTATIC TO LIVER (HCC): ICD-10-CM

## 2023-08-24 DIAGNOSIS — C78.7 NON-SMALL CELL LUNG CANCER METASTATIC TO LIVER (HCC): Primary | ICD-10-CM

## 2023-08-24 DIAGNOSIS — Z79.52 CURRENT USE OF STEROID MEDICATION: ICD-10-CM

## 2023-08-24 DIAGNOSIS — C34.90 NON-SMALL CELL LUNG CANCER METASTATIC TO LIVER (HCC): Primary | ICD-10-CM

## 2023-08-24 DIAGNOSIS — R74.8 ELEVATED LIVER ENZYMES: ICD-10-CM

## 2023-08-24 LAB
ALBUMIN SERPL-MCNC: 2.8 G/DL (ref 3.5–5)
ALBUMIN/GLOB SERPL: 0.8 (ref 1.1–2.2)
ALP SERPL-CCNC: 147 U/L (ref 45–117)
ALT SERPL-CCNC: 45 U/L (ref 12–78)
ANION GAP SERPL CALC-SCNC: 2 MMOL/L (ref 5–15)
AST SERPL-CCNC: 28 U/L (ref 15–37)
BASOPHILS # BLD: 0 K/UL (ref 0–0.1)
BASOPHILS NFR BLD: 0 % (ref 0–1)
BILIRUB SERPL-MCNC: 0.5 MG/DL (ref 0.2–1)
BUN SERPL-MCNC: 16 MG/DL (ref 6–20)
BUN/CREAT SERPL: 26 (ref 12–20)
CALCIUM SERPL-MCNC: 8.4 MG/DL (ref 8.5–10.1)
CHLORIDE SERPL-SCNC: 96 MMOL/L (ref 97–108)
CO2 SERPL-SCNC: 32 MMOL/L (ref 21–32)
CREAT SERPL-MCNC: 0.62 MG/DL (ref 0.55–1.02)
DIFFERENTIAL METHOD BLD: ABNORMAL
EOSINOPHIL # BLD: 0.2 K/UL (ref 0–0.4)
EOSINOPHIL NFR BLD: 1 % (ref 0–7)
ERYTHROCYTE [DISTWIDTH] IN BLOOD BY AUTOMATED COUNT: 15.9 % (ref 11.5–14.5)
GLOBULIN SER CALC-MCNC: 3.6 G/DL (ref 2–4)
GLUCOSE SERPL-MCNC: 85 MG/DL (ref 65–100)
HCT VFR BLD AUTO: 39 % (ref 35–47)
HGB BLD-MCNC: 13.4 G/DL (ref 11.5–16)
IMM GRANULOCYTES # BLD AUTO: 0.1 K/UL (ref 0–0.04)
IMM GRANULOCYTES NFR BLD AUTO: 1 % (ref 0–0.5)
LYMPHOCYTES # BLD: 1.1 K/UL (ref 0.8–3.5)
LYMPHOCYTES NFR BLD: 9 % (ref 12–49)
MCH RBC QN AUTO: 31.2 PG (ref 26–34)
MCHC RBC AUTO-ENTMCNC: 34.4 G/DL (ref 30–36.5)
MCV RBC AUTO: 90.7 FL (ref 80–99)
MONOCYTES # BLD: 1.5 K/UL (ref 0–1)
MONOCYTES NFR BLD: 12 % (ref 5–13)
NEUTS SEG # BLD: 10 K/UL (ref 1.8–8)
NEUTS SEG NFR BLD: 77 % (ref 32–75)
NRBC # BLD: 0 K/UL (ref 0–0.01)
NRBC BLD-RTO: 0 PER 100 WBC
PLATELET # BLD AUTO: 145 K/UL (ref 150–400)
PMV BLD AUTO: 10.2 FL (ref 8.9–12.9)
POTASSIUM SERPL-SCNC: 4.3 MMOL/L (ref 3.5–5.1)
PROT SERPL-MCNC: 6.4 G/DL (ref 6.4–8.2)
RBC # BLD AUTO: 4.3 M/UL (ref 3.8–5.2)
SODIUM SERPL-SCNC: 130 MMOL/L (ref 136–145)
WBC # BLD AUTO: 13 K/UL (ref 3.6–11)

## 2023-08-24 PROCEDURE — 80053 COMPREHEN METABOLIC PANEL: CPT

## 2023-08-24 PROCEDURE — G8400 PT W/DXA NO RESULTS DOC: HCPCS | Performed by: INTERNAL MEDICINE

## 2023-08-24 PROCEDURE — 1090F PRES/ABSN URINE INCON ASSESS: CPT | Performed by: INTERNAL MEDICINE

## 2023-08-24 PROCEDURE — G8420 CALC BMI NORM PARAMETERS: HCPCS | Performed by: INTERNAL MEDICINE

## 2023-08-24 PROCEDURE — 1123F ACP DISCUSS/DSCN MKR DOCD: CPT | Performed by: INTERNAL MEDICINE

## 2023-08-24 PROCEDURE — 3074F SYST BP LT 130 MM HG: CPT | Performed by: INTERNAL MEDICINE

## 2023-08-24 PROCEDURE — 4004F PT TOBACCO SCREEN RCVD TLK: CPT | Performed by: INTERNAL MEDICINE

## 2023-08-24 PROCEDURE — 85025 COMPLETE CBC W/AUTO DIFF WBC: CPT

## 2023-08-24 PROCEDURE — 99214 OFFICE O/P EST MOD 30 MIN: CPT | Performed by: INTERNAL MEDICINE

## 2023-08-24 PROCEDURE — 3079F DIAST BP 80-89 MM HG: CPT | Performed by: INTERNAL MEDICINE

## 2023-08-24 PROCEDURE — G8428 CUR MEDS NOT DOCUMENT: HCPCS | Performed by: INTERNAL MEDICINE

## 2023-08-24 PROCEDURE — 36415 COLL VENOUS BLD VENIPUNCTURE: CPT

## 2023-08-24 RX ORDER — PANTOPRAZOLE SODIUM 40 MG/1
40 TABLET, DELAYED RELEASE ORAL
Qty: 30 TABLET | Refills: 0 | Status: SHIPPED | OUTPATIENT
Start: 2023-08-24 | End: 2023-09-23

## 2023-08-24 RX ORDER — SODIUM CHLORIDE 0.9 % (FLUSH) 0.9 %
5-40 SYRINGE (ML) INJECTION PRN
OUTPATIENT
Start: 2023-08-24

## 2023-08-24 RX ORDER — PREDNISONE 10 MG/1
40 TABLET ORAL DAILY
Qty: 120 TABLET | Refills: 0 | Status: SHIPPED | OUTPATIENT
Start: 2023-08-24 | End: 2023-09-23

## 2023-08-24 RX ORDER — HEPARIN SODIUM (PORCINE) LOCK FLUSH IV SOLN 100 UNIT/ML 100 UNIT/ML
500 SOLUTION INTRAVENOUS PRN
OUTPATIENT
Start: 2023-08-24

## 2023-08-24 RX ORDER — SODIUM CHLORIDE 9 MG/ML
25 INJECTION, SOLUTION INTRAVENOUS PRN
OUTPATIENT
Start: 2023-08-24

## 2023-08-24 NOTE — PROGRESS NOTES
[facility]   at [address] [place], [address], [facility], [address] [place] [address]   [phone]      Oncology Note          Patient: Everitt Lundborg  MRN: [id number]   SSN: [id number]    YOB: 1940           Diagnosis        1. Adenocarcinoma of the lung with metastasis to the liver       PD-L1 0%   TMB high   NGS: PALB2 mutation present     Treatment:     Pembrolizumab 1 cycle       Subjective:         Everitt Lundborg is a 82 y.o. female who I am seeing for advanced NSCLC. She suffers with severe COPD and in on ambulatory O2 by nasal canula. In imaging studies she was noted to have an KATHERIN lung nodule and enlarged mediastinal LNs and in the recent PET  there is an abnormal area in the liver. She denies pain or headache. A biopsy of the liver established a diagnosis a adenocarcinoma of the lung. She comes with her daughter to this office visit. Interval History:    She received one cycle of immunotherapy. She developed Gr 3 LFT elevation. She is on high dose steroids. She is now experiencing worsening swelling in both legs, pain and insomnia. Review of Systems:      Constitutional: insomnia   Eyes: negative   Ears, Nose, Mouth, Throat, and Face: negative   Respiratory: positive for dyspnea on exertion   Cardiovascular: negative   Gastrointestinal: negative   Genitourinary:negative   Integument/Breast: negative   Hematologic/Lymphatic: negative   Musculoskeletal: swelling in both legs   Neurological: negative    Review of systems was reviewed and updated as needed on 08/24/23.          Past Medical History:   Diagnosis Date    Atrial fibrillation [facility])     Complete heart block ([address]) 9/6/2014    Early satiety 1/28/2016    Hypertension     ICD (implantable cardioverter-defibrillator) in place     Long term current use of anticoagulant therapy     Lung cancer ([address])     Myocardial infarction ([address]) 2014    Nausea & vomiting

## 2023-08-24 NOTE — PROGRESS NOTES
MAGGIE FROM DR Pina Espinosa REFERRAL TO [facility]. This RN will call to inquire on referral tomorrow.

## 2023-08-24 NOTE — PROGRESS NOTES
Pearl Sexton is a 82 y.o. female here for follow up appt for NSCLC. Swelling and weeping of legs. Not sleeping. She has gained 15 lbs in last 3 weeks. Legs are very painful. 1. Have you been to the ER, urgent care clinic since your last visit? Hospitalized since your last visit? 7/29/23  UTI    2. Have you seen or consulted any other health care providers outside of the [address] [place] [address] since your last visit? Include any pap smears or colon screening.      no

## 2023-08-25 ENCOUNTER — CLINICAL DOCUMENTATION (OUTPATIENT)
Age: 83
End: 2023-08-25

## 2023-09-08 ENCOUNTER — TELEPHONE (OUTPATIENT)
Age: 83
End: 2023-09-08

## 2023-09-08 NOTE — TELEPHONE ENCOUNTER
Called pt to follow up on her mychart msg. She is aware of her prednisone taper and is aware that we may need to send in 5mg tab when she gets to 5mg dose.     She also requested we cancel the lymphedema referral due to she is unable to get to [facility] and she is aware that if the steroids do not help with the swelling like  is hoping for that she will need to be then re-referred and on a wait list.

## 2023-09-11 ENCOUNTER — HOME HEALTH ADMISSION (OUTPATIENT)
Dept: HOME HEALTH SERVICES | Facility: HOME HEALTH | Age: 83
End: 2023-09-11
Payer: MEDICARE

## 2023-09-11 DIAGNOSIS — R60.0 LEG EDEMA: ICD-10-CM

## 2023-09-11 DIAGNOSIS — C34.90 NON-SMALL CELL LUNG CANCER METASTATIC TO LIVER (HCC): Primary | ICD-10-CM

## 2023-09-11 DIAGNOSIS — C78.7 NON-SMALL CELL LUNG CANCER METASTATIC TO LIVER (HCC): Primary | ICD-10-CM

## 2023-09-12 ENCOUNTER — HOME CARE VISIT (OUTPATIENT)
Facility: HOME HEALTH | Age: 83
End: 2023-09-12

## 2023-09-12 VITALS
RESPIRATION RATE: 18 BRPM | SYSTOLIC BLOOD PRESSURE: 130 MMHG | TEMPERATURE: 97.4 F | HEART RATE: 80 BPM | OXYGEN SATURATION: 96 % | DIASTOLIC BLOOD PRESSURE: 68 MMHG

## 2023-09-12 PROCEDURE — G0162 HHC RN E&M PLAN SVS, 15 MIN: HCPCS

## 2023-09-12 PROCEDURE — 0221000100 HH NO PAY CLAIM PROCEDURE

## 2023-09-12 ASSESSMENT — ENCOUNTER SYMPTOMS
DYSPNEA ACTIVITY LEVEL: AT REST
STOOL DESCRIPTION: FORMED

## 2023-09-12 NOTE — HOME HEALTH
risk for falls Yes:   Recommended requesting PT/OT orders due to fall risk YES: patient declining at this time, but states she will request if she changes her mind. Patient response to recommended requesting of PT/OT orders: Not agreeable    Interdisciplinary communication with:  Physician for the purpose of admit notification    Written Teaching Material Utilized: admit packet, med list    Clinician reviewed orientation to home health booklet with patient/caregiver including agency phone number, agency complaint process, state hotline number, as well as [facility]'s quality hotline number. Emergency preparedness reviewed with patient/caregiver in home health booklet. Consent forms signed    Patient/caregiver instructed on plan of care and are agreeable to plan of care at this time. Plan of care and admission to home health status called to attending physician. The following practitioner has agreed to sign the ongoing Norma Torres MD, and was notified of the following: visit frequency of 2w1,3w8,3prn    Discharge planning discussed with patient and caregiver. Discharge planning as follows: When wound is 100% healed and When goals are met Patient/caregiver did verbalize agreement with discharge planning.      Specific plan for next visit: wound care

## 2023-09-14 ENCOUNTER — HOME CARE VISIT (OUTPATIENT)
Facility: HOME HEALTH | Age: 83
End: 2023-09-14

## 2023-09-14 VITALS
DIASTOLIC BLOOD PRESSURE: 64 MMHG | WEIGHT: 156 LBS | RESPIRATION RATE: 16 BRPM | TEMPERATURE: 98.2 F | SYSTOLIC BLOOD PRESSURE: 126 MMHG | BODY MASS INDEX: 21.45 KG/M2

## 2023-09-14 PROCEDURE — G0299 HHS/HOSPICE OF RN EA 15 MIN: HCPCS

## 2023-09-14 NOTE — HOME HEALTH
Subjective: Patient states her leg were hurting. Falls since last visit No(if yes complete the Fall Tracking Form and include bsrifallreport):   Caregiver involvement changes: no  Home health supplies by type and quantity ordered/delivered this visit include: no    Clinician asked if patient has had any physician contact since last home care visit and patient states: N/A  Clinician asked if patient has any new or changed medications and patient states:  N/A   If Yes, were medications reconciled? N/A   Was the certifying physician notified of changes in medications? N/A     Clinical assessment (what this visit means for the patient overall and need for ongoing skilled care) and progress or lack of progress towards SPECIFIC goals: Patients wound care is complete. Supplies have not arrived yet. Put in for PRN visit for tomorrow due to supplies not there and pt will need wrapped again. Written Teaching Material Utilized: N/A    Interdisciplinary communication with: N/A    Discharge planning as follows:  When goals are met    Specific plan for next visit: Educate in s/s of infection and when to call [facility], MD or 446

## 2023-09-15 ENCOUNTER — HOME CARE VISIT (OUTPATIENT)
Facility: HOME HEALTH | Age: 83
End: 2023-09-15

## 2023-09-15 PROCEDURE — G0299 HHS/HOSPICE OF RN EA 15 MIN: HCPCS

## 2023-09-16 VITALS
DIASTOLIC BLOOD PRESSURE: 66 MMHG | SYSTOLIC BLOOD PRESSURE: 126 MMHG | HEART RATE: 67 BPM | TEMPERATURE: 97.9 F | OXYGEN SATURATION: 96 % | RESPIRATION RATE: 16 BRPM

## 2023-09-16 ASSESSMENT — ENCOUNTER SYMPTOMS: PAIN LOCATION - PAIN QUALITY: ACHING, BURNING

## 2023-09-16 NOTE — HOME HEALTH
Subjective:Patient states her legs are hurting so much. Falls since last visit No(if yes complete the Fall Tracking Form and include bsrifallreport):   Caregiver involvement changes: no  Home health supplies by type and quantity ordered/delivered this visit include: no    Clinician asked if patient has had any physician contact since last home care visit and patient states: N/A  Clinician asked if patient has any new or changed medications and patient states:  N/A   If Yes, were medications reconciled? N/A   Was the certifying physician notified of changes in medications? N/A     Clinical assessment (what this visit means for the patient overall and need for ongoing skilled care) and progress or lack of progress towards SPECIFIC goals: Patient is in alot of pain today, she has been educated on taking her pain medication before we arrive forwound care. Wound care is complete. SN needed for further eval and treatment. Written Teaching Material Utilized: N/A    Interdisciplinary communication with: N/A     Discharge planning as follows:  When goals are met    Specific plan for next visit: Macario Putnam in safety issues regarding Tripping hazards

## 2023-09-18 ENCOUNTER — HOME CARE VISIT (OUTPATIENT)
Facility: HOME HEALTH | Age: 83
End: 2023-09-18
Payer: MEDICARE

## 2023-09-18 VITALS
TEMPERATURE: 98.2 F | RESPIRATION RATE: 18 BRPM | SYSTOLIC BLOOD PRESSURE: 110 MMHG | DIASTOLIC BLOOD PRESSURE: 64 MMHG | HEART RATE: 80 BPM | OXYGEN SATURATION: 97 %

## 2023-09-18 PROCEDURE — G0299 HHS/HOSPICE OF RN EA 15 MIN: HCPCS

## 2023-09-18 ASSESSMENT — ENCOUNTER SYMPTOMS: DYSPNEA ACTIVITY LEVEL: AFTER AMBULATING LESS THAN 10 FT

## 2023-09-18 NOTE — HOME HEALTH
Subjective: Patient states it started hurting so bad this morning. Falls since last visit No(if yes complete the Fall Tracking Form and include bsrifallreport):   Caregiver involvement changes: no  Home health supplies by type and quantity ordered/delivered this visit include: no    Clinician asked if patient has had any physician contact since last home care visit and patient states: N/A  Clinician asked if patient has any new or changed medications and patient states:  N/A   If Yes, were medications reconciled? N/A   Was the certifying physician notified of changes in medications? N/A     Clinical assessment (what this visit means for the patient overall and need for ongoing skilled care) and progress or lack of progress towards SPECIFIC goals: Patients wound care is complete and new wound care orders have been recieved. SN needed for further eval and treatment. Written Teaching Material Utilized: N/A    Interdisciplinary communication with: N/A     Discharge planning as follows:  When goals are met    Specific plan for next visit: Educate in s/s of infection and when to call [facility], MD or 003

## 2023-09-20 ENCOUNTER — HOME CARE VISIT (OUTPATIENT)
Facility: HOME HEALTH | Age: 83
End: 2023-09-20
Payer: MEDICARE

## 2023-09-20 VITALS
SYSTOLIC BLOOD PRESSURE: 129 MMHG | DIASTOLIC BLOOD PRESSURE: 64 MMHG | TEMPERATURE: 98 F | RESPIRATION RATE: 16 BRPM | OXYGEN SATURATION: 72 % | HEART RATE: 93 BPM

## 2023-09-20 PROCEDURE — G0299 HHS/HOSPICE OF RN EA 15 MIN: HCPCS

## 2023-09-20 ASSESSMENT — ENCOUNTER SYMPTOMS: PAIN LOCATION - PAIN QUALITY: SHARP

## 2023-09-20 NOTE — HOME HEALTH
Subjective Patient states that her right leg is hurting worse that left. Falls since last visit No(if yes complete the Fall Tracking Form and include bsrifallreport):   Caregiver involvement changes: no  Home health supplies by type and quantity ordered/delivered this visit include: no    Clinician asked if patient has had any physician contact since last home care visit and patient states: N/A  Clinician asked if patient has any new or changed medications and patient states:  N/A   If Yes, were medications reconciled? N/A   Was the certifying physician notified of changes in medications? N/A     Clinical assessment (what this visit means for the patient overall and need for ongoing skilled care) and progress or lack of progress towards SPECIFIC goals: Patient wound care is complete, and pt is c/o pain starting when it time for bandage to be changed and I explained it is probably due to the drainage. Pt edema has greatly decreased and redness is less than on monday. SN needed for further eval and treatment. Written Teaching Material Utilized: N/A    Interdisciplinary communication with: N/A     Discharge planning as follows:  When patient is no longer able to participate or progresses to SNF/Hospice    Specific plan for next visit: Nusrat Todd in safety issues regarding Diet

## 2023-09-22 ENCOUNTER — HOME CARE VISIT (OUTPATIENT)
Facility: HOME HEALTH | Age: 83
End: 2023-09-22
Payer: MEDICARE

## 2023-09-22 VITALS
RESPIRATION RATE: 16 BRPM | SYSTOLIC BLOOD PRESSURE: 132 MMHG | TEMPERATURE: 98.1 F | DIASTOLIC BLOOD PRESSURE: 74 MMHG | OXYGEN SATURATION: 98 % | HEART RATE: 90 BPM

## 2023-09-22 DIAGNOSIS — R74.8 ELEVATED LIVER ENZYMES: ICD-10-CM

## 2023-09-22 DIAGNOSIS — C78.7 NON-SMALL CELL LUNG CANCER METASTATIC TO LIVER (HCC): ICD-10-CM

## 2023-09-22 DIAGNOSIS — Z79.52 CURRENT USE OF STEROID MEDICATION: ICD-10-CM

## 2023-09-22 DIAGNOSIS — C34.90 NON-SMALL CELL LUNG CANCER METASTATIC TO LIVER (HCC): ICD-10-CM

## 2023-09-22 PROCEDURE — G0299 HHS/HOSPICE OF RN EA 15 MIN: HCPCS

## 2023-09-22 RX ORDER — PANTOPRAZOLE SODIUM 40 MG/1
40 TABLET, DELAYED RELEASE ORAL
Qty: 30 TABLET | Refills: 0 | Status: SHIPPED | OUTPATIENT
Start: 2023-09-22 | End: 2023-10-22

## 2023-09-22 ASSESSMENT — ENCOUNTER SYMPTOMS
DYSPNEA ACTIVITY LEVEL: AFTER AMBULATING 10 - 20 FT
PAIN LOCATION - PAIN QUALITY: SHARP/ACHING

## 2023-09-22 NOTE — TELEPHONE ENCOUNTER
Orders Placed This Encounter    pantoprazole (PROTONIX) 40 MG tablet     Sig: TAKE 1 TABLET BY MOUTH EVERY MORNING (BEFORE BREAKFAST) WHILE ON STEROIDS     Dispense:  30 tablet     Refill:  0       Follow Up Scheduled Visit date not found    For Pharmacy Admin Tracking Only    Program: Medical Group  CPA in place:  Yes  Time Spent (min): 10

## 2023-09-22 NOTE — HOME HEALTH
Subjective: Patient states her legs are hurting this morning. Falls since last visit No(if yes complete the Fall Tracking Form and include bsrifallreport):   Caregiver involvement changes: no  Home health supplies by type and quantity ordered/delivered this visit include: no    Clinician asked if patient has had any physician contact since last home care visit and patient states: N/A  Clinician asked if patient has any new or changed medications and patient states:  N/A   If Yes, were medications reconciled? N/A   Was the certifying physician notified of changes in medications? N/A     Clinical assessment (what this visit means for the patient overall and need for ongoing skilled care) and progress or lack of progress towards SPECIFIC goals: Patients wound care is complete for this visit. SN needed for further eval and treatment. Written Teaching Material Utilized: N/A    Interdisciplinary communication with: N/A     Discharge planning as follows:  When goals are met    Specific plan for next visit: Educate on home medications

## 2023-09-25 ENCOUNTER — HOME CARE VISIT (OUTPATIENT)
Facility: HOME HEALTH | Age: 83
End: 2023-09-25
Payer: MEDICARE

## 2023-09-25 VITALS
SYSTOLIC BLOOD PRESSURE: 128 MMHG | OXYGEN SATURATION: 95 % | DIASTOLIC BLOOD PRESSURE: 72 MMHG | RESPIRATION RATE: 16 BRPM | HEART RATE: 71 BPM | TEMPERATURE: 98.3 F

## 2023-09-25 PROCEDURE — G0299 HHS/HOSPICE OF RN EA 15 MIN: HCPCS

## 2023-09-25 ASSESSMENT — ENCOUNTER SYMPTOMS: DYSPNEA ACTIVITY LEVEL: AFTER AMBULATING 10 - 20 FT

## 2023-09-25 NOTE — HOME HEALTH
Subjective: Patient states she hasnt had the pain like last week. Falls since last visit No(if yes complete the Fall Tracking Form and include bsrifallreport):   Caregiver involvement changes: no  Home health supplies by type and quantity ordered/delivered this visit include:no    Clinician asked if patient has had any physician contact since last home care visit and patient states: N/A  Clinician asked if patient has any new or changed medications and patient states:  N/A   If Yes, were medications reconciled? N/A   Was the certifying physician notified of changes in medications? N/A     Clinical assessment (what this visit means for the patient overall and need for ongoing skilled care) and progress or lack of progress towards SPECIFIC goals: Patients wound care is complete, still noting in particular to measure( not a single wound). SN needed for further eval and treatment. Written Teaching Material Utilized: N/A    Interdisciplinary communication with: N/A     Discharge planning as follows:  Will discharge when the patient has reached their maximum functional potential and maximum safety in their home    Specific plan for next visit: Scottie Enriquez in safety issues regarding Fire hazards with oxygen

## 2023-09-27 ENCOUNTER — HOME CARE VISIT (OUTPATIENT)
Facility: HOME HEALTH | Age: 83
End: 2023-09-27
Payer: MEDICARE

## 2023-09-27 VITALS
DIASTOLIC BLOOD PRESSURE: 68 MMHG | TEMPERATURE: 98.2 F | OXYGEN SATURATION: 97 % | HEART RATE: 80 BPM | SYSTOLIC BLOOD PRESSURE: 124 MMHG | RESPIRATION RATE: 16 BRPM

## 2023-09-27 DIAGNOSIS — R74.8 ELEVATED LIVER ENZYMES: ICD-10-CM

## 2023-09-27 PROCEDURE — G0299 HHS/HOSPICE OF RN EA 15 MIN: HCPCS

## 2023-09-27 RX ORDER — PREDNISONE 10 MG/1
40 TABLET ORAL DAILY
Qty: 120 TABLET | Refills: 0 | OUTPATIENT
Start: 2023-09-27 | End: 2023-10-27

## 2023-09-27 NOTE — HOME HEALTH
Subjective: Patient denies pain. Falls since last visit No(if yes complete the Fall Tracking Form and include bsrifallreport):   Caregiver involvement changes: no  Home health supplies by type and quantity ordered/delivered this visit include: no    Clinician asked if patient has had any physician contact since last home care visit and patient states: N/A  Clinician asked if patient has any new or changed medications and patient states:  N/A   If Yes, were medications reconciled? N/A   Was the certifying physician notified of changes in medications? N/A     Clinical assessment (what this visit means for the patient overall and need for ongoing skilled care) and progress or lack of progress towards SPECIFIC goals: Patients wound care is complete. SN needed for further eval and treatment. Written Teaching Material Utilized: N/A    Interdisciplinary communication with: N/A     Discharge planning as follows:  When goals are met    Specific plan for next visit: Educate in s/s of infection and when to call [facility], MD or 340

## 2023-09-29 ENCOUNTER — HOME CARE VISIT (OUTPATIENT)
Facility: HOME HEALTH | Age: 83
End: 2023-09-29
Payer: MEDICARE

## 2023-09-29 VITALS
TEMPERATURE: 97.9 F | SYSTOLIC BLOOD PRESSURE: 126 MMHG | RESPIRATION RATE: 18 BRPM | OXYGEN SATURATION: 97 % | DIASTOLIC BLOOD PRESSURE: 68 MMHG | HEART RATE: 82 BPM

## 2023-09-29 PROCEDURE — G0299 HHS/HOSPICE OF RN EA 15 MIN: HCPCS

## 2023-09-29 ASSESSMENT — ENCOUNTER SYMPTOMS: DYSPNEA ACTIVITY LEVEL: AFTER AMBULATING 10 - 20 FT

## 2023-09-30 NOTE — HOME HEALTH
Subjective:Patient denies pain in her legs. Falls since last visit No(if yes complete the Fall Tracking Form and include bsrifallreport):   Caregiver involvement changes: no  Home health supplies by type and quantity ordered/delivered this visit include: no    Clinician asked if patient has had any physician contact since last home care visit and patient states: N/A  Clinician asked if patient has any new or changed medications and patient states:  N/A   If Yes, were medications reconciled? N/A   Was the certifying physician notified of changes in medications? N/A     Clinical assessment (what this visit means for the patient overall and need for ongoing skilled care) and progress or lack of progress towards SPECIFIC goals: Patients wound care is complete and legs are looking more improved. SN needed for further eval and treatment    Written Teaching Material Utilized: N/A    Interdisciplinary communication with: N/A     Discharge planning as follows:  When goals are met    Specific plan for next visit: Educate in s/s of reoccuring leg swelling and drainage and when to call [facility], MD or 348

## 2023-10-02 ENCOUNTER — HOME CARE VISIT (OUTPATIENT)
Facility: HOME HEALTH | Age: 83
End: 2023-10-02
Payer: MEDICARE

## 2023-10-02 PROCEDURE — G0299 HHS/HOSPICE OF RN EA 15 MIN: HCPCS

## 2023-10-03 VITALS
DIASTOLIC BLOOD PRESSURE: 64 MMHG | RESPIRATION RATE: 16 BRPM | HEART RATE: 85 BPM | OXYGEN SATURATION: 97 % | TEMPERATURE: 98.6 F | SYSTOLIC BLOOD PRESSURE: 118 MMHG

## 2023-10-03 NOTE — HOME HEALTH
Subjective:Patient denies pain today  Falls since last visit No(if yes complete the Fall Tracking Form and include bsrifallreport):   Caregiver involvement changes: no  Home health supplies by type and quantity ordered/delivered this visit include: no    Clinician asked if patient has had any physician contact since last home care visit and patient states: N/A  Clinician asked if patient has any new or changed medications and patient states:  N/A   If Yes, were medications reconciled? N/A   Was the certifying physician notified of changes in medications? N/A     Clinical assessment (what this visit means for the patient overall and need for ongoing skilled care) and progress or lack of progress towards SPECIFIC goals: Patients wound care is complete and legs are progressing very well. SN needed for further eval and treatment. Written Teaching Material Utilized: N/A    Interdisciplinary communication with: N/A    Discharge planning as follows:  When goals are met    Specific plan for next visit: Educate in s/s of infection and when to call [facility],[address], MD or 124

## 2023-10-04 ENCOUNTER — HOME CARE VISIT (OUTPATIENT)
Facility: HOME HEALTH | Age: 83
End: 2023-10-04
Payer: MEDICARE

## 2023-10-04 VITALS
SYSTOLIC BLOOD PRESSURE: 112 MMHG | RESPIRATION RATE: 16 BRPM | DIASTOLIC BLOOD PRESSURE: 64 MMHG | TEMPERATURE: 97.3 F | OXYGEN SATURATION: 93 % | HEART RATE: 80 BPM

## 2023-10-04 PROCEDURE — G0299 HHS/HOSPICE OF RN EA 15 MIN: HCPCS

## 2023-10-04 NOTE — HOME HEALTH
Subjective:Patient denies pain  Falls since last visit No(if yes complete the Fall Tracking Form and include bsrifallreport):   Caregiver involvement changes: no  Home health supplies by type and quantity ordered/delivered this visit include: no    Clinician asked if patient has had any physician contact since last home care visit and patient states: N/A  Clinician asked if patient has any new or changed medications and patient states:  N/A   If Yes, were medications reconciled? N/A  Was the certifying physician notified of changes in medications? N/A     Clinical assessment (what this visit means for the patient overall and need for ongoing skilled care) and progress or lack of progress towards SPECIFIC goals: Patient wound care is complete for this visit. SN needed for further eval and treatment    Written Teaching Material Utilized: N/A    Interdisciplinary communication with: N/A     Discharge planning as follows:  When goals are met    Specific plan for next visit: Educate in s/s of SOB and when to call [facility], MD or 190

## 2023-10-06 ENCOUNTER — HOME CARE VISIT (OUTPATIENT)
Facility: HOME HEALTH | Age: 83
End: 2023-10-06
Payer: MEDICARE

## 2023-10-06 VITALS
DIASTOLIC BLOOD PRESSURE: 60 MMHG | OXYGEN SATURATION: 97 % | TEMPERATURE: 98.5 F | WEIGHT: 142 LBS | RESPIRATION RATE: 16 BRPM | HEART RATE: 81 BPM | BODY MASS INDEX: 19.53 KG/M2 | SYSTOLIC BLOOD PRESSURE: 118 MMHG

## 2023-10-06 PROCEDURE — G0299 HHS/HOSPICE OF RN EA 15 MIN: HCPCS

## 2023-10-06 NOTE — HOME HEALTH
Subjective: Patient stsates that her legs are not hurting today  Falls since last visit No(if yes complete the Fall Tracking Form and include bsrifallreport):   Caregiver involvement changes: No  Home health supplies by type and quantity ordered/delivered this visit include: No    Clinician asked if patient has had any physician contact since last home care visit and patient states: N/A  Clinician asked if patient has any new or changed medications and patient states:  N/A   If Yes, were medications reconciled? N/A   Was the certifying physician notified of changes in medications? N/A     Clinical assessment (what this visit means for the patient overall and need for ongoing skilled care) and progress or lack of progress towards SPECIFIC goals: Patient wound care is complete. SN needed for further eval and treatment. Written Teaching Material Utilized: N/A    Interdisciplinary communication with: N/A     Discharge planning as follows:  When goals are met    Specific plan for next visit: Educate in s/s of infection and when to call [facility], MD or 611

## 2023-10-10 ENCOUNTER — HOME CARE VISIT (OUTPATIENT)
Facility: HOME HEALTH | Age: 83
End: 2023-10-10
Payer: MEDICARE

## 2023-10-10 VITALS
DIASTOLIC BLOOD PRESSURE: 78 MMHG | SYSTOLIC BLOOD PRESSURE: 112 MMHG | OXYGEN SATURATION: 97 % | TEMPERATURE: 98.6 F | HEART RATE: 88 BPM | RESPIRATION RATE: 16 BRPM

## 2023-10-10 PROCEDURE — G0299 HHS/HOSPICE OF RN EA 15 MIN: HCPCS

## 2023-10-11 NOTE — HOME HEALTH
Subjective:Patient denies pain and states that her legs itch  Falls since last visit No(if yes complete the Fall Tracking Form and include bsrifallreport):   Caregiver involvement changes: no  Home health supplies by type and quantity ordered/delivered this visit include: no    Clinician asked if patient has had any physician contact since last home care visit and patient states: N/A  Clinician asked if patient has any new or changed medications and patient states:  N/A   If Yes, were medications reconciled? N/A   Was the certifying physician notified of changes in medications? N/A     Clinical assessment (what this visit means for the patient overall and need for ongoing skilled care) and progress or lack of progress towards SPECIFIC goals: Patients wound care is complete. SN needed for further eval and treatment. Written Teaching Material Utilized: N/A    Interdisciplinary communication with: Dr Fabrice Cat for new wound care orders    Discharge planning as follows:  Will discharge when the patient has reached their maximum functional potential and maximum safety in their home    Specific plan for next visit: Annmarie Kline in safety issues regarding Tripping hazards

## 2023-10-12 ENCOUNTER — HOME CARE VISIT (OUTPATIENT)
Facility: HOME HEALTH | Age: 83
End: 2023-10-12
Payer: MEDICARE

## 2023-10-12 VITALS
OXYGEN SATURATION: 92 % | SYSTOLIC BLOOD PRESSURE: 118 MMHG | TEMPERATURE: 98 F | HEART RATE: 60 BPM | RESPIRATION RATE: 18 BRPM | DIASTOLIC BLOOD PRESSURE: 64 MMHG

## 2023-10-12 PROCEDURE — G0299 HHS/HOSPICE OF RN EA 15 MIN: HCPCS

## 2023-10-12 NOTE — CASE COMMUNICATION
Pt legs are much better as evidence in media. She is developing 1 spot on the left lower leg I am going to use xeroform and foam dressing and tubigrip F for compression if you agree.

## 2023-10-12 NOTE — HOME HEALTH
Subjective: Patient denies pain  Falls since last visit No(if yes complete the Fall Tracking Form and include bsrifallreport):   Caregiver involvement changes: no  Home health supplies by type and quantity ordered/delivered this visit include: no    Clinician asked if patient has had any physician contact since last home care visit and patient states: N/A  Clinician asked if patient has any new or changed medications and patient states:  N/A   If Yes, were medications reconciled? N/A   Was the certifying physician notified of changes in medications? N/A     Clinical assessment (what this visit means for the patient overall and need for ongoing skilled care) and progress or lack of progress towards SPECIFIC goals: Patient wound care is complete. SN needed for further eval and treatment. Written Teaching Material Utilized: N/A    Interdisciplinary communication with: Scott Franklin MD    Discharge planning as follows:  When goals are met    Specific plan for next visit: Educate in s/s of infection and when to call [facility],[address], MD or 261

## 2023-10-14 ENCOUNTER — HOME CARE VISIT (OUTPATIENT)
Facility: HOME HEALTH | Age: 83
End: 2023-10-14
Payer: MEDICARE

## 2023-10-14 VITALS
SYSTOLIC BLOOD PRESSURE: 118 MMHG | HEART RATE: 74 BPM | DIASTOLIC BLOOD PRESSURE: 64 MMHG | TEMPERATURE: 97.4 F | RESPIRATION RATE: 16 BRPM | OXYGEN SATURATION: 92 %

## 2023-10-14 PROCEDURE — G0299 HHS/HOSPICE OF RN EA 15 MIN: HCPCS

## 2023-10-14 ASSESSMENT — ENCOUNTER SYMPTOMS: PAIN LOCATION - PAIN QUALITY: ACHING

## 2023-10-14 NOTE — HOME HEALTH
Subjective:Patient denies pain at this visit. Falls since last visit No(if yes complete the Fall Tracking Form and include bsrifallreport):   Caregiver involvement changes: no  Home health supplies by type and quantity ordered/delivered this visit include: no    Clinician asked if patient has had any physician contact since last home care visit and patient states: N/A  Clinician asked if patient has any new or changed medications and patient states:  N/A   If Yes, were medications reconciled? N/A   Was the certifying physician notified of changes in medications? N/A     Clinical assessment (what this visit means for the patient overall and need for ongoing skilled care) and progress or lack of progress towards SPECIFIC goals: Patient wound care complete for this visit. Patient has alot of swelling in her legs, encouraged pt to raise them up and rest. Applied TUBIF for this visit bilaterally. SN needed for further eval and treatment    Written Teaching Material Utilized: N/A    Interdisciplinary communication with: N/A     Discharge planning as follows:  When patient is no longer able to participate or progresses to SNF/Hospice    Specific plan for next visit: Chacho Rivera in safety issues regarding Diet

## 2023-10-15 ENCOUNTER — APPOINTMENT (OUTPATIENT)
Facility: HOSPITAL | Age: 83
DRG: 189 | End: 2023-10-15
Payer: MEDICARE

## 2023-10-15 ENCOUNTER — HOSPITAL ENCOUNTER (INPATIENT)
Facility: HOSPITAL | Age: 83
LOS: 15 days | Discharge: HOSPICE | DRG: 189 | End: 2023-10-31
Attending: EMERGENCY MEDICINE | Admitting: STUDENT IN AN ORGANIZED HEALTH CARE EDUCATION/TRAINING PROGRAM
Payer: MEDICARE

## 2023-10-15 DIAGNOSIS — G93.40 ACUTE ENCEPHALOPATHY: Primary | ICD-10-CM

## 2023-10-15 DIAGNOSIS — C34.90 PRIMARY MALIGNANT NEOPLASM OF LUNG METASTATIC TO OTHER SITE, UNSPECIFIED LATERALITY (HCC): ICD-10-CM

## 2023-10-15 DIAGNOSIS — R53.0 NEOPLASTIC MALIGNANT RELATED FATIGUE: ICD-10-CM

## 2023-10-15 DIAGNOSIS — J96.01 ACUTE RESPIRATORY FAILURE WITH HYPOXIA (HCC): ICD-10-CM

## 2023-10-15 LAB
ALBUMIN SERPL-MCNC: 2.7 G/DL (ref 3.5–5)
ALBUMIN/GLOB SERPL: 0.7 (ref 1.1–2.2)
ALP SERPL-CCNC: 72 U/L (ref 45–117)
ALT SERPL-CCNC: 182 U/L (ref 12–78)
ANION GAP BLD CALC-SCNC: 3 (ref 10–20)
ANION GAP SERPL CALC-SCNC: 4 MMOL/L (ref 5–15)
ARTERIAL PATENCY WRIST A: POSITIVE
AST SERPL-CCNC: 204 U/L (ref 15–37)
BASE EXCESS BLD CALC-SCNC: 1.8 MMOL/L
BASE EXCESS BLD CALC-SCNC: 2.4 MMOL/L
BASOPHILS # BLD: 0.1 K/UL (ref 0–0.1)
BASOPHILS NFR BLD: 0 % (ref 0–1)
BDY SITE: ABNORMAL
BILIRUB SERPL-MCNC: 1.3 MG/DL (ref 0.2–1)
BUN SERPL-MCNC: 12 MG/DL (ref 6–20)
BUN/CREAT SERPL: 14 (ref 12–20)
CA-I BLD-MCNC: 1.29 MMOL/L (ref 1.12–1.32)
CALCIUM SERPL-MCNC: 9.2 MG/DL (ref 8.5–10.1)
CHLORIDE BLD-SCNC: 93 MMOL/L (ref 100–108)
CHLORIDE SERPL-SCNC: 94 MMOL/L (ref 97–108)
CO2 BLD-SCNC: 33 MMOL/L (ref 19–24)
CO2 SERPL-SCNC: 31 MMOL/L (ref 21–32)
COMMENT:: NORMAL
COMMENT:: NORMAL
CREAT SERPL-MCNC: 0.83 MG/DL (ref 0.55–1.02)
CREAT UR-MCNC: 0.7 MG/DL (ref 0.6–1.3)
DIFFERENTIAL METHOD BLD: ABNORMAL
EOSINOPHIL # BLD: 0 K/UL (ref 0–0.4)
EOSINOPHIL NFR BLD: 0 % (ref 0–7)
ERYTHROCYTE [DISTWIDTH] IN BLOOD BY AUTOMATED COUNT: 16.4 % (ref 11.5–14.5)
GAS FLOW.O2 O2 DELIVERY SYS: ABNORMAL
GLOBULIN SER CALC-MCNC: 3.8 G/DL (ref 2–4)
GLUCOSE BLD STRIP.AUTO-MCNC: 133 MG/DL (ref 74–106)
GLUCOSE SERPL-MCNC: 124 MG/DL (ref 65–100)
HCO3 BLD-SCNC: 29.4 MMOL/L (ref 22–26)
HCO3 BLDA-SCNC: 32 MMOL/L
HCT VFR BLD AUTO: 38.6 % (ref 35–47)
HGB BLD-MCNC: 12.2 G/DL (ref 11.5–16)
IMM GRANULOCYTES # BLD AUTO: 0.2 K/UL (ref 0–0.04)
IMM GRANULOCYTES NFR BLD AUTO: 2 % (ref 0–0.5)
LACTATE BLD-SCNC: 1.79 MMOL/L (ref 0.4–2)
LYMPHOCYTES # BLD: 0.8 K/UL (ref 0.8–3.5)
LYMPHOCYTES NFR BLD: 7 % (ref 12–49)
MCH RBC QN AUTO: 30.9 PG (ref 26–34)
MCHC RBC AUTO-ENTMCNC: 31.6 G/DL (ref 30–36.5)
MCV RBC AUTO: 97.7 FL (ref 80–99)
MONOCYTES # BLD: 1.8 K/UL (ref 0–1)
MONOCYTES NFR BLD: 15 % (ref 5–13)
NEUTS SEG # BLD: 9.5 K/UL (ref 1.8–8)
NEUTS SEG NFR BLD: 76 % (ref 32–75)
NRBC # BLD: 0 K/UL (ref 0–0.01)
NRBC BLD-RTO: 0 PER 100 WBC
O2/TOTAL GAS SETTING VFR VENT: 6 %
PCO2 BLD: 55 MMHG (ref 35–45)
PCO2 BLDV: 78.4 MMHG (ref 41–51)
PH BLD: 7.34 (ref 7.35–7.45)
PH BLDV: 7.22 (ref 7.32–7.42)
PLATELET # BLD AUTO: 239 K/UL (ref 150–400)
PMV BLD AUTO: 10 FL (ref 8.9–12.9)
PO2 BLD: 77 MMHG (ref 80–100)
PO2 BLDV: <27 MMHG (ref 25–40)
POTASSIUM BLD-SCNC: 4.7 MMOL/L (ref 3.5–5.5)
POTASSIUM SERPL-SCNC: 5 MMOL/L (ref 3.5–5.1)
PROCALCITONIN SERPL-MCNC: <0.05 NG/ML
PROT SERPL-MCNC: 6.5 G/DL (ref 6.4–8.2)
RBC # BLD AUTO: 3.95 M/UL (ref 3.8–5.2)
SAO2 % BLD: 94.1 % (ref 92–97)
SARS-COV-2 RDRP RESP QL NAA+PROBE: NOT DETECTED
SODIUM BLD-SCNC: 129 MMOL/L (ref 136–145)
SODIUM SERPL-SCNC: 129 MMOL/L (ref 136–145)
SOURCE: NORMAL
SPECIMEN HOLD: NORMAL
SPECIMEN HOLD: NORMAL
SPECIMEN SITE: ABNORMAL
SPECIMEN TYPE: ABNORMAL
TROPONIN I SERPL HS-MCNC: 156 NG/L (ref 0–51)
WBC # BLD AUTO: 12.4 K/UL (ref 3.6–11)

## 2023-10-15 PROCEDURE — 84132 ASSAY OF SERUM POTASSIUM: CPT

## 2023-10-15 PROCEDURE — 36600 WITHDRAWAL OF ARTERIAL BLOOD: CPT

## 2023-10-15 PROCEDURE — 84484 ASSAY OF TROPONIN QUANT: CPT

## 2023-10-15 PROCEDURE — 4A03X5D MEASUREMENT OF ARTERIAL FLOW, INTRACRANIAL, EXTERNAL APPROACH: ICD-10-PCS | Performed by: RADIOLOGY

## 2023-10-15 PROCEDURE — 36415 COLL VENOUS BLD VENIPUNCTURE: CPT

## 2023-10-15 PROCEDURE — 70496 CT ANGIOGRAPHY HEAD: CPT

## 2023-10-15 PROCEDURE — 71045 X-RAY EXAM CHEST 1 VIEW: CPT

## 2023-10-15 PROCEDURE — 84145 PROCALCITONIN (PCT): CPT

## 2023-10-15 PROCEDURE — 99285 EMERGENCY DEPT VISIT HI MDM: CPT

## 2023-10-15 PROCEDURE — 85025 COMPLETE CBC W/AUTO DIFF WBC: CPT

## 2023-10-15 PROCEDURE — 82947 ASSAY GLUCOSE BLOOD QUANT: CPT

## 2023-10-15 PROCEDURE — 83880 ASSAY OF NATRIURETIC PEPTIDE: CPT

## 2023-10-15 PROCEDURE — 84295 ASSAY OF SERUM SODIUM: CPT

## 2023-10-15 PROCEDURE — 80053 COMPREHEN METABOLIC PANEL: CPT

## 2023-10-15 PROCEDURE — 87635 SARS-COV-2 COVID-19 AMP PRB: CPT

## 2023-10-15 PROCEDURE — 0042T CT BRAIN PERFUSION: CPT

## 2023-10-15 PROCEDURE — 82330 ASSAY OF CALCIUM: CPT

## 2023-10-15 PROCEDURE — 87040 BLOOD CULTURE FOR BACTERIA: CPT

## 2023-10-15 PROCEDURE — 94762 N-INVAS EAR/PLS OXIMTRY CONT: CPT

## 2023-10-15 PROCEDURE — 70450 CT HEAD/BRAIN W/O DYE: CPT

## 2023-10-15 PROCEDURE — 82803 BLOOD GASES ANY COMBINATION: CPT

## 2023-10-15 PROCEDURE — 2700000000 HC OXYGEN THERAPY PER DAY

## 2023-10-15 PROCEDURE — 84443 ASSAY THYROID STIM HORMONE: CPT

## 2023-10-15 PROCEDURE — 93005 ELECTROCARDIOGRAM TRACING: CPT | Performed by: EMERGENCY MEDICINE

## 2023-10-16 ENCOUNTER — HOME CARE VISIT (OUTPATIENT)
Facility: HOME HEALTH | Age: 83
End: 2023-10-16
Payer: MEDICARE

## 2023-10-16 ENCOUNTER — APPOINTMENT (OUTPATIENT)
Facility: HOSPITAL | Age: 83
DRG: 189 | End: 2023-10-16
Payer: MEDICARE

## 2023-10-16 PROBLEM — Z51.5 PALLIATIVE CARE BY SPECIALIST: Status: ACTIVE | Noted: 2023-10-16

## 2023-10-16 PROBLEM — R53.0 NEOPLASTIC MALIGNANT RELATED FATIGUE: Status: ACTIVE | Noted: 2023-10-16

## 2023-10-16 PROBLEM — R41.82 ALTERED MENTAL STATUS: Status: ACTIVE | Noted: 2023-10-16

## 2023-10-16 PROBLEM — G93.40 ACUTE ENCEPHALOPATHY: Status: ACTIVE | Noted: 2023-10-16

## 2023-10-16 PROBLEM — J96.01 ACUTE HYPOXEMIC RESPIRATORY FAILURE (HCC): Status: ACTIVE | Noted: 2023-10-16

## 2023-10-16 PROBLEM — R40.20 COMA (HCC): Status: ACTIVE | Noted: 2023-10-16

## 2023-10-16 PROBLEM — R53.1 WEAKNESS: Status: ACTIVE | Noted: 2023-10-16

## 2023-10-16 PROBLEM — R06.02 SHORTNESS OF BREATH: Status: ACTIVE | Noted: 2023-10-16

## 2023-10-16 PROBLEM — Z71.89 GOALS OF CARE, COUNSELING/DISCUSSION: Status: ACTIVE | Noted: 2023-10-16

## 2023-10-16 LAB
AMMONIA PLAS-SCNC: 13 UMOL/L
APPEARANCE UR: ABNORMAL
BACTERIA URNS QL MICRO: ABNORMAL /HPF
BILIRUB UR QL: NEGATIVE
COLOR UR: ABNORMAL
EKG ATRIAL RATE: 98 BPM
EKG DIAGNOSIS: NORMAL
EKG P AXIS: 45 DEGREES
EKG P-R INTERVAL: 134 MS
EKG Q-T INTERVAL: 342 MS
EKG QRS DURATION: 82 MS
EKG QTC CALCULATION (BAZETT): 436 MS
EKG R AXIS: 1 DEGREES
EKG T AXIS: 55 DEGREES
EKG VENTRICULAR RATE: 98 BPM
EPITH CASTS URNS QL MICRO: ABNORMAL /LPF
GLUCOSE UR STRIP.AUTO-MCNC: NEGATIVE MG/DL
HGB UR QL STRIP: ABNORMAL
HYALINE CASTS URNS QL MICRO: ABNORMAL /LPF (ref 0–5)
KETONES UR QL STRIP.AUTO: NEGATIVE MG/DL
LEUKOCYTE ESTERASE UR QL STRIP.AUTO: ABNORMAL
NITRITE UR QL STRIP.AUTO: NEGATIVE
NT PRO BNP: 1754 PG/ML
PH UR STRIP: 5.5 (ref 5–8)
PROT UR STRIP-MCNC: ABNORMAL MG/DL
RBC #/AREA URNS HPF: ABNORMAL /HPF (ref 0–5)
SP GR UR REFRACTOMETRY: 1.02
TROPONIN I SERPL HS-MCNC: 369 NG/L (ref 0–51)
TSH SERPL DL<=0.05 MIU/L-ACNC: 1.18 UIU/ML (ref 0.36–3.74)
URINE CULTURE IF INDICATED: ABNORMAL
UROBILINOGEN UR QL STRIP.AUTO: 1 EU/DL (ref 0.2–1)
WBC URNS QL MICRO: >100 /HPF (ref 0–4)

## 2023-10-16 PROCEDURE — 6360000002 HC RX W HCPCS: Performed by: STUDENT IN AN ORGANIZED HEALTH CARE EDUCATION/TRAINING PROGRAM

## 2023-10-16 PROCEDURE — 95813 EEG EXTND MNTR 61-119 MIN: CPT

## 2023-10-16 PROCEDURE — 99222 1ST HOSP IP/OBS MODERATE 55: CPT | Performed by: NURSE PRACTITIONER

## 2023-10-16 PROCEDURE — 94640 AIRWAY INHALATION TREATMENT: CPT

## 2023-10-16 PROCEDURE — 2580000003 HC RX 258: Performed by: STUDENT IN AN ORGANIZED HEALTH CARE EDUCATION/TRAINING PROGRAM

## 2023-10-16 PROCEDURE — 95813 EEG EXTND MNTR 61-119 MIN: CPT | Performed by: PSYCHIATRY & NEUROLOGY

## 2023-10-16 PROCEDURE — 6360000004 HC RX CONTRAST MEDICATION: Performed by: EMERGENCY MEDICINE

## 2023-10-16 PROCEDURE — 82140 ASSAY OF AMMONIA: CPT

## 2023-10-16 PROCEDURE — 87086 URINE CULTURE/COLONY COUNT: CPT

## 2023-10-16 PROCEDURE — 95819 EEG AWAKE AND ASLEEP: CPT

## 2023-10-16 PROCEDURE — 2060000000 HC ICU INTERMEDIATE R&B

## 2023-10-16 PROCEDURE — 99223 1ST HOSP IP/OBS HIGH 75: CPT | Performed by: PSYCHIATRY & NEUROLOGY

## 2023-10-16 PROCEDURE — 2580000003 HC RX 258: Performed by: INTERNAL MEDICINE

## 2023-10-16 PROCEDURE — 74177 CT ABD & PELVIS W/CONTRAST: CPT

## 2023-10-16 PROCEDURE — 81001 URINALYSIS AUTO W/SCOPE: CPT

## 2023-10-16 PROCEDURE — 87077 CULTURE AEROBIC IDENTIFY: CPT

## 2023-10-16 PROCEDURE — 95816 EEG AWAKE AND DROWSY: CPT | Performed by: PSYCHIATRY & NEUROLOGY

## 2023-10-16 PROCEDURE — 71275 CT ANGIOGRAPHY CHEST: CPT

## 2023-10-16 PROCEDURE — 84484 ASSAY OF TROPONIN QUANT: CPT

## 2023-10-16 PROCEDURE — 6370000000 HC RX 637 (ALT 250 FOR IP): Performed by: STUDENT IN AN ORGANIZED HEALTH CARE EDUCATION/TRAINING PROGRAM

## 2023-10-16 PROCEDURE — 87186 SC STD MICRODIL/AGAR DIL: CPT

## 2023-10-16 PROCEDURE — 36415 COLL VENOUS BLD VENIPUNCTURE: CPT

## 2023-10-16 PROCEDURE — 6360000004 HC RX CONTRAST MEDICATION: Performed by: STUDENT IN AN ORGANIZED HEALTH CARE EDUCATION/TRAINING PROGRAM

## 2023-10-16 PROCEDURE — 6360000002 HC RX W HCPCS: Performed by: INTERNAL MEDICINE

## 2023-10-16 RX ORDER — ONDANSETRON 4 MG/1
4 TABLET, ORALLY DISINTEGRATING ORAL EVERY 8 HOURS PRN
Status: DISCONTINUED | OUTPATIENT
Start: 2023-10-16 | End: 2023-10-31 | Stop reason: HOSPADM

## 2023-10-16 RX ORDER — SODIUM CHLORIDE 0.9 % (FLUSH) 0.9 %
5-40 SYRINGE (ML) INJECTION EVERY 12 HOURS SCHEDULED
Status: DISCONTINUED | OUTPATIENT
Start: 2023-10-16 | End: 2023-10-31 | Stop reason: HOSPADM

## 2023-10-16 RX ORDER — POLYETHYLENE GLYCOL 3350 17 G/17G
17 POWDER, FOR SOLUTION ORAL DAILY PRN
Status: DISCONTINUED | OUTPATIENT
Start: 2023-10-16 | End: 2023-10-20

## 2023-10-16 RX ORDER — ASPIRIN 81 MG/1
81 TABLET ORAL DAILY
Status: DISCONTINUED | OUTPATIENT
Start: 2023-10-16 | End: 2023-10-23

## 2023-10-16 RX ORDER — SODIUM CHLORIDE, SODIUM LACTATE, POTASSIUM CHLORIDE, CALCIUM CHLORIDE 600; 310; 30; 20 MG/100ML; MG/100ML; MG/100ML; MG/100ML
INJECTION, SOLUTION INTRAVENOUS CONTINUOUS
Status: DISCONTINUED | OUTPATIENT
Start: 2023-10-16 | End: 2023-10-16

## 2023-10-16 RX ORDER — ACETAMINOPHEN 325 MG/1
650 TABLET ORAL EVERY 6 HOURS PRN
Status: DISCONTINUED | OUTPATIENT
Start: 2023-10-16 | End: 2023-10-31 | Stop reason: HOSPADM

## 2023-10-16 RX ORDER — CARVEDILOL 12.5 MG/1
12.5 TABLET ORAL 2 TIMES DAILY WITH MEALS
Status: DISCONTINUED | OUTPATIENT
Start: 2023-10-16 | End: 2023-10-16

## 2023-10-16 RX ORDER — ATORVASTATIN CALCIUM 20 MG/1
20 TABLET, FILM COATED ORAL DAILY
Status: DISCONTINUED | OUTPATIENT
Start: 2023-10-16 | End: 2023-10-23

## 2023-10-16 RX ORDER — BUDESONIDE 0.5 MG/2ML
500 INHALANT ORAL 2 TIMES DAILY
Status: DISCONTINUED | OUTPATIENT
Start: 2023-10-16 | End: 2023-10-16

## 2023-10-16 RX ORDER — ACETAMINOPHEN 650 MG/1
650 SUPPOSITORY RECTAL EVERY 6 HOURS PRN
Status: DISCONTINUED | OUTPATIENT
Start: 2023-10-16 | End: 2023-10-31 | Stop reason: HOSPADM

## 2023-10-16 RX ORDER — METOPROLOL SUCCINATE 25 MG/1
12.5 TABLET, EXTENDED RELEASE ORAL DAILY
Status: DISCONTINUED | OUTPATIENT
Start: 2023-10-17 | End: 2023-10-25

## 2023-10-16 RX ORDER — IPRATROPIUM BROMIDE AND ALBUTEROL SULFATE 2.5; .5 MG/3ML; MG/3ML
1 SOLUTION RESPIRATORY (INHALATION) EVERY 6 HOURS
Status: DISCONTINUED | OUTPATIENT
Start: 2023-10-16 | End: 2023-10-18

## 2023-10-16 RX ORDER — SODIUM CHLORIDE 0.9 % (FLUSH) 0.9 %
5-40 SYRINGE (ML) INJECTION PRN
Status: DISCONTINUED | OUTPATIENT
Start: 2023-10-16 | End: 2023-10-31 | Stop reason: HOSPADM

## 2023-10-16 RX ORDER — FUROSEMIDE 10 MG/ML
20 INJECTION INTRAMUSCULAR; INTRAVENOUS 2 TIMES DAILY
Status: DISCONTINUED | OUTPATIENT
Start: 2023-10-16 | End: 2023-10-18

## 2023-10-16 RX ORDER — ONDANSETRON 2 MG/ML
4 INJECTION INTRAMUSCULAR; INTRAVENOUS EVERY 6 HOURS PRN
Status: DISCONTINUED | OUTPATIENT
Start: 2023-10-16 | End: 2023-10-31 | Stop reason: HOSPADM

## 2023-10-16 RX ORDER — ENOXAPARIN SODIUM 100 MG/ML
1 INJECTION SUBCUTANEOUS EVERY 12 HOURS
Status: DISCONTINUED | OUTPATIENT
Start: 2023-10-16 | End: 2023-10-25

## 2023-10-16 RX ORDER — MORPHINE SULFATE 2 MG/ML
2 INJECTION, SOLUTION INTRAMUSCULAR; INTRAVENOUS EVERY 4 HOURS PRN
Status: DISCONTINUED | OUTPATIENT
Start: 2023-10-16 | End: 2023-10-17

## 2023-10-16 RX ORDER — SODIUM CHLORIDE 9 MG/ML
INJECTION, SOLUTION INTRAVENOUS PRN
Status: DISCONTINUED | OUTPATIENT
Start: 2023-10-16 | End: 2023-10-31 | Stop reason: HOSPADM

## 2023-10-16 RX ADMIN — IPRATROPIUM BROMIDE AND ALBUTEROL SULFATE 1 DOSE: 2.5; .5 SOLUTION RESPIRATORY (INHALATION) at 02:40

## 2023-10-16 RX ADMIN — ENOXAPARIN SODIUM 70 MG: 100 INJECTION SUBCUTANEOUS at 20:44

## 2023-10-16 RX ADMIN — METHYLPREDNISOLONE SODIUM SUCCINATE 40 MG: 40 INJECTION, POWDER, FOR SOLUTION INTRAMUSCULAR; INTRAVENOUS at 03:13

## 2023-10-16 RX ADMIN — IOPAMIDOL 100 ML: 755 INJECTION, SOLUTION INTRAVENOUS at 00:00

## 2023-10-16 RX ADMIN — METHYLPREDNISOLONE SODIUM SUCCINATE 40 MG: 40 INJECTION, POWDER, FOR SOLUTION INTRAMUSCULAR; INTRAVENOUS at 18:02

## 2023-10-16 RX ADMIN — IPRATROPIUM BROMIDE AND ALBUTEROL SULFATE 1 DOSE: 2.5; .5 SOLUTION RESPIRATORY (INHALATION) at 08:49

## 2023-10-16 RX ADMIN — CEFEPIME 1000 MG: 1 INJECTION, POWDER, FOR SOLUTION INTRAMUSCULAR; INTRAVENOUS at 03:13

## 2023-10-16 RX ADMIN — IPRATROPIUM BROMIDE AND ALBUTEROL SULFATE 1 DOSE: 2.5; .5 SOLUTION RESPIRATORY (INHALATION) at 19:15

## 2023-10-16 RX ADMIN — FUROSEMIDE 20 MG: 10 INJECTION, SOLUTION INTRAMUSCULAR; INTRAVENOUS at 16:00

## 2023-10-16 RX ADMIN — BUDESONIDE INHALATION 500 MCG: 0.5 SUSPENSION RESPIRATORY (INHALATION) at 19:15

## 2023-10-16 RX ADMIN — ENOXAPARIN SODIUM 70 MG: 100 INJECTION SUBCUTANEOUS at 13:20

## 2023-10-16 RX ADMIN — VANCOMYCIN HYDROCHLORIDE 1750 MG: 10 INJECTION, POWDER, LYOPHILIZED, FOR SOLUTION INTRAVENOUS at 13:16

## 2023-10-16 RX ADMIN — IOPAMIDOL 100 ML: 755 INJECTION, SOLUTION INTRAVENOUS at 11:13

## 2023-10-16 RX ADMIN — IOPAMIDOL 40 ML: 755 INJECTION, SOLUTION INTRAVENOUS at 00:30

## 2023-10-16 RX ADMIN — CEFEPIME 2000 MG: 2 INJECTION, POWDER, FOR SOLUTION INTRAVENOUS at 20:44

## 2023-10-16 RX ADMIN — BUDESONIDE INHALATION 500 MCG: 0.5 SUSPENSION RESPIRATORY (INHALATION) at 02:40

## 2023-10-16 RX ADMIN — BUDESONIDE INHALATION 500 MCG: 0.5 SUSPENSION RESPIRATORY (INHALATION) at 08:54

## 2023-10-16 RX ADMIN — CEFEPIME 2000 MG: 2 INJECTION, POWDER, FOR SOLUTION INTRAVENOUS at 13:18

## 2023-10-16 ASSESSMENT — LIFESTYLE VARIABLES
HOW OFTEN DO YOU HAVE A DRINK CONTAINING ALCOHOL: NEVER
HOW MANY STANDARD DRINKS CONTAINING ALCOHOL DO YOU HAVE ON A TYPICAL DAY: PATIENT DOES NOT DRINK

## 2023-10-16 NOTE — H&P
Hospitalist Admission Note    NAME:  Antonietta Weir   :  1940   MRN:  [id number]     Date/Time:  10/16/2023 2:30 AM    Patient PCP: Katlyn Rossi MD    ______________________________________________________________________  Given the patient's current clinical presentation, I have a high level of concern for decompensation if discharged from the emergency department. Complex decision making was performed, which includes reviewing the patient's available past medical records, laboratory results, and x-ray films. My assessment of this patient's clinical condition and my plan of care is as follows. 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  Severe COPD  Elevated troponin-suspect demand ischemia  Atrial fibrillation on Eliquis  CHB status post PPM    Plan:  Admit to stepdown unit  Interrogate PPM  Hold PTA aspirin, atorvastatin, Coreg until mentation improves  Rapid EEG negative for seizure activity  Formal EEG in the morning  Obtain MRI brain with and without contrast to evaluate for metastases or CVA  Neurology consulted, greatly appreciate their expertise  Obtain CTA chest with CT abdomen pelvis to evaluate for acute pathology and explanation of acute hypoxemic respiratory failure  -PE less likely given active Eliquis prescription  DuoNebs every 6 hours scheduled with every 4 hours breakthrough nebs  Continue PTA Trelegy Ellipta  Solu-Medrol every 8 hours  -Patient was on 40 mg prednisone in August and was plan to taper-unclear what dose currently is  Pharmacy consulted for medication reconciliation, greatly appreciate their assistance  Switch Eliquis to twice daily Lovenox  Trend troponin-if continues to uptrend will consult cardiology  Check CK  Check UA with reflex  Start empiric cefepime and

## 2023-10-16 NOTE — ED NOTES
Report called to intermediate care unit, ILAN Roth accepting care of patient. Pt transported upstairs by stretcher, escorted by this RN and ED tech, portable monitor in place, all belongings sent up with patient.      Velma Seay RN  10/16/23 9781

## 2023-10-16 NOTE — ED NOTES
Pt back from radiology study, tele-neuro cart placed at bedside and Ceribell EEG monitor applied.      Mazin Polo RN  10/16/23 6510

## 2023-10-16 NOTE — CONSULTS
CRITICAL CARE NOTE      Name: Joel Ruiz   : 1940   MRN: [id number]   Date: 10/16/2023      REASON FOR ICU EVALUATION:  AMS/hypoxia     PRINCIPAL ICU DIAGNOSIS   AMS  Hypoxemia  Pulmonary edema  Lung CA with mets to liver  Pacemaker  On anticoagulation  HTN  HLD  COPD    BRIEF PATIENT SUMMARY   Ms. Nicolas Jones is known metastatic lung CA patient that comes from home after being found with low oxygen saturation in 80's due to nasal cannula displacement and also AMS. She was requiring mid-flow oxygen in ER and had continued AMS which prompted request for ICU evaluation. CT/CTA head and Ceribell deployment were non-revealing. INR and ammonia pending at time of note. Patient mental status is now improving. Family at bedside and see below regarding code status discussion. COMPREHENSIVE ASSESSMENT & PLAN:SYSTEM BASED     24 HOUR EVENTS: Seen in ER, slated for hospital admission.      ICU team recommendations:  NEUROLOGICAL:   Serial neuro exams  Avoid deliriogens  May be secondary to hypoxia  Consideration for MRI PRN    PULMONOLOGY:   Serial ABG/CXR  Corticosteroids  Consideration for cross sectional imaging  Diuretics  Supplemental O2    CARDIOVASCULAR:   Maintain MAP >65  Continuous EKG  Echocardiogram    GASTROINTESTINAL   SUP  NPO status  Ammonia pending     RENAL/ELECTROLYTE/FLUIDS:   Serial electrolytes and renal function  Judicious IVF  Diurese  Electrolyte repletion  Dose medications for CrCl  Strict I&O    ENDOCRINE:   Target euglycemic state    HEMATOLOGY/ONCOLOGY:   Trend CBC  Transfuse PRN  Trend INR    INFECTIOUS DISEASE:   PCT negative  Monitor for signs of infection    ANTIBIOTICS TO DATE:  N/A    CULTURES TO DATE:  N/A    ICU DAILY CHECKLIST     Code Status:DNR/DNI  DVT Prophylaxis:Eliquis  T/L/D: Tubes: None  Lines: Peripheral IV  Drains: None  SUP: Pharmacological  Diet: NPO  Activity Level:Bedrest  ABCDEF Bundle/Checklist Completed:Yes  Disposition: IMCU  Multidisciplinary Rounds
[facility]  [address] [facility], [address] [place] [address]  [phone]  [address]. [place]       CARDIOLOGY CONSULTATION       Date of  Admission: 10/15/2023  9:09 PM     Admission type:Emergency   Primary Care Daisha Gauthier MD     Attending Provider: Jessica Connell MD  Cardiology Provider: Jareth heart  CC/REASON FOR CONSULT: Elevated troponin     Subjective:     Everitt Lundborg is a 82 y.o. female admitted for Acute respiratory failure with hypoxia ([address]) [J96.01]  Acute encephalopathy [G93.40]  Acute hypoxemic respiratory failure ([address]) [J96.01]. The patient was seen and examined at the bedside. She is unable to provide me any history.     History of Takotsubo cardiomyopathy, resolved on beta-blocker  Paroxysmal atrial fibrillation, status post ablation on apixaban  Atrial flutter, status post ablation  History of complete heart block permanent pacemaker status  Terminal metastatic lung cancer  Severe COPD    Patient Active Problem List    Diagnosis Date Noted    Acute hypoxemic respiratory failure ([address]) 10/16/2023    Acute encephalopathy 10/16/2023    Coma ([address]) 10/16/2023    Weakness 10/16/2023    NSCLC metastatic to liver ([address]) 05/01/2023    CAP (community acquired pneumonia) 12/25/2022    Leg edema 03/03/2020    Femur fracture, left ([address]) 10/22/2019    Varicose veins of both lower extremities with inflammation 05/21/2019    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
7.4 oz (61.9 kg)   06/28/23 136 lb 6.4 oz (61.9 kg)   05/17/23 134 lb 7.7 oz (61 kg)   05/17/23 134 lb 8 oz (61 kg)   05/01/23 133 lb 6.4 oz (60.5 kg)   03/31/23 133 lb 12.8 oz (60.7 kg)   03/09/23 145 lb (65.8 kg)   09/16/21 139 lb 9.6 oz (63.3 kg)   08/03/21 138 lb 12.8 oz (63 kg)        Current Diet: Diet NPO       PSYCHOSOCIAL/SPIRITUAL SCREENING:   Palliative IDT has assessed this patient for cultural preferences / practices and a referral made as appropriate to needs (Cultural Services, Patient Advocacy, Ethics, etc.)    Spiritual Affiliation: None    Any spiritual / Moravian concerns:  [] Yes /  [x] No   If \"Yes\" to discuss with pastoral care during IDT     Does caregiver feel burdened by caring for their loved one:   [] Yes /  [x] No /  [] No Caregiver Present/Available [] No Caregiver [] Pt Lives at Facility  If \"Yes\" to discuss with social work during IDT    Anticipatory grief assessment:   [x] Normal  / [] Maladaptive     If \"Maladaptive\" to discuss with social work during IDT    ESAS Anxiety: Anxiety Score: Not anxious    ESAS Depression: Depression Score: 4        LAB AND IMAGING FINDINGS:   Objective data reviewed:  labs, images, records, medication use, vitals, and chart     FINAL COMMENTS   Thank you for allowing Palliative Medicine to participate in the care of Sandi Jacques.     Electronically signed by   IVET Mason NP  Palliative Care Team  on 10/16/2023 at 3:50 PM
rate  Lower extremity: peripheral pulses palpable. Bilateral lower extremity edema. Skin: intact    Neurological exam:    Awake. Simple words. Slightly garbled. Will follow one step commands. Delayed response. Mild perseveration. She did show me a thumb. She showed me 2 fingers. She would stick out her tongue. Cranial nerves:   II-XII were tested    Perrrla  Fundoscopic examination attempted, but could not visualize fundus  Visual fields were full  Eomi, no evidence of nystagmus  Facial sensation:  normal and symmetric  Facial motor: normal and symmetric  Hearing intact  SCM strength intact  Tongue: midline without fasciculations    Motor: Tone -decreased throughout  No evidence of fasciculations    Strength testin/5 throughout. No obvious focal weakness. She did lift each of her extremities off the bed independently and was able to hold them for 5 seconds. Patient had a difficult time in comprehending detailed testing. Sensory:  Upper extremity: intact to pp  Lower extremity: intact to pp    Reflexes:    Right Left  Biceps  2 2  Triceps 2 2  Brachiorad. 2 2  Patella  1 1  Achilles 1 1    Plantar response:  flexor bilaterally    Cerebellar testing:  no tremor apparent, finger/nose and gene were intact. slow    Gait: This was not assessed due to concerns over safety mental status    Labs:     Lab Results   Component Value Date/Time     10/15/2023 09:37 PM    K 5.0 10/15/2023 09:37 PM    CL 94 10/15/2023 09:37 PM    BUN 12 10/15/2023 09:37 PM    WBC 12.4 10/15/2023 09:37 PM    HCT 38.6 10/15/2023 09:37 PM    HGB 12.2 10/15/2023 09:37 PM     10/15/2023 09:37 PM           Complex decision making was necessary due to the patient's current medical condition and other medical co-morbidities. Principal Problem:    Acute hypoxemic respiratory failure (HCC)  Resolved Problems:    * No resolved hospital problems.  *                 Signed By:  Lyla Pierce DO FAAN

## 2023-10-16 NOTE — PROCEDURES
EEG REPORT    Patient Name: Kylee Martinez  : 1940  Age: 82 y.o. Ordering physician: Dr. Redd Lopez  Date of EEG: 10/16/2023  9:22 - 9:42  Diagnosis: altered mental status  Interpreting physician: Abhi Ibarra D.O. FAAN    Procedure: EEG    CLINICAL INDICATION: The patient is a 82 y.o. female who is being evaluated for baseline electro cerebral activities and to rule out seizure focus.       Current Facility-Administered Medications   Medication Dose Route Frequency    enoxaparin (LOVENOX) injection 70 mg  1 mg/kg SubCUTAneous Q12H    budesonide (PULMICORT) nebulizer suspension 500 mcg  500 mcg Nebulization BID    ipratropium 0.5 mg-albuterol 2.5 mg (DUONEB) nebulizer solution 1 Dose  1 Dose Inhalation Q6H    [Held by provider] aspirin EC tablet 81 mg  81 mg Oral Daily    [Held by provider] atorvastatin (LIPITOR) tablet 20 mg  20 mg Oral Daily    morphine (PF) injection 2 mg  2 mg IntraVENous Q4H PRN    methylPREDNISolone sodium (PF) (SOLU-MEDROL PF) injection 40 mg  40 mg IntraVENous Q8H    sodium chloride flush 0.9 % injection 5-40 mL  5-40 mL IntraVENous 2 times per day    sodium chloride flush 0.9 % injection 5-40 mL  5-40 mL IntraVENous PRN    0.9 % sodium chloride infusion   IntraVENous PRN    ondansetron (ZOFRAN-ODT) disintegrating tablet 4 mg  4 mg Oral Q8H PRN    Or    ondansetron (ZOFRAN) injection 4 mg  4 mg IntraVENous Q6H PRN    polyethylene glycol (GLYCOLAX) packet 17 g  17 g Oral Daily PRN    acetaminophen (TYLENOL) tablet 650 mg  650 mg Oral Q6H PRN    Or    acetaminophen (TYLENOL) suppository 650 mg  650 mg Rectal Q6H PRN    tiotropium (SPIRIVA RESPIMAT) 2.5 MCG/ACT inhaler 2 puff  2 puff Inhalation Daily RT    mometasone-formoterol (DULERA) 100-5 MCG/ACT inhaler 2 puff  2 puff Inhalation BID RT    [START ON 10/17/2023] vancomycin (VANCOCIN) 750 mg in sodium chloride 0.9 % 250 mL IVPB ([id number])  750 mg IntraVENous Q12H    ceFEPIme (MAXIPIME) 2,000 mg in sodium chloride 0.9 % 100 mL IVPB
mometasone-formoterol (DULERA) 100-5 MCG/ACT inhaler 2 puff  2 puff Inhalation BID RT    vancomycin (VANCOCIN) 1,500 mg in sodium chloride 0.9 % 250 mL IVPB ([id number])  1,500 mg IntraVENous Q24H           DESCRIPTION OF PROCEDURE:     This is a digitally recorded electroencephalogram    Description of procedure: This EEG was obtained using a 10 lead, 8 channel system positioned circumferentially without any parasagittal coverage (rapid EEG). Computer selected EEG is reviewed as well as background features and all clinically significant events. Clarity algorithm utilized and implemented to provide analysis of underlying activity and seizure detection used to facilitate reading. Description of recording: The background activity of this EEG consists of a mixed frequency in the alpha, theta, delta, beta range. Amplitudes appear symmetric. There is background symmetry. The background is reactive, continuous, variable. Within the limitations of the study, there are no clear or well-formed electrographic seizures. Throughout the recording, there were no clear areas of focal slowing nor spike or spike-and-wave discharges seen. Automated seizure detection burden was reviewed. Hyperventilation and photic stimulation were not performed. Clinical Interpretation: This rapid EEG does not show any clear or well-formed seizures within the confines and limitations of the study. Note the absence of electrographic seizures does not rule out the diagnosis of a seizure disorder. Seizure burden 0%  Clinical correlation is recommended      Comment:   If there is still persistent suspicion for continued seizure-like  activity, I would recommend obtaining an electroencephalogram with the 10-20 international system for improved spatial resolution and parasagittal coverage. Luis Ibarra D.O.   Natalie Middleton

## 2023-10-16 NOTE — ED PROVIDER NOTES
[facility] EMERGENCY DEPT  EMERGENCY DEPARTMENT ENCOUNTER       Pt Name: Everitt Lundborg  MRN: [id number]  [address] 1940  Date of evaluation: 10/15/2023  Provider: Adenike Valladares MD   PCP: China Jacques MD  Note Started: 12:02 AM EDT 10/16/23     CHIEF COMPLAINT       Chief Complaint   Patient presents with    Altered Mental Status     Pt BIBA from home after she was found unresponsive by her son. Son reported to EMS that she normally checks in with him each evening, he didn't hear from  her tonight and went to her home to check on her, found her in bed, unresponsive with her O2 turned off. Per EMS, pt was hypoxic upon arrival, spO2 in low 80's, came up to mid-90s on NRB at 10LMP, remained unresponsive except to strong physical stimuli. HISTORY OF PRESENT ILLNESS: 1 or more elements      History From: EMS and Patient's Son  HPI Limitations: Altered Mental Status     Everitt Lundborg is a 82 y.o. female who presents with complaints of being found unresponsive by son. Patient reports that he last saw her normal yesterday as he checked with her every evening. However he did report talking to her over the phone this morning and she seemed to be in her normal state. He came over this evening and found her unresponsive and that her oxygen tank was turned off. Looks like she had been getting ready for bed. EMS reported her oxygen saturations were in the low 80s. She was put on nonrebreather and transferred to the ER. She had a normal blood sugar of 146. No other recent illnesses. Patient has a history of lung cancer, had had some complications related to treatment for this and had some liver injury associated with that. No other history available. Nursing Notes were all reviewed and agreed with or any disagreements were addressed in the HPI. REVIEW OF SYSTEMS      Review of Systems     Positives and Pertinent negatives as per HPI.     PAST HISTORY     Past Medical History:  Past Medical

## 2023-10-17 LAB
ANION GAP SERPL CALC-SCNC: 2 MMOL/L (ref 5–15)
BASOPHILS # BLD: 0 K/UL (ref 0–0.1)
BASOPHILS NFR BLD: 0 % (ref 0–1)
BUN SERPL-MCNC: 21 MG/DL (ref 6–20)
BUN/CREAT SERPL: 34 (ref 12–20)
CALCIUM SERPL-MCNC: 9.3 MG/DL (ref 8.5–10.1)
CHLORIDE SERPL-SCNC: 99 MMOL/L (ref 97–108)
CO2 SERPL-SCNC: 33 MMOL/L (ref 21–32)
CREAT SERPL-MCNC: 0.61 MG/DL (ref 0.55–1.02)
DIFFERENTIAL METHOD BLD: ABNORMAL
EOSINOPHIL # BLD: 0 K/UL (ref 0–0.4)
EOSINOPHIL NFR BLD: 0 % (ref 0–7)
ERYTHROCYTE [DISTWIDTH] IN BLOOD BY AUTOMATED COUNT: 16.4 % (ref 11.5–14.5)
GLUCOSE SERPL-MCNC: 140 MG/DL (ref 65–100)
HCT VFR BLD AUTO: 38.8 % (ref 35–47)
HGB BLD-MCNC: 11.9 G/DL (ref 11.5–16)
IMM GRANULOCYTES # BLD AUTO: 0 K/UL
IMM GRANULOCYTES NFR BLD AUTO: 0 %
LYMPHOCYTES # BLD: 0.5 K/UL (ref 0.8–3.5)
LYMPHOCYTES NFR BLD: 3 % (ref 12–49)
MCH RBC QN AUTO: 30.4 PG (ref 26–34)
MCHC RBC AUTO-ENTMCNC: 30.7 G/DL (ref 30–36.5)
MCV RBC AUTO: 99 FL (ref 80–99)
MONOCYTES # BLD: 0.5 K/UL (ref 0–1)
MONOCYTES NFR BLD: 3 % (ref 5–13)
NEUTS SEG # BLD: 14.2 K/UL (ref 1.8–8)
NEUTS SEG NFR BLD: 94 % (ref 32–75)
NRBC # BLD: 0 K/UL (ref 0–0.01)
NRBC BLD-RTO: 0 PER 100 WBC
PLATELET # BLD AUTO: 215 K/UL (ref 150–400)
PMV BLD AUTO: 9.6 FL (ref 8.9–12.9)
POTASSIUM SERPL-SCNC: 4.6 MMOL/L (ref 3.5–5.1)
RBC # BLD AUTO: 3.92 M/UL (ref 3.8–5.2)
RBC MORPH BLD: ABNORMAL
SODIUM SERPL-SCNC: 134 MMOL/L (ref 136–145)
VANCOMYCIN SERPL-MCNC: 15 UG/ML
WBC # BLD AUTO: 15.2 K/UL (ref 3.6–11)

## 2023-10-17 PROCEDURE — 80048 BASIC METABOLIC PNL TOTAL CA: CPT

## 2023-10-17 PROCEDURE — 6370000000 HC RX 637 (ALT 250 FOR IP): Performed by: STUDENT IN AN ORGANIZED HEALTH CARE EDUCATION/TRAINING PROGRAM

## 2023-10-17 PROCEDURE — 36415 COLL VENOUS BLD VENIPUNCTURE: CPT

## 2023-10-17 PROCEDURE — 6360000002 HC RX W HCPCS: Performed by: INTERNAL MEDICINE

## 2023-10-17 PROCEDURE — 80202 ASSAY OF VANCOMYCIN: CPT

## 2023-10-17 PROCEDURE — 6360000002 HC RX W HCPCS: Performed by: STUDENT IN AN ORGANIZED HEALTH CARE EDUCATION/TRAINING PROGRAM

## 2023-10-17 PROCEDURE — 2700000000 HC OXYGEN THERAPY PER DAY

## 2023-10-17 PROCEDURE — 99233 SBSQ HOSP IP/OBS HIGH 50: CPT | Performed by: NURSE PRACTITIONER

## 2023-10-17 PROCEDURE — 85025 COMPLETE CBC W/AUTO DIFF WBC: CPT

## 2023-10-17 PROCEDURE — 2060000000 HC ICU INTERMEDIATE R&B

## 2023-10-17 PROCEDURE — 94640 AIRWAY INHALATION TREATMENT: CPT

## 2023-10-17 PROCEDURE — 2580000003 HC RX 258: Performed by: INTERNAL MEDICINE

## 2023-10-17 PROCEDURE — 6360000002 HC RX W HCPCS: Performed by: NURSE PRACTITIONER

## 2023-10-17 RX ORDER — CASTOR OIL AND BALSAM, PERU 788; 87 MG/G; MG/G
OINTMENT TOPICAL 2 TIMES DAILY
Status: DISCONTINUED | OUTPATIENT
Start: 2023-10-17 | End: 2023-10-31 | Stop reason: HOSPADM

## 2023-10-17 RX ORDER — HYDRALAZINE HYDROCHLORIDE 20 MG/ML
10 INJECTION INTRAMUSCULAR; INTRAVENOUS EVERY 6 HOURS PRN
Status: DISCONTINUED | OUTPATIENT
Start: 2023-10-17 | End: 2023-10-23

## 2023-10-17 RX ORDER — MORPHINE SULFATE 2 MG/ML
2 INJECTION, SOLUTION INTRAMUSCULAR; INTRAVENOUS EVERY 4 HOURS PRN
Status: DISCONTINUED | OUTPATIENT
Start: 2023-10-17 | End: 2023-10-18

## 2023-10-17 RX ORDER — PREDNISONE 20 MG/1
20 TABLET ORAL EVERY EVENING
Status: DISCONTINUED | OUTPATIENT
Start: 2023-10-17 | End: 2023-10-26

## 2023-10-17 RX ADMIN — METHYLPREDNISOLONE SODIUM SUCCINATE 40 MG: 40 INJECTION, POWDER, FOR SOLUTION INTRAMUSCULAR; INTRAVENOUS at 11:11

## 2023-10-17 RX ADMIN — CEFEPIME 2000 MG: 2 INJECTION, POWDER, FOR SOLUTION INTRAVENOUS at 04:00

## 2023-10-17 RX ADMIN — IPRATROPIUM BROMIDE AND ALBUTEROL SULFATE 1 DOSE: 2.5; .5 SOLUTION RESPIRATORY (INHALATION) at 19:50

## 2023-10-17 RX ADMIN — MORPHINE SULFATE 2 MG: 2 INJECTION, SOLUTION INTRAMUSCULAR; INTRAVENOUS at 16:53

## 2023-10-17 RX ADMIN — ENOXAPARIN SODIUM 70 MG: 100 INJECTION SUBCUTANEOUS at 08:40

## 2023-10-17 RX ADMIN — FUROSEMIDE 20 MG: 10 INJECTION, SOLUTION INTRAMUSCULAR; INTRAVENOUS at 08:40

## 2023-10-17 RX ADMIN — VANCOMYCIN HYDROCHLORIDE 750 MG: 750 INJECTION, POWDER, LYOPHILIZED, FOR SOLUTION INTRAVENOUS at 12:41

## 2023-10-17 RX ADMIN — VANCOMYCIN HYDROCHLORIDE 750 MG: 750 INJECTION, POWDER, LYOPHILIZED, FOR SOLUTION INTRAVENOUS at 01:49

## 2023-10-17 RX ADMIN — HYDRALAZINE HYDROCHLORIDE 10 MG: 20 INJECTION, SOLUTION INTRAMUSCULAR; INTRAVENOUS at 15:49

## 2023-10-17 RX ADMIN — METHYLPREDNISOLONE SODIUM SUCCINATE 40 MG: 40 INJECTION, POWDER, FOR SOLUTION INTRAMUSCULAR; INTRAVENOUS at 01:49

## 2023-10-17 RX ADMIN — METOPROLOL SUCCINATE 12.5 MG: 25 TABLET, EXTENDED RELEASE ORAL at 08:39

## 2023-10-17 RX ADMIN — CEFEPIME 2000 MG: 2 INJECTION, POWDER, FOR SOLUTION INTRAVENOUS at 11:03

## 2023-10-17 RX ADMIN — FUROSEMIDE 20 MG: 10 INJECTION, SOLUTION INTRAMUSCULAR; INTRAVENOUS at 16:58

## 2023-10-17 RX ADMIN — CEFEPIME 2000 MG: 2 INJECTION, POWDER, FOR SOLUTION INTRAVENOUS at 21:09

## 2023-10-17 RX ADMIN — ARFORMOTEROL TARTRATE: 15 SOLUTION RESPIRATORY (INHALATION) at 19:55

## 2023-10-17 RX ADMIN — IPRATROPIUM BROMIDE AND ALBUTEROL SULFATE 1 DOSE: 2.5; .5 SOLUTION RESPIRATORY (INHALATION) at 02:39

## 2023-10-17 RX ADMIN — Medication: at 11:18

## 2023-10-17 NOTE — PLAN OF CARE
Problem: Discharge Planning  Goal: Discharge to home or other facility with appropriate resources  Outcome: Progressing     Problem: Pain  Goal: Verbalizes/displays adequate comfort level or baseline comfort level  Outcome: Progressing     Problem: Safety - Adult  Goal: Free from fall injury  Outcome: Progressing     Problem: Respiratory - Adult  Goal: Achieves optimal ventilation and oxygenation  10/17/2023 0220 by Radha Merchant RN  Outcome: Progressing  10/17/2023 0140 by Emmy Harper, RT  Outcome: Progressing

## 2023-10-17 NOTE — PLAN OF CARE
Problem: Discharge Planning  Goal: Discharge to home or other facility with appropriate resources  10/17/2023 1219 by Anirudh Clark RN  Outcome: Progressing  Flowsheets (Taken 10/17/2023 0800)  Discharge to home or other facility with appropriate resources: Identify barriers to discharge with patient and caregiver  10/17/2023 0220 by Roberto Chan RN  Outcome: Progressing     Problem: Pain  Goal: Verbalizes/displays adequate comfort level or baseline comfort level  10/17/2023 1219 by Anirudh Clark RN  Outcome: Progressing  Flowsheets  Taken 10/17/2023 1124  Verbalizes/displays adequate comfort level or baseline comfort level: Assess pain using appropriate pain scale  Taken 10/17/2023 0758  Verbalizes/displays adequate comfort level or baseline comfort level: Assess pain using appropriate pain scale  10/17/2023 0220 by Roberto Chan RN  Outcome: Progressing     Problem: Safety - Adult  Goal: Free from fall injury  10/17/2023 1219 by Anirudh Clark RN  Outcome: Progressing  10/17/2023 0220 by Roberto Chan RN  Outcome: Progressing     Problem: Respiratory - Adult  Goal: Achieves optimal ventilation and oxygenation  10/17/2023 1219 by Anirudh Clark RN  Outcome: Progressing  10/17/2023 0220 by Roberto Chan RN  Outcome: Progressing  10/17/2023 0140 by Karl Harper, RT  Outcome: Progressing

## 2023-10-18 LAB
BACTERIA SPEC CULT: ABNORMAL
CC UR VC: ABNORMAL
SERVICE CMNT-IMP: ABNORMAL

## 2023-10-18 PROCEDURE — 2580000003 HC RX 258: Performed by: STUDENT IN AN ORGANIZED HEALTH CARE EDUCATION/TRAINING PROGRAM

## 2023-10-18 PROCEDURE — 6370000000 HC RX 637 (ALT 250 FOR IP): Performed by: STUDENT IN AN ORGANIZED HEALTH CARE EDUCATION/TRAINING PROGRAM

## 2023-10-18 PROCEDURE — 1100000000 HC RM PRIVATE

## 2023-10-18 PROCEDURE — 2580000003 HC RX 258: Performed by: INTERNAL MEDICINE

## 2023-10-18 PROCEDURE — 94640 AIRWAY INHALATION TREATMENT: CPT

## 2023-10-18 PROCEDURE — 6360000002 HC RX W HCPCS: Performed by: INTERNAL MEDICINE

## 2023-10-18 PROCEDURE — 6360000002 HC RX W HCPCS: Performed by: STUDENT IN AN ORGANIZED HEALTH CARE EDUCATION/TRAINING PROGRAM

## 2023-10-18 PROCEDURE — 2700000000 HC OXYGEN THERAPY PER DAY

## 2023-10-18 RX ORDER — IPRATROPIUM BROMIDE AND ALBUTEROL SULFATE 2.5; .5 MG/3ML; MG/3ML
1 SOLUTION RESPIRATORY (INHALATION)
Status: DISCONTINUED | OUTPATIENT
Start: 2023-10-19 | End: 2023-10-22

## 2023-10-18 RX ORDER — FUROSEMIDE 40 MG/1
20 TABLET ORAL 2 TIMES DAILY
Status: DISCONTINUED | OUTPATIENT
Start: 2023-10-18 | End: 2023-10-31 | Stop reason: HOSPADM

## 2023-10-18 RX ORDER — MORPHINE SULFATE 2 MG/ML
1 INJECTION, SOLUTION INTRAMUSCULAR; INTRAVENOUS EVERY 4 HOURS PRN
Status: DISCONTINUED | OUTPATIENT
Start: 2023-10-18 | End: 2023-10-31 | Stop reason: HOSPADM

## 2023-10-18 RX ORDER — AMLODIPINE BESYLATE 5 MG/1
10 TABLET ORAL DAILY
Status: DISCONTINUED | OUTPATIENT
Start: 2023-10-18 | End: 2023-10-25

## 2023-10-18 RX ADMIN — VANCOMYCIN HYDROCHLORIDE 750 MG: 750 INJECTION, POWDER, LYOPHILIZED, FOR SOLUTION INTRAVENOUS at 01:43

## 2023-10-18 RX ADMIN — PREDNISONE 20 MG: 20 TABLET ORAL at 16:38

## 2023-10-18 RX ADMIN — VANCOMYCIN HYDROCHLORIDE 750 MG: 750 INJECTION, POWDER, LYOPHILIZED, FOR SOLUTION INTRAVENOUS at 12:56

## 2023-10-18 RX ADMIN — SODIUM CHLORIDE, PRESERVATIVE FREE 10 ML: 5 INJECTION INTRAVENOUS at 21:03

## 2023-10-18 RX ADMIN — IPRATROPIUM BROMIDE AND ALBUTEROL SULFATE 1 DOSE: 2.5; .5 SOLUTION RESPIRATORY (INHALATION) at 08:20

## 2023-10-18 RX ADMIN — IPRATROPIUM BROMIDE AND ALBUTEROL SULFATE 1 DOSE: 2.5; .5 SOLUTION RESPIRATORY (INHALATION) at 02:53

## 2023-10-18 RX ADMIN — IPRATROPIUM BROMIDE AND ALBUTEROL SULFATE 1 DOSE: 2.5; .5 SOLUTION RESPIRATORY (INHALATION) at 21:03

## 2023-10-18 RX ADMIN — METOPROLOL SUCCINATE 12.5 MG: 25 TABLET, EXTENDED RELEASE ORAL at 08:14

## 2023-10-18 RX ADMIN — SODIUM CHLORIDE, PRESERVATIVE FREE 10 ML: 5 INJECTION INTRAVENOUS at 08:13

## 2023-10-18 RX ADMIN — FUROSEMIDE 20 MG: 40 TABLET ORAL at 16:37

## 2023-10-18 RX ADMIN — CEFEPIME 2000 MG: 2 INJECTION, POWDER, FOR SOLUTION INTRAVENOUS at 11:43

## 2023-10-18 RX ADMIN — CEFEPIME 2000 MG: 2 INJECTION, POWDER, FOR SOLUTION INTRAVENOUS at 03:23

## 2023-10-18 RX ADMIN — AMLODIPINE BESYLATE 10 MG: 5 TABLET ORAL at 08:14

## 2023-10-18 RX ADMIN — IPRATROPIUM BROMIDE AND ALBUTEROL SULFATE 1 DOSE: 2.5; .5 SOLUTION RESPIRATORY (INHALATION) at 13:23

## 2023-10-18 RX ADMIN — SODIUM CHLORIDE 1000 MG: 900 INJECTION INTRAVENOUS at 16:37

## 2023-10-18 RX ADMIN — ARFORMOTEROL TARTRATE: 15 SOLUTION RESPIRATORY (INHALATION) at 08:27

## 2023-10-18 RX ADMIN — ENOXAPARIN SODIUM 70 MG: 100 INJECTION SUBCUTANEOUS at 08:13

## 2023-10-18 RX ADMIN — ARFORMOTEROL TARTRATE: 15 SOLUTION RESPIRATORY (INHALATION) at 21:03

## 2023-10-19 PROCEDURE — 6370000000 HC RX 637 (ALT 250 FOR IP): Performed by: STUDENT IN AN ORGANIZED HEALTH CARE EDUCATION/TRAINING PROGRAM

## 2023-10-19 PROCEDURE — 6360000002 HC RX W HCPCS: Performed by: STUDENT IN AN ORGANIZED HEALTH CARE EDUCATION/TRAINING PROGRAM

## 2023-10-19 PROCEDURE — 2580000003 HC RX 258: Performed by: STUDENT IN AN ORGANIZED HEALTH CARE EDUCATION/TRAINING PROGRAM

## 2023-10-19 PROCEDURE — 94640 AIRWAY INHALATION TREATMENT: CPT

## 2023-10-19 PROCEDURE — 6360000002 HC RX W HCPCS: Performed by: INTERNAL MEDICINE

## 2023-10-19 PROCEDURE — 2700000000 HC OXYGEN THERAPY PER DAY

## 2023-10-19 PROCEDURE — 1100000000 HC RM PRIVATE

## 2023-10-19 PROCEDURE — 94761 N-INVAS EAR/PLS OXIMETRY MLT: CPT

## 2023-10-19 RX ORDER — PANTOPRAZOLE SODIUM 40 MG/1
40 TABLET, DELAYED RELEASE ORAL
Status: DISCONTINUED | OUTPATIENT
Start: 2023-10-20 | End: 2023-10-31 | Stop reason: HOSPADM

## 2023-10-19 RX ADMIN — ARFORMOTEROL TARTRATE: 15 SOLUTION RESPIRATORY (INHALATION) at 08:44

## 2023-10-19 RX ADMIN — AMLODIPINE BESYLATE 10 MG: 5 TABLET ORAL at 08:36

## 2023-10-19 RX ADMIN — SODIUM CHLORIDE, PRESERVATIVE FREE 10 ML: 5 INJECTION INTRAVENOUS at 08:37

## 2023-10-19 RX ADMIN — ENOXAPARIN SODIUM 70 MG: 100 INJECTION SUBCUTANEOUS at 23:53

## 2023-10-19 RX ADMIN — ARFORMOTEROL TARTRATE: 15 SOLUTION RESPIRATORY (INHALATION) at 20:34

## 2023-10-19 RX ADMIN — PREDNISONE 20 MG: 20 TABLET ORAL at 17:27

## 2023-10-19 RX ADMIN — ENOXAPARIN SODIUM 70 MG: 100 INJECTION SUBCUTANEOUS at 08:34

## 2023-10-19 RX ADMIN — Medication: at 08:40

## 2023-10-19 RX ADMIN — METOPROLOL SUCCINATE 12.5 MG: 25 TABLET, EXTENDED RELEASE ORAL at 08:36

## 2023-10-19 RX ADMIN — IPRATROPIUM BROMIDE AND ALBUTEROL SULFATE 1 DOSE: .5; 3 SOLUTION RESPIRATORY (INHALATION) at 14:11

## 2023-10-19 RX ADMIN — SODIUM CHLORIDE 1000 MG: 900 INJECTION INTRAVENOUS at 17:27

## 2023-10-19 RX ADMIN — FUROSEMIDE 20 MG: 40 TABLET ORAL at 08:35

## 2023-10-19 RX ADMIN — FUROSEMIDE 20 MG: 40 TABLET ORAL at 17:27

## 2023-10-19 RX ADMIN — Medication: at 23:54

## 2023-10-19 RX ADMIN — IPRATROPIUM BROMIDE AND ALBUTEROL SULFATE 1 DOSE: .5; 3 SOLUTION RESPIRATORY (INHALATION) at 20:34

## 2023-10-19 RX ADMIN — IPRATROPIUM BROMIDE AND ALBUTEROL SULFATE 1 DOSE: .5; 3 SOLUTION RESPIRATORY (INHALATION) at 08:39

## 2023-10-19 RX ADMIN — SODIUM CHLORIDE, PRESERVATIVE FREE 10 ML: 5 INJECTION INTRAVENOUS at 23:55

## 2023-10-20 LAB
ANION GAP SERPL CALC-SCNC: 5 MMOL/L (ref 5–15)
BASOPHILS # BLD: 0 K/UL (ref 0–0.1)
BASOPHILS NFR BLD: 0 % (ref 0–1)
BUN SERPL-MCNC: 21 MG/DL (ref 6–20)
BUN/CREAT SERPL: 43 (ref 12–20)
CALCIUM SERPL-MCNC: 9.6 MG/DL (ref 8.5–10.1)
CHLORIDE SERPL-SCNC: 92 MMOL/L (ref 97–108)
CO2 SERPL-SCNC: 37 MMOL/L (ref 21–32)
CREAT SERPL-MCNC: 0.49 MG/DL (ref 0.55–1.02)
DIFFERENTIAL METHOD BLD: ABNORMAL
EOSINOPHIL # BLD: 0.1 K/UL (ref 0–0.4)
EOSINOPHIL NFR BLD: 1 % (ref 0–7)
ERYTHROCYTE [DISTWIDTH] IN BLOOD BY AUTOMATED COUNT: 15.6 % (ref 11.5–14.5)
GLUCOSE SERPL-MCNC: 81 MG/DL (ref 65–100)
HCT VFR BLD AUTO: 39.7 % (ref 35–47)
HGB BLD-MCNC: 13.2 G/DL (ref 11.5–16)
IMM GRANULOCYTES # BLD AUTO: 0.1 K/UL (ref 0–0.04)
IMM GRANULOCYTES NFR BLD AUTO: 1 % (ref 0–0.5)
LYMPHOCYTES # BLD: 1.3 K/UL (ref 0.8–3.5)
LYMPHOCYTES NFR BLD: 14 % (ref 12–49)
MAGNESIUM SERPL-MCNC: 2.2 MG/DL (ref 1.6–2.4)
MCH RBC QN AUTO: 31.2 PG (ref 26–34)
MCHC RBC AUTO-ENTMCNC: 33.2 G/DL (ref 30–36.5)
MCV RBC AUTO: 93.9 FL (ref 80–99)
MONOCYTES # BLD: 1.4 K/UL (ref 0–1)
MONOCYTES NFR BLD: 15 % (ref 5–13)
NEUTS SEG # BLD: 6.3 K/UL (ref 1.8–8)
NEUTS SEG NFR BLD: 69 % (ref 32–75)
NRBC # BLD: 0 K/UL (ref 0–0.01)
NRBC BLD-RTO: 0 PER 100 WBC
PLATELET # BLD AUTO: 255 K/UL (ref 150–400)
PMV BLD AUTO: 9.2 FL (ref 8.9–12.9)
POTASSIUM SERPL-SCNC: 3.4 MMOL/L (ref 3.5–5.1)
RBC # BLD AUTO: 4.23 M/UL (ref 3.8–5.2)
SODIUM SERPL-SCNC: 134 MMOL/L (ref 136–145)
WBC # BLD AUTO: 9.1 K/UL (ref 3.6–11)

## 2023-10-20 PROCEDURE — 6370000000 HC RX 637 (ALT 250 FOR IP): Performed by: STUDENT IN AN ORGANIZED HEALTH CARE EDUCATION/TRAINING PROGRAM

## 2023-10-20 PROCEDURE — 94640 AIRWAY INHALATION TREATMENT: CPT

## 2023-10-20 PROCEDURE — 6360000002 HC RX W HCPCS: Performed by: STUDENT IN AN ORGANIZED HEALTH CARE EDUCATION/TRAINING PROGRAM

## 2023-10-20 PROCEDURE — 2700000000 HC OXYGEN THERAPY PER DAY

## 2023-10-20 PROCEDURE — 6370000000 HC RX 637 (ALT 250 FOR IP): Performed by: NURSE PRACTITIONER

## 2023-10-20 PROCEDURE — 6360000002 HC RX W HCPCS: Performed by: INTERNAL MEDICINE

## 2023-10-20 PROCEDURE — 2580000003 HC RX 258: Performed by: STUDENT IN AN ORGANIZED HEALTH CARE EDUCATION/TRAINING PROGRAM

## 2023-10-20 PROCEDURE — 80048 BASIC METABOLIC PNL TOTAL CA: CPT

## 2023-10-20 PROCEDURE — 83735 ASSAY OF MAGNESIUM: CPT

## 2023-10-20 PROCEDURE — 94761 N-INVAS EAR/PLS OXIMETRY MLT: CPT

## 2023-10-20 PROCEDURE — 85025 COMPLETE CBC W/AUTO DIFF WBC: CPT

## 2023-10-20 PROCEDURE — 1100000000 HC RM PRIVATE

## 2023-10-20 PROCEDURE — 2580000003 HC RX 258: Performed by: INTERNAL MEDICINE

## 2023-10-20 PROCEDURE — 36415 COLL VENOUS BLD VENIPUNCTURE: CPT

## 2023-10-20 RX ORDER — SCOLOPAMINE TRANSDERMAL SYSTEM 1 MG/1
1 PATCH, EXTENDED RELEASE TRANSDERMAL
Status: DISCONTINUED | OUTPATIENT
Start: 2023-10-20 | End: 2023-10-31 | Stop reason: HOSPADM

## 2023-10-20 RX ORDER — POLYETHYLENE GLYCOL 3350 17 G/17G
17 POWDER, FOR SOLUTION ORAL 2 TIMES DAILY
Status: DISCONTINUED | OUTPATIENT
Start: 2023-10-20 | End: 2023-10-26

## 2023-10-20 RX ORDER — SENNA AND DOCUSATE SODIUM 50; 8.6 MG/1; MG/1
2 TABLET, FILM COATED ORAL DAILY
Status: DISCONTINUED | OUTPATIENT
Start: 2023-10-20 | End: 2023-10-26

## 2023-10-20 RX ADMIN — PREDNISONE 20 MG: 20 TABLET ORAL at 17:02

## 2023-10-20 RX ADMIN — ARFORMOTEROL TARTRATE: 15 SOLUTION RESPIRATORY (INHALATION) at 20:36

## 2023-10-20 RX ADMIN — IPRATROPIUM BROMIDE AND ALBUTEROL SULFATE 1 DOSE: .5; 3 SOLUTION RESPIRATORY (INHALATION) at 08:07

## 2023-10-20 RX ADMIN — PANTOPRAZOLE SODIUM 40 MG: 40 TABLET, DELAYED RELEASE ORAL at 09:48

## 2023-10-20 RX ADMIN — METOPROLOL SUCCINATE 12.5 MG: 25 TABLET, EXTENDED RELEASE ORAL at 09:49

## 2023-10-20 RX ADMIN — DOCUSATE SODIUM 50MG AND SENNOSIDES 8.6MG 2 TABLET: 8.6; 5 TABLET, FILM COATED ORAL at 17:10

## 2023-10-20 RX ADMIN — FUROSEMIDE 20 MG: 40 TABLET ORAL at 09:49

## 2023-10-20 RX ADMIN — ENOXAPARIN SODIUM 70 MG: 100 INJECTION SUBCUTANEOUS at 09:48

## 2023-10-20 RX ADMIN — ARFORMOTEROL TARTRATE: 15 SOLUTION RESPIRATORY (INHALATION) at 08:12

## 2023-10-20 RX ADMIN — SODIUM CHLORIDE, PRESERVATIVE FREE 10 ML: 5 INJECTION INTRAVENOUS at 21:11

## 2023-10-20 RX ADMIN — SODIUM CHLORIDE, PRESERVATIVE FREE 10 ML: 5 INJECTION INTRAVENOUS at 09:57

## 2023-10-20 RX ADMIN — SODIUM CHLORIDE 1000 MG: 900 INJECTION INTRAVENOUS at 16:52

## 2023-10-20 RX ADMIN — Medication: at 09:56

## 2023-10-20 RX ADMIN — ENOXAPARIN SODIUM 70 MG: 100 INJECTION SUBCUTANEOUS at 21:06

## 2023-10-20 RX ADMIN — AMLODIPINE BESYLATE 10 MG: 5 TABLET ORAL at 09:49

## 2023-10-20 RX ADMIN — POTASSIUM BICARBONATE 40 MEQ: 782 TABLET, EFFERVESCENT ORAL at 17:01

## 2023-10-20 RX ADMIN — FUROSEMIDE 20 MG: 40 TABLET ORAL at 17:02

## 2023-10-20 RX ADMIN — Medication: at 21:11

## 2023-10-20 RX ADMIN — IPRATROPIUM BROMIDE AND ALBUTEROL SULFATE 1 DOSE: .5; 3 SOLUTION RESPIRATORY (INHALATION) at 20:36

## 2023-10-20 RX ADMIN — IPRATROPIUM BROMIDE AND ALBUTEROL SULFATE 1 DOSE: .5; 3 SOLUTION RESPIRATORY (INHALATION) at 15:12

## 2023-10-21 LAB
BACTERIA SPEC CULT: NORMAL
BACTERIA SPEC CULT: NORMAL
SERVICE CMNT-IMP: NORMAL
SERVICE CMNT-IMP: NORMAL

## 2023-10-21 PROCEDURE — 1100000000 HC RM PRIVATE

## 2023-10-21 PROCEDURE — 2580000003 HC RX 258: Performed by: STUDENT IN AN ORGANIZED HEALTH CARE EDUCATION/TRAINING PROGRAM

## 2023-10-21 PROCEDURE — 6370000000 HC RX 637 (ALT 250 FOR IP): Performed by: STUDENT IN AN ORGANIZED HEALTH CARE EDUCATION/TRAINING PROGRAM

## 2023-10-21 PROCEDURE — 2700000000 HC OXYGEN THERAPY PER DAY

## 2023-10-21 PROCEDURE — 6360000002 HC RX W HCPCS: Performed by: STUDENT IN AN ORGANIZED HEALTH CARE EDUCATION/TRAINING PROGRAM

## 2023-10-21 PROCEDURE — 6360000002 HC RX W HCPCS: Performed by: INTERNAL MEDICINE

## 2023-10-21 PROCEDURE — 6370000000 HC RX 637 (ALT 250 FOR IP): Performed by: NURSE PRACTITIONER

## 2023-10-21 PROCEDURE — 94640 AIRWAY INHALATION TREATMENT: CPT

## 2023-10-21 PROCEDURE — 2580000003 HC RX 258: Performed by: INTERNAL MEDICINE

## 2023-10-21 RX ORDER — BISACODYL 10 MG
10 SUPPOSITORY, RECTAL RECTAL DAILY PRN
Status: DISCONTINUED | OUTPATIENT
Start: 2023-10-21 | End: 2023-10-31 | Stop reason: HOSPADM

## 2023-10-21 RX ORDER — LACTULOSE 10 G/15ML
20 SOLUTION ORAL 2 TIMES DAILY
Status: DISCONTINUED | OUTPATIENT
Start: 2023-10-21 | End: 2023-10-25

## 2023-10-21 RX ADMIN — SODIUM CHLORIDE, PRESERVATIVE FREE 10 ML: 5 INJECTION INTRAVENOUS at 21:45

## 2023-10-21 RX ADMIN — DOCUSATE SODIUM 50MG AND SENNOSIDES 8.6MG 2 TABLET: 8.6; 5 TABLET, FILM COATED ORAL at 08:19

## 2023-10-21 RX ADMIN — Medication: at 08:37

## 2023-10-21 RX ADMIN — IPRATROPIUM BROMIDE AND ALBUTEROL SULFATE 1 DOSE: .5; 3 SOLUTION RESPIRATORY (INHALATION) at 19:50

## 2023-10-21 RX ADMIN — SODIUM CHLORIDE, PRESERVATIVE FREE 10 ML: 5 INJECTION INTRAVENOUS at 08:42

## 2023-10-21 RX ADMIN — ARFORMOTEROL TARTRATE: 15 SOLUTION RESPIRATORY (INHALATION) at 19:55

## 2023-10-21 RX ADMIN — METOPROLOL SUCCINATE 12.5 MG: 25 TABLET, EXTENDED RELEASE ORAL at 08:17

## 2023-10-21 RX ADMIN — ENOXAPARIN SODIUM 70 MG: 100 INJECTION SUBCUTANEOUS at 21:45

## 2023-10-21 RX ADMIN — PREDNISONE 20 MG: 20 TABLET ORAL at 17:57

## 2023-10-21 RX ADMIN — SODIUM CHLORIDE 1000 MG: 900 INJECTION INTRAVENOUS at 16:45

## 2023-10-21 RX ADMIN — ARFORMOTEROL TARTRATE: 15 SOLUTION RESPIRATORY (INHALATION) at 09:16

## 2023-10-21 RX ADMIN — IPRATROPIUM BROMIDE AND ALBUTEROL SULFATE 1 DOSE: .5; 3 SOLUTION RESPIRATORY (INHALATION) at 14:54

## 2023-10-21 RX ADMIN — LACTULOSE 20 G: 20 SOLUTION ORAL at 21:45

## 2023-10-21 RX ADMIN — FUROSEMIDE 20 MG: 40 TABLET ORAL at 17:23

## 2023-10-21 RX ADMIN — IPRATROPIUM BROMIDE AND ALBUTEROL SULFATE 1 DOSE: .5; 3 SOLUTION RESPIRATORY (INHALATION) at 09:11

## 2023-10-21 RX ADMIN — FUROSEMIDE 20 MG: 40 TABLET ORAL at 08:20

## 2023-10-21 RX ADMIN — AMLODIPINE BESYLATE 10 MG: 5 TABLET ORAL at 08:19

## 2023-10-21 RX ADMIN — PANTOPRAZOLE SODIUM 40 MG: 40 TABLET, DELAYED RELEASE ORAL at 06:21

## 2023-10-21 RX ADMIN — POLYETHYLENE GLYCOL 3350 17 G: 17 POWDER, FOR SOLUTION ORAL at 21:45

## 2023-10-22 LAB
GLUCOSE BLD STRIP.AUTO-MCNC: 124 MG/DL (ref 65–117)
GLUCOSE BLD STRIP.AUTO-MCNC: 96 MG/DL (ref 65–117)
SERVICE CMNT-IMP: ABNORMAL
SERVICE CMNT-IMP: NORMAL

## 2023-10-22 PROCEDURE — 6370000000 HC RX 637 (ALT 250 FOR IP): Performed by: STUDENT IN AN ORGANIZED HEALTH CARE EDUCATION/TRAINING PROGRAM

## 2023-10-22 PROCEDURE — 82962 GLUCOSE BLOOD TEST: CPT

## 2023-10-22 PROCEDURE — 6360000002 HC RX W HCPCS: Performed by: STUDENT IN AN ORGANIZED HEALTH CARE EDUCATION/TRAINING PROGRAM

## 2023-10-22 PROCEDURE — 2700000000 HC OXYGEN THERAPY PER DAY

## 2023-10-22 PROCEDURE — 6370000000 HC RX 637 (ALT 250 FOR IP): Performed by: NURSE PRACTITIONER

## 2023-10-22 PROCEDURE — 6360000002 HC RX W HCPCS: Performed by: INTERNAL MEDICINE

## 2023-10-22 PROCEDURE — 94760 N-INVAS EAR/PLS OXIMETRY 1: CPT

## 2023-10-22 PROCEDURE — 2580000003 HC RX 258: Performed by: INTERNAL MEDICINE

## 2023-10-22 PROCEDURE — 1100000000 HC RM PRIVATE

## 2023-10-22 PROCEDURE — 2580000003 HC RX 258: Performed by: STUDENT IN AN ORGANIZED HEALTH CARE EDUCATION/TRAINING PROGRAM

## 2023-10-22 PROCEDURE — 94640 AIRWAY INHALATION TREATMENT: CPT

## 2023-10-22 RX ORDER — OXYCODONE HYDROCHLORIDE AND ACETAMINOPHEN 5; 325 MG/1; MG/1
1 TABLET ORAL EVERY 4 HOURS PRN
Status: DISCONTINUED | OUTPATIENT
Start: 2023-10-22 | End: 2023-10-26

## 2023-10-22 RX ORDER — IPRATROPIUM BROMIDE AND ALBUTEROL SULFATE 2.5; .5 MG/3ML; MG/3ML
1 SOLUTION RESPIRATORY (INHALATION) EVERY 6 HOURS PRN
Status: DISCONTINUED | OUTPATIENT
Start: 2023-10-22 | End: 2023-10-31 | Stop reason: HOSPADM

## 2023-10-22 RX ADMIN — ENOXAPARIN SODIUM 70 MG: 100 INJECTION SUBCUTANEOUS at 09:04

## 2023-10-22 RX ADMIN — SODIUM CHLORIDE, PRESERVATIVE FREE 10 ML: 5 INJECTION INTRAVENOUS at 09:07

## 2023-10-22 RX ADMIN — Medication: at 09:17

## 2023-10-22 RX ADMIN — ARFORMOTEROL TARTRATE: 15 SOLUTION RESPIRATORY (INHALATION) at 08:11

## 2023-10-22 RX ADMIN — OXYCODONE HYDROCHLORIDE AND ACETAMINOPHEN 1 TABLET: 5; 325 TABLET ORAL at 12:52

## 2023-10-22 RX ADMIN — SODIUM CHLORIDE 1000 MG: 900 INJECTION INTRAVENOUS at 17:40

## 2023-10-22 RX ADMIN — DOCUSATE SODIUM 50MG AND SENNOSIDES 8.6MG 2 TABLET: 8.6; 5 TABLET, FILM COATED ORAL at 09:02

## 2023-10-22 RX ADMIN — Medication: at 02:06

## 2023-10-22 RX ADMIN — PREDNISONE 20 MG: 20 TABLET ORAL at 17:37

## 2023-10-22 RX ADMIN — FUROSEMIDE 20 MG: 40 TABLET ORAL at 17:33

## 2023-10-22 RX ADMIN — ARFORMOTEROL TARTRATE: 15 SOLUTION RESPIRATORY (INHALATION) at 20:07

## 2023-10-22 RX ADMIN — Medication: at 20:32

## 2023-10-22 ASSESSMENT — PAIN DESCRIPTION - ORIENTATION: ORIENTATION: LOWER

## 2023-10-22 ASSESSMENT — PAIN SCALES - GENERAL
PAINLEVEL_OUTOF10: 0
PAINLEVEL_OUTOF10: 8

## 2023-10-22 ASSESSMENT — PAIN DESCRIPTION - DESCRIPTORS: DESCRIPTORS: ACHING

## 2023-10-22 ASSESSMENT — PAIN DESCRIPTION - LOCATION: LOCATION: ABDOMEN

## 2023-10-23 LAB
GLUCOSE BLD STRIP.AUTO-MCNC: 70 MG/DL (ref 65–117)
SERVICE CMNT-IMP: NORMAL

## 2023-10-23 PROCEDURE — 6370000000 HC RX 637 (ALT 250 FOR IP): Performed by: NURSE PRACTITIONER

## 2023-10-23 PROCEDURE — 6360000002 HC RX W HCPCS: Performed by: INTERNAL MEDICINE

## 2023-10-23 PROCEDURE — 82962 GLUCOSE BLOOD TEST: CPT

## 2023-10-23 PROCEDURE — 6370000000 HC RX 637 (ALT 250 FOR IP): Performed by: STUDENT IN AN ORGANIZED HEALTH CARE EDUCATION/TRAINING PROGRAM

## 2023-10-23 PROCEDURE — 94640 AIRWAY INHALATION TREATMENT: CPT

## 2023-10-23 PROCEDURE — 2700000000 HC OXYGEN THERAPY PER DAY

## 2023-10-23 PROCEDURE — 2580000003 HC RX 258: Performed by: STUDENT IN AN ORGANIZED HEALTH CARE EDUCATION/TRAINING PROGRAM

## 2023-10-23 PROCEDURE — 6360000002 HC RX W HCPCS: Performed by: STUDENT IN AN ORGANIZED HEALTH CARE EDUCATION/TRAINING PROGRAM

## 2023-10-23 PROCEDURE — 94761 N-INVAS EAR/PLS OXIMETRY MLT: CPT

## 2023-10-23 PROCEDURE — 1100000000 HC RM PRIVATE

## 2023-10-23 RX ADMIN — AMLODIPINE BESYLATE 10 MG: 5 TABLET ORAL at 10:39

## 2023-10-23 RX ADMIN — ENOXAPARIN SODIUM 70 MG: 100 INJECTION SUBCUTANEOUS at 22:16

## 2023-10-23 RX ADMIN — Medication: at 10:43

## 2023-10-23 RX ADMIN — SODIUM CHLORIDE, PRESERVATIVE FREE 10 ML: 5 INJECTION INTRAVENOUS at 22:16

## 2023-10-23 RX ADMIN — PREDNISONE 20 MG: 20 TABLET ORAL at 18:46

## 2023-10-23 RX ADMIN — OXYCODONE HYDROCHLORIDE AND ACETAMINOPHEN 1 TABLET: 5; 325 TABLET ORAL at 00:05

## 2023-10-23 RX ADMIN — FUROSEMIDE 20 MG: 40 TABLET ORAL at 18:46

## 2023-10-23 RX ADMIN — DOCUSATE SODIUM 50MG AND SENNOSIDES 8.6MG 2 TABLET: 8.6; 5 TABLET, FILM COATED ORAL at 10:40

## 2023-10-23 RX ADMIN — ARFORMOTEROL TARTRATE: 15 SOLUTION RESPIRATORY (INHALATION) at 20:35

## 2023-10-23 RX ADMIN — PANTOPRAZOLE SODIUM 40 MG: 40 TABLET, DELAYED RELEASE ORAL at 06:34

## 2023-10-23 RX ADMIN — ENOXAPARIN SODIUM 70 MG: 100 INJECTION SUBCUTANEOUS at 10:41

## 2023-10-23 RX ADMIN — FUROSEMIDE 20 MG: 40 TABLET ORAL at 10:39

## 2023-10-23 RX ADMIN — Medication: at 22:18

## 2023-10-23 RX ADMIN — ARFORMOTEROL TARTRATE: 15 SOLUTION RESPIRATORY (INHALATION) at 08:33

## 2023-10-23 RX ADMIN — METOPROLOL SUCCINATE 12.5 MG: 25 TABLET, EXTENDED RELEASE ORAL at 10:38

## 2023-10-23 RX ADMIN — SODIUM CHLORIDE, PRESERVATIVE FREE 10 ML: 5 INJECTION INTRAVENOUS at 10:43

## 2023-10-23 ASSESSMENT — PAIN DESCRIPTION - PAIN TYPE: TYPE: ACUTE PAIN

## 2023-10-23 ASSESSMENT — PAIN DESCRIPTION - DESCRIPTORS: DESCRIPTORS: ACHING

## 2023-10-23 ASSESSMENT — PAIN DESCRIPTION - ONSET: ONSET: AWAKENED FROM SLEEP

## 2023-10-23 ASSESSMENT — PAIN DESCRIPTION - LOCATION: LOCATION: BACK

## 2023-10-23 ASSESSMENT — PAIN SCALES - GENERAL
PAINLEVEL_OUTOF10: 0
PAINLEVEL_OUTOF10: 7

## 2023-10-23 ASSESSMENT — PAIN - FUNCTIONAL ASSESSMENT: PAIN_FUNCTIONAL_ASSESSMENT: ACTIVITIES ARE NOT PREVENTED

## 2023-10-23 ASSESSMENT — PAIN DESCRIPTION - ORIENTATION: ORIENTATION: POSTERIOR

## 2023-10-23 ASSESSMENT — PAIN DESCRIPTION - FREQUENCY: FREQUENCY: INTERMITTENT

## 2023-10-24 PROCEDURE — 6370000000 HC RX 637 (ALT 250 FOR IP): Performed by: NURSE PRACTITIONER

## 2023-10-24 PROCEDURE — 94761 N-INVAS EAR/PLS OXIMETRY MLT: CPT

## 2023-10-24 PROCEDURE — 1100000000 HC RM PRIVATE

## 2023-10-24 PROCEDURE — 6370000000 HC RX 637 (ALT 250 FOR IP): Performed by: STUDENT IN AN ORGANIZED HEALTH CARE EDUCATION/TRAINING PROGRAM

## 2023-10-24 PROCEDURE — 94640 AIRWAY INHALATION TREATMENT: CPT

## 2023-10-24 PROCEDURE — 6360000002 HC RX W HCPCS: Performed by: STUDENT IN AN ORGANIZED HEALTH CARE EDUCATION/TRAINING PROGRAM

## 2023-10-24 PROCEDURE — 2700000000 HC OXYGEN THERAPY PER DAY

## 2023-10-24 PROCEDURE — 2580000003 HC RX 258: Performed by: STUDENT IN AN ORGANIZED HEALTH CARE EDUCATION/TRAINING PROGRAM

## 2023-10-24 PROCEDURE — 6360000002 HC RX W HCPCS: Performed by: INTERNAL MEDICINE

## 2023-10-24 RX ADMIN — PREDNISONE 20 MG: 20 TABLET ORAL at 18:08

## 2023-10-24 RX ADMIN — ARFORMOTEROL TARTRATE: 15 SOLUTION RESPIRATORY (INHALATION) at 08:40

## 2023-10-24 RX ADMIN — DOCUSATE SODIUM 50MG AND SENNOSIDES 8.6MG 2 TABLET: 8.6; 5 TABLET, FILM COATED ORAL at 10:00

## 2023-10-24 RX ADMIN — FUROSEMIDE 20 MG: 40 TABLET ORAL at 10:02

## 2023-10-24 RX ADMIN — FUROSEMIDE 20 MG: 40 TABLET ORAL at 18:08

## 2023-10-24 RX ADMIN — Medication: at 10:02

## 2023-10-24 RX ADMIN — SODIUM CHLORIDE, PRESERVATIVE FREE 10 ML: 5 INJECTION INTRAVENOUS at 22:10

## 2023-10-24 RX ADMIN — PANTOPRAZOLE SODIUM 40 MG: 40 TABLET, DELAYED RELEASE ORAL at 07:13

## 2023-10-24 RX ADMIN — AMLODIPINE BESYLATE 10 MG: 5 TABLET ORAL at 09:59

## 2023-10-24 RX ADMIN — ENOXAPARIN SODIUM 70 MG: 100 INJECTION SUBCUTANEOUS at 10:00

## 2023-10-24 RX ADMIN — LACTULOSE 20 G: 20 SOLUTION ORAL at 22:04

## 2023-10-24 RX ADMIN — ENOXAPARIN SODIUM 70 MG: 100 INJECTION SUBCUTANEOUS at 22:05

## 2023-10-24 RX ADMIN — Medication: at 22:06

## 2023-10-24 RX ADMIN — ARFORMOTEROL TARTRATE: 15 SOLUTION RESPIRATORY (INHALATION) at 20:00

## 2023-10-24 RX ADMIN — SODIUM CHLORIDE, PRESERVATIVE FREE 10 ML: 5 INJECTION INTRAVENOUS at 10:01

## 2023-10-24 RX ADMIN — METOPROLOL SUCCINATE 12.5 MG: 25 TABLET, EXTENDED RELEASE ORAL at 10:00

## 2023-10-24 ASSESSMENT — PAIN DESCRIPTION - ONSET: ONSET: AWAKENED FROM SLEEP

## 2023-10-24 ASSESSMENT — PAIN DESCRIPTION - DESCRIPTORS: DESCRIPTORS: ACHING

## 2023-10-24 ASSESSMENT — PAIN - FUNCTIONAL ASSESSMENT: PAIN_FUNCTIONAL_ASSESSMENT: ACTIVITIES ARE NOT PREVENTED

## 2023-10-24 ASSESSMENT — PAIN DESCRIPTION - PAIN TYPE: TYPE: ACUTE PAIN

## 2023-10-24 ASSESSMENT — PAIN DESCRIPTION - ORIENTATION: ORIENTATION: POSTERIOR

## 2023-10-24 ASSESSMENT — PAIN SCALES - GENERAL: PAINLEVEL_OUTOF10: 0

## 2023-10-24 ASSESSMENT — PAIN DESCRIPTION - LOCATION: LOCATION: BACK

## 2023-10-24 ASSESSMENT — PAIN DESCRIPTION - FREQUENCY: FREQUENCY: INTERMITTENT

## 2023-10-25 PROBLEM — I48.92 ATRIAL FLUTTER (HCC): Status: RESOLVED | Noted: 2017-03-23 | Resolved: 2023-10-25

## 2023-10-25 PROBLEM — Z86.79 S/P ABLATION OF ATRIAL FLUTTER: Status: RESOLVED | Noted: 2017-03-23 | Resolved: 2023-10-25

## 2023-10-25 PROBLEM — Z98.890 S/P ABLATION OF ATRIAL FLUTTER: Status: RESOLVED | Noted: 2017-03-23 | Resolved: 2023-10-25

## 2023-10-25 PROBLEM — I48.0 PAF (PAROXYSMAL ATRIAL FIBRILLATION) (HCC): Status: RESOLVED | Noted: 2017-03-23 | Resolved: 2023-10-25

## 2023-10-25 PROBLEM — J18.9 CAP (COMMUNITY ACQUIRED PNEUMONIA): Status: RESOLVED | Noted: 2022-12-25 | Resolved: 2023-10-25

## 2023-10-25 PROBLEM — Z98.890 S/P ABLATION OF ATRIAL FIBRILLATION: Status: RESOLVED | Noted: 2017-03-23 | Resolved: 2023-10-25

## 2023-10-25 PROBLEM — I83.12 VARICOSE VEINS OF BOTH LOWER EXTREMITIES WITH INFLAMMATION: Status: RESOLVED | Noted: 2019-05-21 | Resolved: 2023-10-25

## 2023-10-25 PROBLEM — I83.11 VARICOSE VEINS OF BOTH LOWER EXTREMITIES WITH INFLAMMATION: Status: RESOLVED | Noted: 2019-05-21 | Resolved: 2023-10-25

## 2023-10-25 PROBLEM — S72.92XA FEMUR FRACTURE, LEFT (HCC): Status: RESOLVED | Noted: 2019-10-22 | Resolved: 2023-10-25

## 2023-10-25 PROBLEM — D37.4 NEOPLASM OF UNCERTAIN BEHAVIOR OF LARGE INTESTINE: Status: RESOLVED | Noted: 2017-05-11 | Resolved: 2023-10-25

## 2023-10-25 PROBLEM — Z86.79 S/P ABLATION OF ATRIAL FIBRILLATION: Status: RESOLVED | Noted: 2017-03-23 | Resolved: 2023-10-25

## 2023-10-25 PROBLEM — R60.0 LEG EDEMA: Status: RESOLVED | Noted: 2020-03-03 | Resolved: 2023-10-25

## 2023-10-25 PROCEDURE — 94761 N-INVAS EAR/PLS OXIMETRY MLT: CPT

## 2023-10-25 PROCEDURE — 6360000002 HC RX W HCPCS: Performed by: STUDENT IN AN ORGANIZED HEALTH CARE EDUCATION/TRAINING PROGRAM

## 2023-10-25 PROCEDURE — 2700000000 HC OXYGEN THERAPY PER DAY

## 2023-10-25 PROCEDURE — 6360000002 HC RX W HCPCS: Performed by: INTERNAL MEDICINE

## 2023-10-25 PROCEDURE — 6370000000 HC RX 637 (ALT 250 FOR IP): Performed by: STUDENT IN AN ORGANIZED HEALTH CARE EDUCATION/TRAINING PROGRAM

## 2023-10-25 PROCEDURE — 6370000000 HC RX 637 (ALT 250 FOR IP): Performed by: NURSE PRACTITIONER

## 2023-10-25 PROCEDURE — 2580000003 HC RX 258: Performed by: STUDENT IN AN ORGANIZED HEALTH CARE EDUCATION/TRAINING PROGRAM

## 2023-10-25 PROCEDURE — 1100000000 HC RM PRIVATE

## 2023-10-25 PROCEDURE — 94640 AIRWAY INHALATION TREATMENT: CPT

## 2023-10-25 RX ORDER — LANOLIN ALCOHOL/MO/W.PET/CERES
3 CREAM (GRAM) TOPICAL NIGHTLY PRN
Status: DISCONTINUED | OUTPATIENT
Start: 2023-10-25 | End: 2023-10-31 | Stop reason: HOSPADM

## 2023-10-25 RX ORDER — ALUMINA, MAGNESIA, AND SIMETHICONE 2400; 2400; 240 MG/30ML; MG/30ML; MG/30ML
15 SUSPENSION ORAL EVERY 6 HOURS PRN
Status: DISCONTINUED | OUTPATIENT
Start: 2023-10-25 | End: 2023-10-31 | Stop reason: HOSPADM

## 2023-10-25 RX ADMIN — SODIUM CHLORIDE, PRESERVATIVE FREE 10 ML: 5 INJECTION INTRAVENOUS at 09:45

## 2023-10-25 RX ADMIN — Medication: at 23:45

## 2023-10-25 RX ADMIN — ARFORMOTEROL TARTRATE: 15 SOLUTION RESPIRATORY (INHALATION) at 20:18

## 2023-10-25 RX ADMIN — PREDNISONE 20 MG: 20 TABLET ORAL at 16:26

## 2023-10-25 RX ADMIN — POLYETHYLENE GLYCOL 3350 17 G: 17 POWDER, FOR SOLUTION ORAL at 23:49

## 2023-10-25 RX ADMIN — ARFORMOTEROL TARTRATE: 15 SOLUTION RESPIRATORY (INHALATION) at 09:02

## 2023-10-25 RX ADMIN — OXYCODONE HYDROCHLORIDE AND ACETAMINOPHEN 1 TABLET: 5; 325 TABLET ORAL at 23:44

## 2023-10-25 RX ADMIN — FUROSEMIDE 20 MG: 40 TABLET ORAL at 16:26

## 2023-10-25 RX ADMIN — METOPROLOL SUCCINATE 12.5 MG: 25 TABLET, EXTENDED RELEASE ORAL at 09:38

## 2023-10-25 RX ADMIN — OXYCODONE HYDROCHLORIDE AND ACETAMINOPHEN 1 TABLET: 5; 325 TABLET ORAL at 16:26

## 2023-10-25 RX ADMIN — MELATONIN 3 MG: at 23:44

## 2023-10-25 RX ADMIN — SODIUM CHLORIDE, PRESERVATIVE FREE 10 ML: 5 INJECTION INTRAVENOUS at 23:53

## 2023-10-25 RX ADMIN — ENOXAPARIN SODIUM 70 MG: 100 INJECTION SUBCUTANEOUS at 09:43

## 2023-10-25 RX ADMIN — OXYCODONE HYDROCHLORIDE AND ACETAMINOPHEN 1 TABLET: 5; 325 TABLET ORAL at 12:03

## 2023-10-25 RX ADMIN — Medication: at 09:44

## 2023-10-25 RX ADMIN — AMLODIPINE BESYLATE 10 MG: 5 TABLET ORAL at 09:38

## 2023-10-25 RX ADMIN — ACETAMINOPHEN 650 MG: 325 TABLET ORAL at 09:40

## 2023-10-25 ASSESSMENT — PAIN DESCRIPTION - FREQUENCY: FREQUENCY: INTERMITTENT

## 2023-10-25 ASSESSMENT — PAIN DESCRIPTION - DESCRIPTORS
DESCRIPTORS: ACHING
DESCRIPTORS: CRAMPING
DESCRIPTORS: ACHING
DESCRIPTORS: CRAMPING;DISCOMFORT

## 2023-10-25 ASSESSMENT — PAIN DESCRIPTION - PAIN TYPE: TYPE: ACUTE PAIN

## 2023-10-25 ASSESSMENT — PAIN SCALES - GENERAL
PAINLEVEL_OUTOF10: 8
PAINLEVEL_OUTOF10: 6
PAINLEVEL_OUTOF10: 5
PAINLEVEL_OUTOF10: 8
PAINLEVEL_OUTOF10: 7

## 2023-10-25 ASSESSMENT — PAIN DESCRIPTION - LOCATION
LOCATION: ABDOMEN

## 2023-10-25 ASSESSMENT — PAIN DESCRIPTION - ORIENTATION
ORIENTATION: UPPER;LOWER;MID
ORIENTATION: LOWER;MID
ORIENTATION: ANTERIOR
ORIENTATION: LOWER

## 2023-10-25 ASSESSMENT — PAIN DESCRIPTION - ONSET: ONSET: ON-GOING

## 2023-10-25 ASSESSMENT — PAIN - FUNCTIONAL ASSESSMENT: PAIN_FUNCTIONAL_ASSESSMENT: ACTIVITIES ARE NOT PREVENTED

## 2023-10-26 PROCEDURE — 6370000000 HC RX 637 (ALT 250 FOR IP): Performed by: STUDENT IN AN ORGANIZED HEALTH CARE EDUCATION/TRAINING PROGRAM

## 2023-10-26 PROCEDURE — 6370000000 HC RX 637 (ALT 250 FOR IP): Performed by: NURSE PRACTITIONER

## 2023-10-26 PROCEDURE — 94640 AIRWAY INHALATION TREATMENT: CPT

## 2023-10-26 PROCEDURE — 2580000003 HC RX 258: Performed by: STUDENT IN AN ORGANIZED HEALTH CARE EDUCATION/TRAINING PROGRAM

## 2023-10-26 PROCEDURE — 1100000000 HC RM PRIVATE

## 2023-10-26 PROCEDURE — 6360000002 HC RX W HCPCS: Performed by: INTERNAL MEDICINE

## 2023-10-26 PROCEDURE — 2700000000 HC OXYGEN THERAPY PER DAY

## 2023-10-26 PROCEDURE — 94761 N-INVAS EAR/PLS OXIMETRY MLT: CPT

## 2023-10-26 RX ORDER — OXYCODONE HYDROCHLORIDE AND ACETAMINOPHEN 5; 325 MG/1; MG/1
2 TABLET ORAL EVERY 4 HOURS PRN
Status: DISCONTINUED | OUTPATIENT
Start: 2023-10-26 | End: 2023-10-31 | Stop reason: HOSPADM

## 2023-10-26 RX ORDER — SCOLOPAMINE TRANSDERMAL SYSTEM 1 MG/1
1 PATCH, EXTENDED RELEASE TRANSDERMAL
Qty: 1 PATCH | Refills: 0 | Status: SHIPPED | OUTPATIENT
Start: 2023-10-26

## 2023-10-26 RX ORDER — FUROSEMIDE 20 MG/1
20 TABLET ORAL 2 TIMES DAILY
Qty: 60 TABLET | Refills: 0 | Status: SHIPPED | OUTPATIENT
Start: 2023-10-26

## 2023-10-26 RX ORDER — SENNA AND DOCUSATE SODIUM 50; 8.6 MG/1; MG/1
1 TABLET, FILM COATED ORAL DAILY
Qty: 30 TABLET | Refills: 0 | Status: SHIPPED | OUTPATIENT
Start: 2023-10-27

## 2023-10-26 RX ORDER — SENNA AND DOCUSATE SODIUM 50; 8.6 MG/1; MG/1
1 TABLET, FILM COATED ORAL DAILY
Status: DISCONTINUED | OUTPATIENT
Start: 2023-10-27 | End: 2023-10-31 | Stop reason: HOSPADM

## 2023-10-26 RX ORDER — DICYCLOMINE HYDROCHLORIDE 10 MG/1
10 CAPSULE ORAL 4 TIMES DAILY PRN
Status: DISCONTINUED | OUTPATIENT
Start: 2023-10-26 | End: 2023-10-31 | Stop reason: HOSPADM

## 2023-10-26 RX ORDER — DICYCLOMINE HYDROCHLORIDE 10 MG/1
10 CAPSULE ORAL 4 TIMES DAILY PRN
Qty: 120 CAPSULE | Refills: 0 | Status: SHIPPED | OUTPATIENT
Start: 2023-10-26

## 2023-10-26 RX ORDER — OXYCODONE HYDROCHLORIDE AND ACETAMINOPHEN 5; 325 MG/1; MG/1
2 TABLET ORAL EVERY 4 HOURS PRN
Qty: 24 TABLET | Refills: 0 | Status: SHIPPED | OUTPATIENT
Start: 2023-10-26 | End: 2023-10-29

## 2023-10-26 RX ORDER — OXYCODONE HYDROCHLORIDE AND ACETAMINOPHEN 5; 325 MG/1; MG/1
2 TABLET ORAL EVERY 4 HOURS PRN
Qty: 24 TABLET | Refills: 0 | Status: SHIPPED | OUTPATIENT
Start: 2023-10-26 | End: 2023-10-26 | Stop reason: SDUPTHER

## 2023-10-26 RX ADMIN — SODIUM CHLORIDE, PRESERVATIVE FREE 10 ML: 5 INJECTION INTRAVENOUS at 20:53

## 2023-10-26 RX ADMIN — MELATONIN 3 MG: at 20:50

## 2023-10-26 RX ADMIN — SODIUM CHLORIDE, PRESERVATIVE FREE 10 ML: 5 INJECTION INTRAVENOUS at 10:11

## 2023-10-26 RX ADMIN — ARFORMOTEROL TARTRATE: 15 SOLUTION RESPIRATORY (INHALATION) at 08:10

## 2023-10-26 RX ADMIN — PANTOPRAZOLE SODIUM 40 MG: 40 TABLET, DELAYED RELEASE ORAL at 08:58

## 2023-10-26 RX ADMIN — DICYCLOMINE HYDROCHLORIDE 10 MG: 10 CAPSULE ORAL at 12:10

## 2023-10-26 RX ADMIN — Medication: at 20:53

## 2023-10-26 RX ADMIN — ACETAMINOPHEN 650 MG: 325 TABLET ORAL at 20:50

## 2023-10-26 RX ADMIN — FUROSEMIDE 20 MG: 40 TABLET ORAL at 17:48

## 2023-10-26 RX ADMIN — ARFORMOTEROL TARTRATE: 15 SOLUTION RESPIRATORY (INHALATION) at 19:48

## 2023-10-26 RX ADMIN — DOCUSATE SODIUM 50MG AND SENNOSIDES 8.6MG 2 TABLET: 8.6; 5 TABLET, FILM COATED ORAL at 10:09

## 2023-10-26 RX ADMIN — SODIUM CHLORIDE, PRESERVATIVE FREE 10 ML: 5 INJECTION INTRAVENOUS at 21:33

## 2023-10-26 RX ADMIN — OXYCODONE HYDROCHLORIDE AND ACETAMINOPHEN 2 TABLET: 5; 325 TABLET ORAL at 13:18

## 2023-10-26 RX ADMIN — Medication: at 10:12

## 2023-10-26 RX ADMIN — FUROSEMIDE 20 MG: 40 TABLET ORAL at 10:08

## 2023-10-26 ASSESSMENT — PAIN SCALES - GENERAL
PAINLEVEL_OUTOF10: 6
PAINLEVEL_OUTOF10: 3
PAINLEVEL_OUTOF10: 3
PAINLEVEL_OUTOF10: 8
PAINLEVEL_OUTOF10: 2

## 2023-10-26 ASSESSMENT — PAIN DESCRIPTION - DESCRIPTORS
DESCRIPTORS: CRAMPING
DESCRIPTORS: CRAMPING
DESCRIPTORS: ACHING

## 2023-10-26 ASSESSMENT — PAIN DESCRIPTION - LOCATION
LOCATION: ABDOMEN

## 2023-10-26 ASSESSMENT — PAIN DESCRIPTION - ORIENTATION
ORIENTATION: LOWER
ORIENTATION: LOWER

## 2023-10-27 PROCEDURE — 2580000003 HC RX 258: Performed by: STUDENT IN AN ORGANIZED HEALTH CARE EDUCATION/TRAINING PROGRAM

## 2023-10-27 PROCEDURE — 6360000002 HC RX W HCPCS: Performed by: NURSE PRACTITIONER

## 2023-10-27 PROCEDURE — 6370000000 HC RX 637 (ALT 250 FOR IP): Performed by: STUDENT IN AN ORGANIZED HEALTH CARE EDUCATION/TRAINING PROGRAM

## 2023-10-27 PROCEDURE — 1100000000 HC RM PRIVATE

## 2023-10-27 PROCEDURE — 6360000002 HC RX W HCPCS: Performed by: INTERNAL MEDICINE

## 2023-10-27 PROCEDURE — 94640 AIRWAY INHALATION TREATMENT: CPT

## 2023-10-27 PROCEDURE — 6370000000 HC RX 637 (ALT 250 FOR IP): Performed by: NURSE PRACTITIONER

## 2023-10-27 PROCEDURE — 2700000000 HC OXYGEN THERAPY PER DAY

## 2023-10-27 PROCEDURE — 94761 N-INVAS EAR/PLS OXIMETRY MLT: CPT

## 2023-10-27 RX ADMIN — PANTOPRAZOLE SODIUM 40 MG: 40 TABLET, DELAYED RELEASE ORAL at 05:59

## 2023-10-27 RX ADMIN — SODIUM CHLORIDE, PRESERVATIVE FREE 5 ML: 5 INJECTION INTRAVENOUS at 22:04

## 2023-10-27 RX ADMIN — FUROSEMIDE 20 MG: 40 TABLET ORAL at 17:47

## 2023-10-27 RX ADMIN — Medication: at 22:04

## 2023-10-27 RX ADMIN — MELATONIN 3 MG: at 21:51

## 2023-10-27 RX ADMIN — Medication: at 09:00

## 2023-10-27 RX ADMIN — MORPHINE SULFATE 1 MG: 2 INJECTION, SOLUTION INTRAMUSCULAR; INTRAVENOUS at 21:58

## 2023-10-27 RX ADMIN — OXYCODONE HYDROCHLORIDE AND ACETAMINOPHEN 2 TABLET: 5; 325 TABLET ORAL at 12:41

## 2023-10-27 RX ADMIN — OXYCODONE HYDROCHLORIDE AND ACETAMINOPHEN 2 TABLET: 5; 325 TABLET ORAL at 16:49

## 2023-10-27 RX ADMIN — SENNOSIDES AND DOCUSATE SODIUM 1 TABLET: 50; 8.6 TABLET ORAL at 08:50

## 2023-10-27 RX ADMIN — ACETAMINOPHEN 650 MG: 325 TABLET ORAL at 06:51

## 2023-10-27 RX ADMIN — ARFORMOTEROL TARTRATE: 15 SOLUTION RESPIRATORY (INHALATION) at 08:30

## 2023-10-27 RX ADMIN — ARFORMOTEROL TARTRATE: 15 SOLUTION RESPIRATORY (INHALATION) at 20:50

## 2023-10-27 RX ADMIN — SODIUM CHLORIDE, PRESERVATIVE FREE 10 ML: 5 INJECTION INTRAVENOUS at 08:53

## 2023-10-27 RX ADMIN — FUROSEMIDE 20 MG: 40 TABLET ORAL at 08:50

## 2023-10-27 ASSESSMENT — PAIN SCALES - GENERAL
PAINLEVEL_OUTOF10: 0
PAINLEVEL_OUTOF10: 4
PAINLEVEL_OUTOF10: 2
PAINLEVEL_OUTOF10: 2
PAINLEVEL_OUTOF10: 6
PAINLEVEL_OUTOF10: 7
PAINLEVEL_OUTOF10: 9

## 2023-10-27 ASSESSMENT — PAIN DESCRIPTION - ORIENTATION: ORIENTATION: LOWER

## 2023-10-27 ASSESSMENT — PAIN - FUNCTIONAL ASSESSMENT: PAIN_FUNCTIONAL_ASSESSMENT: PREVENTS OR INTERFERES SOME ACTIVE ACTIVITIES AND ADLS

## 2023-10-27 ASSESSMENT — PAIN DESCRIPTION - DESCRIPTORS
DESCRIPTORS: ACHING
DESCRIPTORS: ACHING;DISCOMFORT
DESCRIPTORS: ACHING;STABBING

## 2023-10-27 ASSESSMENT — PAIN DESCRIPTION - LOCATION
LOCATION: ABDOMEN
LOCATION: COCCYX
LOCATION: BACK
LOCATION: BACK

## 2023-10-27 NOTE — PLAN OF CARE
ADULT PROTOCOL: JET AEROSOL ASSESSMENT    Patient  Sandi Jacques     82 y.o.   female     10/27/2023  10:32 AM    Breath Sounds Pre Procedure: Breath Sounds Pre-Tx KATHERIN: Diminished                                  Breath Sounds Pre-Tx LLL: Diminished        Breath Sounds Pre-Tx RUL: Diminished        Breath Sounds Pre-Tx RML: Diminished        Breath Sounds Pre-Tx RLL: Diminished  Breath Sounds Post Procedure: Breath Sounds Post-Tx KATHERIN: Diminished          Breath Sounds Post-Tx LLL: Diminished          Breath Sounds Post-Tx RUL: Diminished          Breath Sounds Post-Tx RML: Diminished          Breath Sounds Post-Tx RLL: Diminished                                   Heart Rate: Pre procedure Pre-Tx Pulse: 78           Post procedure Post-Tx Pulse: 79    Resp Rate: Pre procedure Pre-Tx Resps: 19           Post procedure Post-Tx Resps: 18    SpO2:  SpO2: 93 %   without Oxygen                Nebulizer Therapy: Current medications Medications: Arformoterol, Budesonide      Changed: No    Problem List:   Patient Active Problem List   Diagnosis    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

## 2023-10-28 PROCEDURE — 94640 AIRWAY INHALATION TREATMENT: CPT

## 2023-10-28 PROCEDURE — 6370000000 HC RX 637 (ALT 250 FOR IP): Performed by: STUDENT IN AN ORGANIZED HEALTH CARE EDUCATION/TRAINING PROGRAM

## 2023-10-28 PROCEDURE — 6360000002 HC RX W HCPCS: Performed by: INTERNAL MEDICINE

## 2023-10-28 PROCEDURE — 6370000000 HC RX 637 (ALT 250 FOR IP): Performed by: NURSE PRACTITIONER

## 2023-10-28 PROCEDURE — 2700000000 HC OXYGEN THERAPY PER DAY

## 2023-10-28 PROCEDURE — 2580000003 HC RX 258: Performed by: STUDENT IN AN ORGANIZED HEALTH CARE EDUCATION/TRAINING PROGRAM

## 2023-10-28 PROCEDURE — 1100000000 HC RM PRIVATE

## 2023-10-28 RX ADMIN — ARFORMOTEROL TARTRATE: 15 SOLUTION RESPIRATORY (INHALATION) at 08:09

## 2023-10-28 RX ADMIN — SENNOSIDES AND DOCUSATE SODIUM 1 TABLET: 50; 8.6 TABLET ORAL at 09:48

## 2023-10-28 RX ADMIN — SODIUM CHLORIDE, PRESERVATIVE FREE 10 ML: 5 INJECTION INTRAVENOUS at 22:42

## 2023-10-28 RX ADMIN — Medication: at 22:54

## 2023-10-28 RX ADMIN — Medication: at 09:48

## 2023-10-28 RX ADMIN — FUROSEMIDE 20 MG: 40 TABLET ORAL at 16:56

## 2023-10-28 RX ADMIN — OXYCODONE HYDROCHLORIDE AND ACETAMINOPHEN 2 TABLET: 5; 325 TABLET ORAL at 22:54

## 2023-10-28 RX ADMIN — OXYCODONE HYDROCHLORIDE AND ACETAMINOPHEN 2 TABLET: 5; 325 TABLET ORAL at 16:57

## 2023-10-28 RX ADMIN — PANTOPRAZOLE SODIUM 40 MG: 40 TABLET, DELAYED RELEASE ORAL at 06:15

## 2023-10-28 RX ADMIN — OXYCODONE HYDROCHLORIDE AND ACETAMINOPHEN 2 TABLET: 5; 325 TABLET ORAL at 05:17

## 2023-10-28 RX ADMIN — SODIUM CHLORIDE, PRESERVATIVE FREE 10 ML: 5 INJECTION INTRAVENOUS at 09:49

## 2023-10-28 RX ADMIN — MELATONIN 3 MG: at 22:42

## 2023-10-28 RX ADMIN — ARFORMOTEROL TARTRATE: 15 SOLUTION RESPIRATORY (INHALATION) at 20:33

## 2023-10-28 RX ADMIN — FUROSEMIDE 20 MG: 40 TABLET ORAL at 09:48

## 2023-10-28 ASSESSMENT — PAIN SCALES - GENERAL
PAINLEVEL_OUTOF10: 3
PAINLEVEL_OUTOF10: 6

## 2023-10-28 ASSESSMENT — PAIN DESCRIPTION - ORIENTATION: ORIENTATION: LOWER;MID

## 2023-10-28 ASSESSMENT — PAIN DESCRIPTION - DESCRIPTORS
DESCRIPTORS: ACHING
DESCRIPTORS: ACHING

## 2023-10-28 ASSESSMENT — PAIN DESCRIPTION - LOCATION
LOCATION: BACK
LOCATION: COCCYX
LOCATION: BACK

## 2023-10-28 NOTE — PLAN OF CARE
ADULT PROTOCOL: JET AEROSOL ASSESSMENT    Patient  Ana Lilia Dow     82 y.o.   female     10/28/2023  10:24 AM    Breath Sounds Pre Procedure: Breath Sounds Pre-Tx KATHERIN: Clear                                  Breath Sounds Pre-Tx LLL: Clear        Breath Sounds Pre-Tx RUL: Clear        Breath Sounds Pre-Tx RML: Clear        Breath Sounds Pre-Tx RLL: Clear  Breath Sounds Post Procedure: Breath Sounds Post-Tx KATHERIN: Clear          Breath Sounds Post-Tx LLL: Clear          Breath Sounds Post-Tx RUL: Clear          Breath Sounds Post-Tx RML: Clear          Breath Sounds Post-Tx RLL: Clear                                   Heart Rate: Pre procedure Pre-Tx Pulse: 73           Post procedure Post-Tx Pulse: 78    Resp Rate: Pre procedure Pre-Tx Resps: 18           Post procedure Post-Tx Resps: 17    SpO2:  SpO2: 95 %   with Oxygen                Nebulizer Therapy: Current medications Medications: Arformoterol, Budesonide      Changed: No    Problem List:   Patient Active Problem List   Diagnosis    Primary malignant neoplasm of lung metastatic to other site [facility])    Acute respiratory failure with hypoxia (HCC)    Acute encephalopathy    Coma (HCC)    Weakness    Shortness of breath    Goals of care, counseling/discussion    Neoplastic malignant related fatigue    Palliative care by specialist    Altered mental status       Respiratory Therapist: Ramona Baird RCP

## 2023-10-29 PROCEDURE — 2700000000 HC OXYGEN THERAPY PER DAY

## 2023-10-29 PROCEDURE — 94640 AIRWAY INHALATION TREATMENT: CPT

## 2023-10-29 PROCEDURE — 94761 N-INVAS EAR/PLS OXIMETRY MLT: CPT

## 2023-10-29 PROCEDURE — 6370000000 HC RX 637 (ALT 250 FOR IP): Performed by: NURSE PRACTITIONER

## 2023-10-29 PROCEDURE — 6360000002 HC RX W HCPCS: Performed by: INTERNAL MEDICINE

## 2023-10-29 PROCEDURE — 2580000003 HC RX 258: Performed by: STUDENT IN AN ORGANIZED HEALTH CARE EDUCATION/TRAINING PROGRAM

## 2023-10-29 PROCEDURE — 6370000000 HC RX 637 (ALT 250 FOR IP): Performed by: STUDENT IN AN ORGANIZED HEALTH CARE EDUCATION/TRAINING PROGRAM

## 2023-10-29 PROCEDURE — 1100000000 HC RM PRIVATE

## 2023-10-29 RX ADMIN — FUROSEMIDE 20 MG: 40 TABLET ORAL at 17:16

## 2023-10-29 RX ADMIN — PANTOPRAZOLE SODIUM 40 MG: 40 TABLET, DELAYED RELEASE ORAL at 06:43

## 2023-10-29 RX ADMIN — Medication: at 21:50

## 2023-10-29 RX ADMIN — ARFORMOTEROL TARTRATE: 15 SOLUTION RESPIRATORY (INHALATION) at 07:17

## 2023-10-29 RX ADMIN — SODIUM CHLORIDE, PRESERVATIVE FREE 10 ML: 5 INJECTION INTRAVENOUS at 08:38

## 2023-10-29 RX ADMIN — FUROSEMIDE 20 MG: 40 TABLET ORAL at 07:49

## 2023-10-29 RX ADMIN — OXYCODONE HYDROCHLORIDE AND ACETAMINOPHEN 2 TABLET: 5; 325 TABLET ORAL at 17:23

## 2023-10-29 RX ADMIN — Medication: at 08:38

## 2023-10-29 RX ADMIN — SENNOSIDES AND DOCUSATE SODIUM 1 TABLET: 50; 8.6 TABLET ORAL at 08:37

## 2023-10-29 RX ADMIN — SODIUM CHLORIDE, PRESERVATIVE FREE 10 ML: 5 INJECTION INTRAVENOUS at 21:45

## 2023-10-29 RX ADMIN — OXYCODONE HYDROCHLORIDE AND ACETAMINOPHEN 2 TABLET: 5; 325 TABLET ORAL at 21:45

## 2023-10-29 RX ADMIN — ARFORMOTEROL TARTRATE: 15 SOLUTION RESPIRATORY (INHALATION) at 19:52

## 2023-10-29 RX ADMIN — MELATONIN 3 MG: at 21:45

## 2023-10-29 ASSESSMENT — PAIN SCALES - GENERAL
PAINLEVEL_OUTOF10: 8
PAINLEVEL_OUTOF10: 6
PAINLEVEL_OUTOF10: 3

## 2023-10-29 ASSESSMENT — PAIN DESCRIPTION - ORIENTATION: ORIENTATION: LOWER;MID

## 2023-10-29 ASSESSMENT — PAIN DESCRIPTION - LOCATION
LOCATION: BACK
LOCATION: BACK

## 2023-10-29 ASSESSMENT — PAIN DESCRIPTION - DESCRIPTORS: DESCRIPTORS: ACHING

## 2023-10-30 PROCEDURE — 6370000000 HC RX 637 (ALT 250 FOR IP): Performed by: STUDENT IN AN ORGANIZED HEALTH CARE EDUCATION/TRAINING PROGRAM

## 2023-10-30 PROCEDURE — 94640 AIRWAY INHALATION TREATMENT: CPT

## 2023-10-30 PROCEDURE — 6360000002 HC RX W HCPCS: Performed by: INTERNAL MEDICINE

## 2023-10-30 PROCEDURE — 6370000000 HC RX 637 (ALT 250 FOR IP): Performed by: NURSE PRACTITIONER

## 2023-10-30 PROCEDURE — 94761 N-INVAS EAR/PLS OXIMETRY MLT: CPT

## 2023-10-30 PROCEDURE — 2580000003 HC RX 258: Performed by: STUDENT IN AN ORGANIZED HEALTH CARE EDUCATION/TRAINING PROGRAM

## 2023-10-30 PROCEDURE — 1100000000 HC RM PRIVATE

## 2023-10-30 PROCEDURE — 2700000000 HC OXYGEN THERAPY PER DAY

## 2023-10-30 RX ADMIN — PANTOPRAZOLE SODIUM 40 MG: 40 TABLET, DELAYED RELEASE ORAL at 05:40

## 2023-10-30 RX ADMIN — FUROSEMIDE 20 MG: 40 TABLET ORAL at 17:15

## 2023-10-30 RX ADMIN — FUROSEMIDE 20 MG: 40 TABLET ORAL at 08:08

## 2023-10-30 RX ADMIN — SODIUM CHLORIDE, PRESERVATIVE FREE 10 ML: 5 INJECTION INTRAVENOUS at 21:44

## 2023-10-30 RX ADMIN — SENNOSIDES AND DOCUSATE SODIUM 1 TABLET: 50; 8.6 TABLET ORAL at 08:08

## 2023-10-30 RX ADMIN — OXYCODONE HYDROCHLORIDE AND ACETAMINOPHEN 2 TABLET: 5; 325 TABLET ORAL at 17:15

## 2023-10-30 RX ADMIN — Medication: at 08:10

## 2023-10-30 RX ADMIN — Medication: at 21:44

## 2023-10-30 RX ADMIN — ARFORMOTEROL TARTRATE: 15 SOLUTION RESPIRATORY (INHALATION) at 19:47

## 2023-10-30 RX ADMIN — ARFORMOTEROL TARTRATE: 15 SOLUTION RESPIRATORY (INHALATION) at 07:54

## 2023-10-30 RX ADMIN — SODIUM CHLORIDE, PRESERVATIVE FREE 10 ML: 5 INJECTION INTRAVENOUS at 08:14

## 2023-10-30 ASSESSMENT — PAIN SCALES - GENERAL
PAINLEVEL_OUTOF10: 3
PAINLEVEL_OUTOF10: 8
PAINLEVEL_OUTOF10: 3

## 2023-10-30 ASSESSMENT — PAIN DESCRIPTION - LOCATION: LOCATION: BACK

## 2023-10-30 ASSESSMENT — PAIN DESCRIPTION - DESCRIPTORS: DESCRIPTORS: ACHING

## 2023-10-30 ASSESSMENT — PAIN DESCRIPTION - ORIENTATION: ORIENTATION: LOWER;MID

## 2023-10-31 VITALS
WEIGHT: 155.42 LBS | BODY MASS INDEX: 21.05 KG/M2 | TEMPERATURE: 98.4 F | SYSTOLIC BLOOD PRESSURE: 124 MMHG | HEART RATE: 90 BPM | HEIGHT: 72 IN | RESPIRATION RATE: 20 BRPM | OXYGEN SATURATION: 96 % | DIASTOLIC BLOOD PRESSURE: 72 MMHG

## 2023-10-31 PROCEDURE — 6370000000 HC RX 637 (ALT 250 FOR IP): Performed by: INTERNAL MEDICINE

## 2023-10-31 PROCEDURE — 94640 AIRWAY INHALATION TREATMENT: CPT

## 2023-10-31 PROCEDURE — 2700000000 HC OXYGEN THERAPY PER DAY

## 2023-10-31 PROCEDURE — 6370000000 HC RX 637 (ALT 250 FOR IP): Performed by: STUDENT IN AN ORGANIZED HEALTH CARE EDUCATION/TRAINING PROGRAM

## 2023-10-31 PROCEDURE — 6370000000 HC RX 637 (ALT 250 FOR IP): Performed by: NURSE PRACTITIONER

## 2023-10-31 PROCEDURE — 6360000002 HC RX W HCPCS: Performed by: INTERNAL MEDICINE

## 2023-10-31 PROCEDURE — 2580000003 HC RX 258: Performed by: STUDENT IN AN ORGANIZED HEALTH CARE EDUCATION/TRAINING PROGRAM

## 2023-10-31 RX ADMIN — Medication: at 09:14

## 2023-10-31 RX ADMIN — SODIUM CHLORIDE, PRESERVATIVE FREE 10 ML: 5 INJECTION INTRAVENOUS at 09:14

## 2023-10-31 RX ADMIN — ARFORMOTEROL TARTRATE: 15 SOLUTION RESPIRATORY (INHALATION) at 09:06

## 2023-10-31 RX ADMIN — FUROSEMIDE 20 MG: 40 TABLET ORAL at 09:14

## 2023-10-31 RX ADMIN — SENNOSIDES AND DOCUSATE SODIUM 1 TABLET: 50; 8.6 TABLET ORAL at 09:14

## 2023-10-31 RX ADMIN — IPRATROPIUM BROMIDE AND ALBUTEROL SULFATE 1 DOSE: .5; 3 SOLUTION RESPIRATORY (INHALATION) at 05:49

## 2023-10-31 RX ADMIN — PANTOPRAZOLE SODIUM 40 MG: 40 TABLET, DELAYED RELEASE ORAL at 06:38

## 2023-10-31 NOTE — CARE COORDINATION
10/31/23 0949   Social/Functional History   Active  No   Mode of Transportation Other  (medical transport)   Discharge Planning   Type of Residence Assisted living  ([facility])   Living Arrangements Other (Comment)  ([facility])   Patient expects to be discharged to: Assisted living  ([facility])   [address] Discharge   Transition of [address] [place] [address]  (CM Consult) Miguel Aertofallon Discharge Assisted living  [facility])   [address] Provided? No   Mode of Transport at Discharge BLS  ([facility] at 10A)   Confirm Follow Up Transport Family   Condition of Participation: Discharge Planning   The Patient and/or Patient Representative was provided with a Choice of Provider? Patient Representative   The Patient and/Or Patient Representative agree with the Discharge Plan? Yes   Freedom of Choice list was provided with basic dialogue that supports the patient's individualized plan of care/goals, treatment preferences, and shares the quality data associated with the providers? Yes     CM aware that pt will d/c on today and will transition to [facility] on today. CM aware that [facility] transport will arrive at 10:15A.  CM completed room visit with pt, with family at bedside. Pts family agreeable to d/c plan on today. Hospice agency: [facility] aware that pt will d/c on today and will transition to facility: senior living on today. ALEXIS completed the need of the pt at this time.     YUNIER Pierce CM  [phone]
Care Management Initial Assessment       RUR: 15%  Readmission? No  1st IM letter given? Yes -   1st  letter given: No    CM met with patient and family members in the room: Servando Traylor [facility]), phone [phone], [phone]. Son, Iram Lui. Assessment completed and patient mentioned that she is ready for hospice. Palliative Care team arrived to meet with family. CM returned to the the room this afternoon and confirmed that family and patient requesting hospice. [facility], as requested by family/patient. Family did not want a list. [facility] phoned and informed to watch for referral, that family would like to meet with them. 10/17/23 1520   Service Assessment   Patient Orientation Alert and Oriented   Cognition Alert   History Provided By Patient; Child/Family  (Marley Alfaro)   Primary Caregiver Self   Support Systems Children   Patient's Healthcare Decision Maker is: Named in [address]   Servando Traylor)   PCP Verified by CM Yes   Prior Functional Level Independent in ADLs/IADLs   Current Functional Level Other (see comment)  (unable to evaluate, as much weaker)   Can patient return to prior living arrangement No   Family able to assist with home care needs: Yes   Would you like for me to discuss the discharge plan with any other family members/significant others, and if so, who? Yes  (children)   Financial Resources [employer] None   Social/Functional History   Lives With Alone   Type of Home House   Active  No  (not often)   Discharge Planning   Type of Residence House   Current Services Prior To Admission None   Patient expects to be discharged to: Hospice (comment)  (referral made to [facility])     CM will continue to follow for dc needs.   Torsten Jain, RN  Care Manager  
Transition of Care Plan:     RUR: 18%  Prior Level of Functioning: Dependent with ADL's  Disposition:  SNF to Hospice placement- referrals pending  If SNF or IPR: Date F/OC offered: 10/18/23  Date FOC received:  10/18/23  Accepting facility: pending  Date authorization started with reference number:   Date authorization received and expires: Follow up appointments: PCP, Specialist  DME needed: facility to provide  Transportation at discharge: BLS to provide transportation   IM/IMM Medicare/ letter given: 2nd IM Letter given  Is patient a [place] and connected with VA? N/A              If yes, was [facility] transfer form completed and VA notified? Caregiver Contact: Trisha Pulido (POA), phone [phone], [phone]. Discharge Caregiver contacted prior to discharge? CM to discuss d/c plan with family- Son and Daughter  Care Conference needed? none  Barriers to discharge: SNF/Hospice placement     CM called and spoke with Charissa Valdez with [facility], they can accept pt today under Skilled but they do not have a private room. CM called Son, Maricruz Jacques and left a message for a return call. CM called Daughter and was unsuccessful. Daughter has gone back to [place]. Per [address] does not have bed available at this time .     [address]  Phone: [phone]
Transition of Care Plan:    RUR: 16%  Prior Level of Functioning: was independent at home and gotten weaker  Disposition:  [facility] and [facility]  If SNF or IPR:  na  Accepting facility:   Date authorization started with reference number:   Date authorization received and expires: Follow up appointments: Per hospice  DME needed: none  Transportation at discharge: BLS transport  IM/IMM Medicare/ letter given: yes, will need second IM letter   Is patient a  and connected with VA? If yes, was [employer] transfer form completed and VA notified? Caregiver Contact: POA/daughter  Rebekah Brito Home:  Mobile: [phone]     Discharge Caregiver contacted prior to discharge? yes  Care Conference needed? no  Barriers to discharge:  placement ready to accept    CM met with daughterDior November. Patient will be going to an assisted living, after d/c:  Mora Celis [phone] that will follow after d/c:  [facility] [phone]. CM spoke with Mora Celis. Form faxed for physician completion. Patient most likely not be able to move there until 10/31 (possibility of 10/30). CM spoke with Albert Ahumada at [facility]. She will need the referral through [address] Will admit to hospice after she is moved to the assisted living. 1 Form left in patients chart/shelf from assisted living. Message sent to hospitalist to complete. CM will continue to follow.     Braulio Mascorro, RN  Care Manager  
Transition of Care Plan:    RUR: 16%  Prior Level of Functioning: was independent at home and gotten weaker  Disposition:  [address] and [facility]  If SNF or IPR:  na  Accepting facility:   Date authorization started with reference number:   Date authorization received and expires: Follow up appointments: Per hospice  DME needed: none  Transportation at discharge: BLS transport  IM/IMM Medicare/ letter given: yes, will need second IM letter   Is patient a  and connected with VA? If yes, was [employer] transfer form completed and VA notified? Caregiver Contact: POA/daughter  Rebekah Brito Home:  Mobile: [phone]     Discharge Caregiver contacted prior to discharge? yes  Care Conference needed? no  Barriers to discharge:  placement ready to accept      CM: Lina Magallon is currently working with pt in the [address] Unit. CM informed that pt will transition to [facility], for long term placement with hospice: [facility].    ALEXIS spoke with long term liaison at [facility], and was informed that pt is pending completion of new resident forms that were fax to CM on 10/27/23. CM informed that new resident packet must be submitted before admission. CM informed that as long as new resident packet is complete, facility will be ready to accept pt on 10/31/23. CM initiated completion of new resident packet, and received appropriate signatures from physician. ALEXIS sent clinicals to [facility] physician to review, via fax: [phone]. CM will continue to follow.     YUNIER Mccray CM  [phone]
Transition of Care Plan:    RUR: 18%  Prior Level of Functioning: Dependent with ADL's  Disposition:  SNF to Hospice placement- referrals pending  If SNF or IPR: Date F/OC offered: 10/18/23  Date FOC received:  10/18/23  Accepting facility: pending  Date authorization started with reference number:   Date authorization received and expires: Follow up appointments: PCP, Specialist  DME needed: facility to provide  Transportation at discharge: BLS to provide transportation   IM/IMM Medicare/ letter given: 2nd IM Letter given  Is patient a  and connected with VA? N/A   If yes, was [employer] transfer form completed and VA notified? Caregiver Contact: David Lemon [facility]), [phone]  Discharge Caregiver contacted prior to discharge? CM to discuss d/c plan with family- Son and Daughter  Care Conference needed? none  Barriers to discharge: SNF/Hospice placement    UPDATE: 4:34PM    CM met with pts daughter: Rashmi Starr, with additional family at bedside. Rashmi Curielino reported that she is interested in snf list, to review and research additional facilities that can assist with LTC placement along with hospice. CM informed Rashmi Curielino of the following facility that did accept pt, and provided her with snf liaison contact information. Rashmi Starr plans to contact Lizeth Reeder. CM provided Rashmi Starr with snf list.  Rashmi Starr wanted a referral to be sent to [facility] (sent on cclink). YUNIER Mccray [place]  [phone]        INITIAL NOTE: CM followed up with snf liaison: Lizeth Reeder at [facility] ([phone]), via telephone regarding acceptance at facility. Lizeth Reeder reported that they are able to accept pt for hospice care at the time of d/c, and CM could provide pts daughter: Rashmi Starr, her information. CM spoke with Rashmi Starr, via telephone to provide her with the following information. Rashmi Starr reported that she would like to speak with CM at pts bedside when she arrives. CM will continue to follow.     YUNIER Mccray
Transition of Care Plan:    RUR: 18%  Prior Level of Functioning: Dependent with ADL's  Disposition:  SNF to Hospice placement- referrals pending  If SNF or IPR: Date F/OC offered: 10/18/23  Date FOC received:  10/18/23  Accepting facility: pending  Date authorization started with reference number:   Date authorization received and expires: Follow up appointments: PCP, Specialist  DME needed: facility to provide  Transportation at discharge: BLS to provide transportation   IM/IMM Medicare/ letter given: 2nd IM Letter given  Is patient a  and connected with VA? N/A   If yes, was [place] transfer form completed and VA notified? Caregiver Contact: Vivien Teresa (POA), phone [phone], [phone]. Discharge Caregiver contacted prior to discharge? CM to discuss d/c plan with family- Son and Daughter  Care Conference needed? none  Barriers to discharge: SNF/Hospice placement    CM met with DaughterAshli and Son, Tevin Alcaraz to discuss d/c plan . CM updated on facility responses and obtained additional options. [address] - is not able to accept    CM reached out to [facility] [phone] and Xavi Garcia . Eitan Villalobos- and spoke to admission liaison to review.  Placement pending    [address]  Phone: [phone]
Transition of Care Plan:    RUR: 18%  Prior Level of Functioning: Dependent with ADL's  Disposition:  SNF to Hospice placement- referrals pending  If SNF or IPR: Date F/OC offered: 10/18/23  Date FOC received:  10/18/23  Accepting facility: pending  Date authorization started with reference number:   Date authorization received and expires: Follow up appointments: PCP, Specialist  DME needed: facility to provide  Transportation at discharge: BLS to provide transportation   IM/IMM Medicare/ letter given: 2nd IM Letter given  Is patient a [place] and connected with VA? N/A   If yes, was [employer] transfer form completed and VA notified? Caregiver Contact: Chasidy Ortega (POA), phone [phone], [phone]. Discharge Caregiver contacted prior to discharge? CM to discuss d/c plan with family- Son and Daughter  Care Conference needed? none  Barriers to discharge: SNF/Hospice placement      CM: Imer Stern is currently working with pt in the [facility] [place]. CM informed that pt will transition to snf, with hospice placement at the time of d/c. Pt is pending repeat labs, and Urologist are following with pts care. Pt has a pending US. CM will continue to follow.     YUNIER Pierce CM  [phone]
Transition of Care Plan:    RUR: 18%  Prior Level of Functioning: some level of independence  Disposition: choosing to go to SNF with hospice component  If SNF or IPR: Date FOC offered: 10/18  Date FOC received:   Accepting facility:   Date authorization started with reference number:   Date authorization received and expires: Follow up appointments:   DME needed: SNF/hospice to provide  Transportation at discharge: medical ambulance  IM/IMM Medicare/ letter given: yes  Is patient a  and connected with VA? na   If yes, was [employer] transfer form completed and VA notified? Caregiver Contact: April Dance (TREY), phone [phone], [phone]. Discharge Caregiver contacted prior to discharge? yes  Care Conference needed? no  Barriers to discharge:  medically ready with facility arranged    CM met with patient and family to discuss discharge planning. Patient had made the decision to go to hospice and had asked to meet with [facility]. Dori Sotelo [phone], at [address] [facility], met with patient and family. She discussed the options with patient and family. The decision was made to go to a SNF and transition to hospice. CM gave the SNF list to the patient and family, as requested. SNF gave the phone number of a SNF liaison to the family, to discuss the implications and answer questions. Family gave the CM a list of SNF's to send referrals: [facility]. Referrrals will be sent. Once a facility accepts, a CM will need to reach out for follow up. CM will continue to follow.   Roger Otto, RN   Care Manager  
call.    Summer Alberts, [facility] CM  [phone]

## 2023-10-31 NOTE — PLAN OF CARE
Problem: Discharge Planning  Goal: Discharge to home or other facility with appropriate resources  Outcome: Adequate for Discharge     Problem: Pain  Goal: Verbalizes/displays adequate comfort level or baseline comfort level  Outcome: Adequate for Discharge     Problem: Safety - Adult  Goal: Free from fall injury  Outcome: Adequate for Discharge     Problem: Respiratory - Adult  Goal: Achieves optimal ventilation and oxygenation  Outcome: Adequate for Discharge     Problem: Skin/Tissue Integrity  Goal: Absence of new skin breakdown  Description: 1. Monitor for areas of redness and/or skin breakdown  2. Assess vascular access sites hourly  3. Every 4-6 hours minimum:  Change oxygen saturation probe site  4. Every 4-6 hours:  If on nasal continuous positive airway pressure, respiratory therapy assess nares and determine need for appliance change or resting period.   Outcome: Adequate for Discharge     Problem: Neurosensory - Adult  Goal: Achieves stable or improved neurological status  Outcome: Adequate for Discharge  Goal: Absence of seizures  Outcome: Adequate for Discharge  Goal: Remains free of injury related to seizures activity  Outcome: Adequate for Discharge  Goal: Achieves maximal functionality and self care  Outcome: Adequate for Discharge     Problem: Cardiovascular - Adult  Goal: Maintains optimal cardiac output and hemodynamic stability  Outcome: Adequate for Discharge  Goal: Absence of cardiac dysrhythmias or at baseline  Outcome: Adequate for Discharge     Problem: Skin/Tissue Integrity - Adult  Goal: Skin integrity remains intact  Outcome: Adequate for Discharge  Goal: Incisions, wounds, or drain sites healing without S/S of infection  Outcome: Adequate for Discharge  Goal: Oral mucous membranes remain intact  Outcome: Adequate for Discharge     Problem: Musculoskeletal - Adult  Goal: Return mobility to safest level of function  Outcome: Adequate for Discharge  Goal: Maintain proper alignment of

## 2023-11-01 NOTE — DISCHARGE SUMMARY
Admit date: 10/15/2023   Admitting Provider: Dante Turner DO    Discharge date: 10/27/2023  Discharging Provider: VERONICA Vazquez      * Admission Diagnoses:    Acute respiratory failure with hypoxia ([address]) [J96.01]  Acute encephalopathy [G93.40]  Acute hypoxemic respiratory failure ([address]) [J96.01]    * Discharge Diagnoses:      Principal Problem:    Acute respiratory failure with hypoxia ([address])  Active Problems:    Primary malignant neoplasm of lung metastatic to other site [facility])    Acute encephalopathy    Coma ([address])    Weakness    Shortness of breath    Goals of care, counseling/discussion    Neoplastic malignant related fatigue    Palliative care by specialist    Altered mental status  Resolved Problems:    * No resolved hospital problems. [facility] Course:     Sherri aMo is a 82-year-old female with a PMH of COPD on home O2, metastatic lung cancer, atrial fibrillation s/p PPM and on Eliquis, and hypertension who presented after being found down at home by her son for unknown period of time. In ED hypoxic requiring 10 L via nasal cannula. Patient initially evaluated by ICU for potential admission. After discussion with family they would not desire heroic measures and have elected for DNR status currently without clear indication for ICU admission. Medicine requested to admit to stepdown unit. CT head negative for acute process. CXR demonstrating moderately severe pulmonary edema pattern with bibasilar airspace disease atelectasis versus pneumonia with small bilateral pleural effusions. COVID-negative. Initial labs significant for WBC of 12.4, PLT of 234, sodium of 129, potassium of 5, glucose of 124, total bilirubin of 1.3, ALT of  182, AST of 204, alkaline phos of 74, albumin of 2.7, troponin of 156, and BNP of 1754. UA + pyuria, bacteruria, and LE. CT head with no acute intracranial process.  CXR with moderately severe pulmonary edema with bibasilar disease and small bilateral pleural
Admit date: 10/15/2023   Admitting Provider: Sai Ellison DO    Discharge date: 10/29/2023  Discharging Provider: VERONICA Brown      * Admission Diagnoses:    Acute respiratory failure with hypoxia ([address]) [J96.01]  Acute encephalopathy [G93.40]  Acute hypoxemic respiratory failure ([address]) [J96.01]    * Discharge Diagnoses:      Principal Problem:    Acute respiratory failure with hypoxia ([address])  Active Problems:    Primary malignant neoplasm of lung metastatic to other site [facility])    Acute encephalopathy    Coma ([address])    Weakness    Shortness of breath    Goals of care, counseling/discussion    Neoplastic malignant related fatigue    Palliative care by specialist    Altered mental status  Resolved Problems:    * No resolved hospital problems. [facility] Course:     Vargas Montilla is a 43-year-old female with a PMH of COPD on home O2, metastatic lung cancer, atrial fibrillation s/p PPM and on Eliquis, and hypertension who presented after being found down at home by her son for unknown period of time. In ED hypoxic requiring 10 L via nasal cannula. Patient initially evaluated by ICU for potential admission. After discussion with family they would not desire heroic measures and have elected for DNR status currently without clear indication for ICU admission. Medicine requested to admit to stepdown unit. CT head negative for acute process. CXR demonstrating moderately severe pulmonary edema pattern with bibasilar airspace disease atelectasis versus pneumonia with small bilateral pleural effusions. COVID-negative. Initial labs significant for WBC of 12.4, PLT of 234, sodium of 129, potassium of 5, glucose of 124, total bilirubin of 1.3, ALT of  182, AST of 204, alkaline phos of 74, albumin of 2.7, troponin of 156, and BNP of 1754. UA + pyuria, bacteruria, and LE. CT head with no acute intracranial process.  CXR with moderately severe pulmonary edema with bibasilar disease and small bilateral pleural
Discharge Summary    Name: Di Guerra  [id number]  YOB: 1940 (Age: 82 y.o.)   Date of Admission: 10/15/2023  Date of Discharge: 10/31/2023  Attending Physician: No att. providers found    Discharge Diagnosis: Acute respiratory failure with hypoxia, acute encephalopathy, primary malignant neoplasm of the lung with metastasis, and weakness. Consultations:  IP CONSULT TO PALLIATIVE CARE  IP CONSULT TO NEUROLOGY  IP CONSULT TO CARDIOLOGY  IP CONSULT TO HOSPICE  IP CONSULT TO CASE MANAGEMENT  IP CONSULT TO CASE MANAGEMENT      Brief Admission History/Reason for Admission/Brief Hospital Course by Main Problems:    Chi Lopez is a 82-year-old female with a PMH of COPD on home O2, metastatic lung cancer, atrial fibrillation s/p PPM and on Eliquis, and hypertension who presented after being found down at home by her son for unknown period of time. In ED hypoxic requiring 10 L via nasal cannula. Patient initially evaluated by ICU for potential admission. After discussion with family they would not desire heroic measures and have elected for DNR status currently without clear indication for ICU admission. Medicine requested to admit to stepdown unit. CT head negative for acute process. CXR demonstrating moderately severe pulmonary edema pattern with bibasilar airspace disease atelectasis versus pneumonia with small bilateral pleural effusions. COVID-negative. Initial labs significant for WBC of 12.4, PLT of 234, sodium of 129, potassium of 5, glucose of 124, total bilirubin of 1.3, ALT of  182, AST of 204, alkaline phos of 74, albumin of 2.7, troponin of 156, and BNP of 1754. UA + pyuria, bacteruria, and LE. CT head with no acute intracranial process. CXR with moderately severe pulmonary edema with bibasilar disease and small bilateral pleural effusions.     CTA head/neck with no large vessel occulusion/thrombosis, 3 mm anterior communicating artery/right A1-A2 segment
Palpations: Abdomen is soft. Tenderness: There is no abdominal tenderness. Musculoskeletal:      Right lower leg: No edema. Left lower leg: No edema. Neurological:      Mental Status: She is alert. She is disoriented. Comments: Memory loss   Psychiatric:      Comments: Unable to assess     Discharge/Recent Laboratory Results:  No results for input(s): \"NA\", \"K\", \"CL\", \"CO2\", \"BUN\", \"CREATININE\", \"GLU\", \"CA\", \"PHOS\", \"MG\" in the last 72 hours. No results for input(s): \"HGB\", \"HCT\", \"WBC\", \"PLT\" in the last 72 hours. * Discharge Condition: Fair  * Disposition:  facility with hospice    Discharge Medications:     Medication List        START taking these medications      dicyclomine 10 MG capsule  Commonly known as: BENTYL  Take 1 capsule by mouth 4 times daily as needed (abdominal pain)     furosemide 20 MG tablet  Commonly known as: LASIX  Take 1 tablet by mouth in the morning and 1 tablet in the evening. oxyCODONE-acetaminophen 5-325 MG per tablet  Commonly known as: PERCOCET  Take 2 tablets by mouth every 4 hours as needed for Pain for up to 3 days.  Max Daily Amount: 12 tablets     scopolamine transdermal patch  Commonly known as: TRANSDERM-SCOP  Place 1 patch onto the skin every 72 hours     sennosides-docusate sodium 8.6-50 MG tablet  Commonly known as: SENOKOT-S  Take 1 tablet by mouth daily  Start taking on: October 27, 2023            Zaki Mistry taking these medications      acetaminophen 325 MG tablet  Commonly known as: TYLENOL     albuterol sulfate  (90 Base) MCG/ACT inhaler  Commonly known as: PROVENTIL;VENTOLIN;PROAIR     budesonide 0.5 MG/2ML nebulizer suspension  Commonly known as: PULMICORT     ipratropium 0.5 mg-albuterol 2.5 mg 0.5-2.5 (3) MG/3ML Soln nebulizer solution  Commonly known as: DUONEB     pantoprazole 40 MG tablet  Commonly known as: PROTONIX  TAKE 1 TABLET BY MOUTH EVERY MORNING (BEFORE BREAKFAST) WHILE ON STEROIDS     prochlorperazine 10 MG
(includes going over instructions, follow-up, prescriptions, and preparing report for sign off to her PCP): 35 minutes

## 2024-05-01 NOTE — TELEPHONE ENCOUNTER
----- Message from Dennie Scripture, MD sent at 7/6/2023  4:52 PM EDT -----  No change in AST/ALT. Repeat labs weekly. Hold treatment. color consistent with ethnicity/race

## (undated) DEVICE — QUILTED PREMIUM COMFORT UNDERPAD,EXTRA HEAVY: Brand: WINGS

## (undated) DEVICE — SYRINGE MED 20ML STD CLR PLAS LUERLOCK TIP N CTRL DISP

## (undated) DEVICE — CATH IV AUTOGRD BC BLU 22GA 25 -- INSYTE

## (undated) DEVICE — 6619 2 PTNT ISO SYS INCISE AREA&LT;(&GT;&&LT;)&GT;P: Brand: STERI-DRAPE™ IOBAN™ 2

## (undated) DEVICE — SOLIDIFIER FLUID 3000 CC ABSORB

## (undated) DEVICE — Device: Brand: MEDEX

## (undated) DEVICE — NON-REM POLYHESIVE PATIENT RETURN ELECTRODE: Brand: VALLEYLAB

## (undated) DEVICE — TRAP SUC MUCOUS 70ML -- MEDICHOICE MEDLINE

## (undated) DEVICE — ROCKER SWITCH PENCIL BLADE ELECTRODE, HOLSTER: Brand: EDGE

## (undated) DEVICE — KENDALL RADIOLUCENT FOAM MONITORING ELECTRODE RECTANGULAR SHAPE: Brand: KENDALL

## (undated) DEVICE — CURITY NON-ADHERENT STRIPS: Brand: CURITY

## (undated) DEVICE — NEEDLE HYPO 18GA L1.5IN PNK S STL HUB POLYPR SHLD REG BVL

## (undated) DEVICE — Device

## (undated) DEVICE — FORCEPS BX L240CM JAW DIA2.8MM L CAP W/ NDL MIC MESH TOOTH

## (undated) DEVICE — STRAP,POSITIONING,KNEE/BODY,FOAM,4X60": Brand: MEDLINE

## (undated) DEVICE — TOWEL 4 PLY TISS 19X30 SUE WHT

## (undated) DEVICE — BAG BELONG PT PERS CLEAR HANDL

## (undated) DEVICE — CATH IV AUTOGRD BC PNK 20GA 25 -- INSYTE

## (undated) DEVICE — Z DISCONTINUED NO SUB IDED SET EXTN W/ 4 W STPCOCK M SPIN LOK 36IN

## (undated) DEVICE — CONTAINER SPEC 20 ML LID NEUT BUFF FORMALIN 10 % POLYPR STS

## (undated) DEVICE — Device: Brand: MEDICAL ACTION INDUSTRIES

## (undated) DEVICE — SOLIDIFIER MEDC 1200ML -- CONVERT TO 356117

## (undated) DEVICE — 1200 GUARD II KIT W/5MM TUBE W/O VAC TUBE: Brand: GUARDIAN

## (undated) DEVICE — SUTURE VCRL SZ 2-0 L27IN ABSRB UD L26MM CT-2 1/2 CIR J269H

## (undated) DEVICE — SOLUTION IV 1000ML 0.9% SOD CHL

## (undated) DEVICE — KENDALL RADIOLUCENT FOAM MONITORING ELECTRODE -RECTANGULAR SHAPE: Brand: KENDALL

## (undated) DEVICE — SNARE ENDOSCP M L240CM W27MM SHTH DIA2.4MM CHN 2.8MM OVL

## (undated) DEVICE — NEONATAL-ADULT SPO2 SENSOR: Brand: NELLCOR

## (undated) DEVICE — STRAINER URIN CALC RNL MSH -- CONVERT TO ITEM 357634

## (undated) DEVICE — AIRLIFE™ U/CONNECT-IT OXYGEN TUBING 7 FEET (2.1 M) CRUSH-RESISTANT OXYGEN TUBING, VINYL TIPPED: Brand: AIRLIFE™

## (undated) DEVICE — Z DISCONTINUED USE 2751540 TUBING IRRIG L10IN DISP PMP ENDOGATOR

## (undated) DEVICE — STERILE POLYISOPRENE POWDER-FREE SURGICAL GLOVES: Brand: PROTEXIS

## (undated) DEVICE — CUFF ADULT 1 PC 1 VINYL DISP --

## (undated) DEVICE — SYR 3ML LL TIP 1/10ML GRAD --

## (undated) DEVICE — BASIN EMESIS 500CC ROSE 250/CS 60/PLT: Brand: MEDEGEN MEDICAL PRODUCTS, LLC

## (undated) DEVICE — SET ADMIN 16ML TBNG L100IN 2 Y INJ SITE IV PIGGY BK DISP

## (undated) DEVICE — CLIP INT L235CM WRK CHAN DIA2.8MM OPN 11MM LCK MECHANISM MR

## (undated) DEVICE — CONNECTOR TBNG AUX H2O JET DISP FOR OLY 160/180 SER

## (undated) DEVICE — Z DISCONTINUED PER MEDLINE LINE GAS SAMPLING O2/CO2 LNG AD 13 FT NSL W/ TBNG FILTERLINE

## (undated) DEVICE — BASIN EMSIS 16OZ GRAPHITE PLAS KID SHP MOLD GRAD FOR ORAL

## (undated) DEVICE — BW-412T DISP COMBO CLEANING BRUSH: Brand: SINGLE USE COMBINATION CLEANING BRUSH

## (undated) DEVICE — KIT,1200CC CANISTER,3/16"X6' TUBING: Brand: MEDLINE INDUSTRIES, INC.

## (undated) DEVICE — TOWEL SURG W17XL27IN STD BLU COT NONFENESTRATED PREWASHED

## (undated) DEVICE — STERILE POLYISOPRENE POWDER-FREE SURGICAL GLOVES WITH EMOLLIENT COATING: Brand: PROTEXIS

## (undated) DEVICE — BAG SPEC BIOHZD LF 2MIL 6X10IN -- CONVERT TO ITEM 357326

## (undated) DEVICE — SURGICAL PROCEDURE PACK BASIN MAJ SET CUST NO CAUT

## (undated) DEVICE — REM POLYHESIVE ADULT PATIENT RETURN ELECTRODE: Brand: VALLEYLAB

## (undated) DEVICE — SYR 10ML LUER LOK 1/5ML GRAD --

## (undated) DEVICE — UNDERPAD XTR STRGHT 30X36IN --

## (undated) DEVICE — SYR 50ML SLIP TIP NSAF LF STRL --

## (undated) DEVICE — (D)SYR 10ML 1/5ML GRAD NSAF -- PKGING CHANGE USE ITEM 338027

## (undated) DEVICE — TRAP SURG QUAD PARABOLA SLOT DSGN SFTY SCRN TRAPEASE

## (undated) DEVICE — PACK,BASIC,SIRUS,V: Brand: MEDLINE

## (undated) DEVICE — DRSG AQUACEL SURG 3.5X6IN -- CONVERT TO ITEM 369227

## (undated) DEVICE — PREP SKN PREVAIL 40ML APPL --

## (undated) DEVICE — CANN NASAL O2 CAPNOGRAPHY AD -- FILTERLINE

## (undated) DEVICE — SUTURE MCRYL SZ 3-0 L27IN ABSRB UD L19MM PS-2 3/8 CIR PRIM Y427H

## (undated) DEVICE — ENDO CARRY-ON PROCEDURE KIT INCLUDES ENZYMATIC SPONGE, GAUZE, BIOHAZARD LABEL, TRAY, LUBRICANT, DIRTY SCOPE LABEL, WATER LABEL, TRAY, DRAWSTRING PAD, AND DEFENDO 4-PIECE KIT.: Brand: ENDO CARRY-ON PROCEDURE KIT